# Patient Record
Sex: FEMALE | Race: WHITE | Employment: OTHER | URBAN - METROPOLITAN AREA
[De-identification: names, ages, dates, MRNs, and addresses within clinical notes are randomized per-mention and may not be internally consistent; named-entity substitution may affect disease eponyms.]

---

## 2017-01-09 ENCOUNTER — OFFICE VISIT (OUTPATIENT)
Dept: INTERNAL MEDICINE CLINIC | Facility: CLINIC | Age: 82
End: 2017-01-09

## 2017-01-09 VITALS
RESPIRATION RATE: 16 BRPM | TEMPERATURE: 99 F | WEIGHT: 150 LBS | DIASTOLIC BLOOD PRESSURE: 64 MMHG | SYSTOLIC BLOOD PRESSURE: 100 MMHG | HEART RATE: 64 BPM | BODY MASS INDEX: 27.6 KG/M2 | HEIGHT: 62 IN

## 2017-01-09 DIAGNOSIS — R42 VERTIGO: Primary | ICD-10-CM

## 2017-01-09 DIAGNOSIS — F41.9 ANXIETY: ICD-10-CM

## 2017-01-09 DIAGNOSIS — R26.89 BALANCE PROBLEM: ICD-10-CM

## 2017-01-09 PROCEDURE — 99213 OFFICE O/P EST LOW 20 MIN: CPT | Performed by: INTERNAL MEDICINE

## 2017-01-09 RX ORDER — ESCITALOPRAM OXALATE 5 MG/1
5 TABLET ORAL DAILY
Qty: 90 TABLET | Refills: 0 | Status: SHIPPED | OUTPATIENT
Start: 2017-01-09 | End: 2017-05-25

## 2017-01-09 RX ORDER — MECLIZINE HYDROCHLORIDE CHEWABLE TABLETS 25 MG/1
TABLET, CHEWABLE ORAL
Qty: 30 TABLET | Refills: 0 | Status: SHIPPED | OUTPATIENT
Start: 2017-01-09 | End: 2017-02-09

## 2017-01-09 NOTE — PROGRESS NOTES
Vannesa Sal is a 80year old female. Patient presents with:  Dizziness: pt thinks it is the lexapro causing this, along with nausea       HPI:     C/o dizziness with any head turns for the last week. +nausea with it.  No uri symptoms or vomiting, no f/c/CP 9636,9194        • H/O mammogram 2011     left breast   • Arthritis      knees and shoulders   • Cancer [facility])      breast ca   • Breast CA ([facility]    • Rheumatic fever    • History of endoscopy 2013     Dr. Aman Brown   • H/O mammogr to left, walking slow b/c balance feels slightly off, no focal deficits    ASSESSMENT AND PLAN:   Vertigo  (primary encounter diagnosis)- meclizine prn, PT, see me in 2 weeks if not better  Anxiety- continue lexapro.  Discussed I do not think dizziness is r

## 2017-01-12 ENCOUNTER — HOSPITAL ENCOUNTER (OUTPATIENT)
Dept: PHYSICAL THERAPY | Facility: HOSPITAL | Age: 82
Setting detail: THERAPIES SERIES
Discharge: HOME | End: 2017-01-12
Attending: INTERNAL MEDICINE
Payer: MEDICARE

## 2017-01-12 DIAGNOSIS — R26.89 BALANCE PROBLEM: ICD-10-CM

## 2017-01-12 DIAGNOSIS — R42 VERTIGO: Primary | ICD-10-CM

## 2017-01-12 PROCEDURE — 97162 PT EVAL MOD COMPLEX 30 MIN: CPT

## 2017-01-12 NOTE — PROGRESS NOTES
VESTIBULAR EVALUATION:   Referring Physician: Hellen Staley  Diagnosis: Vertigo/Balance disturbance     Date of Service: 1/12/2017     PATIENT Clayton Sal is a 80year old year old y/o female who presents to therapy today with complaints of dizziness mobility, and household tasks without limitations or dizziness prior to 1 week ago. Pt goals include finding out what is causing her symptoms and what is going on so it doesn't happen again. Past medical history was reviewed with Fe Sal.  Significant pursuit:  Normal  Saccades:  Normal   Gaze Evoked Nystagmus:  Normal   Vergence: Normal  VOR slow Right: Normal; Left Normal  VOR Head Thrust:  Normal   VOR-C: Normal    Neurological Exam:   Vertebro/Basilar Artery: Provocation testing in sitting was negat to [phone].  If you have any questions, please contact me at Dept: [phone]    Sincerely,  Electronically signed by therapist: Hailey Valladares DPT    [de-identified] certification required: Yes  I certify the need for these services furnished under this

## 2017-02-09 ENCOUNTER — OFFICE VISIT (OUTPATIENT)
Dept: INTERNAL MEDICINE CLINIC | Facility: CLINIC | Age: 82
End: 2017-02-09

## 2017-02-09 VITALS
DIASTOLIC BLOOD PRESSURE: 68 MMHG | SYSTOLIC BLOOD PRESSURE: 120 MMHG | HEIGHT: 62 IN | WEIGHT: 152.63 LBS | TEMPERATURE: 98 F | RESPIRATION RATE: 16 BRPM | BODY MASS INDEX: 28.09 KG/M2 | HEART RATE: 68 BPM

## 2017-02-09 DIAGNOSIS — Z00.00 ENCOUNTER FOR ANNUAL HEALTH EXAMINATION: Primary | ICD-10-CM

## 2017-02-09 DIAGNOSIS — N20.0 NEPHROLITHIASIS: ICD-10-CM

## 2017-02-09 DIAGNOSIS — Z12.31 ENCOUNTER FOR SCREENING MAMMOGRAM FOR MALIGNANT NEOPLASM OF BREAST: ICD-10-CM

## 2017-02-09 DIAGNOSIS — N28.1 BILATERAL RENAL CYSTS: ICD-10-CM

## 2017-02-09 DIAGNOSIS — E78.00 PURE HYPERCHOLESTEROLEMIA: ICD-10-CM

## 2017-02-09 DIAGNOSIS — F41.9 ANXIETY: ICD-10-CM

## 2017-02-09 DIAGNOSIS — Z12.39 SCREENING FOR MALIGNANT NEOPLASM OF BREAST: ICD-10-CM

## 2017-02-09 DIAGNOSIS — I10 ESSENTIAL HYPERTENSION: ICD-10-CM

## 2017-02-09 DIAGNOSIS — M19.019 SHOULDER ARTHRITIS: ICD-10-CM

## 2017-02-09 DIAGNOSIS — K76.0 FATTY INFILTRATION OF LIVER: ICD-10-CM

## 2017-02-09 DIAGNOSIS — Z90.11 H/O RIGHT MASTECTOMY: ICD-10-CM

## 2017-02-09 DIAGNOSIS — I77.811 ECTATIC ABDOMINAL AORTA (HCC): ICD-10-CM

## 2017-02-09 PROCEDURE — 96160 PT-FOCUSED HLTH RISK ASSMT: CPT | Performed by: INTERNAL MEDICINE

## 2017-02-09 PROCEDURE — G0439 PPPS, SUBSEQ VISIT: HCPCS | Performed by: INTERNAL MEDICINE

## 2017-02-09 PROCEDURE — 99397 PER PM REEVAL EST PAT 65+ YR: CPT | Performed by: INTERNAL MEDICINE

## 2017-02-09 NOTE — PATIENT INSTRUCTIONS
Fe Sal's SCREENING SCHEDULE   Tests on this list are recommended by your physician but may not be covered, or covered at this frequency, by your insurer. Please check with your insurance carrier before scheduling to verify coverage.    PREVENTATIVE S with a family history    Colorectal Cancer Screening  Covered up to Age 76     Colonoscopy Screen   Covered every 10 years- more often if abnormal Colonoscopy,5 Years due on 08/21/2018 Update Health Maintenance if applicable    Flex Sigmoidoscopy Screen  C visit on 10/28/15  -FLU VACC PRSV FREE INC ANTIG   Orders placed or performed in visit on 10/21/14  -INFLUENZA VIRUS VACCINE, >=1YEARS OF AGE   Orders placed or performed in visit on 10/09/12  -INFLUENZA VIRUS VACCINE, PRESERV FREE, >=1YEARS OF AGE    Pl (the forms are also available in [place]  [email or web address] org  This link also has information from the [facility] regarding Advance Directives.

## 2017-02-09 NOTE — PROGRESS NOTES
HPI:   Ibeth Hall is a 80year old female who presents for a MA (Medicare Advantage) Supervisit (Once per calendar year). Anxiety - doing great on lexapro 5mg daily, sleeping well.  Good energy  Renal cysts/kidney stones- needs to see Dr. Jesus Banks this Benjamin Martinez MD:  escitalopram 5 MG Oral Tab Take 1 tablet (5 mg total) by mouth daily. AmLODIPine Besylate 5 MG Oral Tab Take 1 tablet (5 mg total) by mouth once daily.    Multiple Vitamins-Minerals (ICAPS AREDS 2 OR) Take 1 capsule by mouth 2 (two) times daily any unusual skin lesions  EYES: denies blurred vision or double vision  HEENT: denies nasal congestion, sinus pain or ST  LUNGS: denies shortness of breath  CARDIOVASCULAR: denies chest pain on exertion  GI: denies abdominal pain, denies heartburn  : den supraclavicular, and axillary nodes normal   Neurologic: Normal       SUGGESTED VACCINATIONS - Influenza, Pneumococcal, Zoster, Tetanus     Immunization History   Administered Date(s) Administered   • >=3 YRS TRI  MULTIDOSE VIAL (69416) FLU CLINIC 10/21/20 Ms. Jeanine Blackwell does not currently take aspirin. Patient is already taking aspirin      Diet assessment: good     Advanced Directive:  Living Will on file in [facility]? Fe Sal does not have a Living Will on file in [facility]  Discussed with patient and provided you have difficulty seeing?: 0-No    Do you have any difficulty walking or getting up?: 0-No    Do you have any tripping hazards?: 0-No    Are you on multiple medications?: 1-Yes    Does pain affect your day to day activities?: 0-No     Have you had any me 10 years No results found for this or any previous visit. No flowsheet data found. Fecal Occult Blood Annually No results found for: FOB No flowsheet data found.     Glaucoma Screening      Ophthalmology Visit Annually: Diabetics, FHx Glaucoma, AA>50, H 10/03/2016 4.2   12/05/2014 4.6    No flowsheet data found. Creatinine  Annually CREATININE (mg/dL)   Date Value   10/03/2016 0.96   12/05/2014 1.04*    No flowsheet data found.     BUN  Annually BUN (mg/dL)   Date Value   10/03/2016 17     UREA NITROG

## 2017-02-20 ENCOUNTER — LAB ENCOUNTER (OUTPATIENT)
Dept: LAB | Age: 82
End: 2017-02-20
Attending: INTERNAL MEDICINE
Payer: MEDICARE

## 2017-02-20 DIAGNOSIS — E78.00 PURE HYPERCHOLESTEROLEMIA: ICD-10-CM

## 2017-02-20 DIAGNOSIS — F41.9 ANXIETY: ICD-10-CM

## 2017-02-20 DIAGNOSIS — I10 ESSENTIAL HYPERTENSION: ICD-10-CM

## 2017-02-20 DIAGNOSIS — N20.0 NEPHROLITHIASIS: ICD-10-CM

## 2017-02-20 LAB
ALBUMIN SERPL-MCNC: 3.8 G/DL (ref 3.5–4.8)
ALP LIVER SERPL-CCNC: 67 U/L (ref 55–142)
ALT SERPL-CCNC: 31 U/L (ref 14–54)
AST SERPL-CCNC: 21 U/L (ref 15–41)
BASOPHILS # BLD AUTO: 0.06 X10(3) UL (ref 0–0.1)
BASOPHILS NFR BLD AUTO: 0.9 %
BILIRUB SERPL-MCNC: 0.9 MG/DL (ref 0.1–2)
BUN BLD-MCNC: 13 MG/DL (ref 8–20)
CALCIUM BLD-MCNC: 10.1 MG/DL (ref 8.3–10.3)
CHLORIDE: 105 MMOL/L (ref 101–111)
CHOLEST SMN-MCNC: 162 MG/DL (ref ?–200)
CO2: 29 MMOL/L (ref 22–32)
CREAT BLD-MCNC: 1.06 MG/DL (ref 0.55–1.02)
EOSINOPHIL # BLD AUTO: 0.14 X10(3) UL (ref 0–0.3)
EOSINOPHIL NFR BLD AUTO: 2.1 %
ERYTHROCYTE [DISTWIDTH] IN BLOOD BY AUTOMATED COUNT: 13.2 % (ref 11.5–16)
GLUCOSE BLD-MCNC: 96 MG/DL (ref 70–99)
HCT VFR BLD AUTO: 44 % (ref 34–50)
HDLC SERPL-MCNC: 42 MG/DL (ref 45–?)
HDLC SERPL: 3.86 {RATIO} (ref ?–4.44)
HGB BLD-MCNC: 14.2 G/DL (ref 12–16)
IMMATURE GRANULOCYTE COUNT: 0.02 X10(3) UL (ref 0–1)
IMMATURE GRANULOCYTE RATIO %: 0.3 %
LDLC SERPL CALC-MCNC: 92 MG/DL (ref ?–130)
LYMPHOCYTES # BLD AUTO: 1.69 X10(3) UL (ref 0.9–4)
LYMPHOCYTES NFR BLD AUTO: 25.9 %
M PROTEIN MFR SERPL ELPH: 7.7 G/DL (ref 6.1–8.3)
MCH RBC QN AUTO: 29 PG (ref 27–33.2)
MCHC RBC AUTO-ENTMCNC: 32.3 G/DL (ref 31–37)
MCV RBC AUTO: 90 FL (ref 81–100)
MONOCYTES # BLD AUTO: 0.69 X10(3) UL (ref 0.1–0.6)
MONOCYTES NFR BLD AUTO: 10.6 %
NEUTROPHIL ABS PRELIM: 3.93 X10 (3) UL (ref 1.3–6.7)
NEUTROPHILS # BLD AUTO: 3.93 X10(3) UL (ref 1.3–6.7)
NEUTROPHILS NFR BLD AUTO: 60.2 %
NONHDLC SERPL-MCNC: 120 MG/DL (ref ?–130)
PLATELET # BLD AUTO: 314 10(3)UL (ref 150–450)
POTASSIUM SERPL-SCNC: 4.1 MMOL/L (ref 3.6–5.1)
RBC # BLD AUTO: 4.89 X10(6)UL (ref 3.8–5.1)
RED CELL DISTRIBUTION WIDTH-SD: 43.5 FL (ref 35.1–46.3)
SODIUM SERPL-SCNC: 138 MMOL/L (ref 136–144)
TRIGLYCERIDES: 141 MG/DL (ref ?–150)
TSI SER-ACNC: 2.39 MIU/ML (ref 0.35–5.5)
VLDL: 28 MG/DL (ref 5–40)
WBC # BLD AUTO: 6.5 X10(3) UL (ref 4–13)

## 2017-02-20 PROCEDURE — 80053 COMPREHEN METABOLIC PANEL: CPT

## 2017-02-20 PROCEDURE — 84443 ASSAY THYROID STIM HORMONE: CPT

## 2017-02-20 PROCEDURE — 80061 LIPID PANEL: CPT

## 2017-02-20 PROCEDURE — 85025 COMPLETE CBC W/AUTO DIFF WBC: CPT

## 2017-03-23 ENCOUNTER — HOSPITAL ENCOUNTER (OUTPATIENT)
Dept: MAMMOGRAPHY | Age: 82
Discharge: HOME | End: 2017-03-23
Attending: INTERNAL MEDICINE
Payer: MEDICARE

## 2017-03-23 DIAGNOSIS — Z90.11 H/O RIGHT MASTECTOMY: ICD-10-CM

## 2017-03-23 DIAGNOSIS — Z12.31 ENCOUNTER FOR SCREENING MAMMOGRAM FOR MALIGNANT NEOPLASM OF BREAST: ICD-10-CM

## 2017-03-23 PROCEDURE — 77067 SCR MAMMO BI INCL CAD: CPT

## 2017-05-25 ENCOUNTER — OFFICE VISIT (OUTPATIENT)
Dept: INTERNAL MEDICINE CLINIC | Facility: CLINIC | Age: 82
End: 2017-05-25

## 2017-05-25 VITALS
WEIGHT: 151 LBS | SYSTOLIC BLOOD PRESSURE: 118 MMHG | TEMPERATURE: 99 F | DIASTOLIC BLOOD PRESSURE: 66 MMHG | HEART RATE: 64 BPM | BODY MASS INDEX: 27.79 KG/M2 | HEIGHT: 62 IN

## 2017-05-25 DIAGNOSIS — R53.82 CHRONIC FATIGUE: Primary | ICD-10-CM

## 2017-05-25 DIAGNOSIS — F41.9 ANXIETY: ICD-10-CM

## 2017-05-25 DIAGNOSIS — M54.50 ACUTE BILATERAL LOW BACK PAIN WITHOUT SCIATICA: ICD-10-CM

## 2017-05-25 PROBLEM — D37.8 NEOPLASM OF UNCERTAIN BEHAVIOR OF STOMACH, INTESTINES, AND RECTUM: Status: ACTIVE | Noted: 2017-05-25

## 2017-05-25 PROBLEM — D37.5 NEOPLASM OF UNCERTAIN BEHAVIOR OF STOMACH, INTESTINES, AND RECTUM: Status: ACTIVE | Noted: 2017-05-25

## 2017-05-25 PROBLEM — D37.1 NEOPLASM OF UNCERTAIN BEHAVIOR OF STOMACH, INTESTINES, AND RECTUM: Status: ACTIVE | Noted: 2017-05-25

## 2017-05-25 PROBLEM — Z80.0 FAMILY HISTORY OF MALIGNANT NEOPLASM OF GASTROINTESTINAL TRACT: Status: ACTIVE | Noted: 2017-05-25

## 2017-05-25 PROCEDURE — 99214 OFFICE O/P EST MOD 30 MIN: CPT | Performed by: INTERNAL MEDICINE

## 2017-05-25 RX ORDER — ESCITALOPRAM OXALATE 5 MG/1
5 TABLET ORAL DAILY
Qty: 90 TABLET | Refills: 1 | Status: SHIPPED | OUTPATIENT
Start: 2017-05-25 | End: 2017-11-30

## 2017-05-25 NOTE — PROGRESS NOTES
Nicolas Sal is a 80year old female. Patient presents with: Follow - Up: 3 month  Fatigue  Pain: Pt. c/o back pain      HPI:     Patient here for f/u  Anxiety- has some down days but most are good, not excessively worrying like before.  No crying or lack (MULTIVITAMIN OR) Take  by mouth daily.  Disp:  Rfl:       Past Medical History   Diagnosis Date   • Other and unspecified personal history of malignant neoplasm 2001     dr. Pauline Lehman, ill  breast cancer right   • Other and unspecified hyperlipidem adenopathy  LUNGS: normal rate without respiratory distress, lungs clear to auscultation  CARDIO: RRR nl S1 S2  GI: normal bowel sounds, soft, NT/ND  Spine: paraspinal muscle spasm in the lumbar region, neg slr  EXTREMITIES: no cyanosis, clubbing or edema,

## 2017-06-12 RX ORDER — AMLODIPINE BESYLATE 5 MG/1
TABLET ORAL
Qty: 90 TABLET | Refills: 0 | Status: SHIPPED | OUTPATIENT
Start: 2017-06-12 | End: 2017-11-30

## 2017-08-11 ENCOUNTER — OFFICE VISIT (OUTPATIENT)
Dept: INTERNAL MEDICINE CLINIC | Facility: CLINIC | Age: 82
End: 2017-08-11

## 2017-08-11 VITALS
HEART RATE: 67 BPM | TEMPERATURE: 99 F | SYSTOLIC BLOOD PRESSURE: 122 MMHG | BODY MASS INDEX: 27.67 KG/M2 | WEIGHT: 150.38 LBS | HEIGHT: 62 IN | RESPIRATION RATE: 16 BRPM | DIASTOLIC BLOOD PRESSURE: 60 MMHG

## 2017-08-11 DIAGNOSIS — F41.9 ANXIETY AND DEPRESSION: ICD-10-CM

## 2017-08-11 DIAGNOSIS — R35.0 FREQUENCY OF URINATION: Primary | ICD-10-CM

## 2017-08-11 DIAGNOSIS — G89.29 CHRONIC RIGHT SHOULDER PAIN: ICD-10-CM

## 2017-08-11 DIAGNOSIS — F32.A ANXIETY AND DEPRESSION: ICD-10-CM

## 2017-08-11 DIAGNOSIS — M25.511 CHRONIC RIGHT SHOULDER PAIN: ICD-10-CM

## 2017-08-11 LAB
APPEARANCE: CLEAR
BILIRUBIN: NEGATIVE
GLUCOSE (URINE DIPSTICK): NEGATIVE MG/DL
KETONES (URINE DIPSTICK): NEGATIVE MG/DL
MULTISTIX LOT#: ABNORMAL NUMERIC
NITRITE, URINE: NEGATIVE
PH, URINE: 6.5 (ref 4.5–8)
SPECIFIC GRAVITY: 1.01 (ref 1–1.03)
URINE-COLOR: YELLOW
UROBILINOGEN,SEMI-QN: 0.2 MG/DL (ref 0–1.9)

## 2017-08-11 PROCEDURE — 87086 URINE CULTURE/COLONY COUNT: CPT | Performed by: INTERNAL MEDICINE

## 2017-08-11 PROCEDURE — 81003 URINALYSIS AUTO W/O SCOPE: CPT | Performed by: INTERNAL MEDICINE

## 2017-08-11 PROCEDURE — 99214 OFFICE O/P EST MOD 30 MIN: CPT | Performed by: INTERNAL MEDICINE

## 2017-08-11 RX ORDER — CIPROFLOXACIN 250 MG/1
250 TABLET, FILM COATED ORAL 2 TIMES DAILY
Qty: 14 TABLET | Refills: 0 | Status: SHIPPED | OUTPATIENT
Start: 2017-08-11 | End: 2017-08-18

## 2017-08-11 NOTE — PROGRESS NOTES
Natividad Sal is a [de-identified]year old female. Patient presents with:   Follow - Up: ROOM 3/ urine frequency/ nausea/ bilateral shoulder pain, right worse/ extreme fatigue/ all sx x2 weeks      HPI:     C/o urinary frequency, low grade fevers, and fatigue for 2+ wee CRANBERRY EXTRACT OR Take 4,200 mg by mouth 3 (three) times daily. Disp:  Rfl:    Omega-3 Fatty Acids (FISH OIL) 1200 MG Oral Cap Take 1,200 mg by mouth daily. Disp:  Rfl:    Calcium 600 MG Oral Tab Take 600 mg by mouth daily.  Disp:  Rfl:    Multiple Vit adenopathy      EXAM:   /60 (BP Location: Left arm, Patient Position: Sitting, Cuff Size: adult)   Pulse 67   Temp 98.6 °F (37 °C) (Oral)   Resp 16   Ht 62\"   Wt 150 lb 6.4 oz   BMI 27.51 kg/m²   GENERAL: well developed, well nourished,in no apparen

## 2017-09-11 DIAGNOSIS — I10 ESSENTIAL HYPERTENSION: ICD-10-CM

## 2017-09-11 RX ORDER — AMLODIPINE BESYLATE 5 MG/1
TABLET ORAL
Qty: 90 TABLET | Refills: 0 | Status: SHIPPED | OUTPATIENT
Start: 2017-09-11 | End: 2019-06-14

## 2017-09-22 ENCOUNTER — OFFICE VISIT (OUTPATIENT)
Dept: INTERNAL MEDICINE CLINIC | Facility: CLINIC | Age: 82
End: 2017-09-22

## 2017-09-22 ENCOUNTER — LAB ENCOUNTER (OUTPATIENT)
Dept: LAB | Age: 82
End: 2017-09-22
Attending: INTERNAL MEDICINE
Payer: MEDICARE

## 2017-09-22 VITALS
SYSTOLIC BLOOD PRESSURE: 120 MMHG | TEMPERATURE: 99 F | RESPIRATION RATE: 16 BRPM | HEART RATE: 68 BPM | DIASTOLIC BLOOD PRESSURE: 70 MMHG | HEIGHT: 62 IN | BODY MASS INDEX: 27.42 KG/M2 | WEIGHT: 149 LBS

## 2017-09-22 DIAGNOSIS — R53.81 MALAISE AND FATIGUE: Primary | ICD-10-CM

## 2017-09-22 DIAGNOSIS — F41.9 ANXIETY AND DEPRESSION: ICD-10-CM

## 2017-09-22 DIAGNOSIS — R11.0 NAUSEA: ICD-10-CM

## 2017-09-22 DIAGNOSIS — R53.83 MALAISE AND FATIGUE: ICD-10-CM

## 2017-09-22 DIAGNOSIS — Z23 NEED FOR INFLUENZA VACCINATION: ICD-10-CM

## 2017-09-22 DIAGNOSIS — R53.83 MALAISE AND FATIGUE: Primary | ICD-10-CM

## 2017-09-22 DIAGNOSIS — F32.A ANXIETY AND DEPRESSION: ICD-10-CM

## 2017-09-22 DIAGNOSIS — R53.81 MALAISE AND FATIGUE: ICD-10-CM

## 2017-09-22 LAB
ALBUMIN SERPL-MCNC: 3.6 G/DL (ref 3.5–4.8)
ALP LIVER SERPL-CCNC: 78 U/L (ref 55–142)
ALT SERPL-CCNC: 35 U/L (ref 14–54)
AST SERPL-CCNC: 22 U/L (ref 15–41)
BASOPHILS # BLD AUTO: 0.06 X10(3) UL (ref 0–0.1)
BASOPHILS NFR BLD AUTO: 0.7 %
BILIRUB SERPL-MCNC: 0.6 MG/DL (ref 0.1–2)
BUN BLD-MCNC: 21 MG/DL (ref 8–20)
CALCIUM BLD-MCNC: 9.8 MG/DL (ref 8.3–10.3)
CHLORIDE: 102 MMOL/L (ref 101–111)
CO2: 30 MMOL/L (ref 22–32)
CREAT BLD-MCNC: 1.09 MG/DL (ref 0.55–1.02)
EOSINOPHIL # BLD AUTO: 0.16 X10(3) UL (ref 0–0.3)
EOSINOPHIL NFR BLD AUTO: 1.8 %
ERYTHROCYTE [DISTWIDTH] IN BLOOD BY AUTOMATED COUNT: 13.4 % (ref 11.5–16)
FREE T4: 1 NG/DL (ref 0.9–1.8)
GLUCOSE BLD-MCNC: 110 MG/DL (ref 70–99)
HCT VFR BLD AUTO: 42.2 % (ref 34–50)
HGB BLD-MCNC: 13.5 G/DL (ref 12–16)
IMMATURE GRANULOCYTE COUNT: 0.02 X10(3) UL (ref 0–1)
IMMATURE GRANULOCYTE RATIO %: 0.2 %
LYMPHOCYTES # BLD AUTO: 2.39 X10(3) UL (ref 0.9–4)
LYMPHOCYTES NFR BLD AUTO: 26.4 %
M PROTEIN MFR SERPL ELPH: 7.7 G/DL (ref 6.1–8.3)
MCH RBC QN AUTO: 28.2 PG (ref 27–33.2)
MCHC RBC AUTO-ENTMCNC: 32 G/DL (ref 31–37)
MCV RBC AUTO: 88.3 FL (ref 81–100)
MONOCYTES # BLD AUTO: 1.15 X10(3) UL (ref 0.1–0.6)
MONOCYTES NFR BLD AUTO: 12.7 %
NEUTROPHIL ABS PRELIM: 5.27 X10 (3) UL (ref 1.3–6.7)
NEUTROPHILS # BLD AUTO: 5.27 X10(3) UL (ref 1.3–6.7)
NEUTROPHILS NFR BLD AUTO: 58.2 %
PLATELET # BLD AUTO: 307 10(3)UL (ref 150–450)
POTASSIUM SERPL-SCNC: 3.7 MMOL/L (ref 3.6–5.1)
RBC # BLD AUTO: 4.78 X10(6)UL (ref 3.8–5.1)
RED CELL DISTRIBUTION WIDTH-SD: 43.6 FL (ref 35.1–46.3)
SODIUM SERPL-SCNC: 137 MMOL/L (ref 136–144)
TSI SER-ACNC: 2.47 MIU/ML (ref 0.35–5.5)
WBC # BLD AUTO: 9.1 X10(3) UL (ref 4–13)

## 2017-09-22 PROCEDURE — 93000 ELECTROCARDIOGRAM COMPLETE: CPT | Performed by: INTERNAL MEDICINE

## 2017-09-22 PROCEDURE — G0008 ADMIN INFLUENZA VIRUS VAC: HCPCS | Performed by: INTERNAL MEDICINE

## 2017-09-22 PROCEDURE — 80050 GENERAL HEALTH PANEL: CPT | Performed by: INTERNAL MEDICINE

## 2017-09-22 PROCEDURE — 99214 OFFICE O/P EST MOD 30 MIN: CPT | Performed by: INTERNAL MEDICINE

## 2017-09-22 PROCEDURE — 90653 IIV ADJUVANT VACCINE IM: CPT | Performed by: INTERNAL MEDICINE

## 2017-09-22 PROCEDURE — 84439 ASSAY OF FREE THYROXINE: CPT | Performed by: INTERNAL MEDICINE

## 2017-09-22 NOTE — PROGRESS NOTES
Mariama Sal is a [de-identified]year old female. Patient presents with: Follow - Up: 6 week , wants to do labs  Fatigue  Nausea      HPI:     Patient here for f/u  C/o nausea intermittently for 6-8 weeks, no vomiting or abdominal pain with it. No f/c/BM changes.  No Disp:  Rfl:    PRAVASTATIN SODIUM 10 MG Oral Tab TAKE 1 TABLET BY MOUTH EVERY NIGHT AT BEDTIME Disp: 90 tablet Rfl: 3   CRANBERRY EXTRACT OR Take 4,200 mg by mouth 3 (three) times daily.  Disp:  Rfl:    Omega-3 Fatty Acids (FISH OIL) 1200 MG Oral Cap Take 1 pain on exertion, no palpatations  GI: denies abdominal pain and denies heartburn, +nausea  : no dysuria, hematuria  NEURO: denies headaches  PSYCH: as above  RHEUM: No reported joint swelling  HEME: No adenopathy      EXAM:   /70 (BP Location: Rig

## 2017-10-05 ENCOUNTER — HOSPITAL ENCOUNTER (OUTPATIENT)
Dept: ULTRASOUND IMAGING | Age: 82
Discharge: HOME | End: 2017-10-05
Attending: INTERNAL MEDICINE
Payer: MEDICARE

## 2017-10-05 DIAGNOSIS — R53.83 FATIGUE, UNSPECIFIED TYPE: ICD-10-CM

## 2017-10-05 DIAGNOSIS — R11.0 NAUSEA: ICD-10-CM

## 2017-10-05 PROCEDURE — 76700 US EXAM ABDOM COMPLETE: CPT | Performed by: INTERNAL MEDICINE

## 2017-11-14 RX ORDER — PRAVASTATIN SODIUM 10 MG
TABLET ORAL
Qty: 90 TABLET | Refills: 0 | Status: SHIPPED | OUTPATIENT
Start: 2017-11-14 | End: 2018-02-14

## 2017-11-30 ENCOUNTER — OFFICE VISIT (OUTPATIENT)
Dept: INTERNAL MEDICINE CLINIC | Facility: CLINIC | Age: 82
End: 2017-11-30

## 2017-11-30 VITALS
BODY MASS INDEX: 27.23 KG/M2 | WEIGHT: 148 LBS | HEART RATE: 66 BPM | SYSTOLIC BLOOD PRESSURE: 116 MMHG | HEIGHT: 62 IN | DIASTOLIC BLOOD PRESSURE: 68 MMHG | RESPIRATION RATE: 17 BRPM

## 2017-11-30 DIAGNOSIS — R53.81 MALAISE AND FATIGUE: Primary | ICD-10-CM

## 2017-11-30 DIAGNOSIS — F41.9 ANXIETY: ICD-10-CM

## 2017-11-30 DIAGNOSIS — I10 ESSENTIAL HYPERTENSION: ICD-10-CM

## 2017-11-30 DIAGNOSIS — N28.9 RENAL INSUFFICIENCY: ICD-10-CM

## 2017-11-30 DIAGNOSIS — E55.9 VITAMIN D DEFICIENCY: ICD-10-CM

## 2017-11-30 DIAGNOSIS — R53.83 MALAISE AND FATIGUE: Primary | ICD-10-CM

## 2017-11-30 PROCEDURE — 99214 OFFICE O/P EST MOD 30 MIN: CPT | Performed by: INTERNAL MEDICINE

## 2017-11-30 RX ORDER — ESCITALOPRAM OXALATE 5 MG/1
5 TABLET ORAL DAILY
Qty: 90 TABLET | Refills: 1 | Status: SHIPPED | OUTPATIENT
Start: 2017-11-30 | End: 2018-06-26

## 2017-11-30 NOTE — PROGRESS NOTES
Pari Sal is a [de-identified]year old female. Patient presents with: Follow - Up: 6 month f/u , lab results and US results  Fatigue: wants labs checked  HTN      HPI:     Patient here for f/u  Nausea - cleared on its own, labs and US were ok.  Appetite is good, no (MULTIVITAMIN OR) Take  by mouth daily. Disp:  Rfl:       Past Medical History:   Diagnosis Date   • Arthritis     knees and shoulders   • Breast CA ([facility] [address] 2001    right breast cancer with mastectomy.    • Cancer [facility]) 2001    right breast ca   • Cystitis, i clear  NECK: supple,no adenopathy  LUNGS: normal rate without respiratory distress, lungs clear to auscultation  CARDIO: RRR nl S1 S2  GI: normal bowel sounds, no masses, HSM or tenderness  EXTREMITIES: no cyanosis, clubbing or edema, normal strength and t

## 2017-12-01 ENCOUNTER — LAB ENCOUNTER (OUTPATIENT)
Dept: LAB | Age: 82
End: 2017-12-01
Attending: INTERNAL MEDICINE
Payer: MEDICARE

## 2017-12-01 DIAGNOSIS — R53.83 MALAISE AND FATIGUE: ICD-10-CM

## 2017-12-01 DIAGNOSIS — R53.81 MALAISE AND FATIGUE: ICD-10-CM

## 2017-12-01 DIAGNOSIS — I10 ESSENTIAL HYPERTENSION: ICD-10-CM

## 2017-12-01 DIAGNOSIS — E55.9 VITAMIN D DEFICIENCY: ICD-10-CM

## 2017-12-01 DIAGNOSIS — N28.9 RENAL INSUFFICIENCY: ICD-10-CM

## 2017-12-01 PROCEDURE — 82607 VITAMIN B-12: CPT | Performed by: INTERNAL MEDICINE

## 2017-12-01 PROCEDURE — 80048 BASIC METABOLIC PNL TOTAL CA: CPT | Performed by: INTERNAL MEDICINE

## 2017-12-01 PROCEDURE — 82306 VITAMIN D 25 HYDROXY: CPT | Performed by: INTERNAL MEDICINE

## 2017-12-08 DIAGNOSIS — I10 ESSENTIAL HYPERTENSION: ICD-10-CM

## 2017-12-08 RX ORDER — AMLODIPINE BESYLATE 5 MG/1
TABLET ORAL
Qty: 90 TABLET | Refills: 0 | Status: SHIPPED | OUTPATIENT
Start: 2017-12-08 | End: 2018-04-09

## 2017-12-29 DIAGNOSIS — F41.9 ANXIETY: ICD-10-CM

## 2017-12-29 RX ORDER — ESCITALOPRAM OXALATE 5 MG/1
TABLET ORAL
Qty: 90 TABLET | Refills: 0 | OUTPATIENT
Start: 2017-12-29

## 2018-02-14 RX ORDER — PRAVASTATIN SODIUM 10 MG
TABLET ORAL
Qty: 90 TABLET | Refills: 1 | Status: SHIPPED | OUTPATIENT
Start: 2018-02-14 | End: 2018-08-24

## 2018-03-06 ENCOUNTER — TELEPHONE (OUTPATIENT)
Dept: INTERNAL MEDICINE CLINIC | Facility: CLINIC | Age: 83
End: 2018-03-06

## 2018-03-06 DIAGNOSIS — Z12.39 SCREENING FOR MALIGNANT NEOPLASM OF BREAST: ICD-10-CM

## 2018-03-06 DIAGNOSIS — Z90.11 H/O RIGHT MASTECTOMY: Primary | ICD-10-CM

## 2018-03-07 DIAGNOSIS — I10 ESSENTIAL HYPERTENSION: ICD-10-CM

## 2018-03-07 RX ORDER — AMLODIPINE BESYLATE 5 MG/1
TABLET ORAL
Qty: 90 TABLET | Refills: 0 | Status: SHIPPED | OUTPATIENT
Start: 2018-03-07 | End: 2018-04-09

## 2018-03-26 ENCOUNTER — HOSPITAL ENCOUNTER (OUTPATIENT)
Dept: MAMMOGRAPHY | Age: 83
Discharge: HOME | End: 2018-03-26
Attending: INTERNAL MEDICINE
Payer: MEDICARE

## 2018-03-26 DIAGNOSIS — Z12.39 SCREENING FOR MALIGNANT NEOPLASM OF BREAST: ICD-10-CM

## 2018-03-26 DIAGNOSIS — Z90.11 H/O RIGHT MASTECTOMY: ICD-10-CM

## 2018-03-26 PROCEDURE — 77067 SCR MAMMO BI INCL CAD: CPT | Performed by: INTERNAL MEDICINE

## 2018-04-09 ENCOUNTER — TELEPHONE (OUTPATIENT)
Dept: INTERNAL MEDICINE CLINIC | Facility: CLINIC | Age: 83
End: 2018-04-09

## 2018-04-09 ENCOUNTER — OFFICE VISIT (OUTPATIENT)
Dept: INTERNAL MEDICINE CLINIC | Facility: CLINIC | Age: 83
End: 2018-04-09

## 2018-04-09 VITALS
SYSTOLIC BLOOD PRESSURE: 120 MMHG | WEIGHT: 144 LBS | TEMPERATURE: 99 F | BODY MASS INDEX: 26.5 KG/M2 | HEIGHT: 62 IN | DIASTOLIC BLOOD PRESSURE: 60 MMHG | HEART RATE: 68 BPM

## 2018-04-09 DIAGNOSIS — R42 LIGHTHEADED: Primary | ICD-10-CM

## 2018-04-09 DIAGNOSIS — I10 ESSENTIAL HYPERTENSION: ICD-10-CM

## 2018-04-09 DIAGNOSIS — F41.9 ANXIETY: ICD-10-CM

## 2018-04-09 DIAGNOSIS — R11.0 NAUSEA: ICD-10-CM

## 2018-04-09 DIAGNOSIS — E78.00 PURE HYPERCHOLESTEROLEMIA: ICD-10-CM

## 2018-04-09 PROCEDURE — 93000 ELECTROCARDIOGRAM COMPLETE: CPT | Performed by: INTERNAL MEDICINE

## 2018-04-09 PROCEDURE — 99214 OFFICE O/P EST MOD 30 MIN: CPT | Performed by: INTERNAL MEDICINE

## 2018-04-09 RX ORDER — ONDANSETRON 4 MG/1
4 TABLET, FILM COATED ORAL EVERY 8 HOURS PRN
Qty: 30 TABLET | Refills: 0 | Status: SHIPPED | OUTPATIENT
Start: 2018-04-09 | End: 2018-04-26

## 2018-04-09 NOTE — TELEPHONE ENCOUNTER
Patient states she has had dizziness, lightheadedness and nausea for about one month, no appetite, no energy, no new medications except for Lexapro 11/2017, ok as long as she is lying down, feels better after lying down gets up and starts all over, drinkin

## 2018-04-09 NOTE — TELEPHONE ENCOUNTER
Pt has been feeling dizzy and lightheaded and nauseated for about the past 2 weeks. Not sure if it is your medication. It goes on throughout the day.

## 2018-04-09 NOTE — PROGRESS NOTES
Hossein Sal is a [de-identified]year old female. Patient presents with:  Dizziness: LG. Room 4. Dizziness, nausea, fatigue and lightheadedness for about 2 weeks.  Doesn't notice anything triggering the dizziness  Nausea  Anxiety      HPI:     Patient here with several (two) times daily. Disp:  Rfl:    CRANBERRY EXTRACT OR Take 4,200 mg by mouth 3 (three) times daily. Disp:  Rfl:    Omega-3 Fatty Acids (FISH OIL) 1200 MG Oral Cap Take 1,200 mg by mouth daily.  Disp:  Rfl:    Calcium 600 MG Oral Tab Take 600 mg by mouth da joint swelling  HEME: No easy bruising, bleeding or adenopathy      EXAM:   /60 (BP Location: Left arm, Patient Position: Sitting, Cuff Size: adult)   Pulse 68   Temp 98.7 °F (37.1 °C) (Oral)   Ht 62\"   Wt 144 lb   BMI 26.34 kg/m²   GENERAL: well de

## 2018-04-10 ENCOUNTER — LAB ENCOUNTER (OUTPATIENT)
Dept: LAB | Age: 83
End: 2018-04-10
Attending: INTERNAL MEDICINE
Payer: MEDICARE

## 2018-04-10 DIAGNOSIS — R42 LIGHTHEADED: ICD-10-CM

## 2018-04-10 DIAGNOSIS — R11.0 NAUSEA: ICD-10-CM

## 2018-04-10 DIAGNOSIS — F41.9 ANXIETY: ICD-10-CM

## 2018-04-10 PROCEDURE — 80053 COMPREHEN METABOLIC PANEL: CPT | Performed by: INTERNAL MEDICINE

## 2018-04-10 PROCEDURE — 84443 ASSAY THYROID STIM HORMONE: CPT | Performed by: INTERNAL MEDICINE

## 2018-04-10 PROCEDURE — 85025 COMPLETE CBC W/AUTO DIFF WBC: CPT | Performed by: INTERNAL MEDICINE

## 2018-04-26 ENCOUNTER — OFFICE VISIT (OUTPATIENT)
Dept: INTERNAL MEDICINE CLINIC | Facility: CLINIC | Age: 83
End: 2018-04-26

## 2018-04-26 VITALS
TEMPERATURE: 97 F | BODY MASS INDEX: 26.13 KG/M2 | DIASTOLIC BLOOD PRESSURE: 70 MMHG | WEIGHT: 142 LBS | RESPIRATION RATE: 16 BRPM | SYSTOLIC BLOOD PRESSURE: 112 MMHG | HEART RATE: 72 BPM | HEIGHT: 62 IN

## 2018-04-26 DIAGNOSIS — F32.A MILD DEPRESSION: ICD-10-CM

## 2018-04-26 DIAGNOSIS — N20.0 NEPHROLITHIASIS: ICD-10-CM

## 2018-04-26 DIAGNOSIS — M25.511 CHRONIC RIGHT SHOULDER PAIN: ICD-10-CM

## 2018-04-26 DIAGNOSIS — F41.9 ANXIETY: ICD-10-CM

## 2018-04-26 DIAGNOSIS — I10 ESSENTIAL HYPERTENSION: ICD-10-CM

## 2018-04-26 DIAGNOSIS — R53.82 CHRONIC FATIGUE: ICD-10-CM

## 2018-04-26 DIAGNOSIS — I77.811 ECTATIC ABDOMINAL AORTA (HCC): ICD-10-CM

## 2018-04-26 DIAGNOSIS — Z00.00 ENCOUNTER FOR ANNUAL HEALTH EXAMINATION: Primary | ICD-10-CM

## 2018-04-26 DIAGNOSIS — M19.019 SHOULDER ARTHRITIS: ICD-10-CM

## 2018-04-26 DIAGNOSIS — G89.29 CHRONIC RIGHT SHOULDER PAIN: ICD-10-CM

## 2018-04-26 DIAGNOSIS — Z90.11 H/O RIGHT MASTECTOMY: ICD-10-CM

## 2018-04-26 PROBLEM — M54.50 ACUTE BILATERAL LOW BACK PAIN WITHOUT SCIATICA: Status: RESOLVED | Noted: 2017-05-25 | Resolved: 2018-04-26

## 2018-04-26 PROCEDURE — G0439 PPPS, SUBSEQ VISIT: HCPCS | Performed by: INTERNAL MEDICINE

## 2018-04-26 PROCEDURE — 96160 PT-FOCUSED HLTH RISK ASSMT: CPT | Performed by: INTERNAL MEDICINE

## 2018-04-26 PROCEDURE — 99397 PER PM REEVAL EST PAT 65+ YR: CPT | Performed by: INTERNAL MEDICINE

## 2018-04-26 NOTE — PROGRESS NOTES
HPI:   Sammi Morfin is a [de-identified]year old female who presents for a MA (Medicare Advantage) Supervisit (Once per calendar year).     LH and dizzy feeling resolved with pushing fluids (seen 4/9/18), labs were wnl  Depression and Anxiety - doing well on lexapro 5 0 so is at low risk.     Patient Care Team: Patient Care Team:  Yury Gates MD as PCP - Laura Espinal MD ([facility] 8248)  Driss Reece PA-C (Physician Assistant)  David Tobar MD ([facility])  CHRISTY Zhu (Int Multiple Vitamins-Minerals (ICAPS AREDS 2 OR) Take 1 capsule by mouth 2 (two) times daily. CRANBERRY EXTRACT OR Take 4,200 mg by mouth 3 (three) times daily. Omega-3 Fatty Acids (FISH OIL) 1200 MG Oral Cap Take 1,200 mg by mouth daily.    Calcium 600 Weight as of this encounter: 142 lb.     Medicare Hearing Assessment  (Required for AWV/SWV)    Finger Rub  WNL     Visual Acuity WNL                           General Appearance:  Alert, cooperative, no distress, appears stated age   Head:  Normocephalic, visit:    Encounter for annual health examination- utd on cscope and mammogram. Declined shingrix vaccine due to side effect profile. Essential hypertension- controlled, cpm  -     LIPID PANEL;  Future    Ectatic abdominal aorta ([address]- mild ectasia and Cardiovascular Disease Screening     LDL Annually LDL-CHOLESTEROL (mg/dL (calc))   Date Value   12/05/2014 115     LDL Cholesterol (mg/dL)   Date Value   02/20/2017 92        EKG - w/ Initial Preventative Physical Exam only, or if medically necessary [place] for the mentally retarded   Persons who live in the same house as a HepB virus carrier   Homosexual men   Illicit injectable drug abusers     Tetanus Toxoid  Only covered with a cut with metal- TD and TDaP Not covered by Medicare Part B No vaccine history

## 2018-04-26 NOTE — PATIENT INSTRUCTIONS
Fe Sal's SCREENING SCHEDULE   Tests on this list are recommended by your physician but may not be covered, or covered at this frequency, by your insurer. Please check with your insurance carrier before scheduling to verify coverage.    PREVENTATIVE S following criteria:   • Men who are 73-68 years old and have smoked more than 100 cigarettes in their lifetime   • Anyone with a family history    Colorectal Cancer Screening  Covered up to Age 76     Colonoscopy Screen   Covered every 10 years- more often Immunizations      Influenza  Covered Annually   Orders placed or performed in visit on 11/10/16  -FLU VACC [address] ANTIG   Orders placed or performed in visit on 10/28/15  -FLU VACC PRSV FREE INC ANTIG   Orders placed or performed in visit on 10/2 different types of Advance Directives. It also has the State forms available on it's website for anyone to review and print using their home computer and printer. (the forms are also available in [address])  [email or web address]  This link also has informa

## 2018-05-01 ENCOUNTER — APPOINTMENT (OUTPATIENT)
Dept: LAB | Age: 83
End: 2018-05-01
Attending: INTERNAL MEDICINE
Payer: MEDICARE

## 2018-05-01 DIAGNOSIS — I10 ESSENTIAL HYPERTENSION: ICD-10-CM

## 2018-05-01 DIAGNOSIS — I77.811 ECTATIC ABDOMINAL AORTA (HCC): ICD-10-CM

## 2018-05-01 PROCEDURE — 80061 LIPID PANEL: CPT

## 2018-05-29 NOTE — PROGRESS NOTES
Edouard Sal is a [de-identified]year old female. Patient presents with: Follow - Up: to discuss medications for anxiety.  LB-room 4      HPI:     Patient here for f/u  Anxiety is much worse in the last 2 weeks, several nights she wakes up at 3am with panic attacks an 600 MG Oral Tab Take 600 mg by mouth daily. Disp:  Rfl:    Multiple Vitamins-Minerals (MULTIVITAMIN OR) Take  by mouth daily.  Disp:  Rfl:       Past Medical History:   Diagnosis Date   • Arthritis     knees and shoulders   • [facility]    right b atraumatic, normocephalic,ears and throat are clear  NECK: supple,no adenopathy  LUNGS: normal rate without respiratory distress, lungs clear to auscultation  CARDIO: RRR nl S1 S2   NEURO: Alert and oriented    ASSESSMENT AND PLAN:   Panic attack- resource

## 2018-06-05 DIAGNOSIS — I10 ESSENTIAL HYPERTENSION: ICD-10-CM

## 2018-06-05 RX ORDER — AMLODIPINE BESYLATE 5 MG/1
TABLET ORAL
Qty: 90 TABLET | Refills: 0 | Status: SHIPPED | OUTPATIENT
Start: 2018-06-05 | End: 2018-06-28

## 2018-06-06 ENCOUNTER — MOBILE ENCOUNTER (OUTPATIENT)
Dept: INTERNAL MEDICINE CLINIC | Facility: CLINIC | Age: 83
End: 2018-06-06

## 2018-06-06 RX ORDER — CIPROFLOXACIN 250 MG/1
250 TABLET, FILM COATED ORAL 2 TIMES DAILY
Qty: 14 TABLET | Refills: 0 | Status: SHIPPED | OUTPATIENT
Start: 2018-06-06 | End: 2018-06-13

## 2018-06-07 ENCOUNTER — TELEPHONE (OUTPATIENT)
Dept: INTERNAL MEDICINE CLINIC | Facility: CLINIC | Age: 83
End: 2018-06-07

## 2018-06-07 DIAGNOSIS — N30.00 ACUTE CYSTITIS WITHOUT HEMATURIA: Primary | ICD-10-CM

## 2018-06-07 NOTE — TELEPHONE ENCOUNTER
Patient states she came down with a bladder infection last night. She spoke with Dr Ana Maria Davis, because he was on call, and he advised she come in for a urine sample. Does she just need to come in for urine sample? Or does she need to be seen? Please advise.

## 2018-06-07 NOTE — TELEPHONE ENCOUNTER
Patient has taken 2 doses of Cipro which is helping, notified to drink more water, TB indicates to call if not resolving s/s but if s/s resolved repeat U/A CS 1 week after completing abx. Orders in EPIC. Pt verbalizes understanding.

## 2018-06-21 ENCOUNTER — APPOINTMENT (OUTPATIENT)
Dept: LAB | Age: 83
End: 2018-06-21
Attending: INTERNAL MEDICINE
Payer: MEDICARE

## 2018-06-21 DIAGNOSIS — N30.00 ACUTE CYSTITIS WITHOUT HEMATURIA: ICD-10-CM

## 2018-06-21 PROCEDURE — 81001 URINALYSIS AUTO W/SCOPE: CPT

## 2018-06-21 PROCEDURE — 87086 URINE CULTURE/COLONY COUNT: CPT

## 2018-06-26 DIAGNOSIS — F41.9 ANXIETY: ICD-10-CM

## 2018-06-26 RX ORDER — ESCITALOPRAM OXALATE 5 MG/1
TABLET ORAL
Qty: 90 TABLET | Refills: 0 | Status: SHIPPED | OUTPATIENT
Start: 2018-06-26 | End: 2018-09-28

## 2018-06-26 NOTE — TELEPHONE ENCOUNTER
LOV: 05/29/2018  Future Visit: 06/28/2018  Last Labs: 05/01/2018 Lipid panel  Last Rx: 11/30/2017     Per protocol sent to provider

## 2018-06-28 NOTE — PROGRESS NOTES
Zoran Sal is a [de-identified]year old female. Patient presents with: Follow - Up: cn room 3 : follow up on anxiety      HPI:     Patient here for f/u -  Anxiety- doing much better. Just having xanax as backup plan helps. Never used it though.  Going to see her bro Dispersible Take 4 mg by mouth every 8 (eight) hours as needed for Nausea. Disp:  Rfl:       Past Medical History:   Diagnosis Date   • Arthritis     knees and shoulders   • Breast CA ([facility] 2001    right breast cancer with mastectomy.    • Cancer [facility]) 2001 NEURO: Alert and oriented, no deficits    ASSESSMENT AND PLAN:   Anxiety  (primary encounter diagnosis)   -doing much better.  Continue lexapro 5mg daily and can use xanax 0.25mg prn panic attack      Meds & Refills for this Visit:  No prescriptions reque

## 2018-08-24 RX ORDER — PRAVASTATIN SODIUM 10 MG
TABLET ORAL
Qty: 90 TABLET | Refills: 0 | Status: SHIPPED | OUTPATIENT
Start: 2018-08-24 | End: 2018-11-21

## 2018-09-04 DIAGNOSIS — I10 ESSENTIAL HYPERTENSION: ICD-10-CM

## 2018-09-04 RX ORDER — AMLODIPINE BESYLATE 5 MG/1
TABLET ORAL
Qty: 90 TABLET | Refills: 0 | Status: SHIPPED | OUTPATIENT
Start: 2018-09-04 | End: 2018-12-06

## 2018-09-28 DIAGNOSIS — F41.9 ANXIETY: ICD-10-CM

## 2018-09-28 RX ORDER — ESCITALOPRAM OXALATE 5 MG/1
TABLET ORAL
Qty: 90 TABLET | Refills: 0 | Status: SHIPPED | OUTPATIENT
Start: 2018-09-28 | End: 2018-11-30

## 2018-10-04 ENCOUNTER — NURSE ONLY (OUTPATIENT)
Dept: INTERNAL MEDICINE CLINIC | Facility: CLINIC | Age: 83
End: 2018-10-04
Payer: COMMERCIAL

## 2018-10-04 DIAGNOSIS — Z23 NEED FOR INFLUENZA VACCINATION: Primary | ICD-10-CM

## 2018-10-04 PROCEDURE — G0008 ADMIN INFLUENZA VIRUS VAC: HCPCS | Performed by: INTERNAL MEDICINE

## 2018-10-04 PROCEDURE — 90653 IIV ADJUVANT VACCINE IM: CPT | Performed by: INTERNAL MEDICINE

## 2018-11-19 ENCOUNTER — TELEPHONE (OUTPATIENT)
Dept: INTERNAL MEDICINE CLINIC | Facility: CLINIC | Age: 83
End: 2018-11-19

## 2018-11-19 NOTE — TELEPHONE ENCOUNTER
Medical records requested by [facility]. Request sent to [employer] for processing. Copy sent to scanning.

## 2018-11-21 RX ORDER — PRAVASTATIN SODIUM 10 MG
TABLET ORAL
Qty: 90 TABLET | Refills: 0 | Status: SHIPPED | OUTPATIENT
Start: 2018-11-21 | End: 2019-02-22

## 2018-11-30 DIAGNOSIS — F41.9 ANXIETY: ICD-10-CM

## 2018-11-30 RX ORDER — ESCITALOPRAM OXALATE 5 MG/1
TABLET ORAL
Qty: 90 TABLET | Refills: 0 | Status: SHIPPED | OUTPATIENT
Start: 2018-11-30 | End: 2019-01-03 | Stop reason: DRUGHIGH

## 2018-11-30 NOTE — TELEPHONE ENCOUNTER
Last Office Visit: 6-28-18 with TB for follow up  Last Rx Filled: 9-28-18 90 tabs with no refills   Last Labs: 9-28-18 90 tabs with no refills   Future Appointment: 12-6-18    Per protocol to provider

## 2018-12-02 DIAGNOSIS — I10 ESSENTIAL HYPERTENSION: ICD-10-CM

## 2018-12-03 RX ORDER — AMLODIPINE BESYLATE 5 MG/1
TABLET ORAL
Qty: 90 TABLET | Refills: 0 | Status: SHIPPED | OUTPATIENT
Start: 2018-12-03 | End: 2018-12-06

## 2018-12-06 ENCOUNTER — OFFICE VISIT (OUTPATIENT)
Dept: INTERNAL MEDICINE CLINIC | Facility: CLINIC | Age: 83
End: 2018-12-06
Payer: COMMERCIAL

## 2018-12-06 VITALS
BODY MASS INDEX: 25.73 KG/M2 | DIASTOLIC BLOOD PRESSURE: 88 MMHG | HEIGHT: 62 IN | WEIGHT: 139.81 LBS | SYSTOLIC BLOOD PRESSURE: 138 MMHG | TEMPERATURE: 98 F | HEART RATE: 72 BPM | RESPIRATION RATE: 14 BRPM

## 2018-12-06 DIAGNOSIS — R42 DIZZINESS: ICD-10-CM

## 2018-12-06 DIAGNOSIS — I10 ESSENTIAL HYPERTENSION: ICD-10-CM

## 2018-12-06 DIAGNOSIS — R53.81 MALAISE AND FATIGUE: ICD-10-CM

## 2018-12-06 DIAGNOSIS — R53.83 MALAISE AND FATIGUE: ICD-10-CM

## 2018-12-06 DIAGNOSIS — H65.02 ACUTE SEROUS OTITIS MEDIA OF LEFT EAR, RECURRENCE NOT SPECIFIED: Primary | ICD-10-CM

## 2018-12-06 PROCEDURE — 99214 OFFICE O/P EST MOD 30 MIN: CPT | Performed by: INTERNAL MEDICINE

## 2018-12-06 RX ORDER — AZITHROMYCIN 250 MG/1
TABLET, FILM COATED ORAL
Qty: 6 TABLET | Refills: 0 | Status: SHIPPED | OUTPATIENT
Start: 2018-12-06 | End: 2019-01-03

## 2018-12-06 NOTE — PROGRESS NOTES
Cortney Sal is a 82year old female. Patient presents with:   Follow - Up: cn room 3: follow up high blood pressure  , dizziness, cholesterol, ear pain , extreme fatige       HPI:     Patient not feeling well for 3-4 weeks  C/o left ear pain for several we (two) times daily. Disp:  Rfl:    CRANBERRY EXTRACT OR Take 4,200 mg by mouth 3 (three) times daily. Disp:  Rfl:    Omega-3 Fatty Acids (FISH OIL) 1200 MG Oral Cap Take 1,200 mg by mouth daily.  Disp:  Rfl:    Calcium 600 MG Oral Tab Take 600 mg by mouth da adenopathy      EXAM:   /88 (BP Location: Left arm, Patient Position: Sitting, Cuff Size: adult)   Pulse 72   Temp 97.6 °F (36.4 °C) (Oral)   Resp 14   Ht 62\"   Wt 139 lb 12.8 oz   BMI 25.57 kg/m²   GENERAL: well developed, well nourished,in no appa

## 2018-12-11 ENCOUNTER — LAB ENCOUNTER (OUTPATIENT)
Dept: LAB | Age: 83
End: 2018-12-11
Attending: INTERNAL MEDICINE
Payer: MEDICARE

## 2018-12-11 DIAGNOSIS — R42 DIZZINESS: ICD-10-CM

## 2018-12-11 DIAGNOSIS — R53.81 MALAISE AND FATIGUE: ICD-10-CM

## 2018-12-11 DIAGNOSIS — R53.83 MALAISE AND FATIGUE: ICD-10-CM

## 2018-12-11 PROCEDURE — 84439 ASSAY OF FREE THYROXINE: CPT

## 2018-12-11 PROCEDURE — 84443 ASSAY THYROID STIM HORMONE: CPT

## 2018-12-11 PROCEDURE — 85025 COMPLETE CBC W/AUTO DIFF WBC: CPT

## 2018-12-11 PROCEDURE — 80053 COMPREHEN METABOLIC PANEL: CPT

## 2019-01-03 PROBLEM — F33.1 MODERATE EPISODE OF RECURRENT MAJOR DEPRESSIVE DISORDER (HCC): Status: ACTIVE | Noted: 2019-01-03

## 2019-01-03 NOTE — PROGRESS NOTES
Miri Sal is a 82year old female. Patient presents with: Follow - Up: cn room 4: patient is here to follow up on high blood pressure .  depression is worse      HPI:     Patient here for f/u-  HTN- BP controlled, compliant with medication, no HA/DZ/CP History:   Diagnosis Date   • Arthritis     knees and shoulders   • Breast CA ([facility] [address] 2001    right breast cancer with mastectomy.    • Cancer [facility]) 2001    right breast ca   • Cystitis, interstitial     history of----   • Frequent UTI    • H/O mammogram 12/2 auscultation  CARDIO: RRR nl S1 S2   EXTREMITIES: no cyanosis, clubbing or edema  NEURO: Alert and oriented, no focal deficits    ASSESSMENT AND PLAN:      Moderate episode of recurrent major depressive disorder (hcc) - resources given for counseling, incr

## 2019-02-12 DIAGNOSIS — F41.9 ANXIETY: ICD-10-CM

## 2019-02-12 RX ORDER — ESCITALOPRAM OXALATE 5 MG/1
TABLET ORAL
Qty: 90 TABLET | Refills: 0 | OUTPATIENT
Start: 2019-02-12

## 2019-02-12 NOTE — TELEPHONE ENCOUNTER
Pt no longer on Escitalopram 5 mg pt is on 10 mg per LOV notes from 1/3/19. Per protocol denied. Moderate episode of recurrent major depressive disorder (hcc) - resources given for counseling, increase lexapro to 10mg daily, SE reviewed.  Re assess in 6

## 2019-02-22 RX ORDER — PRAVASTATIN SODIUM 10 MG
TABLET ORAL
Qty: 90 TABLET | Refills: 1 | Status: SHIPPED | OUTPATIENT
Start: 2019-02-22 | End: 2019-08-20

## 2019-03-03 DIAGNOSIS — I10 ESSENTIAL HYPERTENSION: ICD-10-CM

## 2019-03-04 RX ORDER — AMLODIPINE BESYLATE 5 MG/1
TABLET ORAL
Qty: 90 TABLET | Refills: 0 | Status: SHIPPED | OUTPATIENT
Start: 2019-03-04 | End: 2019-03-21

## 2019-03-21 PROBLEM — N18.30 CKD (CHRONIC KIDNEY DISEASE) STAGE 3, GFR 30-59 ML/MIN (HCC): Chronic | Status: ACTIVE | Noted: 2019-03-21

## 2019-03-21 NOTE — PROGRESS NOTES
Mariama Sal is a 82year old female. Patient presents with:   Follow - Up: would like mamm      HPI:     Patient here for f/u-  Depressed mood and anxiety doing a lot better- on lexapro 10mg daily now, says she does not overdo things and this helps with ch daily. Disp:  Rfl:    Calcium 600 MG Oral Tab Take 600 mg by mouth daily. Disp:  Rfl:    Multiple Vitamins-Minerals (MULTIVITAMIN OR) Take  by mouth daily.  Disp:  Rfl:       Past Medical History:   Diagnosis Date   • Arthritis     knees and shoulders   • B clear to auscultation  CARDIO: RRR nl S1 S2  NEURO: Alert and oriented, no focal deficits    ASSESSMENT AND PLAN:    Anxiety and depression  (primary encounter diagnosis)- improved, continue lexapro 10mg daily  Encounter for screening mammogram for maligna

## 2019-04-11 ENCOUNTER — HOSPITAL ENCOUNTER (OUTPATIENT)
Dept: MAMMOGRAPHY | Age: 84
Discharge: HOME | End: 2019-04-11
Attending: INTERNAL MEDICINE
Payer: MEDICARE

## 2019-04-11 DIAGNOSIS — Z12.31 ENCOUNTER FOR SCREENING MAMMOGRAM FOR MALIGNANT NEOPLASM OF BREAST: ICD-10-CM

## 2019-04-11 PROCEDURE — 77067 SCR MAMMO BI INCL CAD: CPT | Performed by: INTERNAL MEDICINE

## 2019-04-11 PROCEDURE — 77063 BREAST TOMOSYNTHESIS BI: CPT | Performed by: INTERNAL MEDICINE

## 2019-05-21 ENCOUNTER — OFFICE VISIT (OUTPATIENT)
Dept: FAMILY MEDICINE CLINIC | Facility: CLINIC | Age: 84
End: 2019-05-21
Payer: COMMERCIAL

## 2019-05-21 VITALS — DIASTOLIC BLOOD PRESSURE: 72 MMHG | SYSTOLIC BLOOD PRESSURE: 124 MMHG

## 2019-05-21 DIAGNOSIS — Z01.30 BLOOD PRESSURE CHECK: Primary | ICD-10-CM

## 2019-05-31 ENCOUNTER — TELEPHONE (OUTPATIENT)
Dept: INTERNAL MEDICINE CLINIC | Facility: CLINIC | Age: 84
End: 2019-05-31

## 2019-05-31 DIAGNOSIS — I10 ESSENTIAL HYPERTENSION: ICD-10-CM

## 2019-05-31 DIAGNOSIS — I10 ESSENTIAL HYPERTENSION: Primary | ICD-10-CM

## 2019-05-31 DIAGNOSIS — R53.82 CHRONIC FATIGUE: ICD-10-CM

## 2019-05-31 RX ORDER — AMLODIPINE BESYLATE 5 MG/1
TABLET ORAL
Qty: 90 TABLET | Refills: 0 | Status: SHIPPED | OUTPATIENT
Start: 2019-05-31 | End: 2019-11-26

## 2019-05-31 NOTE — TELEPHONE ENCOUNTER
Supervisit   Future Appointments   Date Time Provider Sherin Queen   6/14/2019  1:00 PM Marquis Tatum MD [facility]     Orders to  Linda   aware must fast no call back required

## 2019-06-07 ENCOUNTER — LAB ENCOUNTER (OUTPATIENT)
Dept: LAB | Age: 84
End: 2019-06-07
Attending: INTERNAL MEDICINE
Payer: MEDICARE

## 2019-06-07 DIAGNOSIS — E78.00 PURE HYPERCHOLESTEROLEMIA: ICD-10-CM

## 2019-06-07 DIAGNOSIS — I10 ESSENTIAL HYPERTENSION: ICD-10-CM

## 2019-06-07 DIAGNOSIS — R53.82 CHRONIC FATIGUE: ICD-10-CM

## 2019-06-07 PROCEDURE — 84443 ASSAY THYROID STIM HORMONE: CPT

## 2019-06-07 PROCEDURE — 80061 LIPID PANEL: CPT

## 2019-06-07 PROCEDURE — 85025 COMPLETE CBC W/AUTO DIFF WBC: CPT

## 2019-06-07 PROCEDURE — 80053 COMPREHEN METABOLIC PANEL: CPT

## 2019-06-10 DIAGNOSIS — F33.1 MODERATE EPISODE OF RECURRENT MAJOR DEPRESSIVE DISORDER (HCC): ICD-10-CM

## 2019-06-10 RX ORDER — ESCITALOPRAM OXALATE 10 MG/1
TABLET ORAL
Qty: 90 TABLET | Refills: 1 | Status: SHIPPED | OUTPATIENT
Start: 2019-06-10 | End: 2019-12-05

## 2019-06-10 NOTE — TELEPHONE ENCOUNTER
LOV: 3/21/19 TB  FOV:4/14/19   LAST RX:1/3/19 10 mg take 1 tab daily 30 tabs 3 refills   LAST LABS:6/7/19 cbc,tsh,cmp,lipids   PER PROTOCOL: to provider

## 2019-06-14 ENCOUNTER — OFFICE VISIT (OUTPATIENT)
Dept: INTERNAL MEDICINE CLINIC | Facility: CLINIC | Age: 84
End: 2019-06-14
Payer: COMMERCIAL

## 2019-06-14 VITALS
SYSTOLIC BLOOD PRESSURE: 112 MMHG | HEART RATE: 56 BPM | DIASTOLIC BLOOD PRESSURE: 60 MMHG | WEIGHT: 134 LBS | HEIGHT: 62 IN | TEMPERATURE: 99 F | RESPIRATION RATE: 14 BRPM | BODY MASS INDEX: 24.66 KG/M2

## 2019-06-14 DIAGNOSIS — F32.A MILD DEPRESSION: ICD-10-CM

## 2019-06-14 DIAGNOSIS — N18.30 CKD (CHRONIC KIDNEY DISEASE) STAGE 3, GFR 30-59 ML/MIN (HCC): ICD-10-CM

## 2019-06-14 DIAGNOSIS — Z00.00 ENCOUNTER FOR ANNUAL HEALTH EXAMINATION: Primary | ICD-10-CM

## 2019-06-14 DIAGNOSIS — I77.811 ECTATIC ABDOMINAL AORTA (HCC): ICD-10-CM

## 2019-06-14 DIAGNOSIS — I10 ESSENTIAL HYPERTENSION: ICD-10-CM

## 2019-06-14 DIAGNOSIS — Z90.11 H/O RIGHT MASTECTOMY: ICD-10-CM

## 2019-06-14 DIAGNOSIS — E78.00 PURE HYPERCHOLESTEROLEMIA: ICD-10-CM

## 2019-06-14 DIAGNOSIS — Z78.0 POST-MENOPAUSAL: ICD-10-CM

## 2019-06-14 DIAGNOSIS — E83.52 HYPERCALCEMIA: ICD-10-CM

## 2019-06-14 DIAGNOSIS — Z80.0 FAMILY HISTORY OF MALIGNANT NEOPLASM OF GASTROINTESTINAL TRACT: ICD-10-CM

## 2019-06-14 DIAGNOSIS — R63.0 LACK OF APPETITE: ICD-10-CM

## 2019-06-14 DIAGNOSIS — F41.9 ANXIETY: ICD-10-CM

## 2019-06-14 PROCEDURE — G0439 PPPS, SUBSEQ VISIT: HCPCS | Performed by: INTERNAL MEDICINE

## 2019-06-14 PROCEDURE — 93000 ELECTROCARDIOGRAM COMPLETE: CPT | Performed by: INTERNAL MEDICINE

## 2019-06-14 PROCEDURE — 96160 PT-FOCUSED HLTH RISK ASSMT: CPT | Performed by: INTERNAL MEDICINE

## 2019-06-14 PROCEDURE — 99397 PER PM REEVAL EST PAT 65+ YR: CPT | Performed by: INTERNAL MEDICINE

## 2019-06-14 NOTE — PATIENT INSTRUCTIONS
Fe Sal's SCREENING SCHEDULE   Tests on this list are recommended by your physician but may not be covered, or covered at this frequency, by your insurer. Please check with your insurance carrier before scheduling to verify coverage.    PREVENTATIVE S Men who are 73-68 years old and have smoked more than 100 cigarettes in their lifetime   • Anyone with a family history    Colorectal Cancer Screening  Covered up to Age 76     Colonoscopy Screen   Covered every 10 years- more often if abnormal Colonoscopy Annually Orders placed or performed in visit on 11/10/16   • FLU VACC [address] ANTIG   Orders placed or performed in visit on 10/28/15   • FLU VACC [address] ANTIG   Orders placed or performed in visit on 10/21/14   • INFLUENZA VIRUS VACCINE, >=3 It also has the State forms available on it's website for anyone to review and print using their home computer and printer. (the forms are also available in [address])  [email or web address]  This link also has information from the [facility] negative...

## 2019-06-14 NOTE — PROGRESS NOTES
HPI:   Perry Carranza is a 82year old female who presents for a MA (Medicare Advantage) Supervisit (Once per calendar year). Patient c/o shoulder pain, has known OA, sees Dr. Sterling Sorenson. Does not like to take pills, may consider CSI.  Does not want surgery as listed below:  She has Dressing and/or Bathing issues based on screening of functional status.    Difficulty dressing or bathing?: Yes  Bathing or Showering: Able without help  Dressing: Able without help                                          Depressi 06/07/2019    HDL 40 06/07/2019    LDL 85 06/07/2019    TRIG 119 06/07/2019          Last Chemistry Labs:   Lab Results   Component Value Date    AST 15 06/07/2019    ALT 22 06/07/2019    CA 10.3 (H) 06/07/2019    ALB 3.7 06/07/2019    TSH 2.360 06/07/2019 special service or report (2001); colonoscopy (2/02 4/08); unspecified diagnostic proce (2005); colonoscopy (08/21/2013); mastectomy right (Right, 2001); needle biopsy right (Right, 2001); and hysterectomy (1985).     Her family history includes Breast Canc quadrants,  no masses, no organomegaly   Pelvic: Deferred   Extremities: No c/c/e, restricted ROM of both shoulders   Pulses: 2+ and symmetric   Skin: Skin color, texture, turgor normal, no rashes or lesions   Lymph nodes: Cervical, supraclavicular, and ax WNL    Post-menopausal- check dexa  -     XR DEXA BONE DENSITOMETRY (CPT=77080);  Future    Family history of malignant neoplasm of gastrointestinal tract- f/u Dr. Cedric Emanuel, pt with lack of appetite and family h/o malignancy  -     EVALUATE & TREAT, GASTRO (I Screen every 10 years No results found for this or any previous visit. No flowsheet data found. Fecal Occult Blood Annually No results found for: FOB No flowsheet data found.     Glaucoma Screening      Ophthalmology Visit Annually: Diabetics, FHx Enzo Litten your pharmacy  prescription benefits                    Template: PATRIC ANN MEDICARE ANNUAL ASSESSMENT FEMALE [19823]

## 2019-07-25 ENCOUNTER — APPOINTMENT (OUTPATIENT)
Dept: LAB | Age: 84
End: 2019-07-25
Attending: INTERNAL MEDICINE
Payer: MEDICARE

## 2019-07-25 DIAGNOSIS — E83.52 HYPERCALCEMIA: ICD-10-CM

## 2019-07-25 LAB
ANION GAP SERPL CALC-SCNC: 5 MMOL/L (ref 0–18)
BUN BLD-MCNC: 23 MG/DL (ref 7–18)
BUN/CREAT SERPL: 24.5 (ref 10–20)
CALCIUM BLD-MCNC: 9.9 MG/DL (ref 8.5–10.1)
CHLORIDE SERPL-SCNC: 105 MMOL/L (ref 98–112)
CO2 SERPL-SCNC: 30 MMOL/L (ref 21–32)
CREAT BLD-MCNC: 0.94 MG/DL (ref 0.55–1.02)
GLUCOSE BLD-MCNC: 109 MG/DL (ref 70–99)
OSMOLALITY SERPL CALC.SUM OF ELEC: 294 MOSM/KG (ref 275–295)
POTASSIUM SERPL-SCNC: 3.7 MMOL/L (ref 3.5–5.1)
PTH-INTACT SERPL-MCNC: 98.2 PG/ML (ref 18.5–88)
SODIUM SERPL-SCNC: 140 MMOL/L (ref 136–145)

## 2019-07-25 PROCEDURE — 83970 ASSAY OF PARATHORMONE: CPT

## 2019-07-25 PROCEDURE — 80048 BASIC METABOLIC PNL TOTAL CA: CPT

## 2019-07-26 ENCOUNTER — TELEPHONE (OUTPATIENT)
Dept: INTERNAL MEDICINE CLINIC | Facility: CLINIC | Age: 84
End: 2019-07-26

## 2019-07-26 DIAGNOSIS — E21.3 HYPERPARATHYROIDISM (HCC): Primary | ICD-10-CM

## 2019-07-30 ENCOUNTER — HOSPITAL ENCOUNTER (OUTPATIENT)
Dept: BONE DENSITY | Age: 84
Discharge: HOME | End: 2019-07-30
Attending: INTERNAL MEDICINE
Payer: MEDICARE

## 2019-07-30 DIAGNOSIS — Z78.0 POST-MENOPAUSAL: ICD-10-CM

## 2019-07-30 PROCEDURE — 77080 DXA BONE DENSITY AXIAL: CPT | Performed by: INTERNAL MEDICINE

## 2019-08-20 RX ORDER — PRAVASTATIN SODIUM 10 MG
TABLET ORAL
Qty: 90 TABLET | Refills: 1 | Status: SHIPPED | OUTPATIENT
Start: 2019-08-20 | End: 2020-02-24

## 2019-08-29 DIAGNOSIS — I10 ESSENTIAL HYPERTENSION: ICD-10-CM

## 2019-08-29 RX ORDER — AMLODIPINE BESYLATE 5 MG/1
TABLET ORAL
Qty: 90 TABLET | Refills: 1 | Status: SHIPPED | OUTPATIENT
Start: 2019-08-29 | End: 2020-02-26

## 2019-10-01 ENCOUNTER — MED REC SCAN ONLY (OUTPATIENT)
Dept: INTERNAL MEDICINE CLINIC | Facility: CLINIC | Age: 84
End: 2019-10-01

## 2019-10-01 ENCOUNTER — IMMUNIZATION (OUTPATIENT)
Dept: INTERNAL MEDICINE CLINIC | Facility: CLINIC | Age: 84
End: 2019-10-01
Payer: COMMERCIAL

## 2019-10-01 DIAGNOSIS — Z23 NEED FOR VACCINATION: ICD-10-CM

## 2019-10-01 PROCEDURE — G0008 ADMIN INFLUENZA VIRUS VAC: HCPCS | Performed by: INTERNAL MEDICINE

## 2019-10-01 PROCEDURE — 90662 IIV NO PRSV INCREASED AG IM: CPT | Performed by: INTERNAL MEDICINE

## 2019-10-08 ENCOUNTER — TELEPHONE (OUTPATIENT)
Dept: INTERNAL MEDICINE CLINIC | Facility: CLINIC | Age: 84
End: 2019-10-08

## 2019-10-08 NOTE — TELEPHONE ENCOUNTER
Specialty: Endocrinologist     Full Name of Specialist: Dr Norris Martinez    Date of Appointment: 10/22/19    Reason for the Appointment (be specific): TB wanted to her to see Endo pt doesn't want to go to Dr Geoffrey Goldmann the patient seen a provider in our [facility]

## 2019-10-22 PROBLEM — E21.3 HYPERPARATHYROIDISM (HCC): Status: ACTIVE | Noted: 2019-10-22

## 2019-10-24 ENCOUNTER — LAB ENCOUNTER (OUTPATIENT)
Dept: LAB | Age: 84
End: 2019-10-24
Attending: INTERNAL MEDICINE
Payer: MEDICARE

## 2019-10-24 DIAGNOSIS — E21.3 HYPERPARATHYROIDISM (HCC): ICD-10-CM

## 2019-10-24 PROCEDURE — 80053 COMPREHEN METABOLIC PANEL: CPT

## 2019-10-24 PROCEDURE — 83970 ASSAY OF PARATHORMONE: CPT

## 2019-10-24 PROCEDURE — 82652 VIT D 1 25-DIHYDROXY: CPT

## 2019-10-24 PROCEDURE — 82306 VITAMIN D 25 HYDROXY: CPT

## 2019-10-28 NOTE — PROGRESS NOTES
Telephone Information:  Home Phone      [phone]    Patient informed of Dr. Eston Closs result note. Patient verbalized understanding and agrees with plan.

## 2019-10-28 NOTE — PROGRESS NOTES
[phone] (home)   No relevant phone numbers on file. Patient informed of Dr. Jagdish Mo result note. Patient verbalized understanding and agrees with plan.

## 2019-10-30 ENCOUNTER — PATIENT OUTREACH (OUTPATIENT)
Dept: CASE MANAGEMENT | Age: 84
End: 2019-10-30

## 2019-11-26 ENCOUNTER — OFFICE VISIT (OUTPATIENT)
Dept: INTERNAL MEDICINE CLINIC | Facility: CLINIC | Age: 84
End: 2019-11-26
Payer: COMMERCIAL

## 2019-11-26 VITALS
DIASTOLIC BLOOD PRESSURE: 72 MMHG | HEIGHT: 62 IN | BODY MASS INDEX: 24.33 KG/M2 | SYSTOLIC BLOOD PRESSURE: 120 MMHG | HEART RATE: 64 BPM | WEIGHT: 132.19 LBS | TEMPERATURE: 98 F | RESPIRATION RATE: 16 BRPM

## 2019-11-26 DIAGNOSIS — F41.9 ANXIETY AND DEPRESSION: ICD-10-CM

## 2019-11-26 DIAGNOSIS — R63.0 DECREASED APPETITE: Primary | ICD-10-CM

## 2019-11-26 DIAGNOSIS — N18.30 CKD (CHRONIC KIDNEY DISEASE) STAGE 3, GFR 30-59 ML/MIN (HCC): Chronic | ICD-10-CM

## 2019-11-26 DIAGNOSIS — E78.00 PURE HYPERCHOLESTEROLEMIA: ICD-10-CM

## 2019-11-26 DIAGNOSIS — F32.A ANXIETY AND DEPRESSION: ICD-10-CM

## 2019-11-26 DIAGNOSIS — E21.3 HYPERPARATHYROIDISM (HCC): ICD-10-CM

## 2019-11-26 DIAGNOSIS — Z80.0 FAMILY HISTORY OF CANCER OF GI TRACT: ICD-10-CM

## 2019-11-26 DIAGNOSIS — I10 ESSENTIAL HYPERTENSION: ICD-10-CM

## 2019-11-26 PROCEDURE — 99214 OFFICE O/P EST MOD 30 MIN: CPT | Performed by: NURSE PRACTITIONER

## 2019-11-26 NOTE — PROGRESS NOTES
Nuvia Sal is a 82year old female. Patient presents with:  Blood Pressure: DD Rm 10 Blood Pressure Check  Lab Results: Vit D, PTH, CMP      HPI:   Here for multiple issues to be addressed. HTN  bp stable  Amlodipine 5mg  toprol XL 25mg.     Dyslipi 2 (two) times daily. • CRANBERRY EXTRACT OR Take 4,200 mg by mouth 3 (three) times daily. • Omega-3 Fatty Acids (FISH OIL) 1200 MG Oral Cap Take 1,200 mg by mouth daily. • Multiple Vitamins-Minerals (MULTIVITAMIN OR) Take  by mouth daily.      • Patient Position: Sitting, Cuff Size: adult)   Pulse 64   Temp 98.2 °F (36.8 °C) (Oral)   Resp 16   Ht 62\"   Wt 132 lb 3.2 oz (60 kg)   BMI 24.18 kg/m²   Repeat temp 98.3  GENERAL: well developed, well nourished,in no apparent distress    HEENT: atraumati

## 2019-12-05 DIAGNOSIS — F33.1 MODERATE EPISODE OF RECURRENT MAJOR DEPRESSIVE DISORDER (HCC): ICD-10-CM

## 2019-12-06 NOTE — TELEPHONE ENCOUNTER
Last Ov: 11/26/19, SD, acute  Upcoming appt: no upcoming appt  Last labs: Vit D, PTH, Vit D, CMP 10/24/19  Last Rx: escitalopram 10mg, #90, ,1R 6/10/19    Per Protocol, not on protocol. Rx pending.

## 2019-12-07 RX ORDER — ESCITALOPRAM OXALATE 10 MG/1
TABLET ORAL
Qty: 90 TABLET | Refills: 0 | Status: SHIPPED | OUTPATIENT
Start: 2019-12-07 | End: 2020-03-04

## 2020-01-30 ENCOUNTER — PATIENT OUTREACH (OUTPATIENT)
Dept: CASE MANAGEMENT | Age: 85
End: 2020-01-30

## 2020-01-30 NOTE — PROGRESS NOTES
Spoke with patient to introduce CCM services. Pt relates she is having arthritic pain on her shoulders which is getting worse. Pt states she gets nauseous and does not feel well due to severe pain. Pt has had xrays in the past and was told to take Tylenol.

## 2020-01-31 ENCOUNTER — PATIENT OUTREACH (OUTPATIENT)
Dept: CASE MANAGEMENT | Age: 85
End: 2020-01-31

## 2020-01-31 NOTE — PROGRESS NOTES
Patient called stated she would like to d/c CCM services. Patient identified with a potential need for Chronic Care Management services. Called patient to introduce self and availability of Chronic Care Management services.   Patient informed about the fo

## 2020-02-11 ENCOUNTER — OFFICE VISIT (OUTPATIENT)
Dept: INTERNAL MEDICINE CLINIC | Facility: CLINIC | Age: 85
End: 2020-02-11
Payer: COMMERCIAL

## 2020-02-11 VITALS
TEMPERATURE: 98 F | DIASTOLIC BLOOD PRESSURE: 66 MMHG | BODY MASS INDEX: 24 KG/M2 | WEIGHT: 131.38 LBS | HEART RATE: 60 BPM | SYSTOLIC BLOOD PRESSURE: 110 MMHG

## 2020-02-11 DIAGNOSIS — M19.011 ARTHRITIS OF RIGHT SHOULDER REGION: Primary | ICD-10-CM

## 2020-02-11 DIAGNOSIS — I10 ESSENTIAL HYPERTENSION: ICD-10-CM

## 2020-02-11 DIAGNOSIS — F33.1 MODERATE EPISODE OF RECURRENT MAJOR DEPRESSIVE DISORDER (HCC): ICD-10-CM

## 2020-02-11 PROCEDURE — 99214 OFFICE O/P EST MOD 30 MIN: CPT | Performed by: INTERNAL MEDICINE

## 2020-02-11 NOTE — PROGRESS NOTES
Janeth Sal is a 82year old female. Patient presents with:  Arthritis: lm rm 3. pain for years in the arms  Health Maintenance: reminded.       HPI:     Patient here for f/u-  C/o chronic R shoulder pain, known OA but no longer wants CSI, plans to see rhe Cap Take 1,200 mg by mouth daily. • Calcium 600 MG Oral Tab Take 600 mg by mouth daily. • Multiple Vitamins-Minerals (MULTIVITAMIN OR) Take  by mouth daily.         Past Medical History:   Diagnosis Date   • Arthritis     knees and shoulders   • Kirsten developed, well nourished,in no apparent distress  SKIN: no rashes,no suspicious lesions  HEENT: atraumatic, normocephalic  NECK: supple,no adenopathy  LUNGS: normal rate without respiratory distress, lungs clear to auscultation  CARDIO: RRR nl S1 S2  GI:

## 2020-02-19 ENCOUNTER — HOSPITAL ENCOUNTER (EMERGENCY)
Facility: HOSPITAL | Age: 85
Discharge: HOME | End: 2020-02-19
Attending: EMERGENCY MEDICINE
Payer: MEDICARE

## 2020-02-19 VITALS
HEIGHT: 62 IN | DIASTOLIC BLOOD PRESSURE: 84 MMHG | HEART RATE: 75 BPM | WEIGHT: 130 LBS | TEMPERATURE: 98 F | SYSTOLIC BLOOD PRESSURE: 137 MMHG | RESPIRATION RATE: 20 BRPM | BODY MASS INDEX: 23.92 KG/M2 | OXYGEN SATURATION: 96 %

## 2020-02-19 DIAGNOSIS — R04.0 EPISTAXIS: Primary | ICD-10-CM

## 2020-02-19 PROCEDURE — 99282 EMERGENCY DEPT VISIT SF MDM: CPT

## 2020-02-20 NOTE — ED PROVIDER NOTES
Patient Seen in: [facility] Emergency Department      History   Patient presents with:  Nose Bleed    Stated Complaint: nose bleed x 1 hour.   not on blood thinners, denies trauma, or dizziness     HPI    CHIEF COMPLAINT: Nosebleed     HISTORY OF PRE CHOLESTEROL    • History of endoscopy 08/21/2013    Dr. Angela Wagner   • Other and unspecified hyperlipidemia    • Other and unspecified personal history of malignant neoplasm 2001    dr. Cherelle Montelongo, ill  breast cancer right   • Pap smear for cervical can CTAB  Cardiovascular: RRR, S1/S2, no m/c/r  Musculoskeletal: Extremities are symmetrical, full range of motion  Skin:  warm and dry, no rashes. HEENT: Bilateral naris clear. No evidence of active bleeding. No sores or lesions noted.   Posterior pharynx

## 2020-02-24 RX ORDER — PRAVASTATIN SODIUM 10 MG
TABLET ORAL
Qty: 90 TABLET | Refills: 1 | Status: SHIPPED | OUTPATIENT
Start: 2020-02-24 | End: 2020-08-31

## 2020-02-26 DIAGNOSIS — I10 ESSENTIAL HYPERTENSION: ICD-10-CM

## 2020-02-26 RX ORDER — AMLODIPINE BESYLATE 5 MG/1
TABLET ORAL
Qty: 90 TABLET | Refills: 0 | Status: SHIPPED | OUTPATIENT
Start: 2020-02-26 | End: 2020-05-26

## 2020-03-04 DIAGNOSIS — F33.1 MODERATE EPISODE OF RECURRENT MAJOR DEPRESSIVE DISORDER (HCC): ICD-10-CM

## 2020-03-04 RX ORDER — ESCITALOPRAM OXALATE 10 MG/1
TABLET ORAL
Qty: 90 TABLET | Refills: 1 | Status: SHIPPED | OUTPATIENT
Start: 2020-03-04 | End: 2020-09-05

## 2020-03-04 NOTE — TELEPHONE ENCOUNTER
Requesting Escitalopram   LOV: 2/11/20  RTC: 4 months   Last Relevant Labs: n/a   Filled: 12/7/19 #90 with 0 refills    Future Appointments   Date Time Provider Sherin Murdockisti   4/28/2020 10:45 AM Cassie Clement MD [address] [place] [address]   5/26/2020  9:20 AM Lalitha

## 2020-05-20 ENCOUNTER — TELEPHONE (OUTPATIENT)
Dept: INTERNAL MEDICINE CLINIC | Facility: CLINIC | Age: 85
End: 2020-05-20

## 2020-05-20 NOTE — TELEPHONE ENCOUNTER
If she is ok with tele visit, I can call her at home next week for her shoulder pain and her mood issues. Tele visits are covered by her insurance. Please set one up if she is agreeable.   Supervisit for her can be scheduled for end of August

## 2020-05-20 NOTE — TELEPHONE ENCOUNTER
Patient called and Cancelled her supervisit. Tried to r/s but wants to wait until the [place] is over.   Please advise

## 2020-05-26 DIAGNOSIS — I10 ESSENTIAL HYPERTENSION: ICD-10-CM

## 2020-05-26 RX ORDER — AMLODIPINE BESYLATE 5 MG/1
TABLET ORAL
Qty: 90 TABLET | Refills: 0 | Status: SHIPPED | OUTPATIENT
Start: 2020-05-26 | End: 2020-08-24

## 2020-06-09 ENCOUNTER — MA CHART PREP (OUTPATIENT)
Dept: FAMILY MEDICINE CLINIC | Facility: CLINIC | Age: 85
End: 2020-06-09

## 2020-07-01 ENCOUNTER — TELEPHONE (OUTPATIENT)
Dept: INTERNAL MEDICINE CLINIC | Facility: CLINIC | Age: 85
End: 2020-07-01

## 2020-07-01 DIAGNOSIS — R53.81 MALAISE AND FATIGUE: ICD-10-CM

## 2020-07-01 DIAGNOSIS — I10 BENIGN ESSENTIAL HYPERTENSION: ICD-10-CM

## 2020-07-01 DIAGNOSIS — Z00.00 ENCOUNTER FOR ANNUAL HEALTH EXAMINATION: Primary | ICD-10-CM

## 2020-07-01 DIAGNOSIS — R53.83 MALAISE AND FATIGUE: ICD-10-CM

## 2020-07-01 DIAGNOSIS — I10 ESSENTIAL HYPERTENSION: ICD-10-CM

## 2020-07-01 DIAGNOSIS — E21.3 HYPERPARATHYROIDISM (HCC): ICD-10-CM

## 2020-07-01 NOTE — TELEPHONE ENCOUNTER
Supervisit  Future Appointments   Date Time Provider Sherin Queen   8/26/2020 10:40 AM Yury Gates MD [facility] [address] [facility]     Orders to THE [facility]  aware must fast no call back required

## 2020-07-30 NOTE — TELEPHONE ENCOUNTER
Last VISIT 02/11/20    Last REFILL 05/01/20 qty 180 w/0 refills    Last LABS 10/24/19 CMP, Vit D done    Future Appointments   Date Time Provider Sherin Queen                 9/8/2020 11:20 AM Tomas Felder MD [facility] [address] [facility]         Per Yohana Gates

## 2020-08-05 ENCOUNTER — OFFICE VISIT (OUTPATIENT)
Dept: FAMILY MEDICINE CLINIC | Facility: CLINIC | Age: 85
End: 2020-08-05
Payer: COMMERCIAL

## 2020-08-05 VITALS
BODY MASS INDEX: 27.6 KG/M2 | WEIGHT: 150 LBS | RESPIRATION RATE: 16 BRPM | TEMPERATURE: 98 F | HEART RATE: 107 BPM | SYSTOLIC BLOOD PRESSURE: 133 MMHG | DIASTOLIC BLOOD PRESSURE: 68 MMHG | HEIGHT: 62 IN | OXYGEN SATURATION: 97 %

## 2020-08-05 DIAGNOSIS — N30.01 ACUTE CYSTITIS WITH HEMATURIA: Primary | ICD-10-CM

## 2020-08-05 DIAGNOSIS — R30.0 DYSURIA: ICD-10-CM

## 2020-08-05 LAB
MULTISTIX LOT#: ABNORMAL NUMERIC
PH, URINE: 6.5 (ref 4.5–8)
PROTEIN (URINE DIPSTICK): 300 MG/DL
SPECIFIC GRAVITY: 1.03 (ref 1–1.03)
UROBILINOGEN,SEMI-QN: 0.2 MG/DL (ref 0–1.9)

## 2020-08-05 PROCEDURE — 99213 OFFICE O/P EST LOW 20 MIN: CPT | Performed by: NURSE PRACTITIONER

## 2020-08-05 PROCEDURE — 87086 URINE CULTURE/COLONY COUNT: CPT | Performed by: NURSE PRACTITIONER

## 2020-08-05 PROCEDURE — 3075F SYST BP GE 130 - 139MM HG: CPT | Performed by: NURSE PRACTITIONER

## 2020-08-05 PROCEDURE — 81003 URINALYSIS AUTO W/O SCOPE: CPT | Performed by: NURSE PRACTITIONER

## 2020-08-05 PROCEDURE — 3008F BODY MASS INDEX DOCD: CPT | Performed by: NURSE PRACTITIONER

## 2020-08-05 PROCEDURE — 3078F DIAST BP <80 MM HG: CPT | Performed by: NURSE PRACTITIONER

## 2020-08-05 RX ORDER — CIPROFLOXACIN 250 MG/1
250 TABLET, FILM COATED ORAL 2 TIMES DAILY
Qty: 14 TABLET | Refills: 0 | Status: SHIPPED | OUTPATIENT
Start: 2020-08-05 | End: 2020-08-12

## 2020-08-05 NOTE — PROGRESS NOTES
Miri Sal is a 82year old female. HPI:   Patient presents with symptoms of UTI for 1 days. Complaining of urinary frequency, urgency, dysuria,      Denies back pain, fever, hematuria.   Pt has strong history of UTI in past.    Ciprofloxacin HCl 250 MG Refer to Dr. Shaffer/urology.    • Rheumatic fever    • Unspecified essential hypertension       Social History:  Social History    Tobacco Use      Smoking status: Never Smoker      Smokeless tobacco: Never Used    Alcohol use: No    Drug use: No        R Patient Instructions       Understanding Urinary Tract Infections (UTIs)   Most UTIs are caused by bacteria, but they may also be caused by viruses or fungi.  Bacteria from the bowel are the most common source of infection. The infection may start because o

## 2020-08-19 ENCOUNTER — LAB ENCOUNTER (OUTPATIENT)
Dept: LAB | Age: 85
End: 2020-08-19
Attending: INTERNAL MEDICINE
Payer: MEDICARE

## 2020-08-19 DIAGNOSIS — R53.81 MALAISE AND FATIGUE: ICD-10-CM

## 2020-08-19 DIAGNOSIS — I10 BENIGN ESSENTIAL HYPERTENSION: ICD-10-CM

## 2020-08-19 DIAGNOSIS — I10 ESSENTIAL HYPERTENSION: ICD-10-CM

## 2020-08-19 DIAGNOSIS — Z00.00 ENCOUNTER FOR ANNUAL HEALTH EXAMINATION: ICD-10-CM

## 2020-08-19 DIAGNOSIS — R53.83 MALAISE AND FATIGUE: ICD-10-CM

## 2020-08-19 DIAGNOSIS — E21.3 HYPERPARATHYROIDISM (HCC): ICD-10-CM

## 2020-08-19 LAB
ALBUMIN SERPL-MCNC: 3.3 G/DL (ref 3.4–5)
ALBUMIN/GLOB SERPL: 0.9 {RATIO} (ref 1–2)
ALP LIVER SERPL-CCNC: 64 U/L (ref 55–142)
ALT SERPL-CCNC: 19 U/L (ref 13–56)
ANION GAP SERPL CALC-SCNC: 3 MMOL/L (ref 0–18)
AST SERPL-CCNC: 15 U/L (ref 15–37)
BASOPHILS # BLD AUTO: 0.05 X10(3) UL (ref 0–0.2)
BASOPHILS NFR BLD AUTO: 0.7 %
BILIRUB SERPL-MCNC: 0.8 MG/DL (ref 0.1–2)
BUN BLD-MCNC: 21 MG/DL (ref 7–18)
BUN/CREAT SERPL: 19.8 (ref 10–20)
CALCIUM BLD-MCNC: 9.8 MG/DL (ref 8.5–10.1)
CHLORIDE SERPL-SCNC: 105 MMOL/L (ref 98–112)
CHOLEST SMN-MCNC: 167 MG/DL (ref ?–200)
CO2 SERPL-SCNC: 30 MMOL/L (ref 21–32)
CREAT BLD-MCNC: 1.06 MG/DL (ref 0.55–1.02)
DEPRECATED RDW RBC AUTO: 44.9 FL (ref 35.1–46.3)
EOSINOPHIL # BLD AUTO: 0.14 X10(3) UL (ref 0–0.7)
EOSINOPHIL NFR BLD AUTO: 1.9 %
ERYTHROCYTE [DISTWIDTH] IN BLOOD BY AUTOMATED COUNT: 13.7 % (ref 11–15)
GLOBULIN PLAS-MCNC: 3.6 G/DL (ref 2.8–4.4)
GLUCOSE BLD-MCNC: 94 MG/DL (ref 70–99)
HCT VFR BLD AUTO: 39.8 % (ref 35–48)
HDLC SERPL-MCNC: 44 MG/DL (ref 40–59)
HGB BLD-MCNC: 12.5 G/DL (ref 12–16)
IMM GRANULOCYTES # BLD AUTO: 0.03 X10(3) UL (ref 0–1)
IMM GRANULOCYTES NFR BLD: 0.4 %
LDLC SERPL CALC-MCNC: 95 MG/DL (ref ?–100)
LYMPHOCYTES # BLD AUTO: 1.64 X10(3) UL (ref 1–4)
LYMPHOCYTES NFR BLD AUTO: 22.3 %
M PROTEIN MFR SERPL ELPH: 6.9 G/DL (ref 6.4–8.2)
MCH RBC QN AUTO: 28.2 PG (ref 26–34)
MCHC RBC AUTO-ENTMCNC: 31.4 G/DL (ref 31–37)
MCV RBC AUTO: 89.6 FL (ref 80–100)
MONOCYTES # BLD AUTO: 0.83 X10(3) UL (ref 0.1–1)
MONOCYTES NFR BLD AUTO: 11.3 %
NEUTROPHILS # BLD AUTO: 4.66 X10 (3) UL (ref 1.5–7.7)
NEUTROPHILS # BLD AUTO: 4.66 X10(3) UL (ref 1.5–7.7)
NEUTROPHILS NFR BLD AUTO: 63.4 %
NONHDLC SERPL-MCNC: 123 MG/DL (ref ?–130)
OSMOLALITY SERPL CALC.SUM OF ELEC: 289 MOSM/KG (ref 275–295)
PATIENT FASTING Y/N/NP: YES
PATIENT FASTING Y/N/NP: YES
PLATELET # BLD AUTO: 284 10(3)UL (ref 150–450)
POTASSIUM SERPL-SCNC: 4 MMOL/L (ref 3.5–5.1)
RBC # BLD AUTO: 4.44 X10(6)UL (ref 3.8–5.3)
SODIUM SERPL-SCNC: 138 MMOL/L (ref 136–145)
TRIGL SERPL-MCNC: 139 MG/DL (ref 30–149)
TSI SER-ACNC: 2.39 MIU/ML (ref 0.36–3.74)
VLDLC SERPL CALC-MCNC: 28 MG/DL (ref 0–30)
WBC # BLD AUTO: 7.4 X10(3) UL (ref 4–11)

## 2020-08-19 PROCEDURE — 80053 COMPREHEN METABOLIC PANEL: CPT

## 2020-08-19 PROCEDURE — 84443 ASSAY THYROID STIM HORMONE: CPT

## 2020-08-19 PROCEDURE — 36415 COLL VENOUS BLD VENIPUNCTURE: CPT

## 2020-08-19 PROCEDURE — 85025 COMPLETE CBC W/AUTO DIFF WBC: CPT

## 2020-08-19 PROCEDURE — 80061 LIPID PANEL: CPT

## 2020-08-23 DIAGNOSIS — I10 ESSENTIAL HYPERTENSION: ICD-10-CM

## 2020-08-24 RX ORDER — AMLODIPINE BESYLATE 5 MG/1
TABLET ORAL
Qty: 90 TABLET | Refills: 0 | Status: SHIPPED | OUTPATIENT
Start: 2020-08-24 | End: 2020-11-20

## 2020-08-24 NOTE — TELEPHONE ENCOUNTER
PASSED per protocol, refill sent.   Last PE 6.14.19-PE scheduled for 9.8.20   Future Appointments   Date Time Provider [facility] Bernice   8/26/2020 12:45 PM Rafal Stanton MD [address]   9/8/2020 11:20 AM Marianna Woods MD [facility] [address] [facility]

## 2020-08-31 RX ORDER — PRAVASTATIN SODIUM 10 MG
TABLET ORAL
Qty: 90 TABLET | Refills: 1 | Status: SHIPPED | OUTPATIENT
Start: 2020-08-31 | End: 2021-03-09

## 2020-08-31 NOTE — TELEPHONE ENCOUNTER
Last VISIT 02/11/20    Last REFILL 02/24/20 qty 90 w/1 refill    Last LABS 08/19/20 TSH, Lipid, CMP, CBC done    Future Appointments   Date Time Provider Sherin Queen   9/8/2020 11:20 AM Idalia Erickson MD [facility] [address] [facility]         Per PROTOCOL?  Pa

## 2020-09-04 DIAGNOSIS — F33.1 MODERATE EPISODE OF RECURRENT MAJOR DEPRESSIVE DISORDER (HCC): ICD-10-CM

## 2020-09-05 RX ORDER — ESCITALOPRAM OXALATE 10 MG/1
TABLET ORAL
Qty: 90 TABLET | Refills: 1 | Status: SHIPPED | OUTPATIENT
Start: 2020-09-05 | End: 2021-03-01

## 2020-09-05 NOTE — TELEPHONE ENCOUNTER
Last OV 2.11.20 w/ TB (acute)   Last PE 6.14.19-Overdue   Last REFILL 3.4.20 Escitalopram 10mg #90 1R  Last LABS 8.19.20 CBC, CMP, Lipid, TSH w/ reflex    Future Appointments   Date Time Provider Sherin Queen   9/8/2020 11:20 AM Inder Castaneda MD EMG

## 2020-09-08 ENCOUNTER — OFFICE VISIT (OUTPATIENT)
Dept: INTERNAL MEDICINE CLINIC | Facility: CLINIC | Age: 85
End: 2020-09-08
Payer: COMMERCIAL

## 2020-09-08 VITALS
BODY MASS INDEX: 24.11 KG/M2 | HEART RATE: 64 BPM | DIASTOLIC BLOOD PRESSURE: 64 MMHG | WEIGHT: 131 LBS | RESPIRATION RATE: 16 BRPM | TEMPERATURE: 98 F | HEIGHT: 61.81 IN | SYSTOLIC BLOOD PRESSURE: 122 MMHG

## 2020-09-08 DIAGNOSIS — E21.3 HYPERPARATHYROIDISM (HCC): ICD-10-CM

## 2020-09-08 DIAGNOSIS — M19.012 LOCALIZED OSTEOARTHRITIS OF BOTH SHOULDER REGIONS: ICD-10-CM

## 2020-09-08 DIAGNOSIS — Z23 NEED FOR VACCINATION: ICD-10-CM

## 2020-09-08 DIAGNOSIS — R35.0 URINARY FREQUENCY: ICD-10-CM

## 2020-09-08 DIAGNOSIS — N18.30 CKD (CHRONIC KIDNEY DISEASE) STAGE 3, GFR 30-59 ML/MIN (HCC): ICD-10-CM

## 2020-09-08 DIAGNOSIS — Z90.11 H/O RIGHT MASTECTOMY: ICD-10-CM

## 2020-09-08 DIAGNOSIS — I10 ESSENTIAL HYPERTENSION: ICD-10-CM

## 2020-09-08 DIAGNOSIS — M19.011 LOCALIZED OSTEOARTHRITIS OF BOTH SHOULDER REGIONS: ICD-10-CM

## 2020-09-08 DIAGNOSIS — E78.00 PURE HYPERCHOLESTEROLEMIA: ICD-10-CM

## 2020-09-08 DIAGNOSIS — Z12.31 ENCOUNTER FOR SCREENING MAMMOGRAM FOR MALIGNANT NEOPLASM OF BREAST: ICD-10-CM

## 2020-09-08 DIAGNOSIS — F33.1 MODERATE EPISODE OF RECURRENT MAJOR DEPRESSIVE DISORDER (HCC): ICD-10-CM

## 2020-09-08 DIAGNOSIS — F41.9 ANXIETY: ICD-10-CM

## 2020-09-08 DIAGNOSIS — Z00.00 ENCOUNTER FOR ANNUAL HEALTH EXAMINATION: Primary | ICD-10-CM

## 2020-09-08 LAB
APPEARANCE: CLEAR
BILIRUBIN: NEGATIVE
GLUCOSE (URINE DIPSTICK): NEGATIVE MG/DL
MULTISTIX LOT#: NORMAL NUMERIC
NITRITE, URINE: NEGATIVE
OCCULT BLOOD: NEGATIVE
PH, URINE: 6 (ref 4.5–8)
PROTEIN (URINE DIPSTICK): 30 MG/DL
SPECIFIC GRAVITY: 1.02 (ref 1–1.03)
URINE-COLOR: YELLOW
UROBILINOGEN,SEMI-QN: 0.2 MG/DL (ref 0–1.9)

## 2020-09-08 PROCEDURE — 3074F SYST BP LT 130 MM HG: CPT | Performed by: INTERNAL MEDICINE

## 2020-09-08 PROCEDURE — 96160 PT-FOCUSED HLTH RISK ASSMT: CPT | Performed by: INTERNAL MEDICINE

## 2020-09-08 PROCEDURE — 81003 URINALYSIS AUTO W/O SCOPE: CPT | Performed by: INTERNAL MEDICINE

## 2020-09-08 PROCEDURE — 87086 URINE CULTURE/COLONY COUNT: CPT | Performed by: INTERNAL MEDICINE

## 2020-09-08 PROCEDURE — G0008 ADMIN INFLUENZA VIRUS VAC: HCPCS | Performed by: INTERNAL MEDICINE

## 2020-09-08 PROCEDURE — 87077 CULTURE AEROBIC IDENTIFY: CPT | Performed by: INTERNAL MEDICINE

## 2020-09-08 PROCEDURE — 3078F DIAST BP <80 MM HG: CPT | Performed by: INTERNAL MEDICINE

## 2020-09-08 PROCEDURE — G0439 PPPS, SUBSEQ VISIT: HCPCS | Performed by: INTERNAL MEDICINE

## 2020-09-08 PROCEDURE — 99397 PER PM REEVAL EST PAT 65+ YR: CPT | Performed by: INTERNAL MEDICINE

## 2020-09-08 PROCEDURE — 3008F BODY MASS INDEX DOCD: CPT | Performed by: INTERNAL MEDICINE

## 2020-09-08 PROCEDURE — 90662 IIV NO PRSV INCREASED AG IM: CPT | Performed by: INTERNAL MEDICINE

## 2020-09-08 RX ORDER — CEPHALEXIN 500 MG/1
500 CAPSULE ORAL 2 TIMES DAILY
Qty: 14 CAPSULE | Refills: 0 | Status: SHIPPED | OUTPATIENT
Start: 2020-09-08 | End: 2020-09-15

## 2020-09-08 NOTE — PATIENT INSTRUCTIONS
Fe Sal's SCREENING SCHEDULE   Tests on this list are recommended by your physician but may not be covered, or covered at this frequency, by your insurer. Please check with your insurance carrier before scheduling to verify coverage.    PREVENTATIVE S Men who are 73-68 years old and have smoked more than 100 cigarettes in their lifetime   • Anyone with a family history    Colorectal Cancer Screening  Covered up to Age 76     Colonoscopy Screen   Covered every 10 years- more often if abnormal Colonoscopy Annually Orders placed or performed in visit on 10/01/19   • FLU VACC HIGH DOSE PRSV FREE   Orders placed or performed in visit on 11/10/16   • FLU VACC [address] ANTIG   Orders placed or performed in visit on 10/28/15   • FLU VACC PRSV FREE INC ANTIG good information including definitions of the different types of Advance Directives. It also has the State forms available on it's website for anyone to review and print using their home computer and printer. (the forms are also available in [address])  www.

## 2020-09-08 NOTE — PROGRESS NOTES
HPI:   Temi Adams is a 82year old female who presents for a MA (Medicare Advantage) Supervisit (Once per calendar year). Encounter for annual health examination- referred for mammogram, she no longer needs screening cscope due to her age.  Flu vacci Sal was screened for Alcohol abuse and had a score of 0 so is at low risk.     Patient Care Team: Patient Care Team:  Shiva Parrish MD as PCP - Zainab Kramer MD ([facility] 6802)  Keron Nixon PA-C (Physician Assistant)  Alpa Coffman Oral Tab, TAKE 1 TABLET BY MOUTH EVERY NIGHT AT BEDTIME  Betamethasone Dipropionate 0.05 % External Lotion, Apply to AA of scalp once daily x2 weeks then hold 1 week then repeat prn  AMLODIPINE BESYLATE 5 MG Oral Tab, TAKE 1 TABLET(5 MG) BY MOUTH EVERY DAY hematuria    EXAM:   /64 (BP Location: Left arm, Patient Position: Sitting, Cuff Size: adult)   Pulse 64   Temp 98 °F (36.7 °C) (Temporal)   Resp 16   Ht 61.81\"   Wt 131 lb (59.4 kg)   LMP  (LMP Unknown)   Breastfeeding No   BMI 24.11 kg/m²  Estimat High Dose 65 YRS & Older PRSV Free (71062) 10/28/2015, 10/01/2019, 09/08/2020   • FLUAD High Dose 72 yr and older (74701) 09/22/2017, 10/04/2018   • Fluzone Vaccine Medicare () 10/28/2015, 11/10/2016   • HIGH DOSE FLU 65 YRS AND OLDER PRSV FREE SINGLE appetite been poor?: Yes  How does the patient maintain a good energy level?: Other  How would you describe your daily physical activity?: None  How would you describe your current health state?: Poor      This section provided for quick review of chart, s to display for this patient.  Update Health Maintenance if applicable     Immunizations (Update Immunization Activity if applicable)     Influenza  Covered Annually 9/8/2020 Please get every year    Pneumococcal 13 (Prevnar)  Covered Once after 65 10/28/201

## 2020-10-28 ENCOUNTER — HOSPITAL ENCOUNTER (OUTPATIENT)
Dept: MAMMOGRAPHY | Age: 85
Discharge: HOME | End: 2020-10-28
Attending: INTERNAL MEDICINE
Payer: MEDICARE

## 2020-10-28 DIAGNOSIS — Z12.31 ENCOUNTER FOR SCREENING MAMMOGRAM FOR MALIGNANT NEOPLASM OF BREAST: ICD-10-CM

## 2020-10-28 DIAGNOSIS — Z90.11 H/O RIGHT MASTECTOMY: ICD-10-CM

## 2020-10-28 PROCEDURE — 77067 SCR MAMMO BI INCL CAD: CPT | Performed by: INTERNAL MEDICINE

## 2020-10-28 PROCEDURE — 77063 BREAST TOMOSYNTHESIS BI: CPT | Performed by: INTERNAL MEDICINE

## 2020-11-20 DIAGNOSIS — I10 ESSENTIAL HYPERTENSION: ICD-10-CM

## 2020-11-20 RX ORDER — AMLODIPINE BESYLATE 5 MG/1
TABLET ORAL
Qty: 90 TABLET | Refills: 0 | Status: SHIPPED | OUTPATIENT
Start: 2020-11-20 | End: 2021-02-17

## 2020-11-20 NOTE — TELEPHONE ENCOUNTER
LOV:9/8/20, CPE, TB  Last CPE:9/8/20, CPE, TB  Last refill:AMLODIPINE BESYLATE 5 MG Oral Tab, 8/24/20  Quantity: 90 tablet, 0 refills  Last labs that are related: cbc, lipid, tsh 8/19/20  Future appointment:No future appointments.   Protocol: passed protoco

## 2020-12-07 ENCOUNTER — OFFICE VISIT (OUTPATIENT)
Dept: INTERNAL MEDICINE CLINIC | Facility: CLINIC | Age: 85
End: 2020-12-07
Payer: COMMERCIAL

## 2020-12-07 VITALS
TEMPERATURE: 98 F | RESPIRATION RATE: 18 BRPM | BODY MASS INDEX: 23.04 KG/M2 | WEIGHT: 125.19 LBS | HEART RATE: 64 BPM | SYSTOLIC BLOOD PRESSURE: 116 MMHG | DIASTOLIC BLOOD PRESSURE: 68 MMHG | HEIGHT: 61.81 IN

## 2020-12-07 DIAGNOSIS — R35.0 URINARY FREQUENCY: Primary | ICD-10-CM

## 2020-12-07 PROCEDURE — 99214 OFFICE O/P EST MOD 30 MIN: CPT | Performed by: FAMILY MEDICINE

## 2020-12-07 PROCEDURE — 87086 URINE CULTURE/COLONY COUNT: CPT | Performed by: FAMILY MEDICINE

## 2020-12-07 PROCEDURE — 81003 URINALYSIS AUTO W/O SCOPE: CPT | Performed by: FAMILY MEDICINE

## 2020-12-07 PROCEDURE — 3074F SYST BP LT 130 MM HG: CPT | Performed by: FAMILY MEDICINE

## 2020-12-07 PROCEDURE — 3078F DIAST BP <80 MM HG: CPT | Performed by: FAMILY MEDICINE

## 2020-12-07 PROCEDURE — 3008F BODY MASS INDEX DOCD: CPT | Performed by: FAMILY MEDICINE

## 2020-12-07 RX ORDER — CEPHALEXIN 500 MG/1
500 CAPSULE ORAL 2 TIMES DAILY
Qty: 14 CAPSULE | Refills: 0 | Status: SHIPPED | OUTPATIENT
Start: 2020-12-07 | End: 2020-12-14

## 2020-12-07 NOTE — PROGRESS NOTES
Fe Sal  8/5/1935    Patient presents with:  UTI: MR rm 9 possible UTI, pt states just frequency in urinating      HPI:   Preeti Fallon is a 82year old female who presents with urinary frequency that has been present for about 1 week.  No significant dys unspecified hyperlipidemia    • Other and unspecified personal history of malignant neoplasm 2001    dr. Destinee Key, ill  breast cancer right   • Pap smear for cervical cancer screening 9/18/2003    wnl   • Renal cyst, right 7/15/2015    Refer to  rashes  EYES:denies blurred vision or double vision  HEENT: congested  LUNGS: denies shortness of breath with exertion  CARDIOVASCULAR: denies chest pain on exertion  GI: no nausea or abdominal pain  NEURO: denies headaches    EXAM:   /68 (BP Locatio

## 2020-12-11 ENCOUNTER — TELEPHONE (OUTPATIENT)
Dept: INTERNAL MEDICINE CLINIC | Facility: CLINIC | Age: 85
End: 2020-12-11

## 2020-12-11 DIAGNOSIS — R35.0 URINARY FREQUENCY: Primary | ICD-10-CM

## 2020-12-11 NOTE — TELEPHONE ENCOUNTER
Discussed results over the phone. Urine Cx with no growth. Still having persistent frequency. Has had chronic urinary symptoms worsening over the last 4 months. Will refer to urology for further evaluation. Referral placed.

## 2021-02-17 DIAGNOSIS — I10 ESSENTIAL HYPERTENSION: ICD-10-CM

## 2021-02-17 RX ORDER — AMLODIPINE BESYLATE 5 MG/1
TABLET ORAL
Qty: 90 TABLET | Refills: 0 | Status: SHIPPED | OUTPATIENT
Start: 2021-02-17 | End: 2021-04-22 | Stop reason: ALTCHOICE

## 2021-03-01 DIAGNOSIS — F33.1 MODERATE EPISODE OF RECURRENT MAJOR DEPRESSIVE DISORDER (HCC): ICD-10-CM

## 2021-03-01 RX ORDER — ESCITALOPRAM OXALATE 10 MG/1
TABLET ORAL
Qty: 90 TABLET | Refills: 1 | Status: SHIPPED | OUTPATIENT
Start: 2021-03-01 | End: 2021-09-02

## 2021-03-01 NOTE — TELEPHONE ENCOUNTER
Last visit-  09/08/2020 cpe    Last refill-  09/05/2020 escitalopram 10mg QTY90 1R    Last labs-  08/19/2020 cbc, cmp, lipid, tsh    No future appointments.     Per Protocol- Passed

## 2021-03-09 RX ORDER — PRAVASTATIN SODIUM 10 MG
10 TABLET ORAL NIGHTLY
Qty: 90 TABLET | Refills: 1 | Status: SHIPPED | OUTPATIENT
Start: 2021-03-09 | End: 2021-06-01

## 2021-03-09 NOTE — TELEPHONE ENCOUNTER
PRAVASTATIN SODIUM 10 MG Oral Tab 90 tablet 1 8/31/2020    Sig:   TAKE 1 TABLET BY MOUTH EVERY NIGHT AT BEDTIME       [id number] - [address] [facility], [address] [place] [address] AT [address] [facility], [phone], [phone]

## 2021-03-15 ENCOUNTER — TELEPHONE (OUTPATIENT)
Dept: INTERNAL MEDICINE CLINIC | Facility: CLINIC | Age: 86
End: 2021-03-15

## 2021-03-15 DIAGNOSIS — E21.3 HYPERPARATHYROIDISM (HCC): ICD-10-CM

## 2021-03-15 DIAGNOSIS — I10 ESSENTIAL HYPERTENSION: ICD-10-CM

## 2021-03-15 DIAGNOSIS — Z00.00 ROUTINE GENERAL MEDICAL EXAMINATION AT A HEALTH CARE FACILITY: Primary | ICD-10-CM

## 2021-03-15 DIAGNOSIS — E78.00 PURE HYPERCHOLESTEROLEMIA: ICD-10-CM

## 2021-03-15 NOTE — TELEPHONE ENCOUNTER
CPE   Future Appointments   Date Time Provider Sherin Bernice   4/22/2021  1:00 PM Piyush Plummer MD [facility] [address] [facility] [facility]      Orders to  THE [facility]   aware must fast no call back required

## 2021-03-17 ENCOUNTER — TELEPHONE (OUTPATIENT)
Dept: INTERNAL MEDICINE CLINIC | Facility: CLINIC | Age: 86
End: 2021-03-17

## 2021-03-17 NOTE — TELEPHONE ENCOUNTER
Pt denies ever being told she cannot take tylenol. Last CMP 8/19/20, LFTs WNL. Advised okay to take tonight and will confirm with TB okay for future use.

## 2021-03-17 NOTE — TELEPHONE ENCOUNTER
Patient calling got her 2nd covid immunization today and would like to take Tylenol for her arthritis    Please advise

## 2021-04-15 ENCOUNTER — LAB ENCOUNTER (OUTPATIENT)
Dept: LAB | Age: 86
End: 2021-04-15
Attending: INTERNAL MEDICINE
Payer: MEDICARE

## 2021-04-15 DIAGNOSIS — E78.00 PURE HYPERCHOLESTEROLEMIA: ICD-10-CM

## 2021-04-15 DIAGNOSIS — Z00.00 ROUTINE GENERAL MEDICAL EXAMINATION AT A HEALTH CARE FACILITY: ICD-10-CM

## 2021-04-15 DIAGNOSIS — I10 ESSENTIAL HYPERTENSION: ICD-10-CM

## 2021-04-15 DIAGNOSIS — E21.3 HYPERPARATHYROIDISM (HCC): ICD-10-CM

## 2021-04-15 PROCEDURE — 36415 COLL VENOUS BLD VENIPUNCTURE: CPT

## 2021-04-15 PROCEDURE — 85025 COMPLETE CBC W/AUTO DIFF WBC: CPT

## 2021-04-15 PROCEDURE — 80053 COMPREHEN METABOLIC PANEL: CPT

## 2021-04-15 PROCEDURE — 80061 LIPID PANEL: CPT

## 2021-04-15 PROCEDURE — 84443 ASSAY THYROID STIM HORMONE: CPT

## 2021-04-22 ENCOUNTER — OFFICE VISIT (OUTPATIENT)
Dept: INTERNAL MEDICINE CLINIC | Facility: CLINIC | Age: 86
End: 2021-04-22
Payer: COMMERCIAL

## 2021-04-22 ENCOUNTER — TELEPHONE (OUTPATIENT)
Dept: INTERNAL MEDICINE CLINIC | Facility: CLINIC | Age: 86
End: 2021-04-22

## 2021-04-22 VITALS
HEART RATE: 68 BPM | HEIGHT: 61.81 IN | TEMPERATURE: 98 F | RESPIRATION RATE: 16 BRPM | SYSTOLIC BLOOD PRESSURE: 90 MMHG | DIASTOLIC BLOOD PRESSURE: 60 MMHG | BODY MASS INDEX: 23.55 KG/M2 | WEIGHT: 128 LBS

## 2021-04-22 DIAGNOSIS — F41.9 ANXIETY AND DEPRESSION: ICD-10-CM

## 2021-04-22 DIAGNOSIS — N18.31 STAGE 3A CHRONIC KIDNEY DISEASE (HCC): ICD-10-CM

## 2021-04-22 DIAGNOSIS — M19.019 SHOULDER ARTHRITIS: ICD-10-CM

## 2021-04-22 DIAGNOSIS — I10 ESSENTIAL HYPERTENSION: Primary | ICD-10-CM

## 2021-04-22 DIAGNOSIS — F32.A ANXIETY AND DEPRESSION: ICD-10-CM

## 2021-04-22 DIAGNOSIS — E78.00 PURE HYPERCHOLESTEROLEMIA: ICD-10-CM

## 2021-04-22 PROCEDURE — 3008F BODY MASS INDEX DOCD: CPT | Performed by: INTERNAL MEDICINE

## 2021-04-22 PROCEDURE — 3074F SYST BP LT 130 MM HG: CPT | Performed by: INTERNAL MEDICINE

## 2021-04-22 PROCEDURE — 3078F DIAST BP <80 MM HG: CPT | Performed by: INTERNAL MEDICINE

## 2021-04-22 PROCEDURE — 99214 OFFICE O/P EST MOD 30 MIN: CPT | Performed by: INTERNAL MEDICINE

## 2021-04-22 RX ORDER — NAPROXEN 500 MG/1
500 TABLET ORAL DAILY PRN
Qty: 30 TABLET | Refills: 2 | Status: SHIPPED | OUTPATIENT
Start: 2021-04-22

## 2021-04-22 NOTE — TELEPHONE ENCOUNTER
Future Appointments   Date Time Provider Sherin Bernice   6/1/2021  1:20 PM Irving Brandt MD [facility] [address] [facility]

## 2021-04-22 NOTE — TELEPHONE ENCOUNTER
Pt came in for [address] today, but came too early and waited in the lobby. Pt came back up and was not checked in. Pt had a follow up instead of [address] Te created to re-schedule pt for MWV.

## 2021-04-29 ENCOUNTER — TELEPHONE (OUTPATIENT)
Dept: INTERNAL MEDICINE CLINIC | Facility: CLINIC | Age: 86
End: 2021-04-29

## 2021-04-29 NOTE — TELEPHONE ENCOUNTER
Pt saw TB on 4/22/21 and was told to discontinue Amlodipine. Her bp has been high 170/102 175/90 174/100 192/100. Pt is not having any symptoms but she is concerned. Her question is should she start the Amlodipine again?  Please advise

## 2021-04-29 NOTE — TELEPHONE ENCOUNTER
LOV 4/22/21    \"Essential hypertension  (primary encounter diagnosis)- DC amlodipine due to low BP, continue metoprolol. Monitor BP, parameters reviewed with her. \"

## 2021-05-03 ENCOUNTER — TELEPHONE (OUTPATIENT)
Dept: INTERNAL MEDICINE CLINIC | Facility: CLINIC | Age: 86
End: 2021-05-03

## 2021-05-03 DIAGNOSIS — M25.60 JOINT STIFFNESS: Primary | ICD-10-CM

## 2021-05-03 NOTE — TELEPHONE ENCOUNTER
LOV with TB 4/22/2021; please advise. HPI:      Patient here for f/u-  HTN- BP running on the low side, double checked today and 90/60. She does feel tired but no dizziness or LH feeling.  No CP/SOB  HL- well controlled on statin therapy  Anxiety- doing w

## 2021-05-04 NOTE — TELEPHONE ENCOUNTER
Patient states she has done some research, she would like to be tested for RA for the following reasons.   Pt states she has had a low grade fever ie 99.2, 99.3 for a couple of months, no energy, fatigue so tired all the time that she has to lie down after

## 2021-05-05 ENCOUNTER — LABORATORY ENCOUNTER (OUTPATIENT)
Dept: LAB | Age: 86
End: 2021-05-05
Attending: INTERNAL MEDICINE
Payer: MEDICARE

## 2021-05-05 DIAGNOSIS — M25.60 JOINT STIFFNESS: ICD-10-CM

## 2021-05-05 PROCEDURE — 86200 CCP ANTIBODY: CPT

## 2021-05-05 PROCEDURE — 36415 COLL VENOUS BLD VENIPUNCTURE: CPT

## 2021-05-05 PROCEDURE — 86431 RHEUMATOID FACTOR QUANT: CPT

## 2021-05-05 PROCEDURE — 85652 RBC SED RATE AUTOMATED: CPT

## 2021-05-11 ENCOUNTER — TELEPHONE (OUTPATIENT)
Dept: INTERNAL MEDICINE CLINIC | Facility: CLINIC | Age: 86
End: 2021-05-11

## 2021-05-11 NOTE — TELEPHONE ENCOUNTER
Patient notified of lab results  5/5/2021and TB comment of within normal ranges no changes in management.

## 2021-05-26 DIAGNOSIS — I10 ESSENTIAL HYPERTENSION: ICD-10-CM

## 2021-05-26 RX ORDER — AMLODIPINE BESYLATE 5 MG/1
TABLET ORAL
Qty: 90 TABLET | Refills: 0 | OUTPATIENT
Start: 2021-05-26

## 2021-06-01 ENCOUNTER — OFFICE VISIT (OUTPATIENT)
Dept: INTERNAL MEDICINE CLINIC | Facility: CLINIC | Age: 86
End: 2021-06-01
Payer: COMMERCIAL

## 2021-06-01 VITALS
HEART RATE: 70 BPM | OXYGEN SATURATION: 97 % | RESPIRATION RATE: 16 BRPM | BODY MASS INDEX: 23 KG/M2 | DIASTOLIC BLOOD PRESSURE: 80 MMHG | WEIGHT: 125 LBS | HEIGHT: 61.73 IN | SYSTOLIC BLOOD PRESSURE: 122 MMHG | TEMPERATURE: 99 F

## 2021-06-01 DIAGNOSIS — Z00.00 ENCOUNTER FOR ANNUAL HEALTH EXAMINATION: Primary | ICD-10-CM

## 2021-06-01 DIAGNOSIS — R11.0 NAUSEA: ICD-10-CM

## 2021-06-01 DIAGNOSIS — F33.1 MODERATE EPISODE OF RECURRENT MAJOR DEPRESSIVE DISORDER (HCC): ICD-10-CM

## 2021-06-01 DIAGNOSIS — E78.00 PURE HYPERCHOLESTEROLEMIA: ICD-10-CM

## 2021-06-01 DIAGNOSIS — N18.31 STAGE 3A CHRONIC KIDNEY DISEASE (HCC): ICD-10-CM

## 2021-06-01 DIAGNOSIS — Z90.11 H/O RIGHT MASTECTOMY: ICD-10-CM

## 2021-06-01 DIAGNOSIS — I10 ESSENTIAL HYPERTENSION: ICD-10-CM

## 2021-06-01 DIAGNOSIS — R10.11 RIGHT UPPER QUADRANT PAIN: ICD-10-CM

## 2021-06-01 DIAGNOSIS — M19.019 SHOULDER ARTHRITIS: ICD-10-CM

## 2021-06-01 DIAGNOSIS — E21.3 HYPERPARATHYROIDISM (HCC): ICD-10-CM

## 2021-06-01 PROBLEM — D37.5 NEOPLASM OF UNCERTAIN BEHAVIOR OF STOMACH, INTESTINES, AND RECTUM: Status: RESOLVED | Noted: 2017-05-25 | Resolved: 2021-06-01

## 2021-06-01 PROBLEM — N28.1 BILATERAL RENAL CYSTS: Status: RESOLVED | Noted: 2017-02-09 | Resolved: 2021-06-01

## 2021-06-01 PROBLEM — D37.1 NEOPLASM OF UNCERTAIN BEHAVIOR OF STOMACH, INTESTINES, AND RECTUM: Status: RESOLVED | Noted: 2017-05-25 | Resolved: 2021-06-01

## 2021-06-01 PROBLEM — D37.8 NEOPLASM OF UNCERTAIN BEHAVIOR OF STOMACH, INTESTINES, AND RECTUM: Status: RESOLVED | Noted: 2017-05-25 | Resolved: 2021-06-01

## 2021-06-01 PROCEDURE — 3008F BODY MASS INDEX DOCD: CPT | Performed by: INTERNAL MEDICINE

## 2021-06-01 PROCEDURE — 99397 PER PM REEVAL EST PAT 65+ YR: CPT | Performed by: INTERNAL MEDICINE

## 2021-06-01 PROCEDURE — 3074F SYST BP LT 130 MM HG: CPT | Performed by: INTERNAL MEDICINE

## 2021-06-01 PROCEDURE — G0439 PPPS, SUBSEQ VISIT: HCPCS | Performed by: INTERNAL MEDICINE

## 2021-06-01 PROCEDURE — 96160 PT-FOCUSED HLTH RISK ASSMT: CPT | Performed by: INTERNAL MEDICINE

## 2021-06-01 PROCEDURE — 3079F DIAST BP 80-89 MM HG: CPT | Performed by: INTERNAL MEDICINE

## 2021-06-01 RX ORDER — PRAVASTATIN SODIUM 10 MG
10 TABLET ORAL NIGHTLY
Qty: 90 TABLET | Refills: 3 | Status: SHIPPED | OUTPATIENT
Start: 2021-06-01

## 2021-06-01 RX ORDER — AMLODIPINE BESYLATE 5 MG/1
5 TABLET ORAL DAILY
Qty: 90 TABLET | Refills: 3 | Status: SHIPPED | OUTPATIENT
Start: 2021-06-01

## 2021-06-01 NOTE — PROGRESS NOTES
HPI:   Wen Toledo is a 82year old female who presents for a MA (Medicare Advantage) Supervisit (Once per calendar year).     Encounter for annual health examination- she is utd on mammogram and she completed covid 19 vaccine series, she no longer needs depressed, or hopeless: Not at all  3. Trouble falling or staying asleep, or sleeping too much: Nearly every day  4. Feeling tired or having little energy: Nearly every day  5. Poor appetite or overeating: Not at all  6.  Feeling bad about yourself - or jaida ([facility] [address]     CKD (chronic kidney disease) stage 3, GFR 30-59 ml/min ([facility])     Hyperparathyroidism ([address]    Wt Readings from Last 3 Encounters:  06/01/21 : 125 lb (56.7 kg)  04/22/21 : 128 lb (58.1 kg)  12/07/20 : 125 lb 3.2 oz (56.8 kg)     Last Cholesterol L (9/18/2003), Renal cyst, right (7/15/2015), Rheumatic fever, and Unspecified essential hypertension.     She  has a past surgical history that includes special service or report (2001); colonoscopy (2/02 4/08); unspecified diagnostic proce (2005); colonosco gums normal   Neck: Supple, symmetrical, trachea midline, no adenopathy;  thyroid: not enlarged, symmetric, no tenderness/mass/nodules; no carotid bruit or JVD   Back:   Symmetric, no curvature, ROM normal, no CVA tenderness   Lungs:   Clear to auscultatio controlled on statin therapy, CPM  H/O right mastectomy for breast cancer- she is utd on left mammogram  Moderate episode of recurrent major depressive disorder ([facility] [address]- doing well on lexapro, CPM  Hyperparathyroidism ([facility] [address]- check PTH and dexa later this yea Panel  Cholesterol  Lipoprotein (HDL)  Triglycerides Covered every 5 years for all Medicare beneficiaries without apparent signs or symptoms of cardiovascular disease Lab Results   Component Value Date    CHOLEST 164 04/15/2021    HDL 46 04/15/2021    LDL 13: 10/28/2015    [id number]: 10/21/2014     No recommendations at this time    Hepatitis B One screening covered for patients with certain risk factors   -  No recommendations at this time    Tetanus Toxoid Not covered by Medicare Part B unless medically

## 2021-06-01 NOTE — PATIENT INSTRUCTIONS
Fe Sal's SCREENING SCHEDULE   Tests on this list are recommended by your physician but may not be covered, or covered at this frequency, by your insurer. Please check with your insurance carrier before scheduling to verify coverage.    PREVENTATIVE 07/30/2019      No recommendations at this time   Pap and Pelvic    Pap   Covered every 2 years for women at normal risk;  Annually if at high risk -  No recommendations at this time    Chlamydia Annually if high risk -  No recommendations at this time   Sc Advance Directives.

## 2021-07-01 ENCOUNTER — HOSPITAL ENCOUNTER (OUTPATIENT)
Dept: ULTRASOUND IMAGING | Age: 86
Discharge: HOME | End: 2021-07-01
Attending: INTERNAL MEDICINE
Payer: MEDICARE

## 2021-07-01 ENCOUNTER — TELEPHONE (OUTPATIENT)
Dept: INTERNAL MEDICINE CLINIC | Facility: CLINIC | Age: 86
End: 2021-07-01

## 2021-07-01 DIAGNOSIS — R11.0 NAUSEA: Primary | ICD-10-CM

## 2021-07-01 DIAGNOSIS — R11.0 NAUSEA: ICD-10-CM

## 2021-07-01 DIAGNOSIS — R10.9 ABDOMINAL PAIN, UNSPECIFIED ABDOMINAL LOCATION: ICD-10-CM

## 2021-07-01 DIAGNOSIS — K76.89 HEPATIC CYST: ICD-10-CM

## 2021-07-01 DIAGNOSIS — R10.11 RIGHT UPPER QUADRANT PAIN: ICD-10-CM

## 2021-07-01 PROCEDURE — 76700 US EXAM ABDOM COMPLETE: CPT | Performed by: INTERNAL MEDICINE

## 2021-08-31 ENCOUNTER — LAB ENCOUNTER (OUTPATIENT)
Dept: LAB | Age: 86
End: 2021-08-31
Attending: INTERNAL MEDICINE
Payer: MEDICARE

## 2021-08-31 DIAGNOSIS — E21.3 HYPERPARATHYROIDISM (HCC): ICD-10-CM

## 2021-08-31 LAB
ANION GAP SERPL CALC-SCNC: 4 MMOL/L (ref 0–18)
BUN BLD-MCNC: 16 MG/DL (ref 7–18)
CALCIUM BLD-MCNC: 10.1 MG/DL (ref 8.5–10.1)
CHLORIDE SERPL-SCNC: 106 MMOL/L (ref 98–112)
CO2 SERPL-SCNC: 29 MMOL/L (ref 21–32)
CREAT BLD-MCNC: 1.07 MG/DL
GLUCOSE BLD-MCNC: 96 MG/DL (ref 70–99)
OSMOLALITY SERPL CALC.SUM OF ELEC: 289 MOSM/KG (ref 275–295)
PATIENT FASTING Y/N/NP: YES
POTASSIUM SERPL-SCNC: 3.8 MMOL/L (ref 3.5–5.1)
PTH-INTACT SERPL-MCNC: 105.3 PG/ML (ref 18.5–88)
SODIUM SERPL-SCNC: 139 MMOL/L (ref 136–145)

## 2021-08-31 PROCEDURE — 83970 ASSAY OF PARATHORMONE: CPT

## 2021-08-31 PROCEDURE — 36415 COLL VENOUS BLD VENIPUNCTURE: CPT

## 2021-08-31 PROCEDURE — 80048 BASIC METABOLIC PNL TOTAL CA: CPT

## 2021-09-01 DIAGNOSIS — F33.1 MODERATE EPISODE OF RECURRENT MAJOR DEPRESSIVE DISORDER (HCC): ICD-10-CM

## 2021-09-02 ENCOUNTER — OFFICE VISIT (OUTPATIENT)
Dept: INTERNAL MEDICINE CLINIC | Facility: CLINIC | Age: 86
End: 2021-09-02
Payer: COMMERCIAL

## 2021-09-02 VITALS
OXYGEN SATURATION: 98 % | RESPIRATION RATE: 16 BRPM | HEART RATE: 55 BPM | WEIGHT: 127 LBS | SYSTOLIC BLOOD PRESSURE: 136 MMHG | TEMPERATURE: 99 F | BODY MASS INDEX: 23.37 KG/M2 | DIASTOLIC BLOOD PRESSURE: 82 MMHG | HEIGHT: 62 IN

## 2021-09-02 DIAGNOSIS — E78.00 PURE HYPERCHOLESTEROLEMIA: ICD-10-CM

## 2021-09-02 DIAGNOSIS — E21.0 PRIMARY HYPERPARATHYROIDISM (HCC): ICD-10-CM

## 2021-09-02 DIAGNOSIS — R53.83 MALAISE AND FATIGUE: Primary | ICD-10-CM

## 2021-09-02 DIAGNOSIS — I10 ESSENTIAL HYPERTENSION: ICD-10-CM

## 2021-09-02 DIAGNOSIS — R53.81 MALAISE AND FATIGUE: Primary | ICD-10-CM

## 2021-09-02 PROCEDURE — 3079F DIAST BP 80-89 MM HG: CPT | Performed by: INTERNAL MEDICINE

## 2021-09-02 PROCEDURE — 3008F BODY MASS INDEX DOCD: CPT | Performed by: INTERNAL MEDICINE

## 2021-09-02 PROCEDURE — 99214 OFFICE O/P EST MOD 30 MIN: CPT | Performed by: INTERNAL MEDICINE

## 2021-09-02 PROCEDURE — 3075F SYST BP GE 130 - 139MM HG: CPT | Performed by: INTERNAL MEDICINE

## 2021-09-02 RX ORDER — ESCITALOPRAM OXALATE 10 MG/1
TABLET ORAL
Qty: 90 TABLET | Refills: 1 | Status: SHIPPED | OUTPATIENT
Start: 2021-09-02

## 2021-09-02 NOTE — PROGRESS NOTES
Jacoby Sal is a 82year old female. Patient presents with: Follow - Up: RM 4 JY med review, chronic conditions      HPI:     Patient here for follow up-  C/o fatigue for over a year, feels like it may be worsening. Needs to take a lot of naps.  No CP/pal on 6/1/2021 ) 30 tablet 2      Past Medical History:   Diagnosis Date   • Anxiety    • Arthritis     knees and shoulders   • Breast CA ([facility] [address] 2001    right breast cancer with mastectomy.    • Cancer [facility]) 2001    right breast ca   • Cystitis, interstitial SpO2 98%   Breastfeeding No   BMI 23.23 kg/m²   GENERAL: well developed, NAD  NECK: supple,no adenopathy  LUNGS: normal rate without respiratory distress, lungs clear to auscultation  CARDIO: RRR nl S1 S2  GI: normal bowel sounds, soft, NT/ND  EXTREMITIES

## 2021-09-02 NOTE — TELEPHONE ENCOUNTER
Last VISIT 6/1/21 TB CPE    Last CPE 6/1/2 TB CPE    Last REFILL 3/1/21 Escitalopram 90T 1R    Last LABS 8/31/21 BMP, PTH. 5/5/21 RA Factr, CCP, Sed Rate    Future Appointments   Date Time Provider Sherin Queen   9/2/2021  1:00 PM Yury Gates MD E

## 2021-09-08 ENCOUNTER — TELEPHONE (OUTPATIENT)
Dept: INTERNAL MEDICINE CLINIC | Facility: CLINIC | Age: 86
End: 2021-09-08

## 2021-09-08 NOTE — TELEPHONE ENCOUNTER
[facility] [place] calling to state that the nuclear stress test ordered by TB will need authorization.  The number to call is [phone]

## 2021-09-09 NOTE — TELEPHONE ENCOUNTER
Testing has been authorized- referrals conducted the JUMANA Carroll on patient VM for patient to call and schedule testing [phone]

## 2021-10-08 ENCOUNTER — TELEPHONE (OUTPATIENT)
Dept: INTERNAL MEDICINE CLINIC | Facility: CLINIC | Age: 86
End: 2021-10-08

## 2021-10-08 NOTE — TELEPHONE ENCOUNTER
Patient is wondering if she should go to the Pharmacy or come into the office to get her Flu shot.   Please advise

## 2021-10-11 ENCOUNTER — IMMUNIZATION (OUTPATIENT)
Dept: FAMILY MEDICINE CLINIC | Facility: CLINIC | Age: 86
End: 2021-10-11
Payer: COMMERCIAL

## 2021-10-11 PROCEDURE — 90662 IIV NO PRSV INCREASED AG IM: CPT | Performed by: NURSE PRACTITIONER

## 2021-10-11 PROCEDURE — G0008 ADMIN INFLUENZA VIRUS VAC: HCPCS | Performed by: NURSE PRACTITIONER

## 2021-10-30 ENCOUNTER — OFFICE VISIT (OUTPATIENT)
Dept: FAMILY MEDICINE CLINIC | Facility: CLINIC | Age: 86
End: 2021-10-30
Payer: COMMERCIAL

## 2021-10-30 VITALS
TEMPERATURE: 98 F | HEART RATE: 72 BPM | SYSTOLIC BLOOD PRESSURE: 110 MMHG | WEIGHT: 125.38 LBS | DIASTOLIC BLOOD PRESSURE: 60 MMHG | BODY MASS INDEX: 23 KG/M2 | OXYGEN SATURATION: 98 %

## 2021-10-30 DIAGNOSIS — N30.01 ACUTE CYSTITIS WITH HEMATURIA: Primary | ICD-10-CM

## 2021-10-30 DIAGNOSIS — R39.9 UTI SYMPTOMS: ICD-10-CM

## 2021-10-30 PROCEDURE — 81003 URINALYSIS AUTO W/O SCOPE: CPT | Performed by: FAMILY MEDICINE

## 2021-10-30 PROCEDURE — 87086 URINE CULTURE/COLONY COUNT: CPT | Performed by: FAMILY MEDICINE

## 2021-10-30 PROCEDURE — 99213 OFFICE O/P EST LOW 20 MIN: CPT | Performed by: FAMILY MEDICINE

## 2021-10-30 PROCEDURE — 3078F DIAST BP <80 MM HG: CPT | Performed by: FAMILY MEDICINE

## 2021-10-30 PROCEDURE — 3074F SYST BP LT 130 MM HG: CPT | Performed by: FAMILY MEDICINE

## 2021-10-30 RX ORDER — CEPHALEXIN 500 MG/1
500 CAPSULE ORAL 2 TIMES DAILY
Qty: 14 CAPSULE | Refills: 0 | Status: SHIPPED | OUTPATIENT
Start: 2021-10-30 | End: 2021-11-06

## 2021-10-30 NOTE — PROGRESS NOTES
Hank Sal is a 82year old female. S:  Patient presents today with the following concerns:  · Patient complains of urinary frequency, urgency, pain with urination for 3 days. Some loss of bladder control.   Denies abdominal or back pain or fever or ch • Colon Cancer Mother    • Breast Cancer Self 63   • Hypertension Brother        REVIEW OF SYSTEMS:  GENERAL: feels well otherwise  SKIN: denies any unusual skin lesions  EYES:denies vision change  LUNGS: denies shortness of breath with exertion  CARDIOV

## 2021-11-02 ENCOUNTER — TELEPHONE (OUTPATIENT)
Dept: INTERNAL MEDICINE CLINIC | Facility: CLINIC | Age: 86
End: 2021-11-02

## 2021-11-02 NOTE — TELEPHONE ENCOUNTER
Patient states she went to the [facility] on 10/30/21 with burning with urination, frequency, some incontinence, gave samples which showed bacteria, given abx, took 3 days, was called by the [facility] who indicates there was no uti and could stop the abx medication. Pt indicates she no longer has any s/s, last uti was 1 year ago. Pt notified to call back if s/s recur after stopping the abx. Pt verbalizes understanding. Any other recommendations?

## 2021-11-02 NOTE — TELEPHONE ENCOUNTER
Spoke with pt. Pt said she went to [facility] on Saturday after having problems urinating. UA was done and no growth was seen. Pt was told by [facility] PA to discontinue antibiotics. Pt said she is not having any current symptoms. Pt said this has been an ongoing problem for years. Routing to provider for recommendation. Would you like to see her or maybe urology?

## 2022-02-15 ENCOUNTER — TELEPHONE (OUTPATIENT)
Dept: INTERNAL MEDICINE CLINIC | Facility: CLINIC | Age: 87
End: 2022-02-15

## 2022-03-04 ENCOUNTER — TELEPHONE (OUTPATIENT)
Dept: INTERNAL MEDICINE CLINIC | Facility: CLINIC | Age: 87
End: 2022-03-04

## 2022-03-04 NOTE — TELEPHONE ENCOUNTER
Supervisit   Future Appointments   Date Time Provider Sherin Queen   5/3/2022  4:20 PM Thai Solis MD [facility] [address] [facility]      Orders to THE [facility] aware must fast no call back required

## 2022-03-07 RX ORDER — ESCITALOPRAM OXALATE 10 MG/1
TABLET ORAL
Qty: 90 TABLET | Refills: 1 | Status: SHIPPED | OUTPATIENT
Start: 2022-03-07

## 2022-03-07 NOTE — TELEPHONE ENCOUNTER
Last VISIT - 9/2/21 f/u chronic conditions    Last CPE - 6/1/21    Last REFILL -     ESCITALOPRAM 10 MG Oral Tab 90 tablet 1 9/2/2021     Last LABS - 8/31/21 bmp 4/15/21 tsh, lipid, cmp, cbc    Future Appointments   Date Time Provider Sherin Queen   5/3/2022  4:20 PM Josh Helton MD [facility] [address] [facility]       Per PROTOCOL? None    Please Approve or Deny.

## 2022-03-17 ENCOUNTER — HOSPITAL ENCOUNTER (OUTPATIENT)
Dept: MAMMOGRAPHY | Age: 87
Discharge: HOME | End: 2022-03-17
Attending: INTERNAL MEDICINE
Payer: MEDICARE

## 2022-03-17 DIAGNOSIS — Z12.31 ENCOUNTER FOR SCREENING MAMMOGRAM FOR MALIGNANT NEOPLASM OF BREAST: ICD-10-CM

## 2022-03-17 PROCEDURE — 77063 BREAST TOMOSYNTHESIS BI: CPT | Performed by: INTERNAL MEDICINE

## 2022-03-17 PROCEDURE — 77067 SCR MAMMO BI INCL CAD: CPT | Performed by: INTERNAL MEDICINE

## 2022-04-01 ENCOUNTER — HOSPITAL ENCOUNTER (OUTPATIENT)
Dept: MAMMOGRAPHY | Facility: HOSPITAL | Age: 87
Discharge: HOME | End: 2022-04-01
Attending: INTERNAL MEDICINE
Payer: MEDICARE

## 2022-04-01 DIAGNOSIS — R92.2 INCONCLUSIVE MAMMOGRAM: ICD-10-CM

## 2022-04-01 PROCEDURE — 77065 DX MAMMO INCL CAD UNI: CPT | Performed by: INTERNAL MEDICINE

## 2022-04-01 PROCEDURE — 77061 BREAST TOMOSYNTHESIS UNI: CPT | Performed by: INTERNAL MEDICINE

## 2022-04-11 ENCOUNTER — TELEPHONE (OUTPATIENT)
Dept: INTERNAL MEDICINE CLINIC | Facility: CLINIC | Age: 87
End: 2022-04-11

## 2022-04-11 NOTE — TELEPHONE ENCOUNTER
Spoke to pt. Informed her of additional views results from 4/1 (benign, rpt in 1 year). Pt said she has gotten multiple letters from the [facility] telling her to reach out to her PCP for results. Informed pt we are unsure why this is. Pt said she will call the mammo dept to ask them about it.

## 2022-04-11 NOTE — TELEPHONE ENCOUNTER
Pt calling about her recent mammogram. She got a letter stating to reach out to her PCP for results.  Please call back

## 2022-04-20 NOTE — TELEPHONE ENCOUNTER
Last VISIT - 9//2/21 f/u on meds and chronic conditions. Last CPE - 6/1/21    Last REFILL -     METOPROLOL TARTRATE 25 MG Oral Tab 180 tablet 1 10/25/2021     Last LABS - 4/15/21 tsh, lipid, cmp, cbc    Future Appointments   Date Time Provider Sherin Queen   5/3/2022  4:20 PM Vannesa Maldonado MD [facility] [address] [facility]     Per PROTOCOL? PASSED    Please Approve or Deny.

## 2022-04-21 ENCOUNTER — LAB ENCOUNTER (OUTPATIENT)
Dept: LAB | Age: 87
End: 2022-04-21
Attending: INTERNAL MEDICINE
Payer: MEDICARE

## 2022-04-21 DIAGNOSIS — I10 HYPERTENSION, UNSPECIFIED TYPE: ICD-10-CM

## 2022-04-21 DIAGNOSIS — Z00.00 ROUTINE GENERAL MEDICAL EXAMINATION AT A HEALTH CARE FACILITY: ICD-10-CM

## 2022-04-21 LAB
ALBUMIN SERPL-MCNC: 3.6 G/DL (ref 3.4–5)
ALBUMIN/GLOB SERPL: 1 {RATIO} (ref 1–2)
ALP LIVER SERPL-CCNC: 53 U/L
ALT SERPL-CCNC: 21 U/L
ANION GAP SERPL CALC-SCNC: 6 MMOL/L (ref 0–18)
AST SERPL-CCNC: 14 U/L (ref 15–37)
BASOPHILS # BLD AUTO: 0.06 X10(3) UL (ref 0–0.2)
BASOPHILS NFR BLD AUTO: 0.7 %
BILIRUB SERPL-MCNC: 0.9 MG/DL (ref 0.1–2)
BUN BLD-MCNC: 22 MG/DL (ref 7–18)
CALCIUM BLD-MCNC: 10 MG/DL (ref 8.5–10.1)
CHLORIDE SERPL-SCNC: 107 MMOL/L (ref 98–112)
CHOLEST SERPL-MCNC: 159 MG/DL (ref ?–200)
CO2 SERPL-SCNC: 29 MMOL/L (ref 21–32)
CREAT BLD-MCNC: 1.02 MG/DL
EOSINOPHIL # BLD AUTO: 0.08 X10(3) UL (ref 0–0.7)
EOSINOPHIL NFR BLD AUTO: 1 %
ERYTHROCYTE [DISTWIDTH] IN BLOOD BY AUTOMATED COUNT: 13.5 %
FASTING PATIENT LIPID ANSWER: YES
FASTING STATUS PATIENT QL REPORTED: YES
GLOBULIN PLAS-MCNC: 3.5 G/DL (ref 2.8–4.4)
GLUCOSE BLD-MCNC: 96 MG/DL (ref 70–99)
HCT VFR BLD AUTO: 39.1 %
HDLC SERPL-MCNC: 47 MG/DL (ref 40–59)
HGB BLD-MCNC: 12.5 G/DL
IMM GRANULOCYTES # BLD AUTO: 0.02 X10(3) UL (ref 0–1)
IMM GRANULOCYTES NFR BLD: 0.2 %
LDLC SERPL CALC-MCNC: 90 MG/DL (ref ?–100)
LYMPHOCYTES # BLD AUTO: 1.83 X10(3) UL (ref 1–4)
LYMPHOCYTES NFR BLD AUTO: 22.7 %
MCH RBC QN AUTO: 28.8 PG (ref 26–34)
MCHC RBC AUTO-ENTMCNC: 32 G/DL (ref 31–37)
MCV RBC AUTO: 90.1 FL
MONOCYTES # BLD AUTO: 0.76 X10(3) UL (ref 0.1–1)
MONOCYTES NFR BLD AUTO: 9.4 %
NEUTROPHILS # BLD AUTO: 5.31 X10 (3) UL (ref 1.5–7.7)
NEUTROPHILS # BLD AUTO: 5.31 X10(3) UL (ref 1.5–7.7)
NEUTROPHILS NFR BLD AUTO: 66 %
NONHDLC SERPL-MCNC: 112 MG/DL (ref ?–130)
OSMOLALITY SERPL CALC.SUM OF ELEC: 297 MOSM/KG (ref 275–295)
PLATELET # BLD AUTO: 288 10(3)UL (ref 150–450)
POTASSIUM SERPL-SCNC: 3.9 MMOL/L (ref 3.5–5.1)
PROT SERPL-MCNC: 7.1 G/DL (ref 6.4–8.2)
RBC # BLD AUTO: 4.34 X10(6)UL
SODIUM SERPL-SCNC: 142 MMOL/L (ref 136–145)
TRIGL SERPL-MCNC: 124 MG/DL (ref 30–149)
TSI SER-ACNC: 1.91 MIU/ML (ref 0.36–3.74)
VLDLC SERPL CALC-MCNC: 20 MG/DL (ref 0–30)
WBC # BLD AUTO: 8.1 X10(3) UL (ref 4–11)

## 2022-04-21 PROCEDURE — 84443 ASSAY THYROID STIM HORMONE: CPT

## 2022-04-21 PROCEDURE — 85025 COMPLETE CBC W/AUTO DIFF WBC: CPT

## 2022-04-21 PROCEDURE — 80061 LIPID PANEL: CPT

## 2022-04-21 PROCEDURE — 36415 COLL VENOUS BLD VENIPUNCTURE: CPT

## 2022-04-21 PROCEDURE — 80053 COMPREHEN METABOLIC PANEL: CPT

## 2022-05-03 ENCOUNTER — OFFICE VISIT (OUTPATIENT)
Dept: INTERNAL MEDICINE CLINIC | Facility: CLINIC | Age: 87
End: 2022-05-03
Payer: COMMERCIAL

## 2022-05-03 VITALS
HEART RATE: 66 BPM | WEIGHT: 123.38 LBS | DIASTOLIC BLOOD PRESSURE: 70 MMHG | BODY MASS INDEX: 22.7 KG/M2 | SYSTOLIC BLOOD PRESSURE: 112 MMHG | HEIGHT: 62 IN | RESPIRATION RATE: 16 BRPM | TEMPERATURE: 99 F

## 2022-05-03 DIAGNOSIS — R53.82 CHRONIC FATIGUE: ICD-10-CM

## 2022-05-03 DIAGNOSIS — M19.019 SHOULDER ARTHRITIS: ICD-10-CM

## 2022-05-03 DIAGNOSIS — I10 ESSENTIAL (PRIMARY) HYPERTENSION: ICD-10-CM

## 2022-05-03 DIAGNOSIS — N18.31 STAGE 3A CHRONIC KIDNEY DISEASE (HCC): ICD-10-CM

## 2022-05-03 DIAGNOSIS — Z78.0 POST-MENOPAUSAL: ICD-10-CM

## 2022-05-03 DIAGNOSIS — F41.9 ANXIETY: ICD-10-CM

## 2022-05-03 DIAGNOSIS — R53.81 MALAISE AND FATIGUE: ICD-10-CM

## 2022-05-03 DIAGNOSIS — E21.0 PRIMARY HYPERPARATHYROIDISM (HCC): ICD-10-CM

## 2022-05-03 DIAGNOSIS — R41.3 MEMORY CHANGES: ICD-10-CM

## 2022-05-03 DIAGNOSIS — F33.1 MODERATE EPISODE OF RECURRENT MAJOR DEPRESSIVE DISORDER (HCC): ICD-10-CM

## 2022-05-03 DIAGNOSIS — Z90.11 H/O RIGHT MASTECTOMY: ICD-10-CM

## 2022-05-03 DIAGNOSIS — R53.83 MALAISE AND FATIGUE: ICD-10-CM

## 2022-05-03 DIAGNOSIS — Z00.00 ENCOUNTER FOR ANNUAL HEALTH EXAMINATION: Primary | ICD-10-CM

## 2022-05-03 DIAGNOSIS — R01.1 HEART MURMUR: ICD-10-CM

## 2022-05-03 DIAGNOSIS — E78.00 PURE HYPERCHOLESTEROLEMIA: ICD-10-CM

## 2022-05-03 PROCEDURE — 99397 PER PM REEVAL EST PAT 65+ YR: CPT | Performed by: INTERNAL MEDICINE

## 2022-05-03 PROCEDURE — 96127 BRIEF EMOTIONAL/BEHAV ASSMT: CPT | Performed by: INTERNAL MEDICINE

## 2022-05-03 PROCEDURE — 3008F BODY MASS INDEX DOCD: CPT | Performed by: INTERNAL MEDICINE

## 2022-05-03 PROCEDURE — 96160 PT-FOCUSED HLTH RISK ASSMT: CPT | Performed by: INTERNAL MEDICINE

## 2022-05-03 PROCEDURE — 3074F SYST BP LT 130 MM HG: CPT | Performed by: INTERNAL MEDICINE

## 2022-05-03 PROCEDURE — 3078F DIAST BP <80 MM HG: CPT | Performed by: INTERNAL MEDICINE

## 2022-05-03 PROCEDURE — G0439 PPPS, SUBSEQ VISIT: HCPCS | Performed by: INTERNAL MEDICINE

## 2022-05-03 RX ORDER — LOSARTAN POTASSIUM 25 MG/1
25 TABLET ORAL DAILY
Qty: 30 TABLET | Refills: 1 | Status: SHIPPED | OUTPATIENT
Start: 2022-05-03

## 2022-05-04 PROBLEM — R41.3 MEMORY CHANGES: Status: ACTIVE | Noted: 2022-05-04

## 2022-05-04 PROBLEM — R10.11 RIGHT UPPER QUADRANT PAIN: Status: RESOLVED | Noted: 2021-06-01 | Resolved: 2022-05-04

## 2022-05-04 PROBLEM — Z80.0 FAMILY HISTORY OF MALIGNANT NEOPLASM OF GASTROINTESTINAL TRACT: Status: RESOLVED | Noted: 2017-05-25 | Resolved: 2022-05-04

## 2022-05-04 PROBLEM — E21.0 PRIMARY HYPERPARATHYROIDISM (HCC): Status: ACTIVE | Noted: 2019-10-22

## 2022-05-04 PROBLEM — R11.0 NAUSEA: Status: RESOLVED | Noted: 2021-06-01 | Resolved: 2022-05-04

## 2022-05-04 RX ORDER — LOSARTAN POTASSIUM 25 MG/1
TABLET ORAL
Qty: 90 TABLET | Refills: 0 | OUTPATIENT
Start: 2022-05-04

## 2022-05-04 NOTE — TELEPHONE ENCOUNTER
Last REFILL   Medication Quantity Refills Start End   losartan 25 MG Oral Tab 30 tablet 1 5/3/2022      Medication already filled   Denying request

## 2022-05-05 ENCOUNTER — OFFICE VISIT (OUTPATIENT)
Dept: INTERNAL MEDICINE CLINIC | Facility: CLINIC | Age: 87
End: 2022-05-05
Payer: COMMERCIAL

## 2022-05-05 VITALS
SYSTOLIC BLOOD PRESSURE: 128 MMHG | HEIGHT: 62 IN | BODY MASS INDEX: 23 KG/M2 | DIASTOLIC BLOOD PRESSURE: 86 MMHG | OXYGEN SATURATION: 98 % | HEART RATE: 84 BPM | TEMPERATURE: 98 F

## 2022-05-05 DIAGNOSIS — R30.0 DYSURIA: Primary | ICD-10-CM

## 2022-05-05 DIAGNOSIS — R39.15 URINARY URGENCY: ICD-10-CM

## 2022-05-05 LAB
BILIRUBIN: NEGATIVE
GLUCOSE (URINE DIPSTICK): NEGATIVE MG/DL
KETONES (URINE DIPSTICK): NEGATIVE MG/DL
LEUKOCYTES: NEGATIVE
MULTISTIX LOT#: ABNORMAL NUMERIC
NITRITE, URINE: NEGATIVE
OCCULT BLOOD: NEGATIVE
PH, URINE: 7.5 (ref 4.5–8)
PROTEIN (URINE DIPSTICK): NEGATIVE MG/DL
SPECIFIC GRAVITY: 1.01 (ref 1–1.03)
UROBILINOGEN,SEMI-QN: 0.2 MG/DL (ref 0–1.9)

## 2022-05-05 PROCEDURE — 87088 URINE BACTERIA CULTURE: CPT | Performed by: PHYSICIAN ASSISTANT

## 2022-05-05 PROCEDURE — 3079F DIAST BP 80-89 MM HG: CPT | Performed by: PHYSICIAN ASSISTANT

## 2022-05-05 PROCEDURE — 81003 URINALYSIS AUTO W/O SCOPE: CPT | Performed by: PHYSICIAN ASSISTANT

## 2022-05-05 PROCEDURE — 3074F SYST BP LT 130 MM HG: CPT | Performed by: PHYSICIAN ASSISTANT

## 2022-05-05 PROCEDURE — 87186 SC STD MICRODIL/AGAR DIL: CPT | Performed by: PHYSICIAN ASSISTANT

## 2022-05-05 PROCEDURE — 87086 URINE CULTURE/COLONY COUNT: CPT | Performed by: PHYSICIAN ASSISTANT

## 2022-05-05 PROCEDURE — 99213 OFFICE O/P EST LOW 20 MIN: CPT | Performed by: PHYSICIAN ASSISTANT

## 2022-05-05 RX ORDER — CEPHALEXIN 500 MG/1
500 CAPSULE ORAL 2 TIMES DAILY
Qty: 14 CAPSULE | Refills: 0 | Status: SHIPPED | OUTPATIENT
Start: 2022-05-05 | End: 2022-05-12

## 2022-05-08 NOTE — PROGRESS NOTES
Attempted to reach pt to notify her of positive results. LM on VM regarding results. Please call pt to ensure that she received message.

## 2022-05-10 ENCOUNTER — HOSPITAL ENCOUNTER (OUTPATIENT)
Dept: BONE DENSITY | Age: 87
Discharge: HOME | End: 2022-05-10
Attending: INTERNAL MEDICINE
Payer: MEDICARE

## 2022-05-10 DIAGNOSIS — Z78.0 POST-MENOPAUSAL: ICD-10-CM

## 2022-05-10 PROCEDURE — 77080 DXA BONE DENSITY AXIAL: CPT | Performed by: INTERNAL MEDICINE

## 2022-05-12 ENCOUNTER — HOSPITAL ENCOUNTER (OUTPATIENT)
Dept: CV DIAGNOSTICS | Facility: HOSPITAL | Age: 87
Discharge: HOME | End: 2022-05-12
Attending: INTERNAL MEDICINE
Payer: MEDICARE

## 2022-05-12 DIAGNOSIS — R53.81 MALAISE AND FATIGUE: ICD-10-CM

## 2022-05-12 DIAGNOSIS — R01.1 HEART MURMUR: ICD-10-CM

## 2022-05-12 DIAGNOSIS — R53.83 MALAISE AND FATIGUE: ICD-10-CM

## 2022-05-12 PROCEDURE — 93306 TTE W/DOPPLER COMPLETE: CPT | Performed by: INTERNAL MEDICINE

## 2022-05-15 NOTE — PROGRESS NOTES
S/w pt to introduce CCM program pt stated she would like additional information to read over. I have mailed letter and will follow up w/ patient in the next few weeks. Moderna dose 1, 2, and 3

## 2022-05-21 DIAGNOSIS — I10 ESSENTIAL HYPERTENSION: ICD-10-CM

## 2022-05-23 RX ORDER — PRAVASTATIN SODIUM 10 MG
TABLET ORAL
Qty: 90 TABLET | Refills: 0 | Status: SHIPPED | OUTPATIENT
Start: 2022-05-23

## 2022-05-23 RX ORDER — AMLODIPINE BESYLATE 5 MG/1
TABLET ORAL
Qty: 90 TABLET | Refills: 0 | Status: SHIPPED | OUTPATIENT
Start: 2022-05-23

## 2022-05-23 NOTE — TELEPHONE ENCOUNTER
PASSED per protocol, refill sent.   Last PE 5.3.22   Future Appointments   Date Time Provider [facility] Bernice   6/30/2022  1:55 PM Marissa Brittle, DO ENINAPER [facility] [place]

## 2022-07-29 ENCOUNTER — LAB ENCOUNTER (OUTPATIENT)
Dept: LAB | Age: 87
End: 2022-07-29
Attending: Other
Payer: MEDICARE

## 2022-07-29 DIAGNOSIS — F02.80 ALZHEIMER DISEASE (HCC): ICD-10-CM

## 2022-07-29 DIAGNOSIS — G30.9 ALZHEIMER DISEASE (HCC): ICD-10-CM

## 2022-07-29 LAB — VIT B12 SERPL-MCNC: 619 PG/ML (ref 193–986)

## 2022-07-29 PROCEDURE — 82607 VITAMIN B-12: CPT

## 2022-07-29 PROCEDURE — 36415 COLL VENOUS BLD VENIPUNCTURE: CPT

## 2022-08-22 DIAGNOSIS — I10 ESSENTIAL HYPERTENSION: ICD-10-CM

## 2022-08-22 RX ORDER — AMLODIPINE BESYLATE 5 MG/1
TABLET ORAL
Qty: 90 TABLET | Refills: 0 | Status: SHIPPED | OUTPATIENT
Start: 2022-08-22

## 2022-08-22 RX ORDER — PRAVASTATIN SODIUM 10 MG
TABLET ORAL
Qty: 90 TABLET | Refills: 0 | Status: SHIPPED | OUTPATIENT
Start: 2022-08-22

## 2022-09-06 ENCOUNTER — TELEPHONE (OUTPATIENT)
Dept: INTERNAL MEDICINE CLINIC | Facility: CLINIC | Age: 87
End: 2022-09-06

## 2022-09-06 NOTE — TELEPHONE ENCOUNTER
Spoke to LILLIANA. Candie said that pt saw Dr. Jayce Mcclain, and they were told to f/u with TB after seeing Dr. Jayce Mcclain. Candie also wanting to discuss dexa. Was told to take Ca but pt has hyperparathyroid so she cannot. Candie ok with waiting until Nov, but will come in sooner if TB thinks pt should be seen sooner. TB, ok to wait until Nov or would you like to see her sooner for f/u? Last seen by TB 5/3/22 for cpe.

## 2022-09-06 NOTE — TELEPHONE ENCOUNTER
Pt 's daughter wanted to make the below appt with TB. I offered a partner and daughter declined as her mom is only familiar with TB.  FYI, please advise if she should be soon sooner, if so please advise what sda to take.       follow up on neurologic testing/dexa scan options of treatment of osteoporosis    Future Appointments   Date Time Provider Sherin Queen   11/8/2022  3:40 PM Bertin Lyons MD [facility] [address] [facility]

## 2022-09-19 DIAGNOSIS — F33.1 MODERATE EPISODE OF RECURRENT MAJOR DEPRESSIVE DISORDER (HCC): ICD-10-CM

## 2022-09-19 RX ORDER — ESCITALOPRAM OXALATE 10 MG/1
10 TABLET ORAL DAILY
Qty: 90 TABLET | Refills: 1 | Status: SHIPPED | OUTPATIENT
Start: 2022-09-19

## 2022-10-19 ENCOUNTER — TELEPHONE (OUTPATIENT)
Dept: INTERNAL MEDICINE CLINIC | Facility: CLINIC | Age: 87
End: 2022-10-19

## 2022-10-19 DIAGNOSIS — I10 ESSENTIAL (PRIMARY) HYPERTENSION: ICD-10-CM

## 2022-10-24 RX ORDER — LOSARTAN POTASSIUM 25 MG/1
25 TABLET ORAL DAILY
Qty: 30 TABLET | Refills: 1 | Status: SHIPPED | OUTPATIENT
Start: 2022-10-24

## 2022-10-24 NOTE — TELEPHONE ENCOUNTER
Informed pt per TB on 10/22 pt is no longer taking this medication and is taking the Losartan instead. Confirmed pick-up of losartan on 5/4/22 at the [facility] at [phone]. Sent refill for Losartan.

## 2022-10-24 NOTE — TELEPHONE ENCOUNTER
Pt called asking why this med was denied? [facility] looked as well and not sure why denied-please look into and refill if needed.

## 2022-11-08 ENCOUNTER — TELEPHONE (OUTPATIENT)
Dept: INTERNAL MEDICINE CLINIC | Facility: CLINIC | Age: 87
End: 2022-11-08

## 2022-11-08 ENCOUNTER — OFFICE VISIT (OUTPATIENT)
Dept: INTERNAL MEDICINE CLINIC | Facility: CLINIC | Age: 87
End: 2022-11-08
Payer: COMMERCIAL

## 2022-11-08 VITALS
BODY MASS INDEX: 22.75 KG/M2 | SYSTOLIC BLOOD PRESSURE: 136 MMHG | WEIGHT: 123.63 LBS | HEIGHT: 62 IN | DIASTOLIC BLOOD PRESSURE: 70 MMHG | HEART RATE: 70 BPM | RESPIRATION RATE: 18 BRPM

## 2022-11-08 DIAGNOSIS — R41.3 MEMORY CHANGES: ICD-10-CM

## 2022-11-08 DIAGNOSIS — R53.83 MALAISE AND FATIGUE: Primary | ICD-10-CM

## 2022-11-08 DIAGNOSIS — I10 ESSENTIAL (PRIMARY) HYPERTENSION: ICD-10-CM

## 2022-11-08 DIAGNOSIS — R53.81 MALAISE AND FATIGUE: Primary | ICD-10-CM

## 2022-11-08 PROCEDURE — 99214 OFFICE O/P EST MOD 30 MIN: CPT | Performed by: INTERNAL MEDICINE

## 2022-11-08 PROCEDURE — 3075F SYST BP GE 130 - 139MM HG: CPT | Performed by: INTERNAL MEDICINE

## 2022-11-08 PROCEDURE — 3078F DIAST BP <80 MM HG: CPT | Performed by: INTERNAL MEDICINE

## 2022-11-08 PROCEDURE — 3008F BODY MASS INDEX DOCD: CPT | Performed by: INTERNAL MEDICINE

## 2022-11-19 ENCOUNTER — LAB ENCOUNTER (OUTPATIENT)
Dept: LAB | Age: 87
End: 2022-11-19
Attending: INTERNAL MEDICINE
Payer: MEDICARE

## 2022-11-19 DIAGNOSIS — R53.81 MALAISE AND FATIGUE: ICD-10-CM

## 2022-11-19 DIAGNOSIS — R53.83 MALAISE AND FATIGUE: ICD-10-CM

## 2022-11-19 LAB
ALBUMIN SERPL-MCNC: 3.6 G/DL (ref 3.4–5)
ALBUMIN/GLOB SERPL: 1 {RATIO} (ref 1–2)
ALP LIVER SERPL-CCNC: 62 U/L
ALT SERPL-CCNC: 19 U/L
ANION GAP SERPL CALC-SCNC: 6 MMOL/L (ref 0–18)
AST SERPL-CCNC: 14 U/L (ref 15–37)
BASOPHILS # BLD AUTO: 0.04 X10(3) UL (ref 0–0.2)
BASOPHILS NFR BLD AUTO: 0.5 %
BILIRUB SERPL-MCNC: 0.8 MG/DL (ref 0.1–2)
BUN BLD-MCNC: 19 MG/DL (ref 7–18)
CALCIUM BLD-MCNC: 10.1 MG/DL (ref 8.5–10.1)
CHLORIDE SERPL-SCNC: 107 MMOL/L (ref 98–112)
CO2 SERPL-SCNC: 27 MMOL/L (ref 21–32)
CREAT BLD-MCNC: 0.97 MG/DL
EOSINOPHIL # BLD AUTO: 0.08 X10(3) UL (ref 0–0.7)
EOSINOPHIL NFR BLD AUTO: 1 %
ERYTHROCYTE [DISTWIDTH] IN BLOOD BY AUTOMATED COUNT: 13.5 %
FASTING STATUS PATIENT QL REPORTED: YES
GFR SERPLBLD BASED ON 1.73 SQ M-ARVRAT: 57 ML/MIN/1.73M2 (ref 60–?)
GLOBULIN PLAS-MCNC: 3.6 G/DL (ref 2.8–4.4)
GLUCOSE BLD-MCNC: 98 MG/DL (ref 70–99)
HCT VFR BLD AUTO: 40.2 %
HGB BLD-MCNC: 12.8 G/DL
IMM GRANULOCYTES # BLD AUTO: 0.03 X10(3) UL (ref 0–1)
IMM GRANULOCYTES NFR BLD: 0.4 %
LYMPHOCYTES # BLD AUTO: 1.55 X10(3) UL (ref 1–4)
LYMPHOCYTES NFR BLD AUTO: 20.2 %
MCH RBC QN AUTO: 28.6 PG (ref 26–34)
MCHC RBC AUTO-ENTMCNC: 31.8 G/DL (ref 31–37)
MCV RBC AUTO: 89.7 FL
MONOCYTES # BLD AUTO: 0.68 X10(3) UL (ref 0.1–1)
MONOCYTES NFR BLD AUTO: 8.9 %
NEUTROPHILS # BLD AUTO: 5.29 X10 (3) UL (ref 1.5–7.7)
NEUTROPHILS # BLD AUTO: 5.29 X10(3) UL (ref 1.5–7.7)
NEUTROPHILS NFR BLD AUTO: 69 %
OSMOLALITY SERPL CALC.SUM OF ELEC: 292 MOSM/KG (ref 275–295)
PLATELET # BLD AUTO: 328 10(3)UL (ref 150–450)
POTASSIUM SERPL-SCNC: 3.9 MMOL/L (ref 3.5–5.1)
PROT SERPL-MCNC: 7.2 G/DL (ref 6.4–8.2)
RBC # BLD AUTO: 4.48 X10(6)UL
SODIUM SERPL-SCNC: 140 MMOL/L (ref 136–145)
TSI SER-ACNC: 2.23 MIU/ML (ref 0.36–3.74)
VIT D+METAB SERPL-MCNC: 37.5 NG/ML (ref 30–100)
WBC # BLD AUTO: 7.7 X10(3) UL (ref 4–11)

## 2022-11-19 PROCEDURE — 36415 COLL VENOUS BLD VENIPUNCTURE: CPT

## 2022-11-19 PROCEDURE — 84443 ASSAY THYROID STIM HORMONE: CPT

## 2022-11-19 PROCEDURE — 85025 COMPLETE CBC W/AUTO DIFF WBC: CPT

## 2022-11-19 PROCEDURE — 80053 COMPREHEN METABOLIC PANEL: CPT

## 2022-11-19 PROCEDURE — 82306 VITAMIN D 25 HYDROXY: CPT

## 2022-11-27 DIAGNOSIS — I10 ESSENTIAL HYPERTENSION: ICD-10-CM

## 2022-11-28 RX ORDER — PRAVASTATIN SODIUM 10 MG
TABLET ORAL
Qty: 90 TABLET | Refills: 0 | Status: SHIPPED | OUTPATIENT
Start: 2022-11-28

## 2022-11-28 RX ORDER — AMLODIPINE BESYLATE 5 MG/1
TABLET ORAL
Qty: 90 TABLET | Refills: 0 | OUTPATIENT
Start: 2022-11-28

## 2023-01-01 ENCOUNTER — PATIENT MESSAGE (OUTPATIENT)
Dept: INTERNAL MEDICINE CLINIC | Facility: CLINIC | Age: 88
End: 2023-01-01

## 2023-01-04 NOTE — TELEPHONE ENCOUNTER
Pt asking questions about hospital d/c meds. Advised already to continue meds as directed upon d/c and can discuss at hosp f/u. Recommended scheduling this week with avail provider. They want to wait for TB. Do you have anything to advise before visit? Pt asking if should restart losartan. Hospital started her on irbesartan 150 mg instead because they don't stock losartan. Also now requesting a home sleep study.

## 2023-01-04 NOTE — TELEPHONE ENCOUNTER
From: Fe Sal  To: Juliette Ayala MD  Sent: 1/1/2023 10:56 AM CST  Subject: Hospital Follow Up    Good morning. My mother, Christi Sumner, was discharged on 12/31/22 from [facility] after a 17 day stay post CVA and fall. We need to schedule a follow up with Dr Duglas Fields. They recommended fu 1 week post discharge, but you have no visits available until the end of January. Are we able to get in sooner? My concerns are:  -the hospital didn't stock Losartin, so they started her on Irbesartan (150 mg). Which should we continue with?  -Are you able to access her test/scan's from [address]?  -I think she has sleep apnea. She's been getting up 8-10x/night and wants to run to the bathroom. They checked for UTI and it was negative. After staying with her, I've noticed she wakes after pausing breathing then coughing/snorting. Could we get a home sleep apnea test ordered?  (I really don't think she could manage a sleep study at a location)  Thank you for your advice,  Gabriel Ram (Fe's Daughter)

## 2023-01-04 NOTE — TELEPHONE ENCOUNTER
Agree with your advise to continue hospital meds, sleep study needs to be discussed in office  Can see available provider this week if they like, I am happy to see as well Stable.

## 2023-01-04 NOTE — TELEPHONE ENCOUNTER
Called and talked to Kar Romo, she is happy to wait for Dr Christin Riley and made an appointment    Future Appointments   Date Time Provider Sherin Melvini   1/12/2023  4:40 PM Isaias Monreal MD [facility]   5/11/2023  2:00 PM Isaias Monreal MD [facility]     Daughter would like to know if they should continue with the medication she was given in the hospital or go back to what Dr Christin Riley had her on.     Daughter would also like to know if Dr Christin Riley would order a at home sleep apnea test

## 2023-01-12 ENCOUNTER — OFFICE VISIT (OUTPATIENT)
Dept: INTERNAL MEDICINE CLINIC | Facility: CLINIC | Age: 88
End: 2023-01-12
Payer: MEDICARE

## 2023-01-12 ENCOUNTER — TELEPHONE (OUTPATIENT)
Dept: INTERNAL MEDICINE CLINIC | Facility: CLINIC | Age: 88
End: 2023-01-12

## 2023-01-12 VITALS
WEIGHT: 122 LBS | HEIGHT: 62 IN | BODY MASS INDEX: 22.45 KG/M2 | OXYGEN SATURATION: 95 % | SYSTOLIC BLOOD PRESSURE: 158 MMHG | RESPIRATION RATE: 16 BRPM | DIASTOLIC BLOOD PRESSURE: 90 MMHG

## 2023-01-12 DIAGNOSIS — R07.89 RIGHT-SIDED CHEST WALL PAIN: ICD-10-CM

## 2023-01-12 DIAGNOSIS — F41.9 ANXIETY: ICD-10-CM

## 2023-01-12 DIAGNOSIS — D64.9 NORMOCYTIC ANEMIA: ICD-10-CM

## 2023-01-12 DIAGNOSIS — I10 ESSENTIAL HYPERTENSION: ICD-10-CM

## 2023-01-12 DIAGNOSIS — E78.00 HIGH CHOLESTEROL: ICD-10-CM

## 2023-01-12 DIAGNOSIS — R91.1 RIGHT LOWER LOBE PULMONARY NODULE: ICD-10-CM

## 2023-01-12 DIAGNOSIS — I69.30 H/O: STROKE WITH RESIDUAL EFFECTS: Primary | ICD-10-CM

## 2023-01-12 PROCEDURE — 3008F BODY MASS INDEX DOCD: CPT | Performed by: INTERNAL MEDICINE

## 2023-01-12 PROCEDURE — 1111F DSCHRG MED/CURRENT MED MERGE: CPT | Performed by: INTERNAL MEDICINE

## 2023-01-12 PROCEDURE — 99214 OFFICE O/P EST MOD 30 MIN: CPT | Performed by: INTERNAL MEDICINE

## 2023-01-12 PROCEDURE — 1126F AMNT PAIN NOTED NONE PRSNT: CPT | Performed by: INTERNAL MEDICINE

## 2023-01-12 PROCEDURE — 3080F DIAST BP >= 90 MM HG: CPT | Performed by: INTERNAL MEDICINE

## 2023-01-12 PROCEDURE — 3077F SYST BP >= 140 MM HG: CPT | Performed by: INTERNAL MEDICINE

## 2023-01-12 RX ORDER — MELATONIN
1 NIGHTLY PRN
COMMUNITY
Start: 2022-12-31

## 2023-01-12 RX ORDER — ACETAMINOPHEN 325 MG/1
2 TABLET ORAL EVERY 6 HOURS PRN
COMMUNITY
Start: 2022-12-31

## 2023-01-12 RX ORDER — IRBESARTAN 150 MG/1
150 TABLET ORAL DAILY
COMMUNITY
Start: 2022-12-31

## 2023-01-12 RX ORDER — ESCITALOPRAM OXALATE 20 MG/1
20 TABLET ORAL DAILY
Qty: 90 TABLET | Refills: 1 | Status: SHIPPED | OUTPATIENT
Start: 2023-01-12 | End: 2024-01-07

## 2023-01-12 RX ORDER — ASPIRIN 325 MG
325 TABLET ORAL DAILY
COMMUNITY
Start: 2022-12-31

## 2023-01-12 NOTE — TELEPHONE ENCOUNTER
Alexandra from [facility] called stating that patient's BP has been running high;  165/85 when therapist was there around 2:00 today then Alexandra took it and it was 150/95  Patient is up urinating at least 7x during the night. Denies fever--When patient had a UTI in the hospital she had no symptoms. Patient also has a pain on the R side going toward the back. Patient is scheduled for today for TB.

## 2023-01-14 PROBLEM — R91.1 RIGHT LOWER LOBE PULMONARY NODULE: Status: ACTIVE | Noted: 2023-01-14

## 2023-01-14 PROBLEM — F33.1 MODERATE EPISODE OF RECURRENT MAJOR DEPRESSIVE DISORDER (HCC): Status: RESOLVED | Noted: 2019-01-03 | Resolved: 2023-01-14

## 2023-01-14 PROBLEM — D64.9 NORMOCYTIC ANEMIA: Status: ACTIVE | Noted: 2023-01-14

## 2023-01-14 PROBLEM — R07.89 RIGHT-SIDED CHEST WALL PAIN: Status: ACTIVE | Noted: 2023-01-14

## 2023-01-14 PROBLEM — I69.30 H/O: STROKE WITH RESIDUAL EFFECTS: Status: ACTIVE | Noted: 2023-01-14

## 2023-01-14 RX ORDER — PRAVASTATIN SODIUM 40 MG
40 TABLET ORAL NIGHTLY
Qty: 30 TABLET | Refills: 2 | COMMUNITY
Start: 2023-01-14

## 2023-01-17 ENCOUNTER — TELEPHONE (OUTPATIENT)
Dept: INTERNAL MEDICINE CLINIC | Facility: CLINIC | Age: 88
End: 2023-01-17

## 2023-01-17 NOTE — TELEPHONE ENCOUNTER
Paperwork was received [facility]#[id number].  It was placed on TB desk for review and signature

## 2023-01-19 ENCOUNTER — TELEPHONE (OUTPATIENT)
Dept: INTERNAL MEDICINE CLINIC | Facility: CLINIC | Age: 88
End: 2023-01-19

## 2023-01-19 NOTE — TELEPHONE ENCOUNTER
Paperwork was received from [facility]. It was placed on TB desk for review and signature.

## 2023-01-19 NOTE — TELEPHONE ENCOUNTER
Paperwork was refaxed to [facility]. Confirmation of receipt was received and paperwork was sent to [facility].

## 2023-01-19 NOTE — TELEPHONE ENCOUNTER
Signed paperwork was faxed to [facility]. Confirmation of receipt was received and paperwork was sent to [facility].

## 2023-01-20 ENCOUNTER — HOSPITAL ENCOUNTER (OUTPATIENT)
Dept: NUCLEAR MEDICINE | Facility: HOSPITAL | Age: 88
Discharge: HOME | End: 2023-01-20
Attending: INTERNAL MEDICINE
Payer: MEDICARE

## 2023-01-20 ENCOUNTER — LAB ENCOUNTER (OUTPATIENT)
Dept: LAB | Age: 88
End: 2023-01-20
Attending: INTERNAL MEDICINE
Payer: MEDICARE

## 2023-01-20 ENCOUNTER — TELEPHONE (OUTPATIENT)
Dept: INTERNAL MEDICINE CLINIC | Facility: CLINIC | Age: 88
End: 2023-01-20

## 2023-01-20 DIAGNOSIS — R35.0 URINARY FREQUENCY: ICD-10-CM

## 2023-01-20 DIAGNOSIS — R07.89 RIGHT-SIDED CHEST WALL PAIN: ICD-10-CM

## 2023-01-20 DIAGNOSIS — R91.1 RIGHT LOWER LOBE PULMONARY NODULE: ICD-10-CM

## 2023-01-20 DIAGNOSIS — R35.0 URINARY FREQUENCY: Primary | ICD-10-CM

## 2023-01-20 LAB
BILIRUB UR QL STRIP.AUTO: NEGATIVE
COLOR UR AUTO: YELLOW
GLUCOSE BLD-MCNC: 88 MG/DL (ref 70–99)
GLUCOSE UR STRIP.AUTO-MCNC: NEGATIVE MG/DL
KETONES UR STRIP.AUTO-MCNC: NEGATIVE MG/DL
NITRITE UR QL STRIP.AUTO: POSITIVE
PH UR STRIP.AUTO: 6.5 [PH] (ref 5–8)
RBC #/AREA URNS AUTO: >10 /HPF
RBC #/AREA URNS AUTO: >10 /HPF
SP GR UR STRIP.AUTO: 1.02 (ref 1–1.03)
UROBILINOGEN UR STRIP.AUTO-MCNC: 0.2 MG/DL
WBC #/AREA URNS AUTO: >50 /HPF
WBC #/AREA URNS AUTO: >50 /HPF
WBC CLUMPS UR QL AUTO: PRESENT /HPF
WBC CLUMPS UR QL AUTO: PRESENT /HPF

## 2023-01-20 PROCEDURE — 87086 URINE CULTURE/COLONY COUNT: CPT

## 2023-01-20 PROCEDURE — 87077 CULTURE AEROBIC IDENTIFY: CPT

## 2023-01-20 PROCEDURE — 87186 SC STD MICRODIL/AGAR DIL: CPT

## 2023-01-20 PROCEDURE — 78815 PET IMAGE W/CT SKULL-THIGH: CPT | Performed by: INTERNAL MEDICINE

## 2023-01-20 PROCEDURE — 81001 URINALYSIS AUTO W/SCOPE: CPT

## 2023-01-20 PROCEDURE — 82962 GLUCOSE BLOOD TEST: CPT

## 2023-01-20 PROCEDURE — 81015 MICROSCOPIC EXAM OF URINE: CPT

## 2023-01-20 RX ORDER — CIPROFLOXACIN 500 MG/1
500 TABLET, FILM COATED ORAL 2 TIMES DAILY
Qty: 14 TABLET | Refills: 0 | Status: SHIPPED | OUTPATIENT
Start: 2023-01-20 | End: 2023-01-27

## 2023-01-20 NOTE — TELEPHONE ENCOUNTER
Patient's daughter, Vincent Mckeon calling; thinks her Mom has another UTI and would like an order put in. They are taking her out today for a test at 1:30 and would also like to do the Urine test at that time since it's hard for her to get around.     Please call daughter and let her know [phone]

## 2023-01-20 NOTE — TELEPHONE ENCOUNTER
Order placed. Daughter reports urinary frequency which is patients usual symptoms for UTI in the past. Daughter aware of order placed.

## 2023-01-24 ENCOUNTER — TELEPHONE (OUTPATIENT)
Dept: INTERNAL MEDICINE CLINIC | Facility: CLINIC | Age: 88
End: 2023-01-24

## 2023-01-24 NOTE — TELEPHONE ENCOUNTER
Paperwork was received from [address] [place] [address]#[id number]. It was placed on TB desk fir review and signature.

## 2023-01-24 NOTE — TELEPHONE ENCOUNTER
Signed paperwork was faxed to [address] [place], [place] [address],[address]. Confirmation of receipt was received and paperwork was sent to scan.

## 2023-01-27 ENCOUNTER — TELEPHONE (OUTPATIENT)
Dept: INTERNAL MEDICINE CLINIC | Facility: CLINIC | Age: 88
End: 2023-01-27

## 2023-01-27 NOTE — TELEPHONE ENCOUNTER
Alexandra, stated she saw pt yesterday and heard a crackle on left lower lobe. Pt doesn't have fever and isn't coughing, no edema and oxygen saturation 95-98% at room air. No SOB    Alexandra stated she doesn't know where the crackle is coming from.

## 2023-01-27 NOTE — TELEPHONE ENCOUNTER
LM for Eder Camacho, pt's dtr at [phone] vm to call back to reschedule appt with TB.  2/6/23 appt on hold for 5:40pm so she could come or we could schedule for 2/3/23 with TB for 1:40pm and caregiver would have to bring her.

## 2023-01-30 RX ORDER — ASPIRIN 325 MG
325 TABLET ORAL DAILY
Qty: 90 TABLET | Refills: 0 | Status: SHIPPED | OUTPATIENT
Start: 2023-01-30

## 2023-01-30 RX ORDER — PRAVASTATIN SODIUM 40 MG
40 TABLET ORAL NIGHTLY
Qty: 90 TABLET | Refills: 2 | Status: SHIPPED | OUTPATIENT
Start: 2023-01-30

## 2023-01-30 RX ORDER — IRBESARTAN 150 MG/1
150 TABLET ORAL DAILY
Qty: 90 TABLET | Refills: 0 | Status: SHIPPED | OUTPATIENT
Start: 2023-01-30

## 2023-01-30 NOTE — TELEPHONE ENCOUNTER
Pt daughter calling to ask for 90 day refills of 3 medications below. Pt is out of the irbesartan completely. Please send to [employer] on file.

## 2023-01-30 NOTE — TELEPHONE ENCOUNTER
Last VISIT - 1/12/23 HFU for 12/20 [facility]    Last CPE - 5/3/22     Last REFILL -     pravastatin 40 MG Oral Tab 30 tablet 2 1/14/2023   Not due til mid April    aspirin 325 MG Oral Tab   12/31/2022   Externally reported    Irbesartan 150 MG Oral Tab   12/31/2022   Externally reported    Last LABS - 1/12/23 Active labs 12/29/22 bmp    Future Appointments   Date Time Provider Sherin Queen   5/11/2023  2:00 PM Page Long MD [facility]       Please Approve or Deny.

## 2023-01-30 NOTE — TELEPHONE ENCOUNTER
Future Appointments   Date Time Provider Sherin Queen   1/31/2023  4:40 PM Moncho Bragg MD [facility]

## 2023-01-31 ENCOUNTER — OFFICE VISIT (OUTPATIENT)
Dept: INTERNAL MEDICINE CLINIC | Facility: CLINIC | Age: 88
End: 2023-01-31
Payer: MEDICARE

## 2023-01-31 VITALS
TEMPERATURE: 99 F | OXYGEN SATURATION: 94 % | BODY MASS INDEX: 22.82 KG/M2 | SYSTOLIC BLOOD PRESSURE: 158 MMHG | HEIGHT: 62 IN | DIASTOLIC BLOOD PRESSURE: 68 MMHG | WEIGHT: 124 LBS | HEART RATE: 50 BPM

## 2023-01-31 DIAGNOSIS — I10 ESSENTIAL HYPERTENSION: Primary | ICD-10-CM

## 2023-01-31 PROCEDURE — 3077F SYST BP >= 140 MM HG: CPT | Performed by: INTERNAL MEDICINE

## 2023-01-31 PROCEDURE — 3008F BODY MASS INDEX DOCD: CPT | Performed by: INTERNAL MEDICINE

## 2023-01-31 PROCEDURE — 3078F DIAST BP <80 MM HG: CPT | Performed by: INTERNAL MEDICINE

## 2023-01-31 PROCEDURE — 99213 OFFICE O/P EST LOW 20 MIN: CPT | Performed by: INTERNAL MEDICINE

## 2023-01-31 RX ORDER — IRBESARTAN 150 MG/1
300 TABLET ORAL DAILY
Qty: 90 TABLET | Refills: 0 | COMMUNITY
Start: 2023-01-31

## 2023-02-03 ENCOUNTER — TELEPHONE (OUTPATIENT)
Dept: INTERNAL MEDICINE CLINIC | Facility: CLINIC | Age: 88
End: 2023-02-03

## 2023-02-03 NOTE — TELEPHONE ENCOUNTER
Received discharge summary from [facility].  - [phone]. Put in TB bin for review and signature.

## 2023-02-08 ENCOUNTER — TELEPHONE (OUTPATIENT)
Dept: INTERNAL MEDICINE CLINIC | Facility: CLINIC | Age: 88
End: 2023-02-08

## 2023-02-08 NOTE — TELEPHONE ENCOUNTER
Faxed discharge summary from [facility]. To [phone]  Ph - [phone]. Received confirmation. Sent to scanning.

## 2023-02-18 ENCOUNTER — HOSPITAL ENCOUNTER (OUTPATIENT)
Age: 88
Discharge: HOME | End: 2023-02-18
Attending: EMERGENCY MEDICINE
Payer: MEDICARE

## 2023-02-18 VITALS
BODY MASS INDEX: 23 KG/M2 | HEIGHT: 62 IN | HEART RATE: 89 BPM | OXYGEN SATURATION: 97 % | RESPIRATION RATE: 20 BRPM | WEIGHT: 125 LBS | DIASTOLIC BLOOD PRESSURE: 96 MMHG | SYSTOLIC BLOOD PRESSURE: 159 MMHG | TEMPERATURE: 98 F

## 2023-02-18 DIAGNOSIS — I10 POORLY-CONTROLLED HYPERTENSION: Primary | ICD-10-CM

## 2023-02-18 LAB
#MXD IC: 0.8 X10ˆ3/UL (ref 0.1–1)
ATRIAL RATE: 68 BPM
BUN BLD-MCNC: 26 MG/DL (ref 7–18)
CHLORIDE BLD-SCNC: 103 MMOL/L (ref 98–112)
CO2 BLD-SCNC: 28 MMOL/L (ref 21–32)
CREAT BLD-MCNC: 1.2 MG/DL
GFR SERPLBLD BASED ON 1.73 SQ M-ARVRAT: 44 ML/MIN/1.73M2 (ref 60–?)
GLUCOSE BLD-MCNC: 125 MG/DL (ref 70–99)
HCT VFR BLD AUTO: 39 %
HCT VFR BLD CALC: 38 %
HGB BLD-MCNC: 12 G/DL
ISTAT IONIZED CALCIUM FOR CHEM 8: 1.36 MMOL/L (ref 1.12–1.32)
LYMPHOCYTES # BLD AUTO: 1.5 X10ˆ3/UL (ref 1–4)
LYMPHOCYTES NFR BLD AUTO: 24 %
MCH RBC QN AUTO: 27.8 PG (ref 26–34)
MCHC RBC AUTO-ENTMCNC: 30.8 G/DL (ref 31–37)
MCV RBC AUTO: 90.5 FL (ref 80–100)
MIXED CELL %: 12 %
NEUTROPHILS # BLD AUTO: 4.1 X10ˆ3/UL (ref 1.5–7.7)
NEUTROPHILS NFR BLD AUTO: 64 %
P AXIS: 79 DEGREES
P-R INTERVAL: 204 MS
PLATELET # BLD AUTO: 273 X10ˆ3/UL (ref 150–450)
POTASSIUM BLD-SCNC: 4.1 MMOL/L (ref 3.6–5.1)
Q-T INTERVAL: 408 MS
QRS DURATION: 94 MS
QTC CALCULATION (BEZET): 433 MS
R AXIS: -18 DEGREES
RBC # BLD AUTO: 4.31 X10ˆ6/UL
SODIUM BLD-SCNC: 141 MMOL/L (ref 136–145)
T AXIS: 34 DEGREES
TROPONIN I BLD-MCNC: 0.02 NG/ML
VENTRICULAR RATE: 68 BPM
WBC # BLD AUTO: 6.4 X10ˆ3/UL (ref 4–11)

## 2023-02-18 PROCEDURE — 99214 OFFICE O/P EST MOD 30 MIN: CPT

## 2023-02-18 PROCEDURE — 84484 ASSAY OF TROPONIN QUANT: CPT

## 2023-02-18 PROCEDURE — 85025 COMPLETE CBC W/AUTO DIFF WBC: CPT | Performed by: EMERGENCY MEDICINE

## 2023-02-18 PROCEDURE — 36415 COLL VENOUS BLD VENIPUNCTURE: CPT

## 2023-02-18 PROCEDURE — 93005 ELECTROCARDIOGRAM TRACING: CPT

## 2023-02-18 PROCEDURE — 80047 BASIC METABLC PNL IONIZED CA: CPT

## 2023-02-18 PROCEDURE — 93010 ELECTROCARDIOGRAM REPORT: CPT

## 2023-02-18 NOTE — ED INITIAL ASSESSMENT (HPI)
C/o hypertension starting this morning. Pt daughter bedside and reports BP meds recently switched and pt may not be responding to meds. Pt home BP cuff presents high readings but daughter's bp cuff present normal readings. Pt reports taking meds as prescribed. Pt reports continuing low salt diet. Pt denies any symptoms. Pt daughter reports recent pmh of stroke, no residuals.

## 2023-02-27 ENCOUNTER — TELEPHONE (OUTPATIENT)
Dept: ORTHOPEDICS CLINIC | Facility: CLINIC | Age: 88
End: 2023-02-27

## 2023-02-27 DIAGNOSIS — G89.29 CHRONIC PAIN OF BOTH SHOULDERS: Primary | ICD-10-CM

## 2023-02-27 DIAGNOSIS — M25.512 CHRONIC PAIN OF BOTH SHOULDERS: Primary | ICD-10-CM

## 2023-02-27 DIAGNOSIS — M25.511 CHRONIC PAIN OF BOTH SHOULDERS: Primary | ICD-10-CM

## 2023-02-27 NOTE — TELEPHONE ENCOUNTER
NP Bilateral shoulder pain. Please advise if imaging is needed.   Future Appointments   Date Time Provider Sherin Queen   2/28/2023  1:20 PM Ifeanyi Drake MD [facility] [id number]   3/2/2023  4:30 PM Anibal Prescott PA-C [facility] EC Nap 4   3/16/2023  5:00 PM Marcel Morfin MD [facility]   5/11/2023  2:00 PM Marcel Morfin MD [facility]

## 2023-02-28 ENCOUNTER — OFFICE VISIT (OUTPATIENT)
Dept: ORTHOPEDICS CLINIC | Facility: CLINIC | Age: 88
End: 2023-02-28
Payer: MEDICARE

## 2023-02-28 ENCOUNTER — HOSPITAL ENCOUNTER (OUTPATIENT)
Dept: GENERAL RADIOLOGY | Age: 88
Discharge: HOME | End: 2023-02-28
Attending: ORTHOPAEDIC SURGERY
Payer: MEDICARE

## 2023-02-28 VITALS — WEIGHT: 125 LBS | BODY MASS INDEX: 23 KG/M2 | HEIGHT: 62 IN

## 2023-02-28 DIAGNOSIS — M19.012 PRIMARY OSTEOARTHRITIS OF BOTH SHOULDERS: Primary | ICD-10-CM

## 2023-02-28 DIAGNOSIS — M25.512 CHRONIC PAIN OF BOTH SHOULDERS: ICD-10-CM

## 2023-02-28 DIAGNOSIS — M25.511 CHRONIC PAIN OF BOTH SHOULDERS: ICD-10-CM

## 2023-02-28 DIAGNOSIS — M19.011 PRIMARY OSTEOARTHRITIS OF BOTH SHOULDERS: Primary | ICD-10-CM

## 2023-02-28 DIAGNOSIS — G89.29 CHRONIC PAIN OF BOTH SHOULDERS: ICD-10-CM

## 2023-02-28 PROCEDURE — 3008F BODY MASS INDEX DOCD: CPT | Performed by: ORTHOPAEDIC SURGERY

## 2023-02-28 PROCEDURE — 99204 OFFICE O/P NEW MOD 45 MIN: CPT | Performed by: ORTHOPAEDIC SURGERY

## 2023-02-28 PROCEDURE — 73030 X-RAY EXAM OF SHOULDER: CPT | Performed by: ORTHOPAEDIC SURGERY

## 2023-02-28 PROCEDURE — 20610 DRAIN/INJ JOINT/BURSA W/O US: CPT | Performed by: ORTHOPAEDIC SURGERY

## 2023-02-28 PROCEDURE — 1125F AMNT PAIN NOTED PAIN PRSNT: CPT | Performed by: ORTHOPAEDIC SURGERY

## 2023-02-28 RX ORDER — TRIAMCINOLONE ACETONIDE 40 MG/ML
40 INJECTION, SUSPENSION INTRA-ARTICULAR; INTRAMUSCULAR ONCE
Status: COMPLETED | OUTPATIENT
Start: 2023-02-28 | End: 2023-02-28

## 2023-02-28 RX ADMIN — TRIAMCINOLONE ACETONIDE 40 MG: 40 INJECTION, SUSPENSION INTRA-ARTICULAR; INTRAMUSCULAR at 14:07:00

## 2023-02-28 NOTE — PROGRESS NOTES
Patient is an 82-year-old white female with bilateral shoulder pain. Patient's daughter is with her today. Patient states that she has pain on a constant basis and has difficulty sleeping at night because of the pain. Patient daughter also gives the same history for her mom. This problems been going on for years but getting progressively worse. Patient's exam shows that have moderate crepitation with passive range of motion of both shoulders. Positive impingement sign. Forward elevation to about 90 to 100 degrees and abduction about 70 degrees. Mildly positive impingement sign. X-rays of her shoulder shows her to have severe degenerative changes of both shoulders with complete joint space narrowing and substantial osteophyte formation. Subacromial space is fairly well-maintained. Impression is that of degenerative arthritis of the shoulders bilaterally. Recommendation is to go ahead with cortisone injections which were done through a posterior medial approach is into the glenohumeral joint with 2 cc of Xylocaine and Marcaine and 40 mg of Kenalog. Patient tolerated the injections well. No complications. Patient was advised to follow-up with me in a couple months if having any issues. If she is doing well she can come in when she has recurrent pain.

## 2023-03-01 ENCOUNTER — HOSPITAL ENCOUNTER (EMERGENCY)
Facility: HOSPITAL | Age: 88
Discharge: HOME | End: 2023-03-01
Attending: EMERGENCY MEDICINE
Payer: MEDICARE

## 2023-03-01 ENCOUNTER — APPOINTMENT (OUTPATIENT)
Dept: CT IMAGING | Facility: HOSPITAL | Age: 88
End: 2023-03-01
Attending: EMERGENCY MEDICINE
Payer: MEDICARE

## 2023-03-01 VITALS
BODY MASS INDEX: 23 KG/M2 | OXYGEN SATURATION: 94 % | HEART RATE: 87 BPM | TEMPERATURE: 99 F | DIASTOLIC BLOOD PRESSURE: 95 MMHG | RESPIRATION RATE: 26 BRPM | SYSTOLIC BLOOD PRESSURE: 163 MMHG | WEIGHT: 125 LBS | HEIGHT: 62 IN

## 2023-03-01 DIAGNOSIS — S00.83XA CONTUSION OF FACE, INITIAL ENCOUNTER: Primary | ICD-10-CM

## 2023-03-01 LAB
ALBUMIN SERPL-MCNC: 3.6 G/DL (ref 3.4–5)
ALBUMIN/GLOB SERPL: 0.9 {RATIO} (ref 1–2)
ALP LIVER SERPL-CCNC: 57 U/L
ALT SERPL-CCNC: 16 U/L
ANION GAP SERPL CALC-SCNC: 7 MMOL/L (ref 0–18)
AST SERPL-CCNC: 9 U/L (ref 15–37)
ATRIAL RATE: 80 BPM
BASOPHILS # BLD AUTO: 0.01 X10(3) UL (ref 0–0.2)
BASOPHILS NFR BLD AUTO: 0.1 %
BILIRUB SERPL-MCNC: 0.8 MG/DL (ref 0.1–2)
BUN BLD-MCNC: 27 MG/DL (ref 7–18)
CALCIUM BLD-MCNC: 10.2 MG/DL (ref 8.5–10.1)
CHLORIDE SERPL-SCNC: 108 MMOL/L (ref 98–112)
CO2 SERPL-SCNC: 23 MMOL/L (ref 21–32)
CREAT BLD-MCNC: 1.1 MG/DL
EOSINOPHIL # BLD AUTO: 0 X10(3) UL (ref 0–0.7)
EOSINOPHIL NFR BLD AUTO: 0 %
ERYTHROCYTE [DISTWIDTH] IN BLOOD BY AUTOMATED COUNT: 13.1 %
GFR SERPLBLD BASED ON 1.73 SQ M-ARVRAT: 49 ML/MIN/1.73M2 (ref 60–?)
GLOBULIN PLAS-MCNC: 3.8 G/DL (ref 2.8–4.4)
GLUCOSE BLD-MCNC: 143 MG/DL (ref 70–99)
HCT VFR BLD AUTO: 38.3 %
HGB BLD-MCNC: 12.6 G/DL
IMM GRANULOCYTES # BLD AUTO: 0.04 X10(3) UL (ref 0–1)
IMM GRANULOCYTES NFR BLD: 0.5 %
LYMPHOCYTES # BLD AUTO: 0.94 X10(3) UL (ref 1–4)
LYMPHOCYTES NFR BLD AUTO: 10.8 %
MCH RBC QN AUTO: 28.3 PG (ref 26–34)
MCHC RBC AUTO-ENTMCNC: 32.9 G/DL (ref 31–37)
MCV RBC AUTO: 86.1 FL
MONOCYTES # BLD AUTO: 0.33 X10(3) UL (ref 0.1–1)
MONOCYTES NFR BLD AUTO: 3.8 %
NEUTROPHILS # BLD AUTO: 7.42 X10 (3) UL (ref 1.5–7.7)
NEUTROPHILS # BLD AUTO: 7.42 X10(3) UL (ref 1.5–7.7)
NEUTROPHILS NFR BLD AUTO: 84.8 %
OSMOLALITY SERPL CALC.SUM OF ELEC: 294 MOSM/KG (ref 275–295)
P AXIS: 75 DEGREES
P-R INTERVAL: 224 MS
PLATELET # BLD AUTO: 262 10(3)UL (ref 150–450)
POTASSIUM SERPL-SCNC: 4.4 MMOL/L (ref 3.5–5.1)
PROT SERPL-MCNC: 7.4 G/DL (ref 6.4–8.2)
Q-T INTERVAL: 386 MS
QRS DURATION: 90 MS
QTC CALCULATION (BEZET): 445 MS
R AXIS: -3 DEGREES
RBC # BLD AUTO: 4.45 X10(6)UL
SODIUM SERPL-SCNC: 138 MMOL/L (ref 136–145)
T AXIS: 66 DEGREES
VENTRICULAR RATE: 80 BPM
WBC # BLD AUTO: 8.7 X10(3) UL (ref 4–11)

## 2023-03-01 PROCEDURE — 93005 ELECTROCARDIOGRAM TRACING: CPT

## 2023-03-01 PROCEDURE — 99284 EMERGENCY DEPT VISIT MOD MDM: CPT

## 2023-03-01 PROCEDURE — 72125 CT NECK SPINE W/O DYE: CPT | Performed by: EMERGENCY MEDICINE

## 2023-03-01 PROCEDURE — 96360 HYDRATION IV INFUSION INIT: CPT

## 2023-03-01 PROCEDURE — 93010 ELECTROCARDIOGRAM REPORT: CPT

## 2023-03-01 PROCEDURE — 99285 EMERGENCY DEPT VISIT HI MDM: CPT

## 2023-03-01 PROCEDURE — 80053 COMPREHEN METABOLIC PANEL: CPT | Performed by: EMERGENCY MEDICINE

## 2023-03-01 PROCEDURE — 70450 CT HEAD/BRAIN W/O DYE: CPT | Performed by: EMERGENCY MEDICINE

## 2023-03-01 PROCEDURE — 85025 COMPLETE CBC W/AUTO DIFF WBC: CPT | Performed by: EMERGENCY MEDICINE

## 2023-03-01 RX ORDER — SODIUM CHLORIDE 9 MG/ML
INJECTION, SOLUTION INTRAVENOUS CONTINUOUS
Status: DISCONTINUED | OUTPATIENT
Start: 2023-03-01 | End: 2023-03-01

## 2023-03-01 NOTE — DISCHARGE INSTRUCTIONS
Apply ice 20 minutes 4 times a day Tylenol or Advil as needed for pain drink plenty fluids follow-up your doctor over next 2 days return if worse peer

## 2023-03-01 NOTE — ED INITIAL ASSESSMENT (HPI)
PT PRESENTS TO ED AFTER HER DAUGHTER STATES SHE WAS IN THE SHOWER AND HEARD PT FALL, PT WAS FOUND AT BOTTOM OF STAIRS FACE FIRST, DOES NOT REMEMBER THE FALL, NORMALLY CONFUSED PER DAUGHTER SHE IS NOW MORE CONFUSED. UNKNOWN HOW MANY STAIRS SHE FELL DOWN, STAIRCASE HAS 8 STAIRS, DAUGHTER DOES NOT BELIEVE SHE FELL DOWN ALL 8 STAIRS.   PT IS ON ASPIRIN 325MG, PT DENIES PAIN, HAS HEMATOMA TO LEFT EYE AND LACERATION, BLEEDING CONTROLED UPON ARRIVAL

## 2023-03-02 ENCOUNTER — OFFICE VISIT (OUTPATIENT)
Dept: SURGERY | Facility: CLINIC | Age: 88
End: 2023-03-02

## 2023-03-02 DIAGNOSIS — R35.1 NOCTURIA: ICD-10-CM

## 2023-03-02 DIAGNOSIS — N39.41 URGE INCONTINENCE OF URINE: ICD-10-CM

## 2023-03-02 DIAGNOSIS — R41.3 MEMORY CHANGES: ICD-10-CM

## 2023-03-02 DIAGNOSIS — F41.9 ANXIETY: ICD-10-CM

## 2023-03-02 DIAGNOSIS — R82.90 URINE FINDING: Primary | ICD-10-CM

## 2023-03-02 LAB
APPEARANCE: CLEAR
BILIRUBIN: NEGATIVE
GLUCOSE (URINE DIPSTICK): NEGATIVE MG/DL
KETONES (URINE DIPSTICK): NEGATIVE MG/DL
MULTISTIX LOT#: ABNORMAL NUMERIC
NITRITE, URINE: NEGATIVE
OCCULT BLOOD: NEGATIVE
PH, URINE: 5.5 (ref 4.5–8)
PROTEIN (URINE DIPSTICK): 30 MG/DL
SPECIFIC GRAVITY: 1.03 (ref 1–1.03)
UROBILINOGEN,SEMI-QN: 0.2 MG/DL (ref 0–1.9)

## 2023-03-11 DIAGNOSIS — I10 ESSENTIAL HYPERTENSION: ICD-10-CM

## 2023-03-13 RX ORDER — IRBESARTAN 150 MG/1
TABLET ORAL
Qty: 90 TABLET | Refills: 0 | Status: SHIPPED | OUTPATIENT
Start: 2023-03-13

## 2023-03-16 ENCOUNTER — OFFICE VISIT (OUTPATIENT)
Dept: INTERNAL MEDICINE CLINIC | Facility: CLINIC | Age: 88
End: 2023-03-16
Payer: MEDICARE

## 2023-03-16 VITALS
TEMPERATURE: 97 F | WEIGHT: 121.19 LBS | RESPIRATION RATE: 16 BRPM | BODY MASS INDEX: 23.79 KG/M2 | HEIGHT: 60 IN | DIASTOLIC BLOOD PRESSURE: 85 MMHG | SYSTOLIC BLOOD PRESSURE: 155 MMHG

## 2023-03-16 DIAGNOSIS — I10 ESSENTIAL HYPERTENSION: Primary | ICD-10-CM

## 2023-03-16 DIAGNOSIS — S00.83XA CONTUSION OF FACE, INITIAL ENCOUNTER: ICD-10-CM

## 2023-03-16 PROCEDURE — 3077F SYST BP >= 140 MM HG: CPT | Performed by: INTERNAL MEDICINE

## 2023-03-16 PROCEDURE — 3079F DIAST BP 80-89 MM HG: CPT | Performed by: INTERNAL MEDICINE

## 2023-03-16 PROCEDURE — 3008F BODY MASS INDEX DOCD: CPT | Performed by: INTERNAL MEDICINE

## 2023-03-16 PROCEDURE — 99213 OFFICE O/P EST LOW 20 MIN: CPT | Performed by: INTERNAL MEDICINE

## 2023-03-16 PROCEDURE — 1126F AMNT PAIN NOTED NONE PRSNT: CPT | Performed by: INTERNAL MEDICINE

## 2023-03-16 RX ORDER — HYDRALAZINE HYDROCHLORIDE 10 MG/1
10 TABLET, FILM COATED ORAL 2 TIMES DAILY
Qty: 60 TABLET | Refills: 2 | Status: SHIPPED | OUTPATIENT
Start: 2023-03-16

## 2023-03-20 ENCOUNTER — TELEPHONE (OUTPATIENT)
Dept: INTERNAL MEDICINE CLINIC | Facility: CLINIC | Age: 88
End: 2023-03-20

## 2023-03-20 RX ORDER — TRAZODONE HYDROCHLORIDE 50 MG/1
50 TABLET ORAL NIGHTLY PRN
Qty: 30 TABLET | Refills: 0 | Status: SHIPPED | OUTPATIENT
Start: 2023-03-20

## 2023-03-20 NOTE — TELEPHONE ENCOUNTER
Patient recently seen, I would advise for trazodone 50mg which is specifically for insomnia and can help with anxiety too.    Xanax at her age not recommended (high chance of confusion on it)  If she wants to try the trazodone, I can send to pharmacy for 30 days

## 2023-03-20 NOTE — TELEPHONE ENCOUNTER
Pt daughter calling regarding mcm sent today. Good morning,  My mom's night time anxiety is getting pretty bad. She's barely sleeping, having panic attacks. We are desperate for something to help her relax and sleep. Could we try some Xanax to see if this will help her?   Stephanie Phillips, Fe's daughter and JEANETTE

## 2023-03-20 NOTE — TELEPHONE ENCOUNTER
Called and spoke to David. Advised against xanax as at pt age, there is high chance of confusion. Informed her TB recommended trazodone 50 mg which is specifically for insomnia and can also help with anxiety. Candie agreeable and said they are willing to try anything as pt is not sleeping. Would like sent to [facility] in [place] (listed in chart).

## 2023-04-04 ENCOUNTER — PATIENT MESSAGE (OUTPATIENT)
Dept: INTERNAL MEDICINE CLINIC | Facility: CLINIC | Age: 88
End: 2023-04-04

## 2023-04-05 NOTE — TELEPHONE ENCOUNTER
From: Fe Sal  To: Isabel Smith MD  Sent: 4/4/2023 11:36 AM CDT  Subject: Durwin Glance    Good Morning. My mom's insurance is saying her Durwin Glance is subject to a quantity limit of 30 tab every 30 days. (She takes 2 tabs/day). I have put in a request for an exception, and Cristóbal Sanchez is going to contact your office for justification of this. Please keep an eye out for the insurance forms that need to be filled out. Thank you.  Millie Ordoñez (Fe's daughter)

## 2023-04-05 NOTE — TELEPHONE ENCOUNTER
Kerry Burkitt,  Just sending you an FYI to be on the look out for a PA for this patient.   Shade Torres

## 2023-04-11 RX ORDER — TRAZODONE HYDROCHLORIDE 50 MG/1
TABLET ORAL
Qty: 30 TABLET | Refills: 0 | Status: SHIPPED | OUTPATIENT
Start: 2023-04-11

## 2023-04-11 NOTE — TELEPHONE ENCOUNTER
Daughter on hipaa calling to request that this be refilled right away this is the only thing that helps her calm down/sleep at night.

## 2023-04-30 ENCOUNTER — HOSPITAL ENCOUNTER (OUTPATIENT)
Age: 88
Discharge: HOME | End: 2023-04-30
Payer: MEDICARE

## 2023-04-30 VITALS
TEMPERATURE: 98 F | OXYGEN SATURATION: 97 % | HEART RATE: 67 BPM | RESPIRATION RATE: 18 BRPM | DIASTOLIC BLOOD PRESSURE: 75 MMHG | SYSTOLIC BLOOD PRESSURE: 123 MMHG

## 2023-04-30 DIAGNOSIS — N30.90 CYSTITIS: Primary | ICD-10-CM

## 2023-04-30 LAB
POCT BILIRUBIN URINE: NEGATIVE
POCT GLUCOSE URINE: NEGATIVE MG/DL
POCT KETONE URINE: NEGATIVE MG/DL
POCT NITRITE URINE: NEGATIVE
POCT PH URINE: 7 (ref 5–8)
POCT PROTEIN URINE: >=300 MG/DL
POCT SPECIFIC GRAVITY URINE: 1.02
POCT URINE COLOR: YELLOW
POCT UROBILINOGEN URINE: 0.2 MG/DL

## 2023-04-30 PROCEDURE — 87077 CULTURE AEROBIC IDENTIFY: CPT | Performed by: PHYSICIAN ASSISTANT

## 2023-04-30 PROCEDURE — 87086 URINE CULTURE/COLONY COUNT: CPT | Performed by: PHYSICIAN ASSISTANT

## 2023-04-30 PROCEDURE — 87186 SC STD MICRODIL/AGAR DIL: CPT | Performed by: PHYSICIAN ASSISTANT

## 2023-04-30 PROCEDURE — 99214 OFFICE O/P EST MOD 30 MIN: CPT

## 2023-04-30 PROCEDURE — 81002 URINALYSIS NONAUTO W/O SCOPE: CPT | Performed by: PHYSICIAN ASSISTANT

## 2023-04-30 RX ORDER — CEPHALEXIN 500 MG/1
500 CAPSULE ORAL 3 TIMES DAILY
Qty: 21 CAPSULE | Refills: 0 | Status: SHIPPED | OUTPATIENT
Start: 2023-04-30 | End: 2023-05-07

## 2023-04-30 NOTE — DISCHARGE INSTRUCTIONS
Antibiotic as written. Push clear fluids. For any deterioration including back pain/flank pain, systemic malaise, fatigue or fever, please immediately seek reevaluation.   If urine culture dictates a change in therapy, you will be contacted

## 2023-05-11 ENCOUNTER — OFFICE VISIT (OUTPATIENT)
Dept: INTERNAL MEDICINE CLINIC | Facility: CLINIC | Age: 88
End: 2023-05-11
Payer: MEDICARE

## 2023-05-11 VITALS
OXYGEN SATURATION: 96 % | RESPIRATION RATE: 16 BRPM | SYSTOLIC BLOOD PRESSURE: 152 MMHG | BODY MASS INDEX: 23.22 KG/M2 | HEIGHT: 61 IN | HEART RATE: 66 BPM | TEMPERATURE: 98 F | DIASTOLIC BLOOD PRESSURE: 74 MMHG | WEIGHT: 123 LBS

## 2023-05-11 DIAGNOSIS — R53.81 PHYSICAL DECONDITIONING: ICD-10-CM

## 2023-05-11 DIAGNOSIS — R26.89 BALANCE PROBLEM: ICD-10-CM

## 2023-05-11 DIAGNOSIS — R41.3 MEMORY CHANGES: ICD-10-CM

## 2023-05-11 DIAGNOSIS — M19.019 SHOULDER ARTHRITIS: ICD-10-CM

## 2023-05-11 DIAGNOSIS — Z12.31 ENCOUNTER FOR SCREENING MAMMOGRAM FOR MALIGNANT NEOPLASM OF BREAST: ICD-10-CM

## 2023-05-11 DIAGNOSIS — E78.00 HIGH CHOLESTEROL: ICD-10-CM

## 2023-05-11 DIAGNOSIS — F41.9 ANXIETY AND DEPRESSION: ICD-10-CM

## 2023-05-11 DIAGNOSIS — I10 ESSENTIAL HYPERTENSION: ICD-10-CM

## 2023-05-11 DIAGNOSIS — N20.0 NEPHROLITHIASIS: ICD-10-CM

## 2023-05-11 DIAGNOSIS — G47.09 OTHER INSOMNIA: ICD-10-CM

## 2023-05-11 DIAGNOSIS — Z90.11 H/O RIGHT MASTECTOMY: ICD-10-CM

## 2023-05-11 DIAGNOSIS — F32.A ANXIETY AND DEPRESSION: ICD-10-CM

## 2023-05-11 DIAGNOSIS — E21.0 PRIMARY HYPERPARATHYROIDISM (HCC): ICD-10-CM

## 2023-05-11 DIAGNOSIS — I69.30 H/O: STROKE WITH RESIDUAL EFFECTS: ICD-10-CM

## 2023-05-11 DIAGNOSIS — N18.31 STAGE 3A CHRONIC KIDNEY DISEASE (HCC): ICD-10-CM

## 2023-05-11 DIAGNOSIS — Z00.00 ENCOUNTER FOR ANNUAL HEALTH EXAMINATION: Primary | ICD-10-CM

## 2023-05-11 RX ORDER — TRAZODONE HYDROCHLORIDE 50 MG/1
50 TABLET ORAL NIGHTLY PRN
Qty: 90 TABLET | Refills: 3 | Status: SHIPPED | OUTPATIENT
Start: 2023-05-11

## 2023-05-11 RX ORDER — HYDRALAZINE HYDROCHLORIDE 10 MG/1
10 TABLET, FILM COATED ORAL 3 TIMES DAILY
Qty: 90 TABLET | Refills: 3 | Status: SHIPPED | OUTPATIENT
Start: 2023-05-11

## 2023-05-13 ENCOUNTER — HOSPITAL ENCOUNTER (OUTPATIENT)
Age: 88
Discharge: HOME | End: 2023-05-13
Payer: MEDICARE

## 2023-05-13 VITALS
HEIGHT: 62 IN | TEMPERATURE: 99 F | RESPIRATION RATE: 18 BRPM | DIASTOLIC BLOOD PRESSURE: 96 MMHG | WEIGHT: 120 LBS | BODY MASS INDEX: 22.08 KG/M2 | SYSTOLIC BLOOD PRESSURE: 168 MMHG | HEART RATE: 83 BPM | OXYGEN SATURATION: 95 %

## 2023-05-13 DIAGNOSIS — R30.0 DYSURIA: Primary | ICD-10-CM

## 2023-05-13 PROBLEM — R91.1 RIGHT LOWER LOBE PULMONARY NODULE: Status: RESOLVED | Noted: 2023-01-14 | Resolved: 2023-05-13

## 2023-05-13 PROBLEM — D64.9 NORMOCYTIC ANEMIA: Status: RESOLVED | Noted: 2023-01-14 | Resolved: 2023-05-13

## 2023-05-13 PROBLEM — R26.89 BALANCE PROBLEM: Status: ACTIVE | Noted: 2023-05-13

## 2023-05-13 PROBLEM — G47.09 OTHER INSOMNIA: Status: ACTIVE | Noted: 2023-05-13

## 2023-05-13 PROBLEM — R07.89 RIGHT-SIDED CHEST WALL PAIN: Status: RESOLVED | Noted: 2023-01-14 | Resolved: 2023-05-13

## 2023-05-13 PROBLEM — R53.81 PHYSICAL DECONDITIONING: Status: ACTIVE | Noted: 2023-05-13

## 2023-05-13 LAB
POCT BILIRUBIN URINE: NEGATIVE
POCT GLUCOSE URINE: NEGATIVE MG/DL
POCT KETONE URINE: NEGATIVE MG/DL
POCT NITRITE URINE: POSITIVE
POCT PH URINE: 5.5 (ref 5–8)
POCT PROTEIN URINE: >=300 MG/DL
POCT SPECIFIC GRAVITY URINE: 1.03
POCT URINE COLOR: YELLOW
POCT UROBILINOGEN URINE: 0.2 MG/DL

## 2023-05-13 PROCEDURE — 87186 SC STD MICRODIL/AGAR DIL: CPT | Performed by: NURSE PRACTITIONER

## 2023-05-13 PROCEDURE — 87086 URINE CULTURE/COLONY COUNT: CPT | Performed by: NURSE PRACTITIONER

## 2023-05-13 PROCEDURE — 81002 URINALYSIS NONAUTO W/O SCOPE: CPT | Performed by: NURSE PRACTITIONER

## 2023-05-13 PROCEDURE — 99214 OFFICE O/P EST MOD 30 MIN: CPT

## 2023-05-13 PROCEDURE — 87088 URINE BACTERIA CULTURE: CPT | Performed by: NURSE PRACTITIONER

## 2023-05-13 RX ORDER — CEFUROXIME AXETIL 500 MG/1
500 TABLET ORAL 2 TIMES DAILY
Qty: 14 TABLET | Refills: 0 | Status: SHIPPED | OUTPATIENT
Start: 2023-05-13 | End: 2023-05-20

## 2023-05-13 NOTE — DISCHARGE INSTRUCTIONS
Take antibiotic as directed. Drink enough water and fluids to keep your urine clear or pale yellow. Avoid caffeine, tea, and carbonated beverages. They tend to irritate your bladder. Empty your bladder often. Avoid holding urine for long periods of time. Empty your bladder before and after sexual intercourse. Women should cleanse from front to back. Use each tissue only once.      SEEK MEDICAL CARE IF:   You have back pain or abdominal pain   You develop a fever/ chills, nausea, vomiting   Your symptoms do not begin to resolve within a few days   We have sent a culture of your urine and will call you with the results

## 2023-05-15 ENCOUNTER — HOSPITAL ENCOUNTER (OUTPATIENT)
Dept: MAMMOGRAPHY | Age: 88
Discharge: HOME | End: 2023-05-15
Attending: INTERNAL MEDICINE
Payer: MEDICARE

## 2023-05-15 DIAGNOSIS — Z12.31 ENCOUNTER FOR SCREENING MAMMOGRAM FOR MALIGNANT NEOPLASM OF BREAST: ICD-10-CM

## 2023-05-15 PROCEDURE — 77067 SCR MAMMO BI INCL CAD: CPT | Performed by: INTERNAL MEDICINE

## 2023-05-15 PROCEDURE — 77063 BREAST TOMOSYNTHESIS BI: CPT | Performed by: INTERNAL MEDICINE

## 2023-05-16 ENCOUNTER — LAB ENCOUNTER (OUTPATIENT)
Dept: LAB | Age: 88
End: 2023-05-16
Attending: INTERNAL MEDICINE
Payer: MEDICARE

## 2023-05-16 DIAGNOSIS — D64.9 NORMOCYTIC ANEMIA: ICD-10-CM

## 2023-05-16 DIAGNOSIS — E78.00 HIGH CHOLESTEROL: ICD-10-CM

## 2023-05-16 DIAGNOSIS — I10 ESSENTIAL HYPERTENSION: ICD-10-CM

## 2023-05-16 LAB
ALBUMIN SERPL-MCNC: 3.5 G/DL (ref 3.4–5)
ALBUMIN/GLOB SERPL: 1 {RATIO} (ref 1–2)
ALP LIVER SERPL-CCNC: 49 U/L
ALT SERPL-CCNC: 17 U/L
ANION GAP SERPL CALC-SCNC: 3 MMOL/L (ref 0–18)
AST SERPL-CCNC: 19 U/L (ref 15–37)
BASOPHILS # BLD AUTO: 0.05 X10(3) UL (ref 0–0.2)
BASOPHILS NFR BLD AUTO: 0.6 %
BILIRUB SERPL-MCNC: 1 MG/DL (ref 0.1–2)
BUN BLD-MCNC: 16 MG/DL (ref 7–18)
CALCIUM BLD-MCNC: 9.8 MG/DL (ref 8.5–10.1)
CHLORIDE SERPL-SCNC: 103 MMOL/L (ref 98–112)
CHOLEST SERPL-MCNC: 106 MG/DL (ref ?–200)
CO2 SERPL-SCNC: 27 MMOL/L (ref 21–32)
CREAT BLD-MCNC: 0.82 MG/DL
DEPRECATED HBV CORE AB SER IA-ACNC: 36.6 NG/ML
EOSINOPHIL # BLD AUTO: 0.11 X10(3) UL (ref 0–0.7)
EOSINOPHIL NFR BLD AUTO: 1.4 %
ERYTHROCYTE [DISTWIDTH] IN BLOOD BY AUTOMATED COUNT: 14.1 %
FASTING PATIENT LIPID ANSWER: YES
FASTING STATUS PATIENT QL REPORTED: YES
GFR SERPLBLD BASED ON 1.73 SQ M-ARVRAT: 70 ML/MIN/1.73M2 (ref 60–?)
GLOBULIN PLAS-MCNC: 3.4 G/DL (ref 2.8–4.4)
GLUCOSE BLD-MCNC: 83 MG/DL (ref 70–99)
HCT VFR BLD AUTO: 37.3 %
HDLC SERPL-MCNC: 46 MG/DL (ref 40–59)
HGB BLD-MCNC: 12 G/DL
IMM GRANULOCYTES # BLD AUTO: 0.02 X10(3) UL (ref 0–1)
IMM GRANULOCYTES NFR BLD: 0.2 %
IRON SATN MFR SERPL: 18 %
IRON SERPL-MCNC: 63 UG/DL
LDLC SERPL CALC-MCNC: 42 MG/DL (ref ?–100)
LYMPHOCYTES # BLD AUTO: 1.65 X10(3) UL (ref 1–4)
LYMPHOCYTES NFR BLD AUTO: 20.5 %
MCH RBC QN AUTO: 28.2 PG (ref 26–34)
MCHC RBC AUTO-ENTMCNC: 32.2 G/DL (ref 31–37)
MCV RBC AUTO: 87.8 FL
MONOCYTES # BLD AUTO: 0.83 X10(3) UL (ref 0.1–1)
MONOCYTES NFR BLD AUTO: 10.3 %
NEUTROPHILS # BLD AUTO: 5.37 X10 (3) UL (ref 1.5–7.7)
NEUTROPHILS # BLD AUTO: 5.37 X10(3) UL (ref 1.5–7.7)
NEUTROPHILS NFR BLD AUTO: 67 %
NONHDLC SERPL-MCNC: 60 MG/DL (ref ?–130)
OSMOLALITY SERPL CALC.SUM OF ELEC: 276 MOSM/KG (ref 275–295)
PLATELET # BLD AUTO: 259 10(3)UL (ref 150–450)
POTASSIUM SERPL-SCNC: 3.8 MMOL/L (ref 3.5–5.1)
PROT SERPL-MCNC: 6.9 G/DL (ref 6.4–8.2)
RBC # BLD AUTO: 4.25 X10(6)UL
SODIUM SERPL-SCNC: 133 MMOL/L (ref 136–145)
TIBC SERPL-MCNC: 343 UG/DL (ref 240–450)
TRANSFERRIN SERPL-MCNC: 230 MG/DL (ref 200–360)
TRIGL SERPL-MCNC: 94 MG/DL (ref 30–149)
TSI SER-ACNC: 2.47 MIU/ML (ref 0.36–3.74)
VIT B12 SERPL-MCNC: 1009 PG/ML (ref 193–986)
VLDLC SERPL CALC-MCNC: 13 MG/DL (ref 0–30)
WBC # BLD AUTO: 8 X10(3) UL (ref 4–11)

## 2023-05-16 PROCEDURE — 80061 LIPID PANEL: CPT

## 2023-05-16 PROCEDURE — 36415 COLL VENOUS BLD VENIPUNCTURE: CPT

## 2023-05-16 PROCEDURE — 82607 VITAMIN B-12: CPT

## 2023-05-16 PROCEDURE — 84443 ASSAY THYROID STIM HORMONE: CPT

## 2023-05-16 PROCEDURE — 80053 COMPREHEN METABOLIC PANEL: CPT

## 2023-05-16 PROCEDURE — 85025 COMPLETE CBC W/AUTO DIFF WBC: CPT

## 2023-05-16 PROCEDURE — 83540 ASSAY OF IRON: CPT

## 2023-05-16 PROCEDURE — 82728 ASSAY OF FERRITIN: CPT

## 2023-05-16 PROCEDURE — 83550 IRON BINDING TEST: CPT

## 2023-05-24 ENCOUNTER — TELEPHONE (OUTPATIENT)
Dept: PHYSICAL THERAPY | Facility: HOSPITAL | Age: 88
End: 2023-05-24

## 2023-05-31 ENCOUNTER — TELEPHONE (OUTPATIENT)
Dept: INTERNAL MEDICINE CLINIC | Facility: CLINIC | Age: 88
End: 2023-05-31

## 2023-05-31 NOTE — TELEPHONE ENCOUNTER
Received order from [address] [facility] for wheelchair. After TB reviewed and faxed to [phone].  - [phone]. Received confirmation. Sent to scanning.

## 2023-06-08 ENCOUNTER — TELEPHONE (OUTPATIENT)
Dept: PHYSICAL THERAPY | Facility: HOSPITAL | Age: 88
End: 2023-06-08

## 2023-06-11 DIAGNOSIS — I10 ESSENTIAL HYPERTENSION: ICD-10-CM

## 2023-06-12 ENCOUNTER — OFFICE VISIT (OUTPATIENT)
Dept: PHYSICAL THERAPY | Facility: HOSPITAL | Age: 88
End: 2023-06-12
Attending: INTERNAL MEDICINE
Payer: MEDICARE

## 2023-06-12 DIAGNOSIS — R26.89 BALANCE PROBLEM: ICD-10-CM

## 2023-06-12 DIAGNOSIS — I69.30 H/O: STROKE WITH RESIDUAL EFFECTS: ICD-10-CM

## 2023-06-12 DIAGNOSIS — R53.81 PHYSICAL DECONDITIONING: ICD-10-CM

## 2023-06-12 PROCEDURE — 97163 PT EVAL HIGH COMPLEX 45 MIN: CPT

## 2023-06-12 PROCEDURE — 97110 THERAPEUTIC EXERCISES: CPT

## 2023-06-12 RX ORDER — IRBESARTAN 150 MG/1
TABLET ORAL
Qty: 180 TABLET | Refills: 1 | Status: SHIPPED | OUTPATIENT
Start: 2023-06-12

## 2023-06-13 DIAGNOSIS — I10 ESSENTIAL HYPERTENSION: ICD-10-CM

## 2023-06-13 RX ORDER — HYDRALAZINE HYDROCHLORIDE 10 MG/1
TABLET, FILM COATED ORAL
Qty: 60 TABLET | Refills: 0 | Status: SHIPPED | OUTPATIENT
Start: 2023-06-13

## 2023-06-19 ENCOUNTER — OFFICE VISIT (OUTPATIENT)
Dept: PHYSICAL THERAPY | Facility: HOSPITAL | Age: 88
End: 2023-06-19
Attending: INTERNAL MEDICINE
Payer: MEDICARE

## 2023-06-19 PROCEDURE — 97112 NEUROMUSCULAR REEDUCATION: CPT

## 2023-06-19 PROCEDURE — 97110 THERAPEUTIC EXERCISES: CPT

## 2023-06-21 ENCOUNTER — OFFICE VISIT (OUTPATIENT)
Dept: PHYSICAL THERAPY | Facility: HOSPITAL | Age: 88
End: 2023-06-21
Attending: INTERNAL MEDICINE
Payer: MEDICARE

## 2023-06-21 PROCEDURE — 97110 THERAPEUTIC EXERCISES: CPT

## 2023-06-21 PROCEDURE — 97112 NEUROMUSCULAR REEDUCATION: CPT

## 2023-06-26 ENCOUNTER — OFFICE VISIT (OUTPATIENT)
Dept: PHYSICAL THERAPY | Facility: HOSPITAL | Age: 88
End: 2023-06-26
Attending: INTERNAL MEDICINE
Payer: MEDICARE

## 2023-06-26 PROCEDURE — 97112 NEUROMUSCULAR REEDUCATION: CPT

## 2023-06-26 PROCEDURE — 97110 THERAPEUTIC EXERCISES: CPT

## 2023-06-26 NOTE — PROGRESS NOTES
Diagnosis:   Physical deconditioning (R53.81)  Balance problem (R26.89)  H/O: stroke with residual effects (I69.30)    Referring Provider: Bobby Gunter  Date of Evaluation:    6/12/2023    Precautions:  Fall Risk Next MD visit:   none scheduled   Insurance Primary/Secondary: Tesfaye Chapman / N/A     # Auth Visits: 10 POC            Subjective: The patient reports that after the last session she was tired for a little while. Today she is doing ok, she has tennis balls on her walker. This is usually her nap time, she is a little sleepy. Pain: pt here for balance      Objective:       6 minute walk test with RW: 290 ft. Normative value for community dwelling elderly: 80 M (1286 ft)      Assessment: The patient had improved utilization of balance strategies today with the static balance in a narrow RONNIE, as she required less frequency in her upper extremity support. Fe does have some swaying when standing without UE support, however she maintains her balance. Goals:   (to be met in 10 visits)  Pt will demonstrate improved balance feet together to >20 seconds to improve static balance  Pt will perform TUG in <55 seconds with least restrictive AD, demonstrating improved gait speed for improved participation in ADL such as community ambulation  Pt will demonstrate the ability to ambulate for 1 min without requiring a seated rest break  Pt will be able to perform 4x STS in 20 seconds or less  Pt will improve functional hip strength to demonstrate ability to ascend/descend 1 flight of stairs reciprocally with use of handrail  Pt will be independent and compliant with comprehensive HEP to maintain progress achieved in PT    Plan: continue sand dune stpe ups  Date: 6/19/2023  TX#: 2/10 Date: 6/21/2023           TX#: 3/10 Date: 6/26/2023                TX#: 4/10 Date:                 TX#: 5/ Date:    Tx#: 6/   Therapeutic exercise  6 minute walk test  Calf stretch 3x30 sec  Step up 4\" box 2x6 ea Therapeutic exercise  Lateral stepping in // bars 3x down/back  Calf stretch 3x30 sec  Step up 4\" box 2x8 ea  Seated HS curl green TB 3x8 ea  LAQ 2.5# 2x8 ea Therapeutic exercise  Lateral stepping in // bars 2x, ankle weights 2nd /3rd lap  Calf stretch 3x30 sec       Neuro re-ed  Standing feet apart 2x30 sec   Standing feet together 10 sec multiple bouts  Standing on airex feet apart 2x30 sec  Foot on 2\" box, balance 3x10 sec ea Neuro re-ed  Standing feet apart 2x30 sec   Standing feet together 10 sec multiple bouts  Standing on airex pad 1 min with CGA  Tilt board weight shifting ant-pos 10x with UE support Neuro re-ed  Step up on sand dune 10x ea COURTNEY UE support  Standing feet together 2x30 sec  Toe tapping on 4\" box 10x ea, single UE support  Semi tandem 2x ea, SBA in // bars  Standing on airex pad 1 min with CGA  Ambulation 1 short lap in PT gym with RW, SBA     X X X     X X X     HEP:   Access Code: [email or web address]  URL: [email or web address]  Date: 06/12/2023  Prepared by: Meg Archuleta    Exercises  - Seated March  - 1 x daily - 7 x weekly - 2 sets - 10 reps  - Seated Long Arc Quad  - 1 x daily - 7 x weekly - 2 sets - 10 reps  - Seated Heel Raise  - 1 x daily - 7 x weekly - 2 sets - 10 reps    Charges: therapeutic exercise x1, neuro re-ed x2      Total Timed Treatment: 40 min  Total Treatment Time: 40 min

## 2023-06-28 ENCOUNTER — OFFICE VISIT (OUTPATIENT)
Dept: PHYSICAL THERAPY | Facility: HOSPITAL | Age: 88
End: 2023-06-28
Attending: INTERNAL MEDICINE
Payer: MEDICARE

## 2023-06-28 PROCEDURE — 97110 THERAPEUTIC EXERCISES: CPT

## 2023-06-28 PROCEDURE — 97112 NEUROMUSCULAR REEDUCATION: CPT

## 2023-07-03 ENCOUNTER — TELEPHONE (OUTPATIENT)
Dept: PHYSICAL THERAPY | Facility: HOSPITAL | Age: 88
End: 2023-07-03

## 2023-07-05 ENCOUNTER — APPOINTMENT (OUTPATIENT)
Dept: PHYSICAL THERAPY | Facility: HOSPITAL | Age: 88
End: 2023-07-05
Attending: INTERNAL MEDICINE
Payer: MEDICARE

## 2023-07-06 DIAGNOSIS — I10 ESSENTIAL HYPERTENSION: ICD-10-CM

## 2023-07-06 DIAGNOSIS — F41.9 ANXIETY: ICD-10-CM

## 2023-07-06 RX ORDER — ESCITALOPRAM OXALATE 20 MG/1
TABLET ORAL
Qty: 90 TABLET | Refills: 1 | Status: SHIPPED | OUTPATIENT
Start: 2023-07-06

## 2023-07-07 ENCOUNTER — APPOINTMENT (OUTPATIENT)
Dept: PHYSICAL THERAPY | Facility: HOSPITAL | Age: 88
End: 2023-07-07
Attending: INTERNAL MEDICINE
Payer: MEDICARE

## 2023-07-10 ENCOUNTER — OFFICE VISIT (OUTPATIENT)
Dept: PHYSICAL THERAPY | Facility: HOSPITAL | Age: 88
End: 2023-07-10
Attending: INTERNAL MEDICINE
Payer: MEDICARE

## 2023-07-10 PROCEDURE — 97110 THERAPEUTIC EXERCISES: CPT

## 2023-07-10 PROCEDURE — 97112 NEUROMUSCULAR REEDUCATION: CPT

## 2023-07-10 NOTE — PROGRESS NOTES
Diagnosis:   Physical deconditioning (R53.81)  Balance problem (R26.89)  H/O: stroke with residual effects (I69.30)    Referring Provider: Yamileth Chapman  Date of Evaluation:    6/12/2023    Precautions:  Fall Risk Next MD visit:   none scheduled   Insurance Primary/Secondary: Scott Wyatt / N/A     # Auth Visits: 10 POC            Subjective: The patient reports that she feels like her strength and balance is the same. , she hasn't noticed that anything has gotten better. Pt has not been going for walks due to the hot weather. Pain: pt here for balance      Objective:       6 minute walk test with RW: 317 feet. Initial test: 290 ft. Normative value for community dwelling elderly: 80 M (1286 ft)    Observation: pt is able to step over a theraband on the floor, no use of upper extremities. Assessment: Reassessed the patient's 6 minute walk test today, she improved in her distance by 27 feet. She did require 1 brief seated rest break during this test. Fe was steady with stepping over the theraband on the floor, and was able to complete this without upper extremity support. Will continue to work to improve strength and balance.       Goals:   (to be met in 10 visits)  Pt will demonstrate improved balance feet together to >20 seconds to improve static balance  Pt will perform TUG in <55 seconds with least restrictive AD, demonstrating improved gait speed for improved participation in ADL such as community ambulation  Pt will demonstrate the ability to ambulate for 1 min without requiring a seated rest break  Pt will be able to perform 4x STS in 20 seconds or less  Pt will improve functional hip strength to demonstrate ability to ascend/descend 1 flight of stairs reciprocally with use of handrail  Pt will be independent and compliant with comprehensive HEP to maintain progress achieved in PT    Plan: continue sand dune step ups  Date: 6/19/2023  TX#: 2/10 Date: 6/21/2023           TX#: 3/10 Date: 6/26/2023 TX#: 4/10 Date: 6/28/2023         TX#: 5/10 Date: 7/10/2023  Tx#: 6/10   Therapeutic exercise  6 minute walk test  Calf stretch 3x30 sec  Step up 4\" box 2x6 ea Therapeutic exercise  Lateral stepping in // bars 3x down/back  Calf stretch 3x30 sec  Step up 4\" box 2x8 ea  Seated HS curl green TB 3x8 ea  LAQ 2.5# 2x8 ea Therapeutic exercise  Lateral stepping in // bars 2x, ankle weights 2nd /3rd lap  Calf stretch 3x30 sec   Therapeutic exercise  Forward step up 6\" box 2x8 ea  Standing hip abduction yellow TB 3x5 ea  Sit<>stand chair with airex pad 3x5 no UE support Therapeutic exercise  6MWT with rolling walker  Sit<>stand 2x5   Neuro re-ed  Standing feet apart 2x30 sec   Standing feet together 10 sec multiple bouts  Standing on airex feet apart 2x30 sec  Foot on 2\" box, balance 3x10 sec ea Neuro re-ed  Standing feet apart 2x30 sec   Standing feet together 10 sec multiple bouts  Standing on airex pad 1 min with CGA  Tilt board weight shifting ant-pos 10x with UE support Neuro re-ed  Step up on sand dune 10x ea COURTNEY UE support  Standing feet together 2x30 sec  Toe tapping on 4\" box 10x ea, single UE support  Semi tandem 2x ea, SBA in // bars  Standing on airex pad 1 min with CGA  Ambulation 1 short lap in PT gym with RW, SBA Neuro re-ed  Stepping over therband on floor 2x5 ea  Forward stepping in // bars no UE support, backwards stepping with UE support 3x  Standing on airex pad 3x30 sec  Weight shifting R/L foot 5x ea Neuro re-ed  Step up on sand dune 8x ea  Forward step over theraband on floor 10x ea forward, lateral  Standing feet apart balance 2x30 sec, then feet together 2x30 sec     X X X X X   X X X X X   HEP:   Access Code: [email or web address]  URL: [email or web address]  Date: 06/12/2023  Prepared by: Kush Dys    Exercises  - Seated March  - 1 x daily - 7 x weekly - 2 sets - 10 reps  - Seated Long Arc Quad  - 1 x daily - 7 x weekly - 2 sets - 10 reps  - Seated Heel Raise  - 1 x daily - 7 x weekly - 2 sets - 10 reps    Charges: therapeutic exercise x1, neuro re-ed x2     Total Timed Treatment: 40 min  Total Treatment Time: 40 min

## 2023-07-12 ENCOUNTER — APPOINTMENT (OUTPATIENT)
Dept: PHYSICAL THERAPY | Facility: HOSPITAL | Age: 88
End: 2023-07-12
Attending: INTERNAL MEDICINE
Payer: MEDICARE

## 2023-07-12 ENCOUNTER — TELEPHONE (OUTPATIENT)
Dept: PHYSICAL THERAPY | Facility: HOSPITAL | Age: 88
End: 2023-07-12

## 2023-07-12 ENCOUNTER — LAB ENCOUNTER (OUTPATIENT)
Dept: LAB | Age: 88
End: 2023-07-12
Attending: INTERNAL MEDICINE
Payer: MEDICARE

## 2023-07-12 DIAGNOSIS — I10 ESSENTIAL HYPERTENSION, MALIGNANT: Primary | ICD-10-CM

## 2023-07-12 LAB
ANION GAP SERPL CALC-SCNC: <0 MMOL/L (ref 0–18)
BUN BLD-MCNC: 38 MG/DL (ref 7–18)
CALCIUM BLD-MCNC: 10.2 MG/DL (ref 8.5–10.1)
CHLORIDE SERPL-SCNC: 107 MMOL/L (ref 98–112)
CO2 SERPL-SCNC: 31 MMOL/L (ref 21–32)
CREAT BLD-MCNC: 1.16 MG/DL
FASTING STATUS PATIENT QL REPORTED: NO
GFR SERPLBLD BASED ON 1.73 SQ M-ARVRAT: 46 ML/MIN/1.73M2 (ref 60–?)
GLUCOSE BLD-MCNC: 85 MG/DL (ref 70–99)
OSMOLALITY SERPL CALC.SUM OF ELEC: 290 MOSM/KG (ref 275–295)
POTASSIUM SERPL-SCNC: 3.8 MMOL/L (ref 3.5–5.1)
SODIUM SERPL-SCNC: 136 MMOL/L (ref 136–145)

## 2023-07-12 PROCEDURE — 36415 COLL VENOUS BLD VENIPUNCTURE: CPT

## 2023-07-12 PROCEDURE — 80048 BASIC METABOLIC PNL TOTAL CA: CPT

## 2023-07-17 ENCOUNTER — OFFICE VISIT (OUTPATIENT)
Dept: PHYSICAL THERAPY | Facility: HOSPITAL | Age: 88
End: 2023-07-17
Attending: INTERNAL MEDICINE
Payer: MEDICARE

## 2023-07-17 PROCEDURE — 97112 NEUROMUSCULAR REEDUCATION: CPT

## 2023-07-17 PROCEDURE — 97110 THERAPEUTIC EXERCISES: CPT

## 2023-07-17 NOTE — PROGRESS NOTES
Diagnosis:   Physical deconditioning (R53.81)  Balance problem (R26.89)  H/O: stroke with residual effects (I69.30)    Referring Provider: Yamileth Chapman  Date of Evaluation:    6/12/2023    Precautions:  Fall Risk Next MD visit:   none scheduled   Insurance Primary/Secondary: Scott Wyatt / N/A     # Auth Visits: 10 POC            Subjective: The patient states that she hasn't been able to go for walks lately because it has been hot and rainy. Pain: pt here for balance      Objective:       6 minute walk test with RW: 317 feet. Initial test: 290 ft. Normative value for community dwelling elderly: 80 M (1286 ft)    Observation: pt is able to step over a theraband on the floor, no use of upper extremities. Assessment: The patient had some difficulty performing a forward step over on the half foam roll, she required single upper extremity support to maintain balance with this. Working on increasing step length and height to improve her ability to step over objects safely. Pt required cueing to lift feet up and reduce shuffling with ambulation around the PT gym.       Goals:   (to be met in 10 visits)  Pt will demonstrate improved balance feet together to >20 seconds to improve static balance  Pt will perform TUG in <55 seconds with least restrictive AD, demonstrating improved gait speed for improved participation in ADL such as community ambulation  Pt will demonstrate the ability to ambulate for 1 min without requiring a seated rest break  Pt will be able to perform 4x STS in 20 seconds or less  Pt will improve functional hip strength to demonstrate ability to ascend/descend 1 flight of stairs reciprocally with use of handrail  Pt will be independent and compliant with comprehensive HEP to maintain progress achieved in PT    Plan: continue sand dune step ups  Date: 6/26/2023                TX#: 4/10 Date: 6/28/2023         TX#: 5/10 Date: 7/10/2023  Tx#: 6/10 Date: 7/17/2023  Tx#: 7/10   Therapeutic exercise  Lateral stepping in // bars 2x, ankle weights 2nd /3rd lap  Calf stretch 3x30 sec   Therapeutic exercise  Forward step up 6\" box 2x8 ea  Standing hip abduction yellow TB 3x5 ea  Sit<>stand chair with airex pad 3x5 no UE support Therapeutic exercise  6MWT with rolling walker  Sit<>stand 2x5 Therapeutic exercise  Step up on 6\" box 2x8 ea  Sit<>stand from standard chair 2x5  Seated HS curl green TB 2x8 ea   Neuro re-ed  Step up on sand dune 10x ea COURTNEY UE support  Standing feet together 2x30 sec  Toe tapping on 4\" box 10x ea, single UE support  Semi tandem 2x ea, SBA in // bars  Standing on airex pad 1 min with CGA  Ambulation 1 short lap in PT gym with RW, SBA Neuro re-ed  Stepping over therband on floor 2x5 ea  Forward stepping in // bars no UE support, backwards stepping with UE support 3x  Standing on airex pad 3x30 sec  Weight shifting R/L foot 5x ea Neuro re-ed  Step up on sand dune 8x ea  Forward step over theraband on floor 10x ea forward, lateral  Standing feet apart balance 2x30 sec, then feet together 2x30 sec   Neuro re-ed  Forward step over half foam roll on floor 10x ea forward, lateral  Forward step up onto airex with balance 5 sec, 2x5  Weight shifting ant-post 10x no UE support  Ambulation in PT gym 2 short laps, cues to pick feet up   X X X X   X X X X   HEP:   Access Code: [email or web address]  URL: [email or web address]  Date: 06/12/2023  Prepared by: Romario Mcdonald    Exercises  - Seated March  - 1 x daily - 7 x weekly - 2 sets - 10 reps  - Seated Long Arc Quad  - 1 x daily - 7 x weekly - 2 sets - 10 reps  - Seated Heel Raise  - 1 x daily - 7 x weekly - 2 sets - 10 reps    Charges: therapeutic exercise x1, neuro re-ed x2     Total Timed Treatment: 41 min  Total Treatment Time: 41 min

## 2023-08-02 ENCOUNTER — OFFICE VISIT (OUTPATIENT)
Dept: PHYSICAL THERAPY | Facility: HOSPITAL | Age: 88
End: 2023-08-02
Attending: INTERNAL MEDICINE
Payer: MEDICARE

## 2023-08-02 PROCEDURE — 97112 NEUROMUSCULAR REEDUCATION: CPT

## 2023-08-02 PROCEDURE — 97110 THERAPEUTIC EXERCISES: CPT

## 2023-08-02 NOTE — PROGRESS NOTES
Diagnosis:   Physical deconditioning (R53.81)  Balance problem (R26.89)  H/O: stroke with residual effects (I69.30)    Referring Provider: Jose Maria Hernandez  Date of Evaluation:    6/12/2023    Precautions:  Fall Risk Next MD visit:   none scheduled   Insurance Primary/Secondary: [place] Faster / N/A     # Auth Visits: 10 POC            Subjective: The patient reports that she is tired today. She notes that she usually takes a nap at this time. Pain: pt here for balance      Objective:       6 minute walk test with RW: 317 feet. Initial test: 290 ft. Normative value for community dwelling elderly: 80 M (1286 ft)    Observation: pt is able to step over a theraband on the floor, no use of upper extremities. Assessment: The patient was fatigued today, therefore took extra seated rest breaks and performed some seated strengthening. She fatigue with the sit to stand exercise, requiring some upper extremity support for this towards the end of the sets. Fe required a seated rest break during the walk around the PT gym at the end of the session.        Goals:   (to be met in 10 visits)  Pt will demonstrate improved balance feet together to >20 seconds to improve static balance  Pt will perform TUG in <55 seconds with least restrictive AD, demonstrating improved gait speed for improved participation in ADL such as community ambulation  Pt will demonstrate the ability to ambulate for 1 min without requiring a seated rest break  Pt will be able to perform 4x STS in 20 seconds or less  Pt will improve functional hip strength to demonstrate ability to ascend/descend 1 flight of stairs reciprocally with use of handrail  Pt will be independent and compliant with comprehensive HEP to maintain progress achieved in PT    Plan: DC  Date: 6/26/2023                TX#: 4/10 Date: 6/28/2023         TX#: 5/10 Date: 7/10/2023  Tx#: 6/10 Date: 7/17/2023  Tx#: 7/10 Date: 8/2/2023  Tx#: 8/10   Therapeutic exercise  Lateral stepping in // bars 2x, ankle weights 2nd /3rd lap  Calf stretch 3x30 sec   Therapeutic exercise  Forward step up 6\" box 2x8 ea  Standing hip abduction yellow TB 3x5 ea  Sit<>stand chair with airex pad 3x5 no UE support Therapeutic exercise  6MWT with rolling walker  Sit<>stand 2x5 Therapeutic exercise  Step up on 6\" box 2x8 ea  Sit<>stand from standard chair 2x5  Seated HS curl green TB 2x8 ea Therapeutic exercise  Seated LAQ 2# 3x8 ea  Sit<>stand from standard chair 2x5 single UE support  Lateral stepping in // bars 2x down/back   Neuro re-ed  Step up on sand dune 10x ea COURTNEY UE support  Standing feet together 2x30 sec  Toe tapping on 4\" box 10x ea, single UE support  Semi tandem 2x ea, SBA in // bars  Standing on airex pad 1 min with CGA  Ambulation 1 short lap in PT gym with RW, SBA Neuro re-ed  Stepping over therband on floor 2x5 ea  Forward stepping in // bars no UE support, backwards stepping with UE support 3x  Standing on airex pad 3x30 sec  Weight shifting R/L foot 5x ea Neuro re-ed  Step up on sand dune 8x ea  Forward step over theraband on floor 10x ea forward, lateral  Standing feet apart balance 2x30 sec, then feet together 2x30 sec   Neuro re-ed  Forward step over half foam roll on floor 10x ea forward, lateral  Forward step up onto airex with balance 5 sec, 2x5  Weight shifting ant-post 10x no UE support  Ambulation in PT gym 2 short laps, cues to pick feet up Neuro re-ed  Step up on airex pad 2x10 ea  Balance on airex pad 2x30 sec ea  Stepping over hurdles in // bars 2x with UE support  Sand dune step ups 10x ea  Ambulation in PT gym long lap, pt requried seated rest break 2/3 of way   X X X X X   X X X X X   HEP:   Access Code: [email or web address]  URL: [email or web address]  Date: 06/12/2023  Prepared by: Hannah Current    Exercises  - Seated March  - 1 x daily - 7 x weekly - 2 sets - 10 reps  - Seated Long Arc Quad  - 1 x daily - 7 x weekly - 2 sets - 10 reps  - Seated Heel Raise  - 1 x daily - 7 x weekly - 2 sets - 10 reps    Charges: therapeutic exercise x1, neuro re-ed x2     Total Timed Treatment: 42 min  Total Treatment Time: 42 min

## 2023-08-08 ENCOUNTER — OFFICE VISIT (OUTPATIENT)
Dept: ORTHOPEDICS CLINIC | Facility: CLINIC | Age: 88
End: 2023-08-08
Payer: MEDICARE

## 2023-08-08 VITALS — BODY MASS INDEX: 22.08 KG/M2 | WEIGHT: 120 LBS | HEIGHT: 62 IN

## 2023-08-08 DIAGNOSIS — M19.011 PRIMARY OSTEOARTHRITIS OF BOTH SHOULDERS: Primary | ICD-10-CM

## 2023-08-08 DIAGNOSIS — M19.012 PRIMARY OSTEOARTHRITIS OF BOTH SHOULDERS: Primary | ICD-10-CM

## 2023-08-08 PROCEDURE — 1126F AMNT PAIN NOTED NONE PRSNT: CPT | Performed by: ORTHOPAEDIC SURGERY

## 2023-08-08 PROCEDURE — 1159F MED LIST DOCD IN RCRD: CPT | Performed by: ORTHOPAEDIC SURGERY

## 2023-08-08 PROCEDURE — 3008F BODY MASS INDEX DOCD: CPT | Performed by: ORTHOPAEDIC SURGERY

## 2023-08-08 PROCEDURE — 1160F RVW MEDS BY RX/DR IN RCRD: CPT | Performed by: ORTHOPAEDIC SURGERY

## 2023-08-08 PROCEDURE — 99212 OFFICE O/P EST SF 10 MIN: CPT | Performed by: ORTHOPAEDIC SURGERY

## 2023-08-08 PROCEDURE — 20610 DRAIN/INJ JOINT/BURSA W/O US: CPT | Performed by: ORTHOPAEDIC SURGERY

## 2023-08-08 RX ORDER — TRIAMCINOLONE ACETONIDE 40 MG/ML
40 INJECTION, SUSPENSION INTRA-ARTICULAR; INTRAMUSCULAR ONCE
Status: COMPLETED | OUTPATIENT
Start: 2023-08-08 | End: 2023-08-08

## 2023-08-08 RX ADMIN — TRIAMCINOLONE ACETONIDE 40 MG: 40 INJECTION, SUSPENSION INTRA-ARTICULAR; INTRAMUSCULAR at 16:16:00

## 2023-08-08 NOTE — PROGRESS NOTES
Patient is a 53-year-old white female here for follow-up on her shoulders. Patient had a cortisone injection of both shoulders intra-articularly at her last visit. These were helpful for her for 3 months time. She is now about 6 months since her last injection and having some recurrence of her pain. Patient's daughter is with her. Patient's exam shows her to have moderate crepitation with passive range of motion of both shoulders. Impingement sign is positive mildly. Elevation is about 100 degrees bilaterally. Impression is that of degenerative arthritis of the shoulders bilaterally. Recommendations to go ahead with repeat injections which were done under sterile technique through a posterior medial approach into each glenohumeral joint. These were done without difficulties with 2 cc of Xylocaine and Marcaine and 40 mg of Kenalog. No complications. Patient will follow-up as needed.

## 2023-08-14 ENCOUNTER — OFFICE VISIT (OUTPATIENT)
Dept: PHYSICAL THERAPY | Facility: HOSPITAL | Age: 88
End: 2023-08-14
Attending: INTERNAL MEDICINE
Payer: MEDICARE

## 2023-08-14 PROCEDURE — 97110 THERAPEUTIC EXERCISES: CPT

## 2023-08-14 NOTE — PROGRESS NOTES
Discharge Summary  Pt has attended 9 visits in Physical Therapy. Diagnosis:   Physical deconditioning (R53.81)  Balance problem (R26.89)  H/O: stroke with residual effects (I69.30)    Referring Provider: Christin Riley  Date of Evaluation:    6/12/2023    Precautions:  Fall Risk Next MD visit:   none scheduled   Insurance Primary/Secondary: Addy Silva / N/A     # Auth Visits: 10 POC            Subjective: The patient reports that she is doing ok, she is tired today. She notes that her balance and walking are the same. Pain: pt here for balance      Objective:       6 minute walk test with RW: 298 ft, last test: 317 feet. Initial test: 290 ft. Normative value for community dwelling elderly: 80 M (1286 ft)    4-Stage Balance Test:   -feet apart: 30 sec   - Feet together: 8 sec (unable at eval)   - Modified Tandem:  5 (unable at eval)   - Tandem Stance: unable sec Fall Risk: yes   - SLS: R unable sec, L unble sec Fall Risk: yes  [Full tandem stance <10 sec indicates increased risk of falling]  Age appropriate norms for SLS: 61-75 y/o mean = 27.0 sec      70-78 y/o mean = 17.2 sec      80-98 y/o mean = 8.5 sec     TUG (AD, time): 36.34 (was 1:01)    Fall Risk: yes  [Performance exceeding the upper limit of confidence intervals are considered increased risk for falls; Dayan, 2006. .. 80-81 y/o mean 11.3s (10.0-12.7s)]    Functional Mobility:  5x sit<>stand: 31.96 (normative age value is 14.8 seconds)   Fall Risk: yes          Assessment: The patient improved in her TUG and 4 stage balance test, however 6MWT and 5x sit to stand were decreased from initial evaluation. This may be related to time of day with testing as these are more endurance type tests, and the patient had a later appointment and noted that she was fatigued today. Overall she has improved in her ability to balance without upper extremity support, and she can walk a farther distance before requiring a seated rest break.  She did not take a seated rest break until 4 minutes into the walk testing today. Leeroy Sharma has met or partially met most of her PT goals and will be discharged from this episode of care for physical therapy. She will continue with her HEP with her caregiver. Goals:   (to be met in 10 visits)  Pt will demonstrate improved balance feet together to >20 seconds to improve static balance  PARTIALLY MET  Pt will perform TUG in <55 seconds with least restrictive AD, demonstrating improved gait speed for improved participation in ADL such as community ambulation   MET  Pt will demonstrate the ability to ambulate for 1 min without requiring a seated rest break  MET  Pt will be able to perform 4x STS in 20 seconds or less  NOT MET  Pt will improve functional hip strength to demonstrate ability to ascend/descend 1 flight of stairs reciprocally with use of handrail MET  Pt will be independent and compliant with comprehensive HEP to maintain progress achieved in PT  MET      Plan: The patient will be discharged from this episode of care for physical therapy at this time. Patient/Family/Caregiver was advised of these findings, precautions, and treatment options and has agreed to actively participate in planning and for this course of care. Thank you for your referral. If you have any questions, please contact me at Dept: [phone].     Sincerely,  Electronically signed by therapist: Yessenia Choe PT     Certification From: 7/54/0456  To:11/12/2023       Plan: DC  Date: 6/28/2023         TX#: 5/10 Date: 7/10/2023  Tx#: 6/10 Date: 7/17/2023  Tx#: 7/10 Date: 8/2/2023  Tx#: 8/10 Date: 8/14/2023  Tx#: 9/10   Therapeutic exercise  Forward step up 6\" box 2x8 ea  Standing hip abduction yellow TB 3x5 ea  Sit<>stand chair with airex pad 3x5 no UE support Therapeutic exercise  6MWT with rolling walker  Sit<>stand 2x5 Therapeutic exercise  Step up on 6\" box 2x8 ea  Sit<>stand from standard chair 2x5  Seated HS curl green TB 2x8 ea Therapeutic exercise  Seated LAQ 2# 3x8 ea  Sit<>stand from standard chair 2x5 single UE support  Lateral stepping in // bars 2x down/back Therapeutic exercise  Reassessment of objective findings  6 minute walk test  DL heel raise 2x10  Standing hip abduction 2x10 ea at // bars  Standing march 2x10 ea   Neuro re-ed  Stepping over therband on floor 2x5 ea  Forward stepping in // bars no UE support, backwards stepping with UE support 3x  Standing on airex pad 3x30 sec  Weight shifting R/L foot 5x ea Neuro re-ed  Step up on sand dune 8x ea  Forward step over theraband on floor 10x ea forward, lateral  Standing feet apart balance 2x30 sec, then feet together 2x30 sec   Neuro re-ed  Forward step over half foam roll on floor 10x ea forward, lateral  Forward step up onto airex with balance 5 sec, 2x5  Weight shifting ant-post 10x no UE support  Ambulation in PT gym 2 short laps, cues to pick feet up Neuro re-ed  Step up on airex pad 2x10 ea  Balance on airex pad 2x30 sec ea  Stepping over hurdles in // bars 2x with UE support  Sand dune step ups 10x ea  Ambulation in PT gym long lap, pt requried seated rest break 2/3 of way X     X X X X X   X X X X X   HEP:   Access Code: [email or web address]  URL: [email or web address]  Date: 08/14/2023  Prepared by: Ruth Laureano    Exercises  - Seated Long Arc Quad  - 1 x daily - 7 x weekly - 2 sets - 10 reps  - Heel Raises with Counter Support  - 1 x daily - 7 x weekly - 2 sets - 10 reps  - Standing Hip Abduction with Counter Support  - 1 x daily - 7 x weekly - 2 sets - 10 reps  - Standing March with Counter Support  - 1 x daily - 7 x weekly - 2 sets - 10 reps    Charges: therapeutic exercise x3    Total Timed Treatment: 39 min  Total Treatment Time: 39 min

## 2023-08-18 ENCOUNTER — APPOINTMENT (OUTPATIENT)
Dept: PHYSICAL THERAPY | Facility: HOSPITAL | Age: 88
End: 2023-08-18
Attending: INTERNAL MEDICINE
Payer: MEDICARE

## 2023-09-06 RX ORDER — DESMOPRESSIN ACETATE 0.1 MG/1
0.1 TABLET ORAL NIGHTLY
Qty: 90 TABLET | Refills: 1 | Status: SHIPPED | OUTPATIENT
Start: 2023-09-06

## 2023-09-18 ENCOUNTER — LAB ENCOUNTER (OUTPATIENT)
Dept: LAB | Age: 88
End: 2023-09-18
Attending: NURSE PRACTITIONER
Payer: MEDICARE

## 2023-09-18 DIAGNOSIS — I10 ESSENTIAL HYPERTENSION, MALIGNANT: Primary | ICD-10-CM

## 2023-09-18 LAB
ANION GAP SERPL CALC-SCNC: 5 MMOL/L (ref 0–18)
BUN BLD-MCNC: 25 MG/DL (ref 7–18)
CALCIUM BLD-MCNC: 9.9 MG/DL (ref 8.5–10.1)
CHLORIDE SERPL-SCNC: 100 MMOL/L (ref 98–112)
CO2 SERPL-SCNC: 31 MMOL/L (ref 21–32)
CREAT BLD-MCNC: 0.92 MG/DL
EGFRCR SERPLBLD CKD-EPI 2021: 61 ML/MIN/1.73M2 (ref 60–?)
FASTING STATUS PATIENT QL REPORTED: NO
GLUCOSE BLD-MCNC: 104 MG/DL (ref 70–99)
OSMOLALITY SERPL CALC.SUM OF ELEC: 287 MOSM/KG (ref 275–295)
POTASSIUM SERPL-SCNC: 4 MMOL/L (ref 3.5–5.1)
SODIUM SERPL-SCNC: 136 MMOL/L (ref 136–145)

## 2023-09-18 PROCEDURE — 80048 BASIC METABOLIC PNL TOTAL CA: CPT

## 2023-09-18 PROCEDURE — 36415 COLL VENOUS BLD VENIPUNCTURE: CPT

## 2023-09-25 ENCOUNTER — APPOINTMENT (OUTPATIENT)
Dept: GENERAL RADIOLOGY | Facility: HOSPITAL | Age: 88
DRG: 481 | End: 2023-09-25
Payer: MEDICARE

## 2023-09-25 ENCOUNTER — ANESTHESIA (OUTPATIENT)
Dept: EMERGENCY DEPT | Facility: HOSPITAL | Age: 88
DRG: 481 | End: 2023-09-25
Payer: MEDICARE

## 2023-09-25 ENCOUNTER — HOSPITAL ENCOUNTER (INPATIENT)
Facility: HOSPITAL | Age: 88
LOS: 5 days | Discharge: NURSING, REHAB OR LONG-TERM CARE | DRG: 481 | End: 2023-10-01
Attending: EMERGENCY MEDICINE | Admitting: HOSPITALIST
Payer: MEDICARE

## 2023-09-25 ENCOUNTER — APPOINTMENT (OUTPATIENT)
Dept: GENERAL RADIOLOGY | Facility: HOSPITAL | Age: 88
DRG: 481 | End: 2023-09-25
Attending: EMERGENCY MEDICINE
Payer: MEDICARE

## 2023-09-25 ENCOUNTER — ANESTHESIA EVENT (OUTPATIENT)
Dept: EMERGENCY DEPT | Facility: HOSPITAL | Age: 88
DRG: 481 | End: 2023-09-25
Payer: MEDICARE

## 2023-09-25 DIAGNOSIS — S72.002A CLOSED FRACTURE OF LEFT HIP, INITIAL ENCOUNTER (HCC): Primary | ICD-10-CM

## 2023-09-25 DIAGNOSIS — I10 ESSENTIAL HYPERTENSION: ICD-10-CM

## 2023-09-25 LAB
ANION GAP SERPL CALC-SCNC: 2 MMOL/L (ref 0–18)
APTT PPP: 32.7 SECONDS (ref 23.3–35.6)
BASOPHILS # BLD AUTO: 0.06 X10(3) UL (ref 0–0.2)
BASOPHILS NFR BLD AUTO: 0.8 %
BUN BLD-MCNC: 34 MG/DL (ref 7–18)
CALCIUM BLD-MCNC: 9.6 MG/DL (ref 8.5–10.1)
CHLORIDE SERPL-SCNC: 106 MMOL/L (ref 98–112)
CO2 SERPL-SCNC: 30 MMOL/L (ref 21–32)
CREAT BLD-MCNC: 1.16 MG/DL
EGFRCR SERPLBLD CKD-EPI 2021: 46 ML/MIN/1.73M2 (ref 60–?)
EOSINOPHIL # BLD AUTO: 0.19 X10(3) UL (ref 0–0.7)
EOSINOPHIL NFR BLD AUTO: 2.4 %
ERYTHROCYTE [DISTWIDTH] IN BLOOD BY AUTOMATED COUNT: 14.3 %
GLUCOSE BLD-MCNC: 120 MG/DL (ref 70–99)
HCT VFR BLD AUTO: 33.6 %
HGB BLD-MCNC: 10.9 G/DL
IMM GRANULOCYTES # BLD AUTO: 0.03 X10(3) UL (ref 0–1)
IMM GRANULOCYTES NFR BLD: 0.4 %
INR BLD: 1.58 (ref 0.85–1.16)
LYMPHOCYTES # BLD AUTO: 1.55 X10(3) UL (ref 1–4)
LYMPHOCYTES NFR BLD AUTO: 19.7 %
MCH RBC QN AUTO: 29.2 PG (ref 26–34)
MCHC RBC AUTO-ENTMCNC: 32.4 G/DL (ref 31–37)
MCV RBC AUTO: 90.1 FL
MONOCYTES # BLD AUTO: 1.17 X10(3) UL (ref 0.1–1)
MONOCYTES NFR BLD AUTO: 14.8 %
NEUTROPHILS # BLD AUTO: 4.88 X10 (3) UL (ref 1.5–7.7)
NEUTROPHILS # BLD AUTO: 4.88 X10(3) UL (ref 1.5–7.7)
NEUTROPHILS NFR BLD AUTO: 61.9 %
OSMOLALITY SERPL CALC.SUM OF ELEC: 295 MOSM/KG (ref 275–295)
PLATELET # BLD AUTO: 248 10(3)UL (ref 150–450)
POTASSIUM SERPL-SCNC: 4 MMOL/L (ref 3.5–5.1)
PROTHROMBIN TIME: 19 SECONDS (ref 11.6–14.8)
RBC # BLD AUTO: 3.73 X10(6)UL
SODIUM SERPL-SCNC: 138 MMOL/L (ref 136–145)
WBC # BLD AUTO: 7.9 X10(3) UL (ref 4–11)

## 2023-09-25 PROCEDURE — 73552 X-RAY EXAM OF FEMUR 2/>: CPT | Performed by: EMERGENCY MEDICINE

## 2023-09-25 PROCEDURE — 99223 1ST HOSP IP/OBS HIGH 75: CPT | Performed by: HOSPITALIST

## 2023-09-25 PROCEDURE — 73502 X-RAY EXAM HIP UNI 2-3 VIEWS: CPT

## 2023-09-25 PROCEDURE — 71045 X-RAY EXAM CHEST 1 VIEW: CPT | Performed by: EMERGENCY MEDICINE

## 2023-09-25 RX ORDER — SODIUM CHLORIDE 9 MG/ML
INJECTION, SOLUTION INTRAVENOUS CONTINUOUS
Status: DISCONTINUED | OUTPATIENT
Start: 2023-09-25 | End: 2023-09-26

## 2023-09-25 RX ORDER — HYDROMORPHONE HYDROCHLORIDE 1 MG/ML
0.5 INJECTION, SOLUTION INTRAMUSCULAR; INTRAVENOUS; SUBCUTANEOUS EVERY 30 MIN PRN
Status: DISCONTINUED | OUTPATIENT
Start: 2023-09-25 | End: 2023-10-01

## 2023-09-25 RX ORDER — LIDOCAINE HYDROCHLORIDE 10 MG/ML
2 INJECTION, SOLUTION EPIDURAL; INFILTRATION; INTRACAUDAL; PERINEURAL AS NEEDED
Status: DISCONTINUED | OUTPATIENT
Start: 2023-09-25 | End: 2023-10-01

## 2023-09-25 RX ORDER — MORPHINE SULFATE 4 MG/ML
4 INJECTION, SOLUTION INTRAMUSCULAR; INTRAVENOUS ONCE
Status: COMPLETED | OUTPATIENT
Start: 2023-09-25 | End: 2023-09-25

## 2023-09-25 RX ORDER — ROPIVACAINE HYDROCHLORIDE 5 MG/ML
30 INJECTION, SOLUTION EPIDURAL; INFILTRATION; PERINEURAL AS NEEDED
Status: DISCONTINUED | OUTPATIENT
Start: 2023-09-25 | End: 2023-10-01

## 2023-09-25 RX ORDER — SODIUM CHLORIDE 9 MG/ML
10 INJECTION INTRAVENOUS AS NEEDED
Status: DISCONTINUED | OUTPATIENT
Start: 2023-09-25 | End: 2023-10-01

## 2023-09-25 RX ORDER — MORPHINE SULFATE 4 MG/ML
INJECTION, SOLUTION INTRAMUSCULAR; INTRAVENOUS
Status: COMPLETED
Start: 2023-09-25 | End: 2023-09-25

## 2023-09-25 RX ORDER — HYDROCHLOROTHIAZIDE 12.5 MG/1
12.5 CAPSULE, GELATIN COATED ORAL DAILY
Status: ON HOLD | COMMUNITY

## 2023-09-25 RX ORDER — CARVEDILOL 12.5 MG/1
12.5 TABLET ORAL 2 TIMES DAILY WITH MEALS
Status: ON HOLD | COMMUNITY

## 2023-09-26 ENCOUNTER — ANESTHESIA (OUTPATIENT)
Dept: SURGERY | Facility: HOSPITAL | Age: 88
DRG: 481 | End: 2023-09-26
Payer: MEDICARE

## 2023-09-26 ENCOUNTER — ANESTHESIA EVENT (OUTPATIENT)
Dept: SURGERY | Facility: HOSPITAL | Age: 88
DRG: 481 | End: 2023-09-26
Payer: MEDICARE

## 2023-09-26 ENCOUNTER — APPOINTMENT (OUTPATIENT)
Dept: GENERAL RADIOLOGY | Facility: HOSPITAL | Age: 88
DRG: 481 | End: 2023-09-26
Attending: ORTHOPAEDIC SURGERY
Payer: MEDICARE

## 2023-09-26 LAB
ANION GAP SERPL CALC-SCNC: 5 MMOL/L (ref 0–18)
ATRIAL RATE: 84 BPM
BILIRUB UR QL STRIP.AUTO: NEGATIVE
BUN BLD-MCNC: 29 MG/DL (ref 7–18)
CALCIUM BLD-MCNC: 9.3 MG/DL (ref 8.5–10.1)
CHLORIDE SERPL-SCNC: 106 MMOL/L (ref 98–112)
CLARITY UR REFRACT.AUTO: CLEAR
CO2 SERPL-SCNC: 26 MMOL/L (ref 21–32)
CREAT BLD-MCNC: 0.9 MG/DL
EGFRCR SERPLBLD CKD-EPI 2021: 62 ML/MIN/1.73M2 (ref 60–?)
GLUCOSE BLD-MCNC: 113 MG/DL (ref 70–99)
GLUCOSE UR STRIP.AUTO-MCNC: NORMAL MG/DL
INR BLD: 1.17 (ref 0.85–1.16)
KETONES UR STRIP.AUTO-MCNC: NEGATIVE MG/DL
LEUKOCYTE ESTERASE UR QL STRIP.AUTO: NEGATIVE
MRSA NASAL: NEGATIVE
NITRITE UR QL STRIP.AUTO: NEGATIVE
OSMOLALITY SERPL CALC.SUM OF ELEC: 291 MOSM/KG (ref 275–295)
P AXIS: 84 DEGREES
P-R INTERVAL: 234 MS
PH UR STRIP.AUTO: 7 [PH] (ref 5–8)
POTASSIUM SERPL-SCNC: 4 MMOL/L (ref 3.5–5.1)
PROT UR STRIP.AUTO-MCNC: NEGATIVE MG/DL
PROTHROMBIN TIME: 15 SECONDS (ref 11.6–14.8)
Q-T INTERVAL: 388 MS
QRS DURATION: 88 MS
QTC CALCULATION (BEZET): 458 MS
R AXIS: -13 DEGREES
RBC UR QL AUTO: NEGATIVE
SODIUM SERPL-SCNC: 137 MMOL/L (ref 136–145)
SP GR UR STRIP.AUTO: 1.02 (ref 1–1.03)
STAPH A BY PCR: NEGATIVE
T AXIS: 71 DEGREES
UROBILINOGEN UR STRIP.AUTO-MCNC: NORMAL MG/DL
VENTRICULAR RATE: 84 BPM

## 2023-09-26 PROCEDURE — 3E0T3BZ INTRODUCTION OF ANESTHETIC AGENT INTO PERIPHERAL NERVES AND PLEXI, PERCUTANEOUS APPROACH: ICD-10-PCS | Performed by: STUDENT IN AN ORGANIZED HEALTH CARE EDUCATION/TRAINING PROGRAM

## 2023-09-26 PROCEDURE — 0QS736Z REPOSITION LEFT UPPER FEMUR WITH INTRAMEDULLARY INTERNAL FIXATION DEVICE, PERCUTANEOUS APPROACH: ICD-10-PCS | Performed by: ORTHOPAEDIC SURGERY

## 2023-09-26 PROCEDURE — 76942 ECHO GUIDE FOR BIOPSY: CPT | Performed by: STUDENT IN AN ORGANIZED HEALTH CARE EDUCATION/TRAINING PROGRAM

## 2023-09-26 PROCEDURE — 3E0T3BZ INTRODUCTION OF ANESTHETIC AGENT INTO PERIPHERAL NERVES AND PLEXI, PERCUTANEOUS APPROACH: ICD-10-PCS | Performed by: ANESTHESIOLOGY

## 2023-09-26 PROCEDURE — 1159F MED LIST DOCD IN RCRD: CPT | Performed by: INTERNAL MEDICINE

## 2023-09-26 PROCEDURE — 2W6RXZZ TRACTION OF LEFT LOWER LEG: ICD-10-PCS | Performed by: ORTHOPAEDIC SURGERY

## 2023-09-26 PROCEDURE — 99232 SBSQ HOSP IP/OBS MODERATE 35: CPT | Performed by: INTERNAL MEDICINE

## 2023-09-26 PROCEDURE — 76000 FLUOROSCOPY <1 HR PHYS/QHP: CPT | Performed by: ORTHOPAEDIC SURGERY

## 2023-09-26 PROCEDURE — 3078F DIAST BP <80 MM HG: CPT | Performed by: INTERNAL MEDICINE

## 2023-09-26 PROCEDURE — 76942 ECHO GUIDE FOR BIOPSY: CPT | Performed by: ANESTHESIOLOGY

## 2023-09-26 PROCEDURE — 3074F SYST BP LT 130 MM HG: CPT | Performed by: INTERNAL MEDICINE

## 2023-09-26 DEVICE — IMPLANTABLE DEVICE: Type: IMPLANTABLE DEVICE | Site: FEMUR | Status: FUNCTIONAL

## 2023-09-26 RX ORDER — OXYCODONE HYDROCHLORIDE 5 MG/1
5 TABLET ORAL EVERY 4 HOURS PRN
Status: DISCONTINUED | OUTPATIENT
Start: 2023-09-26 | End: 2023-10-01

## 2023-09-26 RX ORDER — MEPERIDINE HYDROCHLORIDE 25 MG/ML
12.5 INJECTION INTRAMUSCULAR; INTRAVENOUS; SUBCUTANEOUS AS NEEDED
Status: DISCONTINUED | OUTPATIENT
Start: 2023-09-26 | End: 2023-09-26 | Stop reason: HOSPADM

## 2023-09-26 RX ORDER — MORPHINE SULFATE 2 MG/ML
2 INJECTION, SOLUTION INTRAMUSCULAR; INTRAVENOUS EVERY 2 HOUR PRN
Status: DISCONTINUED | OUTPATIENT
Start: 2023-09-26 | End: 2023-10-01

## 2023-09-26 RX ORDER — DOXEPIN HYDROCHLORIDE 50 MG/1
1 CAPSULE ORAL DAILY
Status: DISCONTINUED | OUTPATIENT
Start: 2023-09-27 | End: 2023-10-01

## 2023-09-26 RX ORDER — ACETAMINOPHEN 500 MG
500 TABLET ORAL EVERY 4 HOURS PRN
Status: DISCONTINUED | OUTPATIENT
Start: 2023-09-26 | End: 2023-10-01

## 2023-09-26 RX ORDER — ACETAMINOPHEN 500 MG
1000 TABLET ORAL EVERY 8 HOURS SCHEDULED
Status: DISCONTINUED | OUTPATIENT
Start: 2023-09-26 | End: 2023-10-01

## 2023-09-26 RX ORDER — MORPHINE SULFATE 4 MG/ML
4 INJECTION, SOLUTION INTRAMUSCULAR; INTRAVENOUS EVERY 2 HOUR PRN
Status: DISCONTINUED | OUTPATIENT
Start: 2023-09-26 | End: 2023-09-26 | Stop reason: DRUGHIGH

## 2023-09-26 RX ORDER — HYDROMORPHONE HYDROCHLORIDE 1 MG/ML
0.4 INJECTION, SOLUTION INTRAMUSCULAR; INTRAVENOUS; SUBCUTANEOUS EVERY 5 MIN PRN
Status: DISCONTINUED | OUTPATIENT
Start: 2023-09-26 | End: 2023-09-26 | Stop reason: HOSPADM

## 2023-09-26 RX ORDER — HYDROMORPHONE HYDROCHLORIDE 1 MG/ML
0.4 INJECTION, SOLUTION INTRAMUSCULAR; INTRAVENOUS; SUBCUTANEOUS EVERY 2 HOUR PRN
Status: DISCONTINUED | OUTPATIENT
Start: 2023-09-26 | End: 2023-10-01

## 2023-09-26 RX ORDER — BISACODYL 10 MG
10 SUPPOSITORY, RECTAL RECTAL
Status: DISCONTINUED | OUTPATIENT
Start: 2023-09-26 | End: 2023-09-26

## 2023-09-26 RX ORDER — MORPHINE SULFATE 2 MG/ML
1 INJECTION, SOLUTION INTRAMUSCULAR; INTRAVENOUS EVERY 2 HOUR PRN
Status: DISCONTINUED | OUTPATIENT
Start: 2023-09-26 | End: 2023-09-26 | Stop reason: DRUGHIGH

## 2023-09-26 RX ORDER — MORPHINE SULFATE 4 MG/ML
4 INJECTION, SOLUTION INTRAMUSCULAR; INTRAVENOUS EVERY 2 HOUR PRN
Status: DISCONTINUED | OUTPATIENT
Start: 2023-09-26 | End: 2023-10-01

## 2023-09-26 RX ORDER — ACETAMINOPHEN 500 MG
1000 TABLET ORAL EVERY 8 HOURS PRN
Status: DISCONTINUED | OUTPATIENT
Start: 2023-09-26 | End: 2023-10-01

## 2023-09-26 RX ORDER — MORPHINE SULFATE 2 MG/ML
2 INJECTION, SOLUTION INTRAMUSCULAR; INTRAVENOUS EVERY 2 HOUR PRN
Status: DISCONTINUED | OUTPATIENT
Start: 2023-09-26 | End: 2023-09-26 | Stop reason: DRUGHIGH

## 2023-09-26 RX ORDER — LOSARTAN POTASSIUM 50 MG/1
50 TABLET ORAL DAILY
Status: DISCONTINUED | OUTPATIENT
Start: 2023-09-26 | End: 2023-09-27

## 2023-09-26 RX ORDER — METOCLOPRAMIDE HYDROCHLORIDE 5 MG/ML
5 INJECTION INTRAMUSCULAR; INTRAVENOUS EVERY 8 HOURS PRN
Status: DISCONTINUED | OUTPATIENT
Start: 2023-09-26 | End: 2023-10-01

## 2023-09-26 RX ORDER — ONDANSETRON 2 MG/ML
4 INJECTION INTRAMUSCULAR; INTRAVENOUS EVERY 6 HOURS PRN
Status: DISCONTINUED | OUTPATIENT
Start: 2023-09-26 | End: 2023-10-01

## 2023-09-26 RX ORDER — ROCURONIUM BROMIDE 10 MG/ML
INJECTION, SOLUTION INTRAVENOUS AS NEEDED
Status: DISCONTINUED | OUTPATIENT
Start: 2023-09-26 | End: 2023-09-26 | Stop reason: SURG

## 2023-09-26 RX ORDER — HYDROCODONE BITARTRATE AND ACETAMINOPHEN 5; 325 MG/1; MG/1
1 TABLET ORAL ONCE AS NEEDED
Status: DISCONTINUED | OUTPATIENT
Start: 2023-09-26 | End: 2023-09-26 | Stop reason: HOSPADM

## 2023-09-26 RX ORDER — ESCITALOPRAM OXALATE 20 MG/1
20 TABLET ORAL DAILY
Status: DISCONTINUED | OUTPATIENT
Start: 2023-09-26 | End: 2023-10-01

## 2023-09-26 RX ORDER — HYDROCODONE BITARTRATE AND ACETAMINOPHEN 5; 325 MG/1; MG/1
2 TABLET ORAL ONCE AS NEEDED
Status: DISCONTINUED | OUTPATIENT
Start: 2023-09-26 | End: 2023-09-26 | Stop reason: HOSPADM

## 2023-09-26 RX ORDER — LIDOCAINE HYDROCHLORIDE 10 MG/ML
INJECTION, SOLUTION EPIDURAL; INFILTRATION; INTRACAUDAL; PERINEURAL AS NEEDED
Status: DISCONTINUED | OUTPATIENT
Start: 2023-09-26 | End: 2023-09-26 | Stop reason: SURG

## 2023-09-26 RX ORDER — EPHEDRINE SULFATE 50 MG/ML
INJECTION INTRAVENOUS AS NEEDED
Status: DISCONTINUED | OUTPATIENT
Start: 2023-09-26 | End: 2023-09-26 | Stop reason: SURG

## 2023-09-26 RX ORDER — SODIUM CHLORIDE 9 MG/ML
INJECTION, SOLUTION INTRAVENOUS CONTINUOUS
Status: DISCONTINUED | OUTPATIENT
Start: 2023-09-26 | End: 2023-09-26

## 2023-09-26 RX ORDER — POLYETHYLENE GLYCOL 3350 17 G/17G
17 POWDER, FOR SOLUTION ORAL DAILY PRN
Status: DISCONTINUED | OUTPATIENT
Start: 2023-09-26 | End: 2023-10-01

## 2023-09-26 RX ORDER — BISACODYL 10 MG
10 SUPPOSITORY, RECTAL RECTAL
Status: DISCONTINUED | OUTPATIENT
Start: 2023-09-26 | End: 2023-10-01

## 2023-09-26 RX ORDER — NALOXONE HYDROCHLORIDE 0.4 MG/ML
80 INJECTION, SOLUTION INTRAMUSCULAR; INTRAVENOUS; SUBCUTANEOUS AS NEEDED
Status: DISCONTINUED | OUTPATIENT
Start: 2023-09-26 | End: 2023-09-26 | Stop reason: HOSPADM

## 2023-09-26 RX ORDER — ACETAMINOPHEN 500 MG
1000 TABLET ORAL ONCE AS NEEDED
Status: DISCONTINUED | OUTPATIENT
Start: 2023-09-26 | End: 2023-09-26 | Stop reason: HOSPADM

## 2023-09-26 RX ORDER — ONDANSETRON 2 MG/ML
4 INJECTION INTRAMUSCULAR; INTRAVENOUS EVERY 4 HOURS PRN
Status: ACTIVE | OUTPATIENT
Start: 2023-09-26 | End: 2023-09-26

## 2023-09-26 RX ORDER — CEFAZOLIN SODIUM/WATER 2 G/20 ML
2 SYRINGE (ML) INTRAVENOUS EVERY 8 HOURS
Qty: 40 ML | Refills: 0 | Status: COMPLETED | OUTPATIENT
Start: 2023-09-27 | End: 2023-09-27

## 2023-09-26 RX ORDER — CEFAZOLIN SODIUM 1 G/3ML
INJECTION, POWDER, FOR SOLUTION INTRAMUSCULAR; INTRAVENOUS AS NEEDED
Status: DISCONTINUED | OUTPATIENT
Start: 2023-09-26 | End: 2023-09-26 | Stop reason: SURG

## 2023-09-26 RX ORDER — DEXAMETHASONE SODIUM PHOSPHATE 4 MG/ML
VIAL (ML) INJECTION AS NEEDED
Status: DISCONTINUED | OUTPATIENT
Start: 2023-09-26 | End: 2023-09-26 | Stop reason: SURG

## 2023-09-26 RX ORDER — SENNOSIDES 8.6 MG
17.2 TABLET ORAL NIGHTLY PRN
Status: DISCONTINUED | OUTPATIENT
Start: 2023-09-26 | End: 2023-10-01

## 2023-09-26 RX ORDER — ATORVASTATIN CALCIUM 10 MG/1
10 TABLET, FILM COATED ORAL NIGHTLY
Status: DISCONTINUED | OUTPATIENT
Start: 2023-09-26 | End: 2023-10-01

## 2023-09-26 RX ORDER — MELATONIN
3 NIGHTLY PRN
Status: DISCONTINUED | OUTPATIENT
Start: 2023-09-26 | End: 2023-10-01

## 2023-09-26 RX ORDER — ACETAMINOPHEN 325 MG/1
650 TABLET ORAL EVERY 6 HOURS PRN
Status: DISCONTINUED | OUTPATIENT
Start: 2023-09-26 | End: 2023-10-01

## 2023-09-26 RX ORDER — CARVEDILOL 12.5 MG/1
12.5 TABLET ORAL 2 TIMES DAILY WITH MEALS
Status: DISCONTINUED | OUTPATIENT
Start: 2023-09-26 | End: 2023-09-27

## 2023-09-26 RX ORDER — TRAZODONE HYDROCHLORIDE 50 MG/1
50 TABLET ORAL NIGHTLY PRN
Status: DISCONTINUED | OUTPATIENT
Start: 2023-09-26 | End: 2023-10-01

## 2023-09-26 RX ORDER — DOCUSATE SODIUM 100 MG/1
100 CAPSULE, LIQUID FILLED ORAL 2 TIMES DAILY
Status: DISCONTINUED | OUTPATIENT
Start: 2023-09-26 | End: 2023-10-01

## 2023-09-26 RX ORDER — SENNOSIDES 8.6 MG
17.2 TABLET ORAL NIGHTLY
Status: DISCONTINUED | OUTPATIENT
Start: 2023-09-26 | End: 2023-10-01

## 2023-09-26 RX ORDER — ONDANSETRON 2 MG/ML
4 INJECTION INTRAMUSCULAR; INTRAVENOUS EVERY 6 HOURS PRN
Status: DISCONTINUED | OUTPATIENT
Start: 2023-09-26 | End: 2023-09-26

## 2023-09-26 RX ORDER — HYDROMORPHONE HYDROCHLORIDE 1 MG/ML
0.2 INJECTION, SOLUTION INTRAMUSCULAR; INTRAVENOUS; SUBCUTANEOUS EVERY 5 MIN PRN
Status: DISCONTINUED | OUTPATIENT
Start: 2023-09-26 | End: 2023-09-26 | Stop reason: HOSPADM

## 2023-09-26 RX ORDER — MIDAZOLAM HYDROCHLORIDE 1 MG/ML
1 INJECTION INTRAMUSCULAR; INTRAVENOUS EVERY 5 MIN PRN
Status: DISCONTINUED | OUTPATIENT
Start: 2023-09-26 | End: 2023-09-26 | Stop reason: HOSPADM

## 2023-09-26 RX ORDER — HYDROMORPHONE HYDROCHLORIDE 1 MG/ML
0.5 INJECTION, SOLUTION INTRAMUSCULAR; INTRAVENOUS; SUBCUTANEOUS EVERY 30 MIN PRN
Status: ACTIVE | OUTPATIENT
Start: 2023-09-26 | End: 2023-09-26

## 2023-09-26 RX ORDER — DESMOPRESSIN ACETATE 0.1 MG/1
0.1 TABLET ORAL NIGHTLY
Status: DISCONTINUED | OUTPATIENT
Start: 2023-09-26 | End: 2023-09-26

## 2023-09-26 RX ORDER — ROPIVACAINE HYDROCHLORIDE 5 MG/ML
INJECTION, SOLUTION EPIDURAL; INFILTRATION; PERINEURAL AS NEEDED
Status: DISCONTINUED | OUTPATIENT
Start: 2023-09-26 | End: 2023-09-26

## 2023-09-26 RX ORDER — GLYCOPYRROLATE 0.2 MG/ML
INJECTION, SOLUTION INTRAMUSCULAR; INTRAVENOUS AS NEEDED
Status: DISCONTINUED | OUTPATIENT
Start: 2023-09-26 | End: 2023-09-26 | Stop reason: SURG

## 2023-09-26 RX ORDER — METOCLOPRAMIDE HYDROCHLORIDE 5 MG/ML
5 INJECTION INTRAMUSCULAR; INTRAVENOUS EVERY 8 HOURS PRN
Status: DISCONTINUED | OUTPATIENT
Start: 2023-09-26 | End: 2023-09-26

## 2023-09-26 RX ORDER — HYDROMORPHONE HYDROCHLORIDE 1 MG/ML
0.2 INJECTION, SOLUTION INTRAMUSCULAR; INTRAVENOUS; SUBCUTANEOUS EVERY 2 HOUR PRN
Status: DISCONTINUED | OUTPATIENT
Start: 2023-09-26 | End: 2023-10-01

## 2023-09-26 RX ORDER — METOPROLOL TARTRATE 5 MG/5ML
2.5 INJECTION INTRAVENOUS ONCE
Status: DISCONTINUED | OUTPATIENT
Start: 2023-09-26 | End: 2023-09-26 | Stop reason: HOSPADM

## 2023-09-26 RX ORDER — SODIUM CHLORIDE, SODIUM LACTATE, POTASSIUM CHLORIDE, CALCIUM CHLORIDE 600; 310; 30; 20 MG/100ML; MG/100ML; MG/100ML; MG/100ML
INJECTION, SOLUTION INTRAVENOUS CONTINUOUS
Status: DISCONTINUED | OUTPATIENT
Start: 2023-09-26 | End: 2023-09-26 | Stop reason: HOSPADM

## 2023-09-26 RX ORDER — SENNOSIDES 8.6 MG
17.2 TABLET ORAL NIGHTLY PRN
Status: DISCONTINUED | OUTPATIENT
Start: 2023-09-26 | End: 2023-09-26

## 2023-09-26 RX ORDER — OXYCODONE HYDROCHLORIDE 5 MG/1
2.5 TABLET ORAL EVERY 4 HOURS PRN
Status: DISCONTINUED | OUTPATIENT
Start: 2023-09-26 | End: 2023-10-01

## 2023-09-26 RX ORDER — ONDANSETRON 2 MG/ML
INJECTION INTRAMUSCULAR; INTRAVENOUS AS NEEDED
Status: DISCONTINUED | OUTPATIENT
Start: 2023-09-26 | End: 2023-09-26 | Stop reason: SURG

## 2023-09-26 RX ORDER — HYDROMORPHONE HYDROCHLORIDE 1 MG/ML
0.6 INJECTION, SOLUTION INTRAMUSCULAR; INTRAVENOUS; SUBCUTANEOUS EVERY 5 MIN PRN
Status: DISCONTINUED | OUTPATIENT
Start: 2023-09-26 | End: 2023-09-26 | Stop reason: HOSPADM

## 2023-09-26 RX ORDER — ENOXAPARIN SODIUM 100 MG/ML
40 INJECTION SUBCUTANEOUS DAILY
Status: DISCONTINUED | OUTPATIENT
Start: 2023-09-27 | End: 2023-09-30

## 2023-09-26 RX ORDER — ENEMA 19; 7 G/133ML; G/133ML
1 ENEMA RECTAL ONCE AS NEEDED
Status: DISCONTINUED | OUTPATIENT
Start: 2023-09-26 | End: 2023-10-01

## 2023-09-26 RX ORDER — POLYETHYLENE GLYCOL 3350 17 G/17G
17 POWDER, FOR SOLUTION ORAL DAILY PRN
Status: DISCONTINUED | OUTPATIENT
Start: 2023-09-26 | End: 2023-09-26

## 2023-09-26 RX ORDER — SODIUM CHLORIDE 9 MG/ML
INJECTION, SOLUTION INTRAVENOUS CONTINUOUS PRN
Status: DISCONTINUED | OUTPATIENT
Start: 2023-09-26 | End: 2023-09-26 | Stop reason: SURG

## 2023-09-26 RX ORDER — SODIUM CHLORIDE 9 MG/ML
INJECTION, SOLUTION INTRAVENOUS CONTINUOUS
Status: DISCONTINUED | OUTPATIENT
Start: 2023-09-26 | End: 2023-09-26 | Stop reason: HOSPADM

## 2023-09-26 RX ORDER — NEOSTIGMINE METHYLSULFATE 1 MG/ML
INJECTION, SOLUTION INTRAVENOUS AS NEEDED
Status: DISCONTINUED | OUTPATIENT
Start: 2023-09-26 | End: 2023-09-26 | Stop reason: SURG

## 2023-09-26 RX ORDER — HYDRALAZINE HYDROCHLORIDE 10 MG/1
10 TABLET, FILM COATED ORAL 2 TIMES DAILY
Status: DISCONTINUED | OUTPATIENT
Start: 2023-09-26 | End: 2023-10-01

## 2023-09-26 RX ORDER — MORPHINE SULFATE 2 MG/ML
1 INJECTION, SOLUTION INTRAMUSCULAR; INTRAVENOUS EVERY 2 HOUR PRN
Status: DISCONTINUED | OUTPATIENT
Start: 2023-09-26 | End: 2023-10-01

## 2023-09-26 RX ORDER — DIPHENHYDRAMINE HYDROCHLORIDE 50 MG/ML
12.5 INJECTION INTRAMUSCULAR; INTRAVENOUS AS NEEDED
Status: DISCONTINUED | OUTPATIENT
Start: 2023-09-26 | End: 2023-09-26 | Stop reason: HOSPADM

## 2023-09-26 RX ADMIN — EPHEDRINE SULFATE 5 MG: 50 INJECTION INTRAVENOUS at 17:47:00

## 2023-09-26 RX ADMIN — ONDANSETRON 4 MG: 2 INJECTION INTRAMUSCULAR; INTRAVENOUS at 18:49:00

## 2023-09-26 RX ADMIN — GLYCOPYRROLATE 0.4 MG: 0.2 INJECTION, SOLUTION INTRAMUSCULAR; INTRAVENOUS at 18:49:00

## 2023-09-26 RX ADMIN — SODIUM CHLORIDE: 9 INJECTION, SOLUTION INTRAVENOUS at 17:36:00

## 2023-09-26 RX ADMIN — DEXAMETHASONE SODIUM PHOSPHATE 4 MG: 4 MG/ML VIAL (ML) INJECTION at 17:53:00

## 2023-09-26 RX ADMIN — CEFAZOLIN SODIUM 2 G: 1 INJECTION, POWDER, FOR SOLUTION INTRAMUSCULAR; INTRAVENOUS at 17:48:00

## 2023-09-26 RX ADMIN — NEOSTIGMINE METHYLSULFATE 3 MG: 1 INJECTION, SOLUTION INTRAVENOUS at 18:49:00

## 2023-09-26 RX ADMIN — ROCURONIUM BROMIDE 30 MG: 10 INJECTION, SOLUTION INTRAVENOUS at 17:41:00

## 2023-09-26 RX ADMIN — EPHEDRINE SULFATE 5 MG: 50 INJECTION INTRAVENOUS at 17:51:00

## 2023-09-26 RX ADMIN — ROPIVACAINE HYDROCHLORIDE 20 ML: 5 INJECTION, SOLUTION EPIDURAL; INFILTRATION; PERINEURAL at 00:20:00

## 2023-09-26 RX ADMIN — LIDOCAINE HYDROCHLORIDE 50 MG: 10 INJECTION, SOLUTION EPIDURAL; INFILTRATION; INTRACAUDAL; PERINEURAL at 17:41:00

## 2023-09-26 NOTE — PLAN OF CARE
NURSING ADMISSION NOTE      Patient admitted via Cart  Oriented to room. Safety precautions initiated. Bed in low position. Call light in reach. Received from ER awake, daughter at bedside. /85, afebrile, on O2 at 2l/nc. Pt denies any pain to lt hip when not moving, states nerve block is working. Per dtr, pt has hx of Dementia, has poor short term memory, can follow conversation and simple instructions. Dtr Jewel Severance is POA, will bring copies of POA for health care, living  tila and POLST form in AM, states pt is DNAR/Comfort. Noted lt leg shortened and externally rotated, PP palpable, skin warm to touch, able to move toes weakly, sensation diminished d/t fem block. NPO at present, IVF infusing. Plan for surgery today. Pt and dtr updated with plan of care, verbalized understanding.

## 2023-09-26 NOTE — ED INITIAL ASSESSMENT (HPI)
Patient was at home and had unwitnessed fall. Unknown if there was LOC. Patient has also been hypertensive with SBP in 200s. Recently prescribed carvedilol.  Patient also has deformity to L hip along with pain with movement

## 2023-09-26 NOTE — ED QUICK NOTES
Orders for admission, patient is aware of plan and ready to go upstairs. Any questions, please call ED RN Tamy Lebron at extension [phone].      Patient Covid vaccination status: Fully vaccinated     COVID Test Ordered in ED: None    COVID Suspicion at Admission: N/A    Running Infusions:    sodium chloride 125 mL/hr at 09/25/23 6806        Mental Status/LOC at time of transport: A&Ox2    Other pertinent information:   CIWA score: N/A   NIH score:  N/A

## 2023-09-26 NOTE — PLAN OF CARE
Patient is alert and oriented x4. Pain last rated a 6/10. Medication given with relief. No complaint of numbness or tingling. Pulses bilateral +2. Plantar and dorsi flexions weak to LLE. Vital signs in normal range. Patient currently on bed rest and NPO awaiting surgery. IS and ankle pumps encouraged. Belongings within reach. Encouraged to call for any needs.

## 2023-09-26 NOTE — ANESTHESIA PROCEDURE NOTES
Regional Block    Date/Time: 9/26/2023 5:44 PM    Performed by: Louisa Gonzalez MD  Authorized by: Louisa Gonzalez MD      General Information and Staff    Start Time:  9/26/2023 5:44 PM  End Time:  9/26/2023 5:46 PM  Anesthesiologist:  Louisa Gonzalez MD  Performed by: Anesthesiologist  Patient Location:  OR    Block Placement: Post Induction  Site Identification: real time ultrasound guided and image stored and retrievable    Block site/laterality marked before start: site marked  Reason for Block: at surgeon's request and post-op pain management    Preanesthetic Checklist: 2 patient identifers, IV checked, site marked, risks and benefits discussed, monitors and equipment checked, pre-op evaluation, timeout performed, anesthesia consent, sterile technique used, no prohibitive neurological deficits and no local skin infection at insertion site      Procedure Details    Patient Position:  Supine  Prep: ChloraPrep    Monitoring:  Cardiac monitor, continuous pulse ox and blood pressure cuff  Block Type:  Fascia iliaca  Laterality:  Left  Injection Technique:  Single-shot    Needle    Needle Type:  Short-bevel and echogenic  Needle Gauge:  21 G  Needle Length:  100 mm  Needle Localization:  Ultrasound guidance  Reason for Ultrasound Use: appropriate spread of the medication was noted in real time and no ultrasound evidence of intravascular and/or intraneural injection            Assessment    Injection Assessment:  Good spread noted, negative resistance, negative aspiration for heme, incremental injection and low pressure  Heart Rate Change: No    - Patient tolerated block procedure well without evidence of immediate block related complications.      Medications  9/26/2023 5:44 PM      Additional Comments    Medication:  Ropivacaine 0.375% 20mL

## 2023-09-26 NOTE — ANESTHESIA PROCEDURE NOTES
Airway  Date/Time: 9/26/2023 5:43 PM  Urgency: elective    Airway not difficult    General Information and Staff    Patient location during procedure: OR  Anesthesiologist: Liza Dean MD  Performed: anesthesiologist   Performed by: Liza Dean MD  Authorized by: Liza Dean MD      Indications and Patient Condition  Indications for airway management: anesthesia  Sedation level: deep  Preoxygenated: yes  Patient position: sniffing  Mask difficulty assessment: 1 - vent by mask    Final Airway Details  Final airway type: endotracheal airway      Successful airway: ETT  Cuffed: yes   Successful intubation technique: direct laryngoscopy  Endotracheal tube insertion site: oral  Blade: Talha  Blade size: #3  ETT size (mm): 7.0    Cormack-Lehane Classification: grade I - full view of glottis  Placement verified by: capnometry   Cuff volume (mL): 9  Measured from: lips  ETT to lips (cm): 21  Number of attempts at approach: 1

## 2023-09-26 NOTE — ANESTHESIA PROCEDURE NOTES
Regional Block    Performed by: Vira Moses DO  Authorized by: Vira Moses DO      General Information and Staff    Start Time:   Anesthesiologist:  Vira Moses DO  Performed by: Anesthesiologist  Patient Location:  OR      Site Identification: real time ultrasound guided and image stored and retrievable    Block site/laterality marked before start: site marked  Reason for Block: at surgeon's request and post-op pain management    Preanesthetic Checklist: 2 patient identifers, IV checked, risks and benefits discussed, monitors and equipment checked, pre-op evaluation, timeout performed, anesthesia consent, sterile technique used, no prohibitive neurological deficits and no local skin infection at insertion site      Procedure Details    Patient Position:   Prep: ChloraPrep    Monitoring:  Cardiac monitor, continuous pulse ox and blood pressure cuff  Block Type:  Femoral  Laterality:  Left  Injection Technique:  Single-shot    Needle    Needle Type:  Short-bevel and echogenic  Needle Localization:  Ultrasound guidance  Reason for Ultrasound Use: appropriate spread of the medication was noted in real time and no ultrasound evidence of intravascular and/or intraneural injection            Assessment    Injection Assessment:  Good spread noted, negative resistance, negative aspiration for heme, incremental injection and low pressure  Heart Rate Change: No    - Patient tolerated block procedure well without evidence of immediate block related complications.      Medications      Additional Comments    Medication:  Ropivacaine 0.5% 20mL

## 2023-09-26 NOTE — ED QUICK NOTES
RN was out of unit when anesthesia came to do femoral block. Another nurse assisted physician procedure; charting/time out was not completed at this time. This RN did return after procedure was finished and is doing post-procedure charting/neurovascular assessments. Patient's vitals are at baseline to ED admission vitals. Patient has strong pulses on left lower extremity, cap refill less than 2 seconds. Patient states that she is unable to feel left leg when touching.

## 2023-09-27 LAB
ANION GAP SERPL CALC-SCNC: 6 MMOL/L (ref 0–18)
BASOPHILS # BLD AUTO: 0.02 X10(3) UL (ref 0–0.2)
BASOPHILS NFR BLD AUTO: 0.2 %
BUN BLD-MCNC: 27 MG/DL (ref 7–18)
CALCIUM BLD-MCNC: 8.6 MG/DL (ref 8.5–10.1)
CHLORIDE SERPL-SCNC: 109 MMOL/L (ref 98–112)
CO2 SERPL-SCNC: 23 MMOL/L (ref 21–32)
CREAT BLD-MCNC: 0.9 MG/DL
EGFRCR SERPLBLD CKD-EPI 2021: 62 ML/MIN/1.73M2 (ref 60–?)
EOSINOPHIL # BLD AUTO: 0 X10(3) UL (ref 0–0.7)
EOSINOPHIL NFR BLD AUTO: 0 %
ERYTHROCYTE [DISTWIDTH] IN BLOOD BY AUTOMATED COUNT: 14.4 %
ERYTHROCYTE [DISTWIDTH] IN BLOOD BY AUTOMATED COUNT: 14.7 %
GLUCOSE BLD-MCNC: 122 MG/DL (ref 70–99)
HCT VFR BLD AUTO: 21.7 %
HCT VFR BLD AUTO: 26.1 %
HGB BLD-MCNC: 7 G/DL
HGB BLD-MCNC: 8.1 G/DL
IMM GRANULOCYTES # BLD AUTO: 0.05 X10(3) UL (ref 0–1)
IMM GRANULOCYTES NFR BLD: 0.5 %
LYMPHOCYTES # BLD AUTO: 0.71 X10(3) UL (ref 1–4)
LYMPHOCYTES NFR BLD AUTO: 6.4 %
MAGNESIUM SERPL-MCNC: 1.8 MG/DL (ref 1.6–2.6)
MCH RBC QN AUTO: 29.4 PG (ref 26–34)
MCH RBC QN AUTO: 29.5 PG (ref 26–34)
MCHC RBC AUTO-ENTMCNC: 31 G/DL (ref 31–37)
MCHC RBC AUTO-ENTMCNC: 32.3 G/DL (ref 31–37)
MCV RBC AUTO: 91.2 FL
MCV RBC AUTO: 94.9 FL
MONOCYTES # BLD AUTO: 1.52 X10(3) UL (ref 0.1–1)
MONOCYTES NFR BLD AUTO: 13.8 %
NEUTROPHILS # BLD AUTO: 8.74 X10 (3) UL (ref 1.5–7.7)
NEUTROPHILS # BLD AUTO: 8.74 X10(3) UL (ref 1.5–7.7)
NEUTROPHILS NFR BLD AUTO: 79.1 %
OSMOLALITY SERPL CALC.SUM OF ELEC: 292 MOSM/KG (ref 275–295)
PLATELET # BLD AUTO: 159 10(3)UL (ref 150–450)
PLATELET # BLD AUTO: 170 10(3)UL (ref 150–450)
POTASSIUM SERPL-SCNC: 4.2 MMOL/L (ref 3.5–5.1)
RBC # BLD AUTO: 2.38 X10(6)UL
RBC # BLD AUTO: 2.75 X10(6)UL
SODIUM SERPL-SCNC: 138 MMOL/L (ref 136–145)
WBC # BLD AUTO: 11 X10(3) UL (ref 4–11)
WBC # BLD AUTO: 8.8 X10(3) UL (ref 4–11)

## 2023-09-27 PROCEDURE — 99232 SBSQ HOSP IP/OBS MODERATE 35: CPT | Performed by: HOSPITALIST

## 2023-09-27 RX ORDER — SODIUM CHLORIDE 9 MG/ML
1000 INJECTION, SOLUTION INTRAVENOUS ONCE
Status: COMPLETED | OUTPATIENT
Start: 2023-09-27 | End: 2023-09-27

## 2023-09-27 RX ORDER — SODIUM CHLORIDE 9 MG/ML
INJECTION, SOLUTION INTRAVENOUS CONTINUOUS
Status: DISCONTINUED | OUTPATIENT
Start: 2023-09-27 | End: 2023-09-29

## 2023-09-27 RX ORDER — MAGNESIUM OXIDE 400 MG/1
400 TABLET ORAL ONCE
Status: COMPLETED | OUTPATIENT
Start: 2023-09-27 | End: 2023-09-27

## 2023-09-27 RX ORDER — CARVEDILOL 3.12 MG/1
3.12 TABLET ORAL 2 TIMES DAILY WITH MEALS
Status: DISCONTINUED | OUTPATIENT
Start: 2023-09-28 | End: 2023-09-29

## 2023-09-27 NOTE — CM/SW NOTE
09/27/23 1415   Choice of Post-Acute Provider   Informed patient of right to choose their preferred provider Yes   List of appropriate post-acute services provided to patient/family with quality data Yes   Patient/family choice [facility]   Information given to Patient;Daughter       Met with pt and pt's dtr at bedside. List of accepting [facility] facilities given. Pt/dtr agreeable with [facility] and [facility] was chosen. Discussed need for insurance auth. [facility] reserved in [address]. Message sent asking them to request insurance auth for [facility] once PT/OT evals available in chart. Updated RN. / to remain available for support and/or discharge planning.      [address]  R:[phone]  S:[phone]    Messi Corbett LCSW  Discharge Planner  [phone]

## 2023-09-27 NOTE — CM/SW NOTE
09/27/23 1200   CM/SW Referral Data   Referral Source Social Work (self-referral)   Reason for Referral Discharge planning   Informant EMR;Clinical Staff Member   Patient Info   Patient's Current Mental Status at Time of Assessment Alert;Memory Impairments   Patient's [address]   Number of Levels in Home 3   Patient lives with Daughter   Discharge Needs   Anticipated D/C needs Subacute rehab         Patient is an 83 y/o woman admitted with hip fracture now s/p surgical repair. PT recommending [facility]. Pt lives with her daughter in a split level home. Message sent to [facility] to request [address] referrals to [address]. PASRR to be completed. Insurance Jane Castro will be needed. / to remain available for support and/or discharge planning.      Jayson Jose LCSW  Discharge Planner  [phone]

## 2023-09-27 NOTE — PLAN OF CARE
Alert and oriented x2/3. VSS. On 3L NC. . IS encouraged. Telemetry monitoring. SCDs on BLE. Tolerating diet. Right arm precautions. Purewick in place. DTV 0230. Denies pain at this time. Aquacel dressing x2 to left hip C/D/I. Ice applied. PT/OT. Call light within reach.

## 2023-09-27 NOTE — CM/SW NOTE
Level 1 PASRR screen completed and uploaded to [facility] referral in [address] worker/ to remain available for support and/or discharge planning.      Shiela DE LA GARZAA MSN, RN CTL/  [id number]

## 2023-09-27 NOTE — PHYSICAL THERAPY NOTE
PHYSICAL THERAPY EVALUATION - INPATIENT     Room Number: 373/373-A  Evaluation Date: 9/27/2023  Type of Evaluation: Initial  Physician Order: PT Eval and Treat    Presenting Problem: closed fracture of L hip  Co-Morbidities : dementia, anxiety, cancern, arthritis, stroke, HTN, mastectomy  Reason for Therapy: Mobility Dysfunction and Discharge Planning    History related to current admission: Patient is a 82year old female admitted on 9/25/2023 from home for fall with closed fracture of L hip. Pt is s/p Cephalomedullary nailing of left intertrochanteric fracture on 9/26/23. ASSESSMENT   In this PT evaluation, the patient presents with the following impairments of decreased strength, balance, endurance, ROM, and functional activity tolerance. These impairments and comorbidities manifest themselves as functional limitations in independent bed mobility, transfers, and gait. The patient is below baseline and would benefit from skilled inpatient PT to address the above deficits to assist patient in returning to prior to level of function. Functional outcome measures completed include AMPAC. The AM-PAC '6-Clicks' Inpatient Basic Mobility Short Form was completed and this patient is demonstrating a Approx Degree of Impairment: 61.29%  degree of impairment in mobility. Research supports that patients with this level of impairment may benefit from LACHELLE. DISCHARGE RECOMMENDATIONS  PT Discharge Recommendations: Sub-acute rehabilitation    PLAN  PT Treatment Plan: Bed mobility; Body mechanics; Endurance; Energy conservation;Patient education; Family education;Gait training;Range of motion;Strengthening;Stoop training;Stair training;Transfer training;Balance training  Rehab Potential : Good  Frequency (Obs): 3-5x/week  Number of Visits to Meet Established Goals: 5      CURRENT GOALS    Goal #1 Patient is able to demonstrate supine - sit EOB @ level: minimum assistance     Goal #2 Patient is able to demonstrate transfers Sit to/from Stand at assistance level: supervision     Goal #3 Patient is able to ambulate 50 feet with assist device: walker - rolling at assistance level: minimum assistance     Goal #4 Consider stair goal if/when appropriate   Goal #5    Goal #6    Goal Comments: Goals established on 9/27/2023    HOME SITUATION  Type of Home: House   Home Layout: Multi-level  Stairs to Enter : 7 (1 ANGELO outside the home)  Railing: Yes          Lives With: Daughter  Drives: No  Patient Owned Equipment: Rolling walker       Prior Level of [place]: Pt typically ambulates with no AD. Pt has supervision for stair climbing. Pt gets assist with ADLs. Pt has a CG for 4-6 hours/day. SUBJECTIVE  Pt pleasant and cooperative during session. OBJECTIVE  Precautions: Limb alert - right;Bed/chair alarm  Fall Risk: High fall risk    WEIGHT BEARING RESTRICTION  Weight Bearing Restriction: L lower extremity           L Lower Extremity: Weight Bearing as Tolerated    PAIN ASSESSMENT  Rating: Unable to rate  Location: L hip  Management Techniques: Activity promotion; Body mechanics; Relaxation;Repositioning;Breathing techniques    COGNITION  Pt with hx of dementia, pt follow simple one step commands with increased time. Pt with poor short term memory. RANGE OF MOTION AND STRENGTH ASSESSMENT  Upper extremity ROM and strength are limited due to hx of shoulder problems. Lower extremity ROM is within functional limits, except L hip s/p fracture repair. Lower extremity strength is within functional limits, except L hip s/p fracture repair. BALANCE  Static Sitting: Good  Dynamic Sitting: Fair +  Static Standing: Poor +  Dynamic Standing: Poor +    ADDITIONAL TESTS                                    ACTIVITY TOLERANCE   Pt with orthostatic hypotension, pt symptomatic and complaining of dizziness. RN notified of values.     BP sitting EOB - 94/60   BP sitting EOB - 102/67   BP standing - 88/51   BP returned to supine - 114/57          O2 WALK       NEUROLOGICAL FINDINGS                        AM-PAC '6-Clicks' INPATIENT SHORT FORM - BASIC MOBILITY  How much difficulty does the patient currently have. .. Patient Difficulty: Turning over in bed (including adjusting bedclothes, sheets and blankets)?: A Lot   Patient Difficulty: Sitting down on and standing up from a chair with arms (e.g., wheelchair, bedside commode, etc.): A Little   Patient Difficulty: Moving from lying on back to sitting on the side of the bed?: A Lot   How much help from another person does the patient currently need. .. Help from Another: Moving to and from a bed to a chair (including a wheelchair)?: A Little   Help from Another: Need to walk in hospital room?: A Lot   Help from Another: Climbing 3-5 steps with a railing?: A Lot       AM-PAC Score:  Raw Score: 14   Approx Degree of Impairment: 61.29%   Standardized Score (AM-PAC Scale): 38.1   CMS Modifier (G-Code): CL    FUNCTIONAL ABILITY STATUS  Gait Assessment   Functional Mobility/Gait Assessment  Gait Assistance: Not tested    Skilled Therapy Provided: Per RN okay to work with pt. Pt received in chair and was agreeable to PT session. Bed Mobility:  Rolling: NT  Supine to sit: max A of 2    Sit to supine: max A of 2     Transfer Mobility:  Sit to stand: min A    Stand to sit: min A  Gait = Pt unable to take steps at this time due to symptomatic orthostatic hypotension. See activity tolerance section above. Therapist's Comments: Pt and dtr educated on role of therapy, goals for session, safety, fall prevention, WBAT LLE, no hip restrictions, activity recommendations, and discharge planning. Exercise/Education Provided:  Bed mobility  Body mechanics  Energy conservation  Functional activity tolerated  ROM  Strengthening  Transfer training    Patient End of Session: In bed;Needs met;Call light within reach;RN aware of session/findings; All patient questions and concerns addressed;SCDs in place; Alarm set; Family present; Discussed recommendations with /      Patient Evaluation Complexity Level:  History Moderate - 1 or 2 personal factors and/or co-morbidities   Examination of body systems Low - addressing 1-2 elements   Clinical Presentation Low - Stable   Clinical Decision Making Low - Stable       PT Session Time: 30 minutes  Therapeutic Activity: 15 minutes

## 2023-09-27 NOTE — PROGRESS NOTES
[facility] Progress Note    Subjective: Tolerable pain at rest and laying in bed but more painful/pressure in left hip with weight bearing    Objective:     Alert Oriented to place, person,    Labs:   Lab Results   Component Value Date    WBC 11.0 09/27/2023    HGB 8.1 09/27/2023    HCT 26.1 09/27/2023    .0 09/27/2023    CREATSERUM 0.90 09/27/2023    BUN 27 09/27/2023     09/27/2023    K 4.2 09/27/2023     09/27/2023    CO2 23.0 09/27/2023     09/27/2023    CA 8.6 09/27/2023    MG 1.8 09/27/2023           Assessment/Plan: 82year old female s/p CMN for L ITFx on 9/26/23.     Activity: WBAT  Anticoagulation: Lovenox for 3 weeks, ASA 81 BID for subsequent 3 weeks  Analgesia: continue current pain protocol  Antibiotics: periop abx  Disposition: pending medical clearance      Fawn Hewitt MD  [address] [facility]

## 2023-09-27 NOTE — CM/SW NOTE
Department  notified of request for igor LAROSE referrals started. Assigned CM/SW to follow up with pt/family on further discharge planning.       Kvng Armando  [facility]

## 2023-09-27 NOTE — PROGRESS NOTES
Patient has order for DNAR/comfort, no POLST form on file. POLST given to daughter at bedside to review. Daughter had questions regarding DNAR status. Encouraged daughter to discuss with provider in AM during rounds.

## 2023-09-27 NOTE — PLAN OF CARE
Patient is alert and oriented x 3 forgetful at times. BP low patient was dizzy x 2 attempts to get out of bed. Hospitalist notified and boluses given as ordered. On 2L oxygen via nasal cannula. Denies pain. Aquacel dressings on left hip clean, dry, and intact. Weak plantar and dorsiflexion on LLE. Denies numbness and tingling. DTV s/p straight cath last night monitoring bladder scans. Encouraging PO fluids and IV fluids infusing per order. Last BM 9/25/23. Tolerating regular diet. SCD's on bilaterally. Up moderate assist with walker. WBAT LLE. Safety precautions in place. Plan to discharge once medically cleared to [facility] [place] [address]'s. Plan of care discussed with patient.

## 2023-09-27 NOTE — OPERATIVE REPORT
Operative Note    Patient Name: Bryan Sal    Preoperative Diagnosis:     Left intertrochanteric hip fracture    Postoperative Diagnosis:     Left intertrochanteric hip fracture    Surgeon(s) and Role:     Beatriz Briones MD - Primary     Assistant: Surgical Assistant.: Krzysztof Gu    Procedures:     Cephalomedullary nailing of left intertrochanteric fracture (CPT 89616)    Antibiotics: Ancef 2 g    Surgical Findings: intertrochanteric fracture    Anesthesia: General    Complications: None    Implants:   Implant Name Type Inv. Item Serial No.  Lot No. LRB No. Used Action   10MM/130 DEG TI FREEMAN TFNA 170MM - STERILE   N/A [employer] [id number] Left 1 Implanted   TFNA Fenestrated Helical Blade 10NY - Sterile   N/A [employer] [id number] Left 1 Implanted   SCREW BN 5MM 4.3MM 32MM NLEX - SN/A  SCREW BN 5MM 4.3MM 32MM NLEX N/A [employer] [id number] Left 1 Implanted       Specimen: None    Condition: Stable    Estimated Blood Loss: 100mL    Indications:  82year old female with an intertrochanteric hip fracture. Surgical and non-surgical treatment options were discussed with the patient/patient's family. Using a shared decision making process, the patient/patient's family elected to proceed with surgery. The risks associated with the procedure, expected  outcome, the expected time to recovery, and the rehab required were reviewed. All of the patient's questions were answered. Procedure:  Patient was met in the preoperative holding area where consent was verified, laterality was marked with the surgeon's initials, and the H&P was updated. Patient was brought to the operating room on an inpatient bed. Patient was transferred from the transport cart onto the [place] table and placed in a supine position. The foot and ankle was paddded with abdominal pad and web roll prior to being placed in the boots. A padded perineal post placed between the legs. The ipsilateral arm was draped over the chest and padded.  The uninjured leg was adducted below the injured leg. Traction was applied to injured leg and appropriate internal or external rotation to achieve reduction of fracture. Fluoroscopy was used to confirm appropriate reduction. The leg was prepped and draped in the usual sterile fashion. A surgical timeout was performed. Antibiotics were fully infused. TXA was infused. Guidewire was used to facilitate the marking of the femur in AP and lateral planes as well as the tip of the greater trochanter. The neck shaft angle and intramedullary canal size was measured using the ruler and guidewire. A 10 blade was then used to make a small incision proximal and just to the tip of the greater trochanter. Incision was carried through the dermis into the tensor fascia radha. A 3.2 guidewire was then placed through the incision. Fluoroscopy was used to confirm the intramedullary nail starting point with the guidewire in AP and lateral planes. The guidewire was then inserted into the proximal femur to approximately a depth of 15 cm. Fluoroscopy was used to confirm the trajectory in the proximal femur in the AP and lateral planes. A 16 mm cannulated drill bit was then passed over the guidewire with a soft tissue protection sleeve. Once the opening was created the guidewire and drill bit were removed. A 10mm diameter short nail measuring 170 mm in length was inserted with the handle. Fluoroscopy was used to confirm the nail was inserted to an appropriate depth. An aiming guide was then attached to confirm that the helical blade will be placed in the correct position. Fluoroscopy was used to ensure the correct anteversion of the intramedullary nail. A 10 blade was then used to make incision through the dermis and tensor fascia radha down to bone. A guide sleeve was then inserted through the intramedullary nail handle to facilitate placement of the helical blade. The guide sleeve was then advanced down to bone. This was confirmed on fluoroscopy. A 3.2 mm guidewire was then passed through the bone into the femoral head stopping 10 mm short of the joint. Fluoroscopy was used to confirm appropriate trajectory of this guidewire and AP and lateral planes. The length of the helical blade was measured and the appropriate screw length was noted to be 90 mm. A cannulated drill bit was then used to open the lateral cortex of the proximal femur. The helical blade was then fully inserted. The screw was then subsequently placed to lock rotation but allow collapse. With the helical blade seated, the helical blade and sleeves were removed. A 10 blade was then used to make an incision through the dermis and tensor fascia radha down to bone for the distal interlocking screw. A sleeve was inserted down to bone. A 4.2 mm drill was used to drill bicortically. Depth gauge was used to measure the length of the screw. A 32 mm locking screw was then inserted. Fluoroscopy was used to confirm the appropriate length and trajectory. The intramedullary nail handle was disconnected from the nail. Fluoroscopy was used to confirm implant positioning in AP and lateral planes. The wounds were irrigated with sterile saline prior to closure. Joint cocktail was used to infiltrate the surgical site. Closure:  0 Vicryl was used to reapproximate the TFL split proximally and distally using a figure of 8 fashion. 2-0 Vicryl was used to reapproximate the skin incision. 3-0 Monocryl was used in a subcuticular fashion. Skin glue and Steri-Strips were applied. Dressing/Splint:  2 Aquacel dressing were applied. Post Operative:  Patient was woken up from anesthesia and taken to PACU for further recovery and discharge home. Isidoro Posada MD   [facility]  [facility] [address] [facility], [address] [place] [address] org  [email or web address]  t: [phone]  f: [phone]

## 2023-09-28 PROBLEM — D64.9 ANEMIA: Status: ACTIVE | Noted: 2023-01-14

## 2023-09-28 LAB
ANION GAP SERPL CALC-SCNC: 5 MMOL/L (ref 0–18)
ANTIBODY SCREEN: NEGATIVE
BASOPHILS # BLD AUTO: 0.02 X10(3) UL (ref 0–0.2)
BASOPHILS NFR BLD AUTO: 0.3 %
BUN BLD-MCNC: 30 MG/DL (ref 7–18)
CALCIUM BLD-MCNC: 8.2 MG/DL (ref 8.5–10.1)
CHLORIDE SERPL-SCNC: 111 MMOL/L (ref 98–112)
CO2 SERPL-SCNC: 23 MMOL/L (ref 21–32)
CREAT BLD-MCNC: 0.83 MG/DL
EGFRCR SERPLBLD CKD-EPI 2021: 69 ML/MIN/1.73M2 (ref 60–?)
EOSINOPHIL # BLD AUTO: 0.22 X10(3) UL (ref 0–0.7)
EOSINOPHIL NFR BLD AUTO: 3.1 %
ERYTHROCYTE [DISTWIDTH] IN BLOOD BY AUTOMATED COUNT: 14.4 %
GLUCOSE BLD-MCNC: 97 MG/DL (ref 70–99)
HCT VFR BLD AUTO: 19.3 %
HGB BLD-MCNC: 6.2 G/DL
HGB BLD-MCNC: 7.7 G/DL
IMM GRANULOCYTES # BLD AUTO: 0.01 X10(3) UL (ref 0–1)
IMM GRANULOCYTES NFR BLD: 0.1 %
LYMPHOCYTES # BLD AUTO: 1.19 X10(3) UL (ref 1–4)
LYMPHOCYTES NFR BLD AUTO: 16.7 %
MAGNESIUM SERPL-MCNC: 1.9 MG/DL (ref 1.6–2.6)
MCH RBC QN AUTO: 29.1 PG (ref 26–34)
MCHC RBC AUTO-ENTMCNC: 32.1 G/DL (ref 31–37)
MCV RBC AUTO: 90.6 FL
MONOCYTES # BLD AUTO: 1.11 X10(3) UL (ref 0.1–1)
MONOCYTES NFR BLD AUTO: 15.6 %
NEUTROPHILS # BLD AUTO: 4.56 X10 (3) UL (ref 1.5–7.7)
NEUTROPHILS # BLD AUTO: 4.56 X10(3) UL (ref 1.5–7.7)
NEUTROPHILS NFR BLD AUTO: 64.2 %
OSMOLALITY SERPL CALC.SUM OF ELEC: 294 MOSM/KG (ref 275–295)
PLATELET # BLD AUTO: 149 10(3)UL (ref 150–450)
PLATELET # BLD AUTO: 149 10(3)UL (ref 150–450)
POTASSIUM SERPL-SCNC: 4.1 MMOL/L (ref 3.5–5.1)
RBC # BLD AUTO: 2.13 X10(6)UL
RH BLOOD TYPE: POSITIVE
SODIUM SERPL-SCNC: 139 MMOL/L (ref 136–145)
WBC # BLD AUTO: 7.1 X10(3) UL (ref 4–11)

## 2023-09-28 PROCEDURE — 99232 SBSQ HOSP IP/OBS MODERATE 35: CPT | Performed by: HOSPITALIST

## 2023-09-28 PROCEDURE — 30233N1 TRANSFUSION OF NONAUTOLOGOUS RED BLOOD CELLS INTO PERIPHERAL VEIN, PERCUTANEOUS APPROACH: ICD-10-PCS | Performed by: HOSPITALIST

## 2023-09-28 RX ORDER — SODIUM CHLORIDE 9 MG/ML
INJECTION, SOLUTION INTRAVENOUS ONCE
Status: COMPLETED | OUTPATIENT
Start: 2023-09-28 | End: 2023-09-28

## 2023-09-28 NOTE — PLAN OF CARE
Patient A & O x3 forgetful at times. Denies pain at this time. Dressing is clean dry and intact. IVF infusing per order. Bladder scan done at 2030 =218ml, will continue to monitor throughout the night. WBAT. Safety measures in place. Instructed to use call light.

## 2023-09-28 NOTE — CM/SW NOTE
Plan for DC to [facility]'s for LACHELLE at discharge. Confirmed with [facility]'s that insurance Vivienne Guillory is pending. Await auth and medical clearance for DC. / to remain available for support and/or discharge planning.      [address]  [facility]  M:[phone]  B:[phone]    [place] [facility]  Discharge Planner  [phone]

## 2023-09-28 NOTE — PLAN OF CARE
Patient alert and oriented x3, forgetful at times. VSS on RA. Pain controlled with PO medication. Denies n/t. X2 Aquacel dressing to left hip, CDI. SCDs in place, ankle pumps encouraged, IS encouraged. Pt up max assist. Tolerating diet, no c/o n/v. Patient DTV again at 1300. Purewick in place. Plan: monitor blood pressures, discharge to [address] [facility] pending insurance auth     Update: patients hgb at 1351 was 6.2. 1 unit of RBCs ordered, consent signed, blood transfused. Post transfusion hgb redraw for 2000, endorsed to night shift RN. Bladder Scan at 1328 was 313ml. Bladder Scan at 1754 was 407ml. Patient sat on commode to attempt to void, unable to void. Straight cath performed at 1830 was 400ml.

## 2023-09-29 PROBLEM — R33.9 URINARY RETENTION: Status: ACTIVE | Noted: 2023-09-29

## 2023-09-29 LAB
ANION GAP SERPL CALC-SCNC: 4 MMOL/L (ref 0–18)
BLOOD TYPE BARCODE: 5100
BUN BLD-MCNC: 20 MG/DL (ref 7–18)
CALCIUM BLD-MCNC: 8.4 MG/DL (ref 8.5–10.1)
CHLORIDE SERPL-SCNC: 112 MMOL/L (ref 98–112)
CO2 SERPL-SCNC: 24 MMOL/L (ref 21–32)
CREAT BLD-MCNC: 0.67 MG/DL
EGFRCR SERPLBLD CKD-EPI 2021: 85 ML/MIN/1.73M2 (ref 60–?)
GLUCOSE BLD-MCNC: 110 MG/DL (ref 70–99)
HGB BLD-MCNC: 7.2 G/DL
OSMOLALITY SERPL CALC.SUM OF ELEC: 293 MOSM/KG (ref 275–295)
POTASSIUM SERPL-SCNC: 3.9 MMOL/L (ref 3.5–5.1)
SODIUM SERPL-SCNC: 140 MMOL/L (ref 136–145)
UNIT VOLUME: 350 ML

## 2023-09-29 PROCEDURE — 99232 SBSQ HOSP IP/OBS MODERATE 35: CPT | Performed by: HOSPITALIST

## 2023-09-29 PROCEDURE — 99221 1ST HOSP IP/OBS SF/LOW 40: CPT | Performed by: PHYSICIAN ASSISTANT

## 2023-09-29 RX ORDER — LOSARTAN POTASSIUM 100 MG/1
100 TABLET ORAL DAILY
Status: ON HOLD | COMMUNITY
Start: 2023-09-30

## 2023-09-29 RX ORDER — ENOXAPARIN SODIUM 100 MG/ML
40 INJECTION SUBCUTANEOUS DAILY
Qty: 8 ML | Refills: 0 | Status: SHIPPED | OUTPATIENT
Start: 2023-09-30 | End: 2023-10-01

## 2023-09-29 RX ORDER — ASPIRIN 81 MG/1
81 TABLET ORAL DAILY
Status: SHIPPED | COMMUNITY
Start: 2023-10-20 | End: 2023-10-01

## 2023-09-29 RX ORDER — CARVEDILOL 12.5 MG/1
12.5 TABLET ORAL 2 TIMES DAILY WITH MEALS
Status: DISCONTINUED | OUTPATIENT
Start: 2023-09-29 | End: 2023-10-01

## 2023-09-29 RX ORDER — LOSARTAN POTASSIUM 100 MG/1
100 TABLET ORAL DAILY
Status: DISCONTINUED | OUTPATIENT
Start: 2023-09-29 | End: 2023-10-01

## 2023-09-29 NOTE — CM/SW NOTE
Informed by [facility] that they have received insurance auth and can accept pt for admission today. Updated RN. Urology consult pending. Await medical clearance for DC. / to remain available for support and/or discharge planning.      [address]  W:[phone]  P:[phone]    Rebel Martinez [facility]  Discharge Planner  [phone]

## 2023-09-29 NOTE — PLAN OF CARE
Patient A & O x2-3 forgetful at times. Denies pain at this time. Dressing is clean dry and intact. IVF infusing per order. WBAT. Safety measures in place. Instructed to use call light. 0200: Straight cath performed, 850ml out.    0300: Patient had short burst of SVT, SBP running in 170s, IM MD notified.

## 2023-09-29 NOTE — PHYSICAL THERAPY NOTE
PHYSICAL THERAPY TREATMENT NOTE - INPATIENT    Room Number: 373/373-A     Session: 1     Number of Visits to Meet Established Goals: 5    Presenting Problem: closed fracture of L hip  Co-Morbidities : dementia, anxiety, cancern, arthritis, stroke, HTN, mastectomy  ASSESSMENT       RN reported positive for orthostatics - added ace wraps to BLE's, and cleared for re-attempt of EOB sitting/orthostatics/transfer to chair. Pt seen this PM for bed mobility, transfers, and BLE strengthening. Pt with improved sit<>stand mechanics this session, when compared to PT Eval.  Recommend use of sit<>stand lift or two person SPT to surface (R side)  At this time, Pt. presents with decreased balance, impaired strength, difficulty with gait/transfers resulting in downgrade of overall functional mobility. Due to above deficits, Pt will benefit from continued IP PT, so that patient may achieve highest functional independence/return to baseline. DISCHARGE RECOMMENDATIONS  PT Discharge Recommendations: Sub-acute rehabilitation     PLAN  PT Treatment Plan: Bed mobility; Body mechanics; Endurance; Energy conservation;Patient education; Family education;Gait training;Range of motion;Strengthening;Stoop training;Stair training;Transfer training;Balance training  Rehab Potential : Good  Frequency (Obs): 3-5x/week    CURRENT GOALS     Goal #1 Patient is able to demonstrate supine - sit EOB @ level: minimum assistance      Goal #2 Patient is able to demonstrate transfers Sit to/from Stand at assistance level: supervision      Goal #3 Patient is able to ambulate 50 feet with assist device: walker - rolling at assistance level: minimum assistance      Goal #4 Consider stair goal if/when appropriate   Goal #5     Goal #6     Goal Comments: Goals established on 9/27/2023 9/29/2023 all goals ongoing       SUBJECTIVE  \"Wow you must workout\"    OBJECTIVE  Precautions: Limb alert - right;Bed/chair alarm    WEIGHT BEARING RESTRICTION  Weight Bearing Restriction: L lower extremity           L Lower Extremity: Weight Bearing as Tolerated    PAIN ASSESSMENT   Rating: Unable to rate  Location: L hip  Management Techniques: Activity promotion; Body mechanics; Relaxation;Repositioning;Breathing techniques    BALANCE                                                                                                                       Static Sitting: Good  Dynamic Sitting: Fair +           Static Standing: Poor +  Dynamic Standing: Poor +    ACTIVITY TOLERANCE                         O2 WALK         AM-PAC '6-Clicks' INPATIENT SHORT FORM - BASIC MOBILITY  How much difficulty does the patient currently have. .. Patient Difficulty: Turning over in bed (including adjusting bedclothes, sheets and blankets)?: A Lot   Patient Difficulty: Sitting down on and standing up from a chair with arms (e.g., wheelchair, bedside commode, etc.): A Little   Patient Difficulty: Moving from lying on back to sitting on the side of the bed?: A Lot   How much help from another person does the patient currently need. .. Help from Another: Moving to and from a bed to a chair (including a wheelchair)?: A Little   Help from Another: Need to walk in hospital room?: Total   Help from Another: Climbing 3-5 steps with a railing?: Total       AM-PAC Score:  Raw Score: 12   Approx Degree of Impairment: 68.66%   Standardized Score (AM-PAC Scale): 35.33   CMS Modifier (G-Code): CL    FUNCTIONAL ABILITY STATUS  Gait Assessment   Functional Mobility/Gait Assessment  Gait Assistance: Not tested  Distance (ft): 0    Skilled Therapy Provided    Bed Mobility:  Rolling: Mod A   Supine<>Sit: Max A   Sit<>Supine: NT     Transfer Mobility:  Sit<>Stand: Max A   Stand<>Sit: Max A   Gait: NT    Therapist's Comments: Pt denied dizziness and reported 0/10 pain. Pt's son witnessed session.      Orthostatics:   Supine = 106/60  Seated = 93/61  Standing =105/66 (from recliner chair - holding onto bed railing)      THERAPEUTIC EXERCISES  Lower Extremity Ankle pumps  Knee extension     Upper Extremity none     Position Sitting     Repetitions   10   Sets   1     Patient End of Session: Up in chair;Call light within reach; Needs met;RN aware of session/findings; All patient questions and concerns addressed; Alarm set; Family present    PT Session Time: 23 minutes  Gait Trainin minutes  Therapeutic Activity: 15 minutes  Therapeutic Exercise: 0 minutes   Neuromuscular Re-education: 8 minutes

## 2023-09-29 NOTE — PROGRESS NOTES
[facility] Progress Note    Subjective: Patient has been unable to participate in therapy secondary to orthostatic hypotension. Objective:     Alert Oriented to place, person  Dressing clean dry and intact. Dorsiflexion/plantarflexion intact. Swelling of left thigh    Labs:   Lab Results   Component Value Date    HGB 7.2 09/29/2023    CREATSERUM 0.67 09/29/2023    BUN 20 09/29/2023     09/29/2023    K 3.9 09/29/2023     09/29/2023    CO2 24.0 09/29/2023     09/29/2023    CA 8.4 09/29/2023           Assessment/Plan: 82year old female s/p CMN for L ITFx on 9/26/23. Continue to monitor hemoglobin and transfuse as needed.     Activity: WBAT  Anticoagulation: Lovenox for 3 weeks, ASA 81 BID for subsequent 3 weeks  Analgesia: continue current pain protocol  Antibiotics: periop abx complete  Disposition: Possibly tomorrow depending on hemoglobin stability      Mario Garcia MD  [address] [facility]

## 2023-09-29 NOTE — CONSULTS
[facility]    Report of Consultation    Fe Sal Patient Status:  Inpatient    1935 MRN [id number]   [facility] 3SW-A Attending Eliana De La O MD   Hosp Day # 3 PCP Bibi Keith MD     Date of Admission:  2023  Date of Consult:  2023    Reason for Consultation:  Urinary retention    History of Present Illness:  Josy Colvin is a a(n) 82year old female with hx of dementia, breast cancer, HTN, HL, CVA, who presented to the ED following unwitnessed fall. Imaging left intertrochanteric fracture. She was admitted and underwent Cephalomedullary nailing of left intertrochanteric fracture 23. Post operatively required multiple straight catheterizations for volumes 400-850mL. Sal catheter was placed last night without difficulty. Urology consulted. Patient currently resting in bed. Son at bedside provides interval history. Previously seen by urology services for nocturia. No evidence of retention at that time with post void residuals being scant. She was started on desmopressin low dose with improvement of her nocturia from x 5+ to x1. Overall had been doing well from  perspective. UA at time of presentation was negative. History:  Past Medical History:   Diagnosis Date    Anxiety     Arthritis     knees and shoulders    Breast CA ([address]     right breast cancer with mastectomy.     Cancer [facility])     right breast ca    Cystitis, interstitial     history of----    Flatulence/gas pain/belching     Frequent UTI     H/O mammogram 2011    left breast    H/O mammogram 2015    benign    H/O pregnancy [id number]        HIGH BLOOD PRESSURE     HIGH CHOLESTEROL     History of endoscopy 2013    Dr. Nury Hernandez    Loss of appetite     Nausea     Other and unspecified hyperlipidemia     Other and unspecified personal history of malignant neoplasm     dr. Edd Briones, ill  breast cancer right    Pap smear for cervical cancer screening 2003 wnl    Renal cyst, right 07/15/2015    Refer to Dr. Shaffer/urology. Rheumatic fever     Stroke [facility])     Unspecified essential hypertension      Past Surgical History:   Procedure Laterality Date    COLONOSCOPY  2/02 4/08 2008 Dr. Niki Juan, repeat in five years, family history of colon cancer, Diverticulosis    COLONOSCOPY  08/21/2013    Dr. Amado Osborne    partial hystero. MASTECTOMY RIGHT Right 2001    right breast cancer with mastectomy. 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    SPECIAL SERVICE OR REPORT  2001    mastectomy right    UNSPECIFIED DIAGNOSTIC PROCE  2005    cystoscopy     Family History   Problem Relation Age of Onset    Heart Disorder Father         chf    Cancer Mother         colon    Stroke Mother     Colon Cancer Mother     Breast Cancer Self 63    Hypertension Brother       reports that she has never smoked. She has never been exposed to tobacco smoke. She has never used smokeless tobacco. She reports that she does not drink alcohol and does not use drugs.     Allergies:    Bactrim [Sulfametho*    NAUSEA ONLY  Nitrofurantoin          NAUSEA ONLY    Medications:    Current Facility-Administered Medications:     carvedilol (Coreg) tab 12.5 mg, 12.5 mg, Oral, BID with meals    losartan (Cozaar) tab 100 mg, 100 mg, Oral, Daily    acetaminophen (Tylenol) tab 650 mg, 650 mg, Oral, Q6H PRN    escitalopram (Lexapro) tab 20 mg, 20 mg, Oral, Daily    hydrALAZINE (Apresoline) tab 10 mg, 10 mg, Oral, BID    traZODone (Desyrel) tab 50 mg, 50 mg, Oral, Nightly PRN    acetaminophen (Tylenol Extra Strength) tab 500 mg, 500 mg, Oral, Q4H PRN    atorvastatin (Lipitor) tab 10 mg, 10 mg, Oral, Nightly    acetaminophen (Tylenol Extra Strength) tab 1,000 mg, 1,000 mg, Oral, Q8H PRN    melatonin tab 3 mg, 3 mg, Oral, Nightly PRN    morphINE PF 2 MG/ML injection 1 mg, 1 mg, Intravenous, Q2H PRN **OR** morphINE PF 2 MG/ML injection 2 mg, 2 mg, Intravenous, Q2H PRN **OR** morphINE PF 4 MG/ML injection 4 mg, 4 mg, Intravenous, Q2H PRN    polyethylene glycol (PEG 3350) (Miralax) 17 g oral packet 17 g, 17 g, Oral, Daily PRN    sennosides (Senokot) tab 17.2 mg, 17.2 mg, Oral, Nightly PRN    bisacodyl (Dulcolax) 10 MG rectal suppository 10 mg, 10 mg, Rectal, Daily PRN    fleet enema (Fleet) 7-19 GM/118ML rectal enema 133 mL, 1 enema, Rectal, Once PRN    ondansetron (Zofran) 4 MG/2ML injection 4 mg, 4 mg, Intravenous, Q6H PRN    metoclopramide (Reglan) 5 mg/mL injection 5 mg, 5 mg, Intravenous, Q8H PRN    sennosides (Senokot) tab 17.2 mg, 17.2 mg, Oral, Nightly    docusate sodium (Colace) cap 100 mg, 100 mg, Oral, BID    bisacodyl (Dulcolax) 10 MG rectal suppository 10 mg, 10 mg, Rectal, Daily PRN    ondansetron (Zofran) 4 MG/2ML injection 4 mg, 4 mg, Intravenous, Q6H PRN    multivitamin (Tab-A-Fidelia/Beta Carotene) tab 1 tablet, 1 tablet, Oral, Daily    enoxaparin (Lovenox) 40 MG/0.4ML SUBQ injection 40 mg, 40 mg, Subcutaneous, Daily    acetaminophen (Tylenol Extra Strength) tab 1,000 mg, 1,000 mg, Oral, Q8H MONIKA    oxyCODONE immediate release tab 2.5 mg, 2.5 mg, Oral, Q4H PRN **OR** oxyCODONE immediate release tab 5 mg, 5 mg, Oral, Q4H PRN    HYDROmorphone (Dilaudid) 1 MG/ML injection 0.2 mg, 0.2 mg, Intravenous, Q2H PRN **OR** HYDROmorphone (Dilaudid) 1 MG/ML injection 0.4 mg, 0.4 mg, Intravenous, Q2H PRN    HYDROmorphone (Dilaudid) 1 MG/ML injection 0.5 mg, 0.5 mg, Intravenous, Q30 Min PRN    lidocaine PF (Xylocaine-MPF) 1% injection, 2 mL, Injection, PRN    ropivacaine (Naropin) 0.5% injection, 30 mL, Injection, PRN    EPINEPHrine (Adrenalin) 1 MG/ML injection (Allergic Reaction Kit) 1 mg, 1 mg, Injection, PRN    sodium chloride 0.9% PF injection 10 mL, 10 mL, Injection, PRN    Review of Systems:  Review of systems not obtained due to patient factors.     Physical Exam:  Sleeping restfully  Non labored respirations  Sal in place draining clear yellow urine    Laboratory Data:  Lab Results   Component Value Date    HGB 7.2 09/29/2023    CREATSERUM 0.67 09/29/2023    BUN 20 09/29/2023     09/29/2023    K 3.9 09/29/2023     09/29/2023    CO2 24.0 09/29/2023     09/29/2023    CA 8.4 09/29/2023       Urinalysis Results (last three years):  Recent Labs     12/07/20  1154 10/30/21  1057 05/05/22  1723 01/20/23  1520 03/02/23  1607 04/30/23  1359 05/13/23  1456 09/26/23  0441   COLORUR  --   --   --  Yellow  --   --   --  Light-Yellow   CLARITY  --   --   --  Slightly Cloudy*  --   --   --  Clear   SPECGRAVITY 1.010 1.010 1.015 1.025 1.030 1.020 1.030 1.017   PHURINE 6.5 7.0 7.5 6.5 5.5  --   --  7.0   PROUR  --   --   --  30 mg/dL*  --   --   --  Negative   GLUUR  --   --   --  Negative  --  Negative Negative Normal   KETUR  --   --   --  Negative  --   --   --  Negative   BILUR  --   --   --  Negative  --   --   --  Negative   BLOODURINE  --   --   --  Small*  --   --   --  Negative   NITRITE neg Negative Negative Positive* Negative  --   --  Negative   UROBILINOGEN  --   --   --  0.2  --   --   --  Normal   LEUUR  --   --   --  Large*  --   --   --  Negative   WBCUR  --   --   --  >50*  >50*  --   --   --   --    RBCUR  --   --   --  >10*  >10*  --   --   --   --    BACUR  --   --   --  Rare*  Rare*  --   --   --   --        Urine Culture Results (last three years):  Lab Results   Component Value Date    URINECUL >100,000 CFU/ML Escherichia coli (A) 05/13/2023    URINECUL 10,000 - 50,000 CFU/ML Escherichia coli (A) 04/30/2023    URINECUL 50,000-99,000 CFU/ML Escherichia coli (A) 01/20/2023    URINECUL >100,000 CFU/ML Escherichia coli (A) 05/05/2022    URINECUL No Growth 2 Days 10/30/2021    URINECUL No Growth at 18-24 hrs. 12/07/2020    URINECUL 10,000 - 50,000 CFU/ML Non hemolytic Streptococcus (A) 09/08/2020    URINECUL No Growth at 18-24 hrs.  08/05/2020    URINECUL No Growth at 18-24 hrs. 06/21/2018    URINECUL No Growth at 18-24 hrs. 08/11/2017    URINECUL 25,000 CFU/ML Mixed gram positive ambar 12/27/2016    URINECUL 40,000 CFU/ML Escherichia coli (A) 12/22/2016    URINECUL 80,000 CFU/ML Escherichia coli (A) 10/23/2016    URINECUL <10,000 CFU/ML Mixed Ambar 09/30/2015    URINECUL No Growth 1 Day 09/14/2015       Imaging  XR FLUOROSCOPY C-ARM TIME LESS THAN 1 HOUR (CPT=76000)    Result Date: 9/26/2023  PROCEDURE:  XR FLUOROSCOPY C-ARM TIME <1 HOUR  (CPT=76000)  INDICATIONS:  Left proximal femoral fracture. COMPARISON:  None. TECHNIQUE:  Intraoperative fluoroscopic guidance  FLUOROSCOPY IMAGES OBTAINED:  13 FLUOROSCOPY TIME:  1 minute and 3 seconds TECHNOLOGIST TIME:  1 hour RADIATION DOSE (AIR KERMA PRODUCT):  5.1013mGy   FINDINGS:  Intertrochanteric fracture in the proximal left femur is noted status post ORIF            CONCLUSION:  Intraoperative guidance during ORIF of the proximal left femur. LOCATION:  [id number]    Dictated by (CST): Moshe Clement MD on 9/26/2023 at 8:04 PM     Finalized by (CST): Moshe Clement MD on 9/26/2023 at 8:04 PM       XR HIP W OR WO PELVIS 2 OR 3 VIEWS, LEFT (CPT=73502)    Result Date: 9/25/2023  PROCEDURE:  XR HIP W OR WO PELVIS 2 OR 3 VIEWS, LEFT (CPT=73502)  TECHNIQUE:  Unilateral 2 to 3 views of the hip and pelvis if performed. COMPARISON:  None. INDICATIONS:  Patient had fall from standing height., Unknown if there was an LOC. Hypertensive  PATIENT STATED HISTORY: (As transcribed by Technologist)  Patient was at home and had unwitnessed fall. Patient also has deformity to left hip along with pain with movement. FINDINGS:  BONES:  Left intratrochanteric comminuted fracture with impaction and displacement by at least 2 cm. There is no dislocation. Degenerative change involves the hips bilaterally as well as visualized lower lumbar sacral spine. CONCLUSION:  1. Comminuted impacted displaced left intratrochanteric fracture as detailed above.    LOCATION:  [facility]   Dictated by (CST): Javier Chaidez MD on 9/25/2023 at 10:04 PM     Finalized by (CST): MINA MD Chrissy on 9/25/2023 at 10:05 PM       XR CHEST AP PORTABLE  (CPT=71045)    Result Date: 9/25/2023  PROCEDURE:  XR CHEST AP PORTABLE  (CPT=71045)  TECHNIQUE:  AP chest radiograph was obtained. COMPARISON:  [facility] , XR, CHEST PA   LATERAL, 11/22/2010, 3:59 PM.  INDICATIONS:  Patient had fall from standing height., Unknown if there was an LOC. Hypertensive  PATIENT STATED HISTORY: (As transcribed by Technologist)  Patient was at home and had unwitnessed fall. Patient also has deformity to left hip along with pain with movement. FINDINGS:  Suboptimal examination secondary to rotation. Enlarged cardiac silhouette. Mild left basilar atelectasis. Minimal pulmonary venous congestion. No significant pleural fluid or pneumothorax. Advanced degenerative changes in the left shoulder. Interstitial opacities are noted. CONCLUSION:  Mild left basilar atelectasis with minimal pulmonary venous congestion. Interstitial opacities may represent fibrotic changes. LOCATION:  [id number]      Dictated by (CST): Joseph Hernadez MD on 9/25/2023 at 10:00 PM     Finalized by (CST): Joseph Hernadez MD on 9/25/2023 at 10:02 PM       XR FEMUR MIN 2 VIEWS LEFT (CPT=73552)    Result Date: 9/25/2023  PROCEDURE:  XR FEMUR MIN 2 VIEWS LEFT (CPT=73552)  TECHNIQUE:  AP and lateral views were obtained. COMPARISON:  None. INDICATIONS:  Patient had fall from standing height., Unknown if there was an LOC. Hypertensive  PATIENT STATED HISTORY: (As transcribed by Technologist)  Patient was at home and had unwitnessed fall. Patient also has deformity to left hip along with pain with movement. FINDINGS:  Acute moderately displaced fracture through the left intertrochanteric region. There is medial angulation of the distal fracture fragment. No hip dislocation. Nonspecific linear mineralization at the proximal posterior left thigh. CONCLUSION:  See above.    LOCATION:  [id number]   Dictated by (CST): Joseph Hernadez MD on 9/25/2023 at 9:58 PM     Finalized by (CST): Arsenio Rojo MD on 9/25/2023 at 10:00 PM             Impression:  Patient Active Problem List:     Essential hypertension     High cholesterol     Shoulder arthritis     Anxiety     Nephrolithiasis     Fatty infiltration of liver     H/O right mastectomy     Chronic fatigue     Anxiety and depression     Chronic right shoulder pain     CKD (chronic kidney disease) stage 3, GFR 30-59 ml/min (HCC)     Primary hyperparathyroidism ([address]     Memory changes     H/O: stroke with residual effects     Anemia     Other insomnia     Balance problem     Physical deconditioning     Closed fracture of left hip, initial encounter [facility])      82year old female with hx of dementia, breast cancer, HTN, HL, CVA, who presented to the ED following unwitnessed fall. Imaging left intertrochanteric fracture. She was admitted and underwent Cephalomedullary nailing of left intertrochanteric fracture 9/26/23. Post operatively required multiple straight catheterizations for volumes 400-850mL. Rome catheter was placed last night without difficulty. Urology consulted. Patient currently resting in bed. Son at bedside provides interval history. Previously seen by urology services for nocturia. No evidence of retention at that time with post void residuals being scant. She was started on desmopressin low dose with improvement of her nocturia from x 5+ to x1. Overall had been doing well from  perspective. UA at time of presentation was negative. Reviewed with son current urinary retention likely secondary to anesthesia, constipation, narcotic pain medication, and decreased mobility. Recommend continuation of rome catheter for additional 5-7 days. Encouraged bowel regimen to avoid constipation. Recommendations:  As above. Rome for additional 5-7 days. May have voiding trial at facility or with  group if desired. We will sign off. Okay to d'c with rome.   Reviewed with son and nursing staff. Thank you for allowing me to participate in the care of your patient.     Millie Portillo PA-C  [facility]  9/29/2023  11:48 AM

## 2023-09-30 LAB
BASOPHILS # BLD AUTO: 0.06 X10(3) UL (ref 0–0.2)
BASOPHILS NFR BLD AUTO: 0.7 %
EOSINOPHIL # BLD AUTO: 0.2 X10(3) UL (ref 0–0.7)
EOSINOPHIL NFR BLD AUTO: 2.3 %
ERYTHROCYTE [DISTWIDTH] IN BLOOD BY AUTOMATED COUNT: 14.9 %
HCT VFR BLD AUTO: 20 %
HGB BLD-MCNC: 6.8 G/DL
HGB BLD-MCNC: 8.1 G/DL
IMM GRANULOCYTES # BLD AUTO: 0.06 X10(3) UL (ref 0–1)
IMM GRANULOCYTES NFR BLD: 0.7 %
LYMPHOCYTES # BLD AUTO: 1.67 X10(3) UL (ref 1–4)
LYMPHOCYTES NFR BLD AUTO: 19 %
MCH RBC QN AUTO: 29.4 PG (ref 26–34)
MCHC RBC AUTO-ENTMCNC: 34 G/DL (ref 31–37)
MCV RBC AUTO: 86.6 FL
MONOCYTES # BLD AUTO: 1.37 X10(3) UL (ref 0.1–1)
MONOCYTES NFR BLD AUTO: 15.6 %
NEUTROPHILS # BLD AUTO: 5.45 X10 (3) UL (ref 1.5–7.7)
NEUTROPHILS # BLD AUTO: 5.45 X10(3) UL (ref 1.5–7.7)
NEUTROPHILS NFR BLD AUTO: 61.7 %
PLATELET # BLD AUTO: 190 10(3)UL (ref 150–450)
RBC # BLD AUTO: 2.31 X10(6)UL
WBC # BLD AUTO: 8.8 X10(3) UL (ref 4–11)

## 2023-09-30 PROCEDURE — 99232 SBSQ HOSP IP/OBS MODERATE 35: CPT | Performed by: HOSPITALIST

## 2023-09-30 RX ORDER — SODIUM CHLORIDE 9 MG/ML
INJECTION, SOLUTION INTRAVENOUS ONCE
Status: COMPLETED | OUTPATIENT
Start: 2023-09-30 | End: 2023-09-30

## 2023-09-30 RX ORDER — ASPIRIN 81 MG/1
81 TABLET, CHEWABLE ORAL DAILY
Status: DISCONTINUED | OUTPATIENT
Start: 2023-09-30 | End: 2023-10-01

## 2023-09-30 NOTE — PLAN OF CARE
Patient is alert and oriented x3 this morning. VSS on RA Mild pain at rest. No complaint of numbness or tingling. Pulses bilateral +2. Plantar and dorsi flexions moderate to LLE. Aquacell dressing x2 to Left Hip, CDI no blood noted, some bruising noted under dressing, CDI. Patient hgb this AM 6.8, hospitalist notified, blood to be started. Ortho MD contacted to change blood thinners if appropriate. Sal in place, will remain in place while patient is at rehab. Moderate assist when ambulating. IS and ankle pumps encouraged. Belongings within reach. Encouraged to call for any needs. 1130: Transfusion of RBCs started, patient stable at this time. 1600: Repeat Hgb following transfusion was 8.1, MD notified.

## 2023-09-30 NOTE — CM/SW NOTE
CM updated by RN patient is not medically cleared for discharge. CM sent clinical updates and updated [facility] residence in [address] [place] [address]. CM/SW to follow up tomorrow for patient status.      Sarah Bro RN Case Manager [id number]

## 2023-09-30 NOTE — PLAN OF CARE
Received patient awake sitting in reclining chair. Daughter at bedside. Daughter said she needs Trazodone at night otherwise she wont sleep just like the other night. On room air, clear lungs. NSR on tele. Sal for retention. Discharge planning to [facility]. Ace wrap placed on legs before getting up to prevent dizziness. Denies any pain. Problem: Patient/Family Goals  Goal: Patient/Family Long Term Goal  Description: Patient's Long Term Goal: To have a full recovery and have no complications, able to go home with family. Interventions:  - home safety  - pain management  - PT/OT  - incision care  - follow-up appointments  - See additional Care Plan goals for specific interventions  Outcome: Progressing  Goal: Patient/Family Short Term Goal  Description: Patient's Short Term Goal: To be able to move around after surgery.     Interventions:   - pain control  - BR   - IVF  - oxygen  - mon BP  - See additional Care Plan goals for specific interventions  Outcome: Progressing     Problem: PAIN - ADULT  Goal: Verbalizes/displays adequate comfort level or patient's stated pain goal  Description: INTERVENTIONS:  - Encourage pt to monitor pain and request assistance  - Assess pain using appropriate pain scale  - Administer analgesics based on type and severity of pain and evaluate response  - Implement non-pharmacological measures as appropriate and evaluate response  - Consider cultural and social influences on pain and pain management  - Manage/alleviate anxiety  - Utilize distraction and/or relaxation techniques  - Monitor for opioid side effects  - Notify MD/LIP if interventions unsuccessful or patient reports new pain  - Anticipate increased pain with activity and pre-medicate as appropriate  Outcome: Progressing     Problem: SAFETY ADULT - FALL  Goal: Free from fall injury  Description: INTERVENTIONS:  - Assess pt frequently for physical needs  - Identify cognitive and physical deficits and behaviors that affect risk of falls.   - [place] fall precautions as indicated by assessment.  - Educate pt/family on patient safety including physical limitations  - Instruct pt to call for assistance with activity based on assessment  - Modify environment to reduce risk of injury  - Provide assistive devices as appropriate  - Consider OT/PT consult to assist with strengthening/mobility  - Encourage toileting schedule  Outcome: Progressing

## 2023-10-01 VITALS
TEMPERATURE: 99 F | BODY MASS INDEX: 23 KG/M2 | OXYGEN SATURATION: 95 % | RESPIRATION RATE: 26 BRPM | WEIGHT: 126 LBS | SYSTOLIC BLOOD PRESSURE: 158 MMHG | HEART RATE: 72 BPM | DIASTOLIC BLOOD PRESSURE: 71 MMHG

## 2023-10-01 LAB
ANION GAP SERPL CALC-SCNC: 3 MMOL/L (ref 0–18)
BASOPHILS # BLD AUTO: 0.06 X10(3) UL (ref 0–0.2)
BASOPHILS NFR BLD AUTO: 0.8 %
BLOOD TYPE BARCODE: 5100
BUN BLD-MCNC: 20 MG/DL (ref 7–18)
C DIFF TOX B STL QL: NEGATIVE
CALCIUM BLD-MCNC: 8.4 MG/DL (ref 8.5–10.1)
CHLORIDE SERPL-SCNC: 111 MMOL/L (ref 98–112)
CO2 SERPL-SCNC: 26 MMOL/L (ref 21–32)
CREAT BLD-MCNC: 0.63 MG/DL
EGFRCR SERPLBLD CKD-EPI 2021: 86 ML/MIN/1.73M2 (ref 60–?)
EOSINOPHIL # BLD AUTO: 0.28 X10(3) UL (ref 0–0.7)
EOSINOPHIL NFR BLD AUTO: 3.6 %
ERYTHROCYTE [DISTWIDTH] IN BLOOD BY AUTOMATED COUNT: 15.3 %
GLUCOSE BLD-MCNC: 95 MG/DL (ref 70–99)
HCT VFR BLD AUTO: 22.1 %
HGB BLD-MCNC: 7.5 G/DL
HGB BLD-MCNC: 8.5 G/DL
IMM GRANULOCYTES # BLD AUTO: 0.07 X10(3) UL (ref 0–1)
IMM GRANULOCYTES NFR BLD: 0.9 %
LYMPHOCYTES # BLD AUTO: 1.34 X10(3) UL (ref 1–4)
LYMPHOCYTES NFR BLD AUTO: 17.3 %
MCH RBC QN AUTO: 30.4 PG (ref 26–34)
MCHC RBC AUTO-ENTMCNC: 33.9 G/DL (ref 31–37)
MCV RBC AUTO: 89.5 FL
MONOCYTES # BLD AUTO: 1.13 X10(3) UL (ref 0.1–1)
MONOCYTES NFR BLD AUTO: 14.6 %
NEUTROPHILS # BLD AUTO: 4.88 X10 (3) UL (ref 1.5–7.7)
NEUTROPHILS # BLD AUTO: 4.88 X10(3) UL (ref 1.5–7.7)
NEUTROPHILS NFR BLD AUTO: 62.8 %
OSMOLALITY SERPL CALC.SUM OF ELEC: 292 MOSM/KG (ref 275–295)
PLATELET # BLD AUTO: 193 10(3)UL (ref 150–450)
POTASSIUM SERPL-SCNC: 4.1 MMOL/L (ref 3.5–5.1)
RBC # BLD AUTO: 2.47 X10(6)UL
SODIUM SERPL-SCNC: 140 MMOL/L (ref 136–145)
UNIT VOLUME: 350 ML
WBC # BLD AUTO: 7.8 X10(3) UL (ref 4–11)

## 2023-10-01 PROCEDURE — 99239 HOSP IP/OBS DSCHRG MGMT >30: CPT | Performed by: HOSPITALIST

## 2023-10-01 RX ORDER — ASPIRIN 81 MG/1
81 TABLET ORAL 2 TIMES DAILY
Status: SHIPPED | COMMUNITY
Start: 2023-10-01 | End: 2023-10-03

## 2023-10-01 RX ORDER — ASPIRIN 81 MG/1
81 TABLET, CHEWABLE ORAL 2 TIMES DAILY
Status: DISCONTINUED | OUTPATIENT
Start: 2023-10-01 | End: 2023-10-01

## 2023-10-01 NOTE — PROGRESS NOTES
NURSING DISCHARGE NOTE    Discharged Rehab facility via Ambulance. Accompanied by Family member  Belongings Taken by patient/family.

## 2023-10-01 NOTE — PROGRESS NOTES
Report called to [address] [place] [address], call back number given for any questions, Medication list faxed.

## 2023-10-01 NOTE — PLAN OF CARE
Patient is alert and oriented x3 this morning. Denies pain at rest. No complaint of numbness or tingling. Pulses bilateral +2. Plantar and dorsi flexions weak to LLE, some pain when performing. Aquacel dressings x2 to hip are both CDI. Some bruising noted to the hip with swelling. Vital signs in normal range. Patient ambulates max assist x2. IS and ankle pumps encouraged. Belongings within reach. Encouraged to call for any needs.

## 2023-10-01 NOTE — PLAN OF CARE
A&Ox 3, forgetful at times. VSS. On room air. . IS encouraged. Telemetry monitoring. SCDs on BLE. Ankle pumps encouraged. Tolerating diet. Last BM 9/30. Sal intact, draining. Denies numbness and tingling to lower extremities. Pain controlled with PO meds. Dressing to left hip C/D/I. AM labs pending. Discharge to [facility] when medically cleared. Patient updated on plan of care. Safety precautions maintained. Call light within reach.

## 2023-10-01 NOTE — CM/SW NOTE
10/01/23 1500   Discharge disposition   Expected discharge disposition subacute   Discharge transportation Nellie Hodge 148 noted that patient is cleared for DC and confirmed with [address] [place] [address] that they are able to accept patient today. [facility] [phone] has been requested to arrange ambulance for  time of 5pm. PCS form completed. [address] [place] [address] [phone]     SW will continue to follow for plan of care changes and remain available for any additional DC needs or concerns.      Jaguar RÍOS, [facility]  Discharge Planner   [id number]

## 2023-10-02 ENCOUNTER — INITIAL APN SNF VISIT (OUTPATIENT)
Dept: INTERNAL MEDICINE CLINIC | Age: 88
End: 2023-10-02

## 2023-10-02 DIAGNOSIS — E78.00 HIGH CHOLESTEROL: ICD-10-CM
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
## 2023-10-02 DIAGNOSIS — R33.9 URINE RETENTION: ICD-10-CM

## 2023-10-02 DIAGNOSIS — G47.00 INSOMNIA, UNSPECIFIED TYPE: ICD-10-CM

## 2023-10-02 DIAGNOSIS — Z47.1 AFTERCARE FOLLOWING LEFT HIP JOINT REPLACEMENT SURGERY: ICD-10-CM

## 2023-10-02 DIAGNOSIS — F32.9 MAJOR DEPRESSIVE DISORDER WITH CURRENT ACTIVE EPISODE, UNSPECIFIED DEPRESSION EPISODE SEVERITY, UNSPECIFIED WHETHER RECURRENT: ICD-10-CM

## 2023-10-02 DIAGNOSIS — I69.30 H/O: STROKE WITH RESIDUAL EFFECTS: ICD-10-CM

## 2023-10-02 DIAGNOSIS — Z96.642 AFTERCARE FOLLOWING LEFT HIP JOINT REPLACEMENT SURGERY: ICD-10-CM

## 2023-10-02 DIAGNOSIS — Z86.73 H/O: STROKE: ICD-10-CM

## 2023-10-02 DIAGNOSIS — I10 PRIMARY HYPERTENSION: ICD-10-CM

## 2023-10-02 DIAGNOSIS — G47.09 OTHER INSOMNIA: ICD-10-CM

## 2023-10-02 DIAGNOSIS — E23.2 DIABETES INSIPIDUS (HCC): ICD-10-CM

## 2023-10-02 DIAGNOSIS — Z97.8 FOLEY CATHETER STATUS: ICD-10-CM

## 2023-10-02 DIAGNOSIS — E78.5 HYPERLIPIDEMIA, UNSPECIFIED HYPERLIPIDEMIA TYPE: ICD-10-CM

## 2023-10-02 DIAGNOSIS — D62 ACUTE BLOOD LOSS ANEMIA: ICD-10-CM

## 2023-10-02 PROCEDURE — 1160F RVW MEDS BY RX/DR IN RCRD: CPT | Performed by: NURSE PRACTITIONER

## 2023-10-02 PROCEDURE — 99310 SBSQ NF CARE HIGH MDM 45: CPT | Performed by: NURSE PRACTITIONER

## 2023-10-02 PROCEDURE — 1111F DSCHRG MED/CURRENT MED MERGE: CPT | Performed by: NURSE PRACTITIONER

## 2023-10-02 PROCEDURE — 1159F MED LIST DOCD IN RCRD: CPT | Performed by: NURSE PRACTITIONER

## 2023-10-03 ENCOUNTER — TELEPHONE (OUTPATIENT)
Dept: ORTHOPEDICS CLINIC | Facility: CLINIC | Age: 88
End: 2023-10-03

## 2023-10-03 ENCOUNTER — EXTERNAL FACILITY (OUTPATIENT)
Dept: FAMILY MEDICINE CLINIC | Facility: CLINIC | Age: 88
End: 2023-10-03

## 2023-10-03 DIAGNOSIS — I10 ESSENTIAL HYPERTENSION: ICD-10-CM

## 2023-10-03 DIAGNOSIS — Z47.89 ORTHOPEDIC AFTERCARE: Primary | ICD-10-CM

## 2023-10-03 DIAGNOSIS — R33.9 URINARY RETENTION: ICD-10-CM

## 2023-10-03 DIAGNOSIS — R41.3 MEMORY CHANGES: ICD-10-CM

## 2023-10-03 DIAGNOSIS — S72.002A CLOSED FRACTURE OF LEFT HIP, INITIAL ENCOUNTER (HCC): ICD-10-CM

## 2023-10-03 DIAGNOSIS — Z96.642 STATUS POST LEFT HIP REPLACEMENT: Primary | ICD-10-CM

## 2023-10-03 DIAGNOSIS — Z98.890 S/P ORIF (OPEN REDUCTION INTERNAL FIXATION) FRACTURE: ICD-10-CM

## 2023-10-03 DIAGNOSIS — Z87.81 S/P ORIF (OPEN REDUCTION INTERNAL FIXATION) FRACTURE: ICD-10-CM

## 2023-10-03 PROCEDURE — 99306 1ST NF CARE HIGH MDM 50: CPT | Performed by: FAMILY MEDICINE

## 2023-10-03 PROCEDURE — G0317 PROLNG NSG FAC E/M EA ADDL 15 MIN: HCPCS | Performed by: FAMILY MEDICINE

## 2023-10-03 PROCEDURE — 1111F DSCHRG MED/CURRENT MED MERGE: CPT | Performed by: FAMILY MEDICINE

## 2023-10-03 RX ORDER — OMEPRAZOLE 20 MG/1
20 CAPSULE, DELAYED RELEASE ORAL EVERY MORNING
Status: ON HOLD | COMMUNITY

## 2023-10-03 RX ORDER — POLYETHYLENE GLYCOL 3350 17 G/17G
17 POWDER, FOR SOLUTION ORAL DAILY PRN
Status: ON HOLD | COMMUNITY

## 2023-10-03 RX ORDER — MELATONIN
325
Status: ON HOLD | COMMUNITY

## 2023-10-03 RX ORDER — DOCUSATE SODIUM 100 MG/1
100 CAPSULE, LIQUID FILLED ORAL 2 TIMES DAILY
Status: ON HOLD | COMMUNITY

## 2023-10-03 RX ORDER — ASPIRIN 325 MG
325 TABLET ORAL DAILY
Status: ON HOLD | COMMUNITY

## 2023-10-03 NOTE — TELEPHONE ENCOUNTER
Daughter calling to make a post-op appt. DOS: 09/26/23 // OPEN REDUCTION AND INTERNAL FIXATION OF LEFT FEMUR INTER TROCHANTERIC FRACTURE WITH TFNA NAIL     Please advise when to schedule. Thanks!

## 2023-10-03 NOTE — DISCHARGE SUMMARY
[facility]  DISCHARGE SUMMARY     Chris Sal Patient Status:  Inpatient    1935 MRN [id number]   [facility] 3SW-A Attending No att. providers found   Hosp Day # 5 PCP Rosangela Jhaveri MD     Date of Admission: 2023  Date of Discharge:  10/1/2023     Discharge Disposition: SNF Subacute Rehab    Discharge Diagnosis:  Acute comminuted impacted displaced trochanteric fracture on the left  Prior CVA   Urinary retention  Acute expected blood loss anemia    History of Present Illness:   Cydney Sanchez is a 82year old female with hx of dementia who lives at home with her daughter. Patient walks on her own most of the time and while trying to get up from the chair and walking around the house she suffered an unwitnessed mechanical fall. Her daughter was nearby and came quickly to help with and she states that patient was awake. Her daughter states that patient's blood pressure has been high recently and she was recently started by her cardiologist and [facility]. Patient has shortening and rotation of the left lower extremity. She denies chest pain shortness of breath abdominal pain. Patient is a poor historian most of the history is obtained from the daughter. Brief Synopsis:   Patient is a 44-year-old female admitted with a fall. She is noted an acute comminuted impacted displaced trochanteric fracture on the left. She had repair of this fracture and her pain was well controlled. She did develop expected postoperative blood loss anemia. She was transfused to maintain hemoglobin greater than 7. She remained stable and was discharged in good condition. Lace+ Score: 77  59-90 High Risk  29-58 Medium Risk  0-28   Low Risk       TCM Follow-Up Recommendation:  LACE > 58:  High Risk of readmission after discharge from the hospital.      Procedures during hospitalization:   Cephalomedullary nailing of left intertrochanteric fracture     Consultants:  Orthopedics    Discharge Medication List:     Discharge Medications        START taking these medications        Instructions Prescription details   losartan 100 MG Tabs  Commonly known as: Cozaar      Take 1 tablet (100 mg total) by mouth daily. Refills: 0            CHANGE how you take these medications        Instructions Prescription details   aspirin 81 MG Tbec  What changed:   when to take this  Another medication with the same name was removed. Continue taking this medication, and follow the directions you see here. Take 1 tablet (81 mg total) by mouth in the morning and 1 tablet (81 mg total) before bedtime. Refills: 0     escitalopram 20 MG Tabs  Commonly known as: Lexapro  What changed: when to take this      TAKE 1 TABLET(20 MG) BY MOUTH DAILY   Quantity: 90 tablet  Refills: 1     hydrALAZINE 10 MG Tabs  Commonly known as: Apresoline  What changed:   how much to take  when to take this      TAKE 1 TABLET(10 MG) BY MOUTH IN THE MORNING AND AT BEDTIME   Quantity: 60 tablet  Refills: 0            CONTINUE taking these medications        Instructions Prescription details   acetaminophen 325 MG Tabs  Commonly known as: Tylenol      Take 2 tablets (650 mg total) by mouth every 6 (six) hours as needed. Refills: 0     carvedilol 12.5 MG Tabs  Commonly known as: Coreg      Take 1 tablet (12.5 mg total) by mouth 2 (two) times daily with meals. Refills: 0     CRANBERRY EXTRACT OR      Take 4,200 mg by mouth 3 (three) times daily. Refills: 0     desmopressin 0.1 MG Tabs  Commonly known as: Ddavp      TAKE 1 TABLET(0.1 MG) BY MOUTH EVERY NIGHT   Quantity: 90 tablet  Refills: 1     Fish Oil 1200 MG Caps      Take 1,200 mg by mouth daily. Refills: 0     hydroCHLOROthiazide 12.5 MG Caps  Commonly known as: Microzide      Take 1 capsule (12.5 mg total) by mouth daily. Refills: 0     melatonin 3 MG Tabs      Take 1 tablet (3 mg total) by mouth nightly as needed. Refills: 0     Multi-Vitamin Tabs      Take 1 tablet by mouth daily. Refills: 0     MULTIVITAMIN OR      Take  by mouth daily. Refills: 0     pravastatin 40 MG Tabs  Commonly known as: Pravachol      Take 1 tablet (40 mg total) by mouth nightly. Quantity: 90 tablet  Refills: 2     traZODone 50 MG Tabs  Commonly known as: Desyrel      Take 1 tablet (50 mg total) by mouth nightly as needed for Sleep. Quantity: 90 tablet  Refills: 3            STOP taking these medications      Irbesartan 150 MG Tabs        metoprolol tartrate 25 MG Tabs  Commonly known as: Lopressor                 [place] reviewed: n[place]    Follow-up appointment:   Alonzo Khan PA-C  [address] [place] [address]  [phone]    Follow up  Voiding trial in 5-7 days. May be completed at facility or with urology group. Call if any questions come up. [address] [facility] [facility] [id number]  [phone]    Follow up in 2 week(s)      Mimi Jaquez MD  [address] [place] [address]  [phone]    Follow up in 1 week(s)      Appointments for Next 30 Days 10/3/2023 - 2023      None            Vital signs:       Physical Exam:    General: No acute distress   Lungs: clear to auscultation  Cardiovascular: S1, S2  Abdomen: Soft      -----------------------------------------------------------------------------------------------  PATIENT DISCHARGE INSTRUCTIONS: See electronic chart    Patricia Elias MD    Total time spent on discharge plannin minutes     The [facility] makes medical notes like these available to patients in the interest of transparency. Please be advised this is a medical document. Medical documents are intended to carry relevant information, facts as evident, and the clinical opinion of the practitioner. The medical note is intended as peer to peer communication and may appear blunt or direct. It is written in medical language and may contain abbreviations or verbiage that are unfamiliar.

## 2023-10-04 ENCOUNTER — SNF VISIT (OUTPATIENT)
Dept: INTERNAL MEDICINE CLINIC | Age: 88
End: 2023-10-04

## 2023-10-04 DIAGNOSIS — Z47.1 AFTERCARE FOLLOWING LEFT HIP JOINT REPLACEMENT SURGERY: Primary | ICD-10-CM

## 2023-10-04 DIAGNOSIS — Z97.8 FOLEY CATHETER STATUS: ICD-10-CM

## 2023-10-04 DIAGNOSIS — R33.9 URINE RETENTION: ICD-10-CM

## 2023-10-04 DIAGNOSIS — Z96.642 AFTERCARE FOLLOWING LEFT HIP JOINT REPLACEMENT SURGERY: Primary | ICD-10-CM

## 2023-10-04 DIAGNOSIS — Z86.73 H/O: STROKE: ICD-10-CM

## 2023-10-04 PROCEDURE — 1111F DSCHRG MED/CURRENT MED MERGE: CPT | Performed by: NURSE PRACTITIONER

## 2023-10-04 PROCEDURE — 99309 SBSQ NF CARE MODERATE MDM 30: CPT | Performed by: NURSE PRACTITIONER

## 2023-10-04 PROCEDURE — 1160F RVW MEDS BY RX/DR IN RCRD: CPT | Performed by: NURSE PRACTITIONER

## 2023-10-04 PROCEDURE — 3077F SYST BP >= 140 MM HG: CPT | Performed by: NURSE PRACTITIONER

## 2023-10-04 PROCEDURE — 1159F MED LIST DOCD IN RCRD: CPT | Performed by: NURSE PRACTITIONER

## 2023-10-04 PROCEDURE — 3079F DIAST BP 80-89 MM HG: CPT | Performed by: NURSE PRACTITIONER

## 2023-10-04 PROCEDURE — 1126F AMNT PAIN NOTED NONE PRSNT: CPT | Performed by: NURSE PRACTITIONER

## 2023-10-04 NOTE — PROGRESS NOTES
[facility], Subacute Rehab  [facility]    Fe Sal Author: MARISSA Canseco     1935 MRN [id number]   Oren White  Admission 23      Last Hospital Discharge 10/1/23 PCP Omar Julian MD     Hospital Discharge Diagnoses:  -Unwitnessed Fall  -Aftercare for ORIF of Left hip d/t fracture   -H/o Stroke  -Urine Retention  -Sal Status  -HTN  -HLD  -DM Insipidus  -Depression  -Dementia  -Insomnia  -Anemia    HPI:  Fe Sal  : 1935, Age 82year old  female patient with PMH sig for Stroke, Dementia, Depression, and DM Insipidis  who lives at home with her daughter. Patient walks on her own most of the time and while trying to get up from the chair and walking around the house she suffered an unwitnessed mechanical fall. Her daughter was nearby and came quickly to help with and she states that patient was awake. Her daughter states that patient's blood pressure has been high recently and she was recently started by her cardiologist and Core[facility]. Patient has shortening and rotation of the left lower extremity. Workup was done and patient was noted to have an acute comminuted impacted displaced trochanteric fracture on the left. She did develop expected postoperative blood loss anemia. She was transfused to maintain hgb greater than 7. The patient was discharged in stable condition to [facility] for rehabilitation and medical management. Today:  Seen in room with CG at her side. Doing well with no complaints. Will continue to monitor    Chief Complaint at visit:   Patient presents with: Follow - Up: Unwitnessed fall; Aftercare of ORIF of Left Hip; H/o Stroke; DM Insipidis, Depression, Dementia and Insomnia     ALLERGIES    She is allergic to bactrim [sulfamethoxazole w/trimethoprim] and nitrofurantoin.       CURRENT MEDS:    ferrous sulfate 325 (65 FE) MG Oral Tab EC, Take 1 tablet (325 mg total) by mouth daily with breakfast.  losartan 100 MG Oral Tab, Take 1 tablet (100 mg total) by mouth daily. carvedilol 12.5 MG Oral Tab, Take 1 tablet (12.5 mg total) by mouth 2 (two) times daily with meals. hydroCHLOROthiazide 12.5 MG Oral Cap, Take 1 capsule (12.5 mg total) by mouth daily. desmopressin 0.1 MG Oral Tab, TAKE 1 TABLET(0.1 MG) BY MOUTH EVERY NIGHT  Multiple Vitamin (MULTI-VITAMIN) Oral Tab, Take 1 tablet by mouth daily. ESCITALOPRAM 20 MG Oral Tab, TAKE 1 TABLET(20 MG) BY MOUTH DAILY (Patient taking differently: Take 1 tablet (20 mg total) by mouth at bedtime.)  HYDRALAZINE 10 MG Oral Tab, TAKE 1 TABLET(10 MG) BY MOUTH IN THE MORNING AND AT BEDTIME (Patient taking differently: Take 2 tablets (20 mg total) by mouth 3 (three) times daily.)  traZODone 50 MG Oral Tab, Take 1 tablet (50 mg total) by mouth nightly as needed for Sleep. (Patient taking differently: Take 1 tablet (50 mg total) by mouth nightly.)  pravastatin 40 MG Oral Tab, Take 1 tablet (40 mg total) by mouth nightly. acetaminophen 325 MG Oral Tab, Take 2 tablets (650 mg total) by mouth every 6 (six) hours as needed. melatonin 3 MG Oral Tab, Take 1 tablet (3 mg total) by mouth nightly as needed. CRANBERRY EXTRACT OR, Take 4,200 mg by mouth 3 (three) times daily. Omega-3 Fatty Acids (FISH OIL) 1200 MG Oral Cap, Take 1,200 mg by mouth daily. aspirin 325 MG Oral Tab, Take 1 tablet (325 mg total) by mouth daily. (Patient not taking: Reported on 10/3/2023)  omeprazole 20 MG Oral Capsule Delayed Release, Take 1 capsule (20 mg total) by mouth every morning. No current facility-administered medications on file prior to visit.         HISTORY:    She  has a past medical history of Anxiety, Arthritis, Breast CA ([address] (2001), Cancer ([facility] [address] (2001), Cystitis, interstitial, Flatulence/gas pain/belching, Frequent UTI, H/O mammogram (12/28/2011), H/O mammogram (02/25/2015), H/O pregnancy (7926,7899), HIGH BLOOD PRESSURE, HIGH CHOLESTEROL, History of endoscopy (08/21/2013), Loss of appetite, Nausea, Other and unspecified hyperlipidemia, Other and unspecified personal history of malignant neoplasm (2001), Pap smear for cervical cancer screening (09/18/2003), Renal cyst, right (07/15/2015), Rheumatic fever, Stroke ([facility] [address] and Unspecified essential hypertension. She  has a past surgical history that includes special service or report (2001); colonoscopy (2/02 4/08); unspecified diagnostic proce (2005); colonoscopy (08/21/2013); hysterectomy (1985); mastectomy right (Right, 2001); and needle biopsy right (Right, 2001). CODE STATUS VERIFIED: DNAR/Comfort      SUBJECTIVE:    REVIEW OF SYSTEMS:  GENERAL HEALTH:feels well otherwise  SKIN: denies any unusual skin lesions or rashes  WOUNDS: +Left hip   EYES:no visual complaints or deficits  HENT: denies nasal congestion, sinus pain or sore throat; and hearing loss negative  RESPIRATORY: denies shortness of breath, wheezing or cough   CARDIOVASCULAR:denies chest pain, no palpitations , denies syncope, denies orthopnea, denies cough  GI: denies nausea, vomiting, constipation, diarrhea; no rectal bleeding; no heartburn  :+Sal d/t urine retention  MUSCULOSKELETAL:no joint complaints upper or lower extremities  NEURO:no sensory or motor complaint, denies seizures, denies vertigo, denies tinnitus, and denies tremors  PSYCHE: no symptoms of depression or anxiety  HEMATOLOGY:denies hx anemia, denies bruising, denies excessive bleeding  ENDOCRINE: denies excessive thirst or urination; denies unexpected wt gain or wt loss  ALLERGY/IMM.: denies food or seasonal allergies      OBJECTIVE:  VITALS:  There were no vitals taken for this visit.     PHYSICAL EXAM:  GENERAL HEALTH: well developed, well nourished, in no apparent distress  LINES, TUBES, DRAINS:  Sal catheter  SKIN: pink, pale, warm  WOUND: +Left Hip-->Aquacel c/d/i  EYES: PERRLA, conjunctiva normal; no drainage from eyes  HENT: normocephalic; normal nose, no nasal drainage, mucous membranes pink, moist  NECK: full range of motion observed  RESPIRATORY:clear to percussion and auscultation, No wheezing/cough/accessory muscle use; on room air  CARDIOVASCULAR: S1, S2 normal, RRR; no S3, no S4; , no click, no murmur  ABDOMEN: normal active BS+, soft, non-distended; no apparent masses; observed, non-tender, no guarding during physical exam  :  +Sal  LYMPHATIC: no lymphedema  MUSCULOSKELETAL: no acute synovitis upper or lower extremity. Weakness R/T recent hospitalization/diagnoses/sequelae; will undergo therapies to rehab and improve strength, endurance and independence w/ ADLs as able  EXTREMITIES/VASCULAR: no cyanosis, clubbing or edema, radial pulses 2+, and dorsalis pedal pulses 2+  NEUROLOGIC: intact; no sensorimotor deficit, reflexes normal, cranial nerves intact II-XII, follows commands  PSYCHIATRIC: ---Alert to name, place and month, forgetful    DIAGNOSTICS REVIEWED AT THIS VISIT:    [address] [place] [address] records, notes, lab and imaging results reviewed. And diagnostics available in rehab records/[facility]. Medication reconciliation completed. CBC W/DIFF AND PLATELETS: dated: 96/1/00  WHITE BLOOD CELLS 7.95 THO/mm3 4.80 - 10.80 Final  RED BLOOD CELLS 2.53 MIL/mm3 4. 20 - 5.40 L Final  HEMOGLOBIN 7.8 gm/dL 12.0 - 16.0 L Final  RESULT VERIFIED, SUGGEST REPEAT TESTING IF NECESSARY. Hemoglobin measurements may be lower by 10% in postprandial  patients and lower by 5.7% in recumbent patients. This  variation should be considered when evaluating the Alert-A*  status of this Hemoglobin result.   Reference: Clinical Guide to Laboratory Tests 3rd Edition   Jolanta Laureano,   HEMATOCRIT 24.0 % 37.0 - 47.0 L Final  MCV 94.9 um3 81.0 - 99.0 Final  MCH 30.8 uug 27.0 - 33.0 Final  MCHC 32.5 gm/dL 32.0 - 36.0 Final  RDW-CV 16.3 % 11.5 - 15.2 H Final  PLATELET COUNT 664 THO/mm3 150 - 400 Final  MPV 9.0 um3 9.5 - 13.1 L Final  NEUTROPHILS, ABS 5.32 THO/mm3 1.40 - 6.80 Final  LYMPHOCYTES, ABS 1.18 THO/mm3 0.80 - 3.00 Final  MONOCYTES, ABS 1.13 THO/mm3 0.20 - 1.00 H Final  EOSINOPHILS, ABS 0.17 THO/mm3 0.00 - 0.50 Final  BASOPHILS, ABS 0.04 THO/mm3 0.00 - 0.10 Final  IMMATURE GRANS, ABS 0.11 THO/mm3 0.00 - 0.10 H Final  NEUTROPHILS % 67.0 % Final  LYMPHOCYTES % 14.8 % Final  MONOCYTES % 14.2 % Final  EOSINOPHILS % 2.1 % Final  BASOPHILS % 0.5 % Final  IMMATURE GRANS % 1.4 % Final    MAGNESIUM 1.9 mg/dL 1.4 - 2.3 Final    PHOSPHORUS, INOR. 2.0 mg/dL 2.8 - 4.8 L Final    COMPREHENSIVE METABOLIC: dated: 22/5/27  GLUCOSE 84 mg/dL 70 - 110 Final  BUN 17 mg/dL 7 - 28 Final  CREATININE 0.68 mg/dL 0.44 - 1.32 Final  BILIRUBIN, TOTAL 1.30 mg/dL 0.16 - 1.30 Final  PROTEIN, TOTAL 4.8 gm/dL 5.6 - 8.2 L Final  ALBUMIN 3.0 gm/dL 3.2 - 4.9 L Final  SODIUM 141 mEq/L 138 - 147 Final  POTASSIUM 4.1 mEq/L 3.6 - 5.0 Final  CHLORIDE 107 mEq/L 99 - 110 Final  CARBON DIOXIDE 27 mM/L 18 - 30 Final  SGPT(ALT) 9 U/L 0 - 42 Final  SGOT(AST) 20 U/L 9 - 35 Final  ALK.  PHOSPHATASE 41 U/L 34 - 143 Final  CALCIUM 8.7 mg/dL 8.7 - 10.5 Final  eGFR If African American > 60 mL/m/1.73m  2  >=60 Final  eGFR If Non-African American > 60 mL/m/1.73m  2  >=60 Final    SEE PLAN BELOW  Unwitnessed Fall/Aftercare for ORIF of Left hip d/t fracture Physical Deconditioning/Impaired mobility and ADLs/At risk for falling  -Fall Precautions  -PT/OT/ST to evaluate and treat  -Tylenol 650 mg q6h prn for fever/pain, if given for fever, notify MD/NP  -Remove Aquacel dressing on 10/3/23, per ortho  -LACHELLE team to establish care plan meeting with patient and POA/family as appropriate  -ELOS: TBD  -Anticipate DC on or before TBD; SW to assist patient/family w/ DC planning  -DC Plan:  TBD  -Dr. Hai Cesar, Orthopedic Surgeon as directed    H/o Stroke  -Lovenox completed  -ASA 81 mg bid changed to 325 mg every day, per daughter request d/t h/o stroke after meals  -Dr. Emeli Briones, Neurology as directed    Urine Retention  -Voiding trial to being 10/3/23 X 3 days  -Bladder scan after ea void, if yields >250 ml of urine straight cath, if yields >350 reinsert rome    DM Insipidus  -Desmopressin 0.1 mg qhs  -Monitor    HTN/HLD  -Vitals q shift  -Coreg 12.5 mg bid, hold for sbp<100 or hr <60  -Losartan 100 mg every day  -Hydralazine 10 mg bid for sbp <100  -hydrochlorothiazide 12.5 mg every day  -Pravastatin 40 mg qd    Depression  -Lexapro 20 mg every day    Dementia  -Monitor    Anemia  -Hgb: 7.8  -FeS04 325 mg every day  -Monitor labs    Insomnia  -Melatonin 3 mg at bedtime prn  -Trazadone 50 mg qhs    DVT Prophylaxis  -Completed Lovenox, on ASA per daughter's request  -Encourage early mobilization and participation in PT/OT as able    GI Prophylaxis  -Omeprazole 20 mg every day  -Mylanta 15 mg every day prn    Bowel Management Regimen/Constipation   -Add Colace 100 mg bid  -Add Miralax 17 g every day prn     Supplements  -MVI every day  -Fish Oil 1200 mg every day  -Cranberry 4200 mg tid    LABS  -CBC/CMP weekly while in [facility]    Follow Up Appointments  -Dr. King Roy, PCP within 1-2 weeks following [facility] discharge.   -Dr. Fritz Lamar, Orthopedic Surgeon as directed  -Dr. Malena Garcia, Neurology as directed    *Greater than 65 minutes spent w/ patient and family, reviewing medical records, labs, completing medication reconciliation and entering orders to establish plan of care in [facility].     Bob Valles, APRN  10/02/23   7:11 PM

## 2023-10-05 ENCOUNTER — HOSPITAL ENCOUNTER (INPATIENT)
Facility: HOSPITAL | Age: 88
LOS: 10 days | Discharge: NURSING, REHAB OR LONG-TERM CARE | DRG: 377 | End: 2023-10-15
Attending: EMERGENCY MEDICINE | Admitting: HOSPITALIST

## 2023-10-05 ENCOUNTER — APPOINTMENT (OUTPATIENT)
Dept: GENERAL RADIOLOGY | Facility: HOSPITAL | Age: 88
DRG: 377 | End: 2023-10-05
Attending: EMERGENCY MEDICINE

## 2023-10-05 ENCOUNTER — APPOINTMENT (OUTPATIENT)
Dept: CT IMAGING | Facility: HOSPITAL | Age: 88
DRG: 377 | End: 2023-10-05
Attending: EMERGENCY MEDICINE

## 2023-10-05 ENCOUNTER — HOSPITAL ENCOUNTER (INPATIENT)
Facility: HOSPITAL | Age: 88
LOS: 10 days | Discharge: NURSING, REHAB OR LONG-TERM CARE | End: 2023-10-15
Attending: EMERGENCY MEDICINE | Admitting: HOSPITALIST

## 2023-10-05 ENCOUNTER — APPOINTMENT (OUTPATIENT)
Dept: GENERAL RADIOLOGY | Facility: HOSPITAL | Age: 88
End: 2023-10-05
Attending: EMERGENCY MEDICINE

## 2023-10-05 ENCOUNTER — TELEPHONE (OUTPATIENT)
Dept: INTERNAL MEDICINE CLINIC | Age: 88
End: 2023-10-05

## 2023-10-05 ENCOUNTER — APPOINTMENT (OUTPATIENT)
Dept: CT IMAGING | Facility: HOSPITAL | Age: 88
End: 2023-10-05
Attending: EMERGENCY MEDICINE

## 2023-10-05 VITALS
SYSTOLIC BLOOD PRESSURE: 156 MMHG | RESPIRATION RATE: 18 BRPM | OXYGEN SATURATION: 96 % | TEMPERATURE: 98 F | DIASTOLIC BLOOD PRESSURE: 87 MMHG | HEART RATE: 78 BPM

## 2023-10-05 DIAGNOSIS — E87.1 HYPONATREMIA: Primary | ICD-10-CM

## 2023-10-05 DIAGNOSIS — I48.91 ATRIAL FIBRILLATION, NEW ONSET (HCC): ICD-10-CM

## 2023-10-05 DIAGNOSIS — D64.9 NORMOCYTIC ANEMIA: ICD-10-CM

## 2023-10-05 DIAGNOSIS — N30.00 ACUTE CYSTITIS WITHOUT HEMATURIA: ICD-10-CM

## 2023-10-05 DIAGNOSIS — G47.09 OTHER INSOMNIA: ICD-10-CM
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
## 2023-10-05 LAB
ALBUMIN SERPL-MCNC: 2.8 G/DL (ref 3.4–5)
ALBUMIN/GLOB SERPL: 0.8 {RATIO} (ref 1–2)
ALP LIVER SERPL-CCNC: 63 U/L
ALT SERPL-CCNC: 15 U/L
ANION GAP SERPL CALC-SCNC: 6 MMOL/L (ref 0–18)
AST SERPL-CCNC: 24 U/L (ref 15–37)
BASOPHILS # BLD AUTO: 0.04 X10(3) UL (ref 0–0.2)
BASOPHILS NFR BLD AUTO: 0.3 %
BILIRUB SERPL-MCNC: 2 MG/DL (ref 0.1–2)
BILIRUB UR QL STRIP.AUTO: NEGATIVE
BUN BLD-MCNC: 14 MG/DL (ref 7–18)
CALCIUM BLD-MCNC: 9.3 MG/DL (ref 8.5–10.1)
CHLORIDE SERPL-SCNC: 92 MMOL/L (ref 98–112)
CO2 SERPL-SCNC: 27 MMOL/L (ref 21–32)
CREAT BLD-MCNC: 0.82 MG/DL
EGFRCR SERPLBLD CKD-EPI 2021: 70 ML/MIN/1.73M2 (ref 60–?)
EOSINOPHIL # BLD AUTO: 0.12 X10(3) UL (ref 0–0.7)
EOSINOPHIL NFR BLD AUTO: 0.9 %
ERYTHROCYTE [DISTWIDTH] IN BLOOD BY AUTOMATED COUNT: 16.2 %
GLOBULIN PLAS-MCNC: 3.5 G/DL (ref 2.8–4.4)
GLUCOSE BLD-MCNC: 109 MG/DL (ref 70–99)
GLUCOSE UR STRIP.AUTO-MCNC: NORMAL MG/DL
HCT VFR BLD AUTO: 26.4 %
HGB BLD-MCNC: 8.8 G/DL
IMM GRANULOCYTES # BLD AUTO: 0.16 X10(3) UL (ref 0–1)
IMM GRANULOCYTES NFR BLD: 1.2 %
KETONES UR STRIP.AUTO-MCNC: NEGATIVE MG/DL
LACTATE SERPL-SCNC: 1.1 MMOL/L (ref 0.4–2)
LEUKOCYTE ESTERASE UR QL STRIP.AUTO: 500
LYMPHOCYTES # BLD AUTO: 1.08 X10(3) UL (ref 1–4)
LYMPHOCYTES NFR BLD AUTO: 8.3 %
MCH RBC QN AUTO: 30.3 PG (ref 26–34)
MCHC RBC AUTO-ENTMCNC: 33.3 G/DL (ref 31–37)
MCV RBC AUTO: 91 FL
MONOCYTES # BLD AUTO: 1.3 X10(3) UL (ref 0.1–1)
MONOCYTES NFR BLD AUTO: 10 %
NEUTROPHILS # BLD AUTO: 10.24 X10 (3) UL (ref 1.5–7.7)
NEUTROPHILS # BLD AUTO: 10.24 X10(3) UL (ref 1.5–7.7)
NEUTROPHILS NFR BLD AUTO: 79.3 %
NITRITE UR QL STRIP.AUTO: NEGATIVE
OSMOLALITY SERPL CALC.SUM OF ELEC: 261 MOSM/KG (ref 275–295)
PH UR STRIP.AUTO: 7 [PH] (ref 5–8)
PLATELET # BLD AUTO: 345 10(3)UL (ref 150–450)
POTASSIUM SERPL-SCNC: 3.6 MMOL/L (ref 3.5–5.1)
PROT SERPL-MCNC: 6.3 G/DL (ref 6.4–8.2)
PROT UR STRIP.AUTO-MCNC: NEGATIVE MG/DL
Q-T INTERVAL: 394 MS
QRS DURATION: 98 MS
QTC CALCULATION (BEZET): 449 MS
R AXIS: 9 DEGREES
RBC # BLD AUTO: 2.9 X10(6)UL
SODIUM SERPL-SCNC: 125 MMOL/L (ref 136–145)
SP GR UR STRIP.AUTO: 1.01 (ref 1–1.03)
T AXIS: 73 DEGREES
TROPONIN I HIGH SENSITIVITY: 33 NG/L
TSI SER-ACNC: 3.02 MIU/ML (ref 0.36–3.74)
UROBILINOGEN UR STRIP.AUTO-MCNC: NORMAL MG/DL
VENTRICULAR RATE: 78 BPM
WBC # BLD AUTO: 12.9 X10(3) UL (ref 4–11)
WBC #/AREA URNS AUTO: >50 /HPF

## 2023-10-05 PROCEDURE — 71045 X-RAY EXAM CHEST 1 VIEW: CPT | Performed by: EMERGENCY MEDICINE

## 2023-10-05 PROCEDURE — 99223 1ST HOSP IP/OBS HIGH 75: CPT | Performed by: INTERNAL MEDICINE

## 2023-10-05 PROCEDURE — 73502 X-RAY EXAM HIP UNI 2-3 VIEWS: CPT | Performed by: EMERGENCY MEDICINE

## 2023-10-05 PROCEDURE — 70450 CT HEAD/BRAIN W/O DYE: CPT | Performed by: EMERGENCY MEDICINE

## 2023-10-05 RX ORDER — SODIUM CHLORIDE 9 MG/ML
INJECTION, SOLUTION INTRAVENOUS CONTINUOUS
Status: DISCONTINUED | OUTPATIENT
Start: 2023-10-05 | End: 2023-10-06

## 2023-10-05 RX ORDER — LORAZEPAM 2 MG/ML
1 INJECTION INTRAMUSCULAR ONCE
Status: COMPLETED | OUTPATIENT
Start: 2023-10-05 | End: 2023-10-05

## 2023-10-05 RX ORDER — MELATONIN
3 NIGHTLY PRN
Status: DISCONTINUED | OUTPATIENT
Start: 2023-10-05 | End: 2023-10-15

## 2023-10-05 RX ORDER — ENOXAPARIN SODIUM 100 MG/ML
40 INJECTION SUBCUTANEOUS DAILY
Status: DISCONTINUED | OUTPATIENT
Start: 2023-10-06 | End: 2023-10-15

## 2023-10-05 RX ORDER — ONDANSETRON 2 MG/ML
4 INJECTION INTRAMUSCULAR; INTRAVENOUS EVERY 6 HOURS PRN
Status: DISCONTINUED | OUTPATIENT
Start: 2023-10-05 | End: 2023-10-15

## 2023-10-05 RX ORDER — SENNOSIDES 8.6 MG
17.2 TABLET ORAL NIGHTLY PRN
Status: DISCONTINUED | OUTPATIENT
Start: 2023-10-05 | End: 2023-10-15

## 2023-10-05 RX ORDER — HYDROCODONE BITARTRATE AND ACETAMINOPHEN 5; 325 MG/1; MG/1
2 TABLET ORAL EVERY 4 HOURS PRN
Status: DISCONTINUED | OUTPATIENT
Start: 2023-10-05 | End: 2023-10-07

## 2023-10-05 RX ORDER — ACETAMINOPHEN 325 MG/1
650 TABLET ORAL EVERY 4 HOURS PRN
Status: DISCONTINUED | OUTPATIENT
Start: 2023-10-05 | End: 2023-10-07

## 2023-10-05 RX ORDER — POLYETHYLENE GLYCOL 3350 17 G/17G
17 POWDER, FOR SOLUTION ORAL DAILY PRN
Status: DISCONTINUED | OUTPATIENT
Start: 2023-10-05 | End: 2023-10-15

## 2023-10-05 RX ORDER — ENEMA 19; 7 G/133ML; G/133ML
1 ENEMA RECTAL ONCE AS NEEDED
Status: DISCONTINUED | OUTPATIENT
Start: 2023-10-05 | End: 2023-10-15

## 2023-10-05 RX ORDER — HYDROCODONE BITARTRATE AND ACETAMINOPHEN 5; 325 MG/1; MG/1
1 TABLET ORAL EVERY 4 HOURS PRN
Status: DISCONTINUED | OUTPATIENT
Start: 2023-10-05 | End: 2023-10-07

## 2023-10-05 RX ORDER — HYDROMORPHONE HYDROCHLORIDE 1 MG/ML
0.5 INJECTION, SOLUTION INTRAMUSCULAR; INTRAVENOUS; SUBCUTANEOUS ONCE
Status: COMPLETED | OUTPATIENT
Start: 2023-10-05 | End: 2023-10-05

## 2023-10-05 RX ORDER — BISACODYL 10 MG
10 SUPPOSITORY, RECTAL RECTAL
Status: DISCONTINUED | OUTPATIENT
Start: 2023-10-05 | End: 2023-10-15

## 2023-10-05 RX ORDER — METOCLOPRAMIDE HYDROCHLORIDE 5 MG/ML
10 INJECTION INTRAMUSCULAR; INTRAVENOUS EVERY 8 HOURS PRN
Status: DISCONTINUED | OUTPATIENT
Start: 2023-10-05 | End: 2023-10-06

## 2023-10-05 NOTE — TELEPHONE ENCOUNTER
RN staff notified this writer that the patient had an unwitnessed fall. S/p orif of left hip; on  mg every day. This writer notified ED Case Manager and in-house PCP, Dr. Yessenia Ku. Sent out for evaluation.

## 2023-10-05 NOTE — PROGRESS NOTES
Fe Sal Author: Tanja Wilson MD     1935 MRN [id number]   [facility]  Admission 23      Last Hospital Discharge 10/1/23 PCP Daniel Villegas of Discharge  [facility]        CC --admitted to [address] [place] [address] from [facility] for subacute rehab, left intertrochanteric fracture, s/p ORIF    H. P.I Rogers Landaverde is a 82year old female with history of hypertension hyperlipidemia dementia recurrent UTIs, who lives at home with her daughter suffered unwitnessed fall, complained of left lower extremity pain, patient was brought into the emergency room where work-up revealed acute comminuted impacted displaced intertrochanteric fracture of the left hip. Patient was taken to the OR and underwent ORIF,  Her postoperative course was complicated by acute blood loss anemia urinary retention  Patient was transfused 1 unit of blood  Sal catheter was placed in the hospital and patient was transferred to [address] [place] [address] with the catheter. Voiding trial in 5 to 7 days  Discharge diagnosis  Acute comminuted impacted displaced trochanteric fracture on the left, s/p ORIF  Prior CVA   Urinary retention  Acute expected blood loss anemia    Patient seen in her room today, she is doing well and denies any complaints except for expected pain in her left hip area, she denies any chest pain shortness of breath dizziness headache nausea vomiting diarrhea abdominal pain  Her appetite is good        Past Medical History:   Diagnosis Date    Anxiety     Arthritis     knees and shoulders    Breast CA ([facility] [address]     right breast cancer with mastectomy.     Cancer [facility])     right breast ca    Cystitis, interstitial     history of----    Flatulence/gas pain/belching     Frequent UTI     H/O mammogram 2011    left breast    H/O mammogram 2015    benign    H/O pregnancy 4261,2107        HIGH BLOOD PRESSURE     HIGH CHOLESTEROL     History of endoscopy 2013    Dr. Dmitri Ying    Loss of appetite Nausea     Other and unspecified hyperlipidemia     Other and unspecified personal history of malignant neoplasm 2001    dr. Iona Corrigan, ill  breast cancer right    Pap smear for cervical cancer screening 09/18/2003    wnl    Renal cyst, right 07/15/2015    Refer to Dr. Shaffer/urology. Rheumatic fever     Stroke [facility])     Unspecified essential hypertension      Past Surgical History:   Procedure Laterality Date    COLONOSCOPY  2/02 4/08 2008 Dr. Arjun Sharp, repeat in five years, family history of colon cancer, Diverticulosis    COLONOSCOPY  08/21/2013    Dr. Jacquelyn Solano    partial hystero. MASTECTOMY RIGHT Right 2001    right breast cancer with mastectomy. 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    SPECIAL SERVICE OR REPORT  2001    mastectomy right    UNSPECIFIED DIAGNOSTIC PROCE  2005    cystoscopy     Family History   Problem Relation Age of Onset    Heart Disorder Father         chf    Cancer Mother         colon    Stroke Mother     Colon Cancer Mother     Breast Cancer Self 63    Hypertension Brother      Social History     Socioeconomic History    Marital status:    Tobacco Use    Smoking status: Never     Passive exposure: Never    Smokeless tobacco: Never   Vaping Use    Vaping Use: Never used   Substance and Sexual Activity    Alcohol use: No    Drug use: No    Sexual activity: Never   Other Topics Concern    Caffeine Concern Yes     Comment: tea     Exercise No       ALLERGIES:    Bactrim [Sulfametho*    NAUSEA ONLY  Nitrofurantoin          NAUSEA ONLY    CODE STATUS:  DNR    CURRENT MEDICATIONS   Current Outpatient Medications   Medication Sig Dispense Refill    aspirin 325 MG Oral Tab Take 1 tablet (325 mg total) by mouth daily. (Patient not taking: Reported on 10/3/2023)      omeprazole 20 MG Oral Capsule Delayed Release Take 1 capsule (20 mg total) by mouth every morning.       ferrous sulfate 325 (65 FE) MG Oral Tab EC Take 1 tablet (325 mg total) by mouth daily with breakfast.      polyethylene glycol, PEG 3350, 17 g Oral Powd Pack Take 17 g by mouth daily as needed. docusate sodium 100 MG Oral Cap Take 1 capsule (100 mg total) by mouth 2 (two) times daily. losartan 100 MG Oral Tab Take 1 tablet (100 mg total) by mouth daily. carvedilol 12.5 MG Oral Tab Take 1 tablet (12.5 mg total) by mouth 2 (two) times daily with meals. hydroCHLOROthiazide 12.5 MG Oral Cap Take 1 capsule (12.5 mg total) by mouth daily. desmopressin 0.1 MG Oral Tab TAKE 1 TABLET(0.1 MG) BY MOUTH EVERY NIGHT 90 tablet 1    Multiple Vitamin (MULTI-VITAMIN) Oral Tab Take 1 tablet by mouth daily. ESCITALOPRAM 20 MG Oral Tab TAKE 1 TABLET(20 MG) BY MOUTH DAILY (Patient taking differently: Take 1 tablet (20 mg total) by mouth at bedtime.) 90 tablet 1    HYDRALAZINE 10 MG Oral Tab TAKE 1 TABLET(10 MG) BY MOUTH IN THE MORNING AND AT BEDTIME (Patient taking differently: Take 2 tablets (20 mg total) by mouth 3 (three) times daily.) 60 tablet 0    traZODone 50 MG Oral Tab Take 1 tablet (50 mg total) by mouth nightly as needed for Sleep. (Patient taking differently: Take 1 tablet (50 mg total) by mouth nightly.) 90 tablet 3    pravastatin 40 MG Oral Tab Take 1 tablet (40 mg total) by mouth nightly. 90 tablet 2    acetaminophen 325 MG Oral Tab Take 2 tablets (650 mg total) by mouth every 6 (six) hours as needed. melatonin 3 MG Oral Tab Take 1 tablet (3 mg total) by mouth nightly as needed. CRANBERRY EXTRACT OR Take 4,200 mg by mouth 3 (three) times daily. Omega-3 Fatty Acids (FISH OIL) 1200 MG Oral Cap Take 1,200 mg by mouth daily. REVIEW OF SYSTEMS:  Review of Systems:   Constitutional: No fevers, chills, fatigue or night sweats. ENT: No mouth pain, neck pain, running nose, headaches or swollen glands. Skin: No rashes, pruritus or skin changes,  Respiratory: Denies cough, wheezing or shortness of breath.   CV: Denies chest pain, palpitations, orthopnea, PND or dizziness. Musculoskeletal: No joint pain, stiffness or swelling. GI: No nausea, vomiting or diarrhea. No blood in stools.   Neurologic: No seizures, tremors, weakness or numbness    VITALS: Pulse 72/min respiration 18/min temperature 98.6 blood pressure 150/70    PHYSICAL EXAM:  GENERAL: well developed, well nourished, in no apparent distress  SKIN: no rashes, no suspicious lesions  Wound; left hip in dressing  HEENT: atraumatic, normocephalic, ears and throat are clear  EYES: PERRLA, EOMI, conjunctiva are clear  NECK: supple, no adenopathy, no bruits  CHEST: no chest tenderness  LUNGS: clear to auscultation  CARDIO: RRR without murmur  GI: good BS's, no masses, HSM or tenderness  : deferred Sal in place  RECTAL: deferred  MUSCULOSKELETAL: back is not tender, FROM of the back  EXTREMITIES: no cyanosis, clubbing or edema  NEURO: Oriented times three, cranial nerves are intact, motor and sensory are grossly intact      DIAGNOSTICS REVIEWED AT THIS VISIT:  Lab Results   Component Value Date    GLU 95 10/01/2023    BUN 20 (H) 10/01/2023    BUNCREA 17.4 04/15/2021    CREATSERUM 0.63 10/01/2023    ANIONGAP 3 10/01/2023    GFR 62 12/01/2017    GFRNAA 51 (L) 04/21/2022    GFRAA 58 (L) 04/21/2022    CA 8.4 (L) 10/01/2023    OSMOCALC 292 10/01/2023    ALKPHO 49 (L) 05/16/2023    AST 19 05/16/2023    ALT 17 05/16/2023    BILT 1.0 05/16/2023    TP 6.9 05/16/2023    ALB 3.5 05/16/2023    GLOBULIN 3.4 05/16/2023    AGRATIO 1.5 04/21/2015     10/01/2023    K 4.1 10/01/2023     10/01/2023    CO2 26.0 10/01/2023       Lab Results   Component Value Date    WBC 7.8 10/01/2023    RBC 2.47 (L) 10/01/2023    HGB 8.5 (L) 10/01/2023    HCT 22.1 (L) 10/01/2023    .0 10/01/2023    MCV 89.5 10/01/2023    MCH 30.4 10/01/2023    MCHC 33.9 10/01/2023    RDW 15.3 10/01/2023    NEPRELIM 4.88 10/01/2023    NEPERCENT 62.8 10/01/2023    LYPERCENT 17.3 10/01/2023    MOPERCENT 14.6 10/01/2023    EOPERCENT 3.6 10/01/2023    BAPERCENT 0.8 10/01/2023    NE 4.88 10/01/2023    LYMABS 1.34 10/01/2023    MOABSO 1.13 (H) 10/01/2023    EOABSO 0.28 10/01/2023    BAABSO 0.06 10/01/2023     ASSESSMENT AND PLAN:      Diagnoses and all orders for this visit:    Orthopedic aftercare  Closed fracture of left hip, initial encounter ([facility] [address]  S/P ORIF (open reduction internal fixation) fracture  Pain control with Tylenol  - PT to work on ambulation, gait, balance, strength, endurance, transfers, safety  - OT to work on fine motor skills, ADLs, hygiene, toileting, transfers, safety  Essential hypertension  Continue losartan, hydralazine, Coreg  Memory changes/insomnia  On trazodone and melatonin  Urinary retention  Sal catheter in place  Voiding trial next week    - 24h nursing for medication administration, bowel/bladder care, pain/sleep assessment  - Physician supervision for multiple medical comorbidities, fall risk, DVT risk, infection risk, pain management  - Psych for adjustment to disability and cognitive deficits  - Social work/: for discharge planning needs and access to community resources as needed     [facility] medications reviewed reconciled and restarted   Check the labs i CBC CMP TSH    Time spent at appointment today is 95 minutes including preparing to see patient, reviewing test results, performing medically appropriate examination and evaluation and coordinating care, counseling and educating patient/family, ordering medications and testing, and documenting clinical information in EMR. Jamal Tineo MD   [address] [place] [address]    Electronically signed      This dictation was performed with a verbal recognition program [facility]) and it was checked for errors. It is possible that there are still dictated errors within this office note. If so, please bring any errors to my attention for an addendum.  All efforts were made to ensure that this office note is accurate

## 2023-10-06 ENCOUNTER — APPOINTMENT (OUTPATIENT)
Dept: CV DIAGNOSTICS | Facility: HOSPITAL | Age: 88
DRG: 377 | End: 2023-10-06
Attending: INTERNAL MEDICINE

## 2023-10-06 ENCOUNTER — NURSE ONLY (OUTPATIENT)
Dept: ELECTROPHYSIOLOGY | Facility: HOSPITAL | Age: 88
End: 2023-10-06
Attending: NURSE PRACTITIONER
Payer: MEDICARE

## 2023-10-06 ENCOUNTER — APPOINTMENT (OUTPATIENT)
Dept: CV DIAGNOSTICS | Facility: HOSPITAL | Age: 88
End: 2023-10-06
Attending: INTERNAL MEDICINE

## 2023-10-06 PROBLEM — E83.39 HYPOPHOSPHATEMIA: Status: ACTIVE | Noted: 2023-10-06

## 2023-10-06 PROBLEM — R41.0 DELIRIUM: Status: ACTIVE | Noted: 2023-10-06

## 2023-10-06 PROBLEM — E83.42 HYPOMAGNESEMIA: Status: ACTIVE | Noted: 2023-10-06

## 2023-10-06 PROBLEM — E87.6 HYPOKALEMIA: Status: ACTIVE | Noted: 2023-10-06

## 2023-10-06 PROBLEM — F02.80 ALZHEIMER'S DEMENTIA (HCC): Status: ACTIVE | Noted: 2023-10-06

## 2023-10-06 PROBLEM — R41.82 ALTERED MENTAL STATUS: Status: ACTIVE | Noted: 2023-10-06

## 2023-10-06 PROBLEM — N39.0 URINARY TRACT INFECTION WITHOUT HEMATURIA: Status: ACTIVE | Noted: 2023-10-06

## 2023-10-06 PROBLEM — G30.9 ALZHEIMER'S DEMENTIA (HCC): Status: ACTIVE | Noted: 2023-10-06

## 2023-10-06 LAB
ALBUMIN SERPL-MCNC: 2.8 G/DL (ref 3.4–5)
ALBUMIN/GLOB SERPL: 0.8 {RATIO} (ref 1–2)
ALP LIVER SERPL-CCNC: 56 U/L
ALT SERPL-CCNC: 15 U/L
ANION GAP SERPL CALC-SCNC: 8 MMOL/L (ref 0–18)
AST SERPL-CCNC: 24 U/L (ref 15–37)
ATRIAL RATE: 85 BPM
BILIRUB SERPL-MCNC: 2 MG/DL (ref 0.1–2)
BUN BLD-MCNC: 13 MG/DL (ref 7–18)
CALCIUM BLD-MCNC: 8.8 MG/DL (ref 8.5–10.1)
CHLORIDE SERPL-SCNC: 94 MMOL/L (ref 98–112)
CO2 SERPL-SCNC: 25 MMOL/L (ref 21–32)
CORTIS SERPL-MCNC: 24.5 UG/DL
CREAT BLD-MCNC: 0.61 MG/DL
EGFRCR SERPLBLD CKD-EPI 2021: 87 ML/MIN/1.73M2 (ref 60–?)
ERYTHROCYTE [DISTWIDTH] IN BLOOD BY AUTOMATED COUNT: 16.3 %
GLOBULIN PLAS-MCNC: 3.4 G/DL (ref 2.8–4.4)
GLUCOSE BLD-MCNC: 95 MG/DL (ref 70–99)
HCT VFR BLD AUTO: 23.6 %
HGB BLD-MCNC: 8.1 G/DL
MAGNESIUM SERPL-MCNC: 1.5 MG/DL (ref 1.6–2.6)
MCH RBC QN AUTO: 30.7 PG (ref 26–34)
MCHC RBC AUTO-ENTMCNC: 34.3 G/DL (ref 31–37)
MCV RBC AUTO: 89.4 FL
OSMOLALITY SERPL CALC.SUM OF ELEC: 264 MOSM/KG (ref 275–295)
OSMOLALITY UR: 526 MOSM/KG (ref 300–1300)
P AXIS: 84 DEGREES
P-R INTERVAL: 232 MS
PHOSPHATE SERPL-MCNC: 2.3 MG/DL (ref 2.5–4.9)
PLATELET # BLD AUTO: 315 10(3)UL (ref 150–450)
POTASSIUM SERPL-SCNC: 3 MMOL/L (ref 3.5–5.1)
PROT SERPL-MCNC: 6.2 G/DL (ref 6.4–8.2)
Q-T INTERVAL: 384 MS
QRS DURATION: 100 MS
QTC CALCULATION (BEZET): 456 MS
R AXIS: 20 DEGREES
RBC # BLD AUTO: 2.64 X10(6)UL
SODIUM SERPL-SCNC: 127 MMOL/L (ref 136–145)
SODIUM SERPL-SCNC: 147 MMOL/L
T AXIS: 83 DEGREES
TSI SER-ACNC: 2.93 MIU/ML (ref 0.36–3.74)
URATE SERPL-MCNC: 2.6 MG/DL
VENTRICULAR RATE: 85 BPM
WBC # BLD AUTO: 14.2 X10(3) UL (ref 4–11)

## 2023-10-06 PROCEDURE — 99223 1ST HOSP IP/OBS HIGH 75: CPT | Performed by: INTERNAL MEDICINE

## 2023-10-06 PROCEDURE — 93306 TTE W/DOPPLER COMPLETE: CPT | Performed by: INTERNAL MEDICINE

## 2023-10-06 PROCEDURE — 99233 SBSQ HOSP IP/OBS HIGH 50: CPT | Performed by: INTERNAL MEDICINE

## 2023-10-06 RX ORDER — TRAZODONE HYDROCHLORIDE 50 MG/1
50 TABLET ORAL NIGHTLY
Status: DISCONTINUED | OUTPATIENT
Start: 2023-10-06 | End: 2023-10-15

## 2023-10-06 RX ORDER — SODIUM CHLORIDE 9 MG/ML
INJECTION, SOLUTION INTRAVENOUS CONTINUOUS
Status: DISCONTINUED | OUTPATIENT
Start: 2023-10-06 | End: 2023-10-09

## 2023-10-06 RX ORDER — ASPIRIN 325 MG
325 TABLET ORAL DAILY
Status: DISCONTINUED | OUTPATIENT
Start: 2023-10-06 | End: 2023-10-08

## 2023-10-06 RX ORDER — LOSARTAN POTASSIUM 100 MG/1
100 TABLET ORAL DAILY
Status: DISCONTINUED | OUTPATIENT
Start: 2023-10-06 | End: 2023-10-15

## 2023-10-06 RX ORDER — DOCUSATE SODIUM 100 MG/1
100 CAPSULE, LIQUID FILLED ORAL 2 TIMES DAILY
Status: DISCONTINUED | OUTPATIENT
Start: 2023-10-06 | End: 2023-10-09

## 2023-10-06 RX ORDER — ATORVASTATIN CALCIUM 10 MG/1
10 TABLET, FILM COATED ORAL NIGHTLY
Status: DISCONTINUED | OUTPATIENT
Start: 2023-10-06 | End: 2023-10-15

## 2023-10-06 RX ORDER — HYDROCHLOROTHIAZIDE 12.5 MG/1
12.5 TABLET ORAL DAILY
Status: DISCONTINUED | OUTPATIENT
Start: 2023-10-06 | End: 2023-10-06

## 2023-10-06 RX ORDER — HYDRALAZINE HYDROCHLORIDE 10 MG/1
20 TABLET, FILM COATED ORAL 3 TIMES DAILY
Status: DISCONTINUED | OUTPATIENT
Start: 2023-10-06 | End: 2023-10-15

## 2023-10-06 RX ORDER — MAGNESIUM OXIDE 400 MG/1
800 TABLET ORAL ONCE
Status: DISCONTINUED | OUTPATIENT
Start: 2023-10-06 | End: 2023-10-15

## 2023-10-06 RX ORDER — ESCITALOPRAM OXALATE 20 MG/1
20 TABLET ORAL NIGHTLY
Status: DISCONTINUED | OUTPATIENT
Start: 2023-10-06 | End: 2023-10-15

## 2023-10-06 RX ORDER — MAGNESIUM SULFATE HEPTAHYDRATE 40 MG/ML
2 INJECTION, SOLUTION INTRAVENOUS ONCE
Status: COMPLETED | OUTPATIENT
Start: 2023-10-06 | End: 2023-10-06

## 2023-10-06 RX ORDER — ACETAMINOPHEN 325 MG/1
650 TABLET ORAL EVERY 6 HOURS PRN
Status: DISCONTINUED | OUTPATIENT
Start: 2023-10-06 | End: 2023-10-15

## 2023-10-06 RX ORDER — QUETIAPINE FUMARATE 25 MG/1
25 TABLET, FILM COATED ORAL 2 TIMES DAILY PRN
Status: DISCONTINUED | OUTPATIENT
Start: 2023-10-06 | End: 2023-10-07

## 2023-10-06 RX ORDER — DESMOPRESSIN ACETATE 0.1 MG/1
0.1 TABLET ORAL NIGHTLY
Status: DISCONTINUED | OUTPATIENT
Start: 2023-10-06 | End: 2023-10-06

## 2023-10-06 RX ORDER — CARVEDILOL 12.5 MG/1
12.5 TABLET ORAL 2 TIMES DAILY WITH MEALS
Status: DISCONTINUED | OUTPATIENT
Start: 2023-10-06 | End: 2023-10-09

## 2023-10-06 RX ORDER — PANTOPRAZOLE SODIUM 20 MG/1
20 TABLET, DELAYED RELEASE ORAL
Status: DISCONTINUED | OUTPATIENT
Start: 2023-10-06 | End: 2023-10-08

## 2023-10-06 NOTE — IMAGING NOTE
TTE 7/13/23  The study quality is good. The left ventricle is moderately enlarged. Global left ventricular systolic function borderline. The left ventricular ejection fraction is 50%. The left ventricle diastolic function is impaired (Grade II) with an elevated left atrial pressure. Mild concentric left ventricular hypertrophy is present. No regional wall motion abnormalities were identifed. The left atrial diameter is mildly increased. Left atrial diameter is 4.1 cms. Moderate (2+) mitral regurgitation. Mild  aortic regurgitation. Mild tricuspid regurgitation. The estimated pulmonary artery systolic pressure is 40 mmHg assuming a right atrial pressure of 3 mmHg.

## 2023-10-06 NOTE — PLAN OF CARE
Fe Sal Patient Status:  Emergency    1935 MRN [id number]   Location [address] Attending Kevon Rosales MD   Hosp Day # 0 PCP Ascencion Patrick MD       Cardiology Nocturnal APN Note    Page Received: Dr Juma Ray @2599    HPI:     Patient is a 82year old female  with PMH of HTN, HLD, anxiety, fatty infiltration of liver, CKD, hyperparathyroidism, CVA, s/p hip surgery . Pt was brought ED after having a fall from nursing home. In ED pt was noted to be in afib. [facility] was consulted for afib. Pt follows up with Dr Soila Phillips as outpatient. HPI obtained from chart review and information provided by ER provider. EKG: Afib  Vital Signs:       10/5/2023     9:15 PM 10/5/2023     9:30 PM   Vitals History   /84 185/85   Pulse 76 87   Resp 17    SpO2 97 % 98 %        Labs:   Lab Results   Component Value Date    WBC 12.9 10/05/2023    HGB 8.8 10/05/2023    HCT 26.4 10/05/2023    .0 10/05/2023    CREATSERUM 0.82 10/05/2023    BUN 14 10/05/2023     10/05/2023    K 3.6 10/05/2023    CL 92 10/05/2023    CO2 27.0 10/05/2023     10/05/2023    CA 9.3 10/05/2023    ALB 2.8 10/05/2023    ALKPHO 63 10/05/2023    BILT 2.0 10/05/2023    TP 6.3 10/05/2023    AST 24 10/05/2023    ALT 15 10/05/2023       Diagnostics:   CT BRAIN OR HEAD (05921)    Result Date: 10/5/2023  CONCLUSION:  Chronic changes as described, no acute process    LOCATION:  [facility]   Dictated by (CST): Madison Crespo MD on 10/05/2023 at 8:59 PM     Finalized by (CST): Madison Crespo MD on 10/05/2023 at 9:04 PM       XR CHEST AP PORTABLE  (CPT=71045)    Result Date: 10/5/2023  CONCLUSION:    Stable cardiac enlargement, aortic vascular calcifications and some tortuosity aorta. No sign of CHF, or pneumothorax. The right chest appears clear. Mild atelectasis left base. No pneumothorax. Glenohumeral DJD bilaterally.   LOCATION:  [facility]      Dictated by (CST): Madison Crespo MD on 10/05/2023 at 7:18 PM     Finalized by (CST): Moises Dixon MD on 10/05/2023 at 7:19 PM       XR HIP W OR WO PELVIS 2 OR 3 VIEWS, LEFT (CPT=73502)    Result Date: 10/5/2023  CONCLUSION:    Femur fracture has been maintained in near anatomic alignment and position with intramedullary álvaro and screws. Fracture line remains visible, continued clinical and x-ray follow-up advised. No new fracture identified after the recent fall. LOCATION:  [facility]   Dictated by (CST): Moises Dixon MD on 10/05/2023 at 7:17 PM     Finalized by (CST): Moises Dixon MD on 10/05/2023 at 7:18 PM       XR FLUOROSCOPY C-ARM TIME LESS THAN 1 HOUR (CPT=76000)    Result Date: 9/26/2023  CONCLUSION:  Intraoperative guidance during ORIF of the proximal left femur. LOCATION:  [id number]    Dictated by (CST): Hazel Tellez MD on 9/26/2023 at 8:04 PM     Finalized by (CST): Hazel Tellez MD on 9/26/2023 at 8:04 PM       XR HIP W OR WO PELVIS 2 OR 3 VIEWS, LEFT (CPT=73502)    Result Date: 9/25/2023  CONCLUSION:  1. Comminuted impacted displaced left intratrochanteric fracture as detailed above. LOCATION:  [facility]   Dictated by (CST): Tip York MD on 9/25/2023 at 10:04 PM     Finalized by (CST): Tip York MD on 9/25/2023 at 10:05 PM       XR CHEST AP PORTABLE  (CPT=71045)    Result Date: 9/25/2023  CONCLUSION:  Mild left basilar atelectasis with minimal pulmonary venous congestion. Interstitial opacities may represent fibrotic changes. LOCATION:  [id number]      Dictated by (CST): Sandeep Kay MD on 9/25/2023 at 10:00 PM     Finalized by (CST): Sandeep Kay MD on 9/25/2023 at 10:02 PM       XR FEMUR MIN 2 VIEWS LEFT (MNH=54534)    Result Date: 9/25/2023  CONCLUSION:  See above.    LOCATION:  [id number]   Dictated by (CST): Sandeep Kay MD on 9/25/2023 at 9:58 PM     Finalized by (CST): Sandeep Kay MD on 9/25/2023 at 10:00 PM        Allergies:    Bactrim [Sulfametho*    NAUSEA ONLY  Nitrofurantoin          NAUSEA ONLY    Medications:  No current facility-administered medications for this encounter.        Assessment/Plan:  -  AC not started with low HGB   - Continue to monitor overnight  - Formal Cardiology consult to follow in AM.     Jean Pierre [address], [address]  10/5/2023  10:20 PM

## 2023-10-06 NOTE — PROGRESS NOTES
NURSING ADMISSION NOTE      Patient admitted via Cart  Oriented to room. Safety precautions initiated. Bed in low position. Call light in reach. Patient is alert to self    Patient accompanied by daughter, agitated, fixated on getting out of bed and going to the bathroom, reinforced the need to call before getting out of bed, bed alarm and video monitoring activated. Patient with low sodium IVF started per order. Patient with irregular HR, s.c Lovenox in the am. Regular/General diet, no risk factors for dysphagia, screening passed. Patient is incontinent, briefed, rolls from side to side. Discussed current POC with patient's daughter Jimbo Zhou, she is agreeable. Patient comfortable.

## 2023-10-06 NOTE — PHYSICAL THERAPY NOTE
Following pt for PT this AM.  Pt with increased agitation earlier this AM and had just fallen asleep. D/w RN, will check back as schedule permits or later during admit.

## 2023-10-06 NOTE — PROGRESS NOTES
Pt is A&Ox 2-3. Seizure precautions. Right limb precaution. Can get agitated and anxious; gets Seroquel BID PRN. Last dose was 1225. Vital sings stable, afebrile. Gets Norco for pain. SPO2 maintained on 2L. Continuous pulse ox. 1st degree AV heart block with paroxysmal supraventricular tachy. Denies any SOB, cough. Tele. Lovenox. Reg diet. Denies any n/v/d. Incontinent with a perwick. .9NS at 75ml/hr in the Left [facility]. IV Rocephen. Will continue to monitor. Pt is updated with plan of care.

## 2023-10-06 NOTE — OCCUPATIONAL THERAPY NOTE
Attempted to see pt for skilled OT services this date. First attempt RN asking to HOLD due to agitation, second attempt, RN requesting HOLD due to pt sleeping Will re-attempt as schedule allows.  Shaan Johnson, 10/06/23, 10:19 AM

## 2023-10-07 PROBLEM — G93.40 ENCEPHALOPATHY: Status: ACTIVE | Noted: 2023-10-07

## 2023-10-07 PROBLEM — F03.918 DEMENTIA WITH BEHAVIORAL DISTURBANCE (HCC): Status: ACTIVE | Noted: 2023-10-07

## 2023-10-07 PROBLEM — F02.80 ALZHEIMER'S DEMENTIA (HCC): Status: RESOLVED | Noted: 2023-10-06 | Resolved: 2023-10-07

## 2023-10-07 PROBLEM — G30.9 ALZHEIMER'S DEMENTIA (HCC): Status: RESOLVED | Noted: 2023-10-06 | Resolved: 2023-10-07

## 2023-10-07 PROBLEM — I48.0 PAROXYSMAL ATRIAL FIBRILLATION (HCC): Status: ACTIVE | Noted: 2023-10-05

## 2023-10-07 PROBLEM — F32.A DEPRESSION: Status: ACTIVE | Noted: 2023-10-07

## 2023-10-07 PROBLEM — G93.40 ACUTE ENCEPHALOPATHY: Status: ACTIVE | Noted: 2023-10-07

## 2023-10-07 LAB
ANION GAP SERPL CALC-SCNC: 7 MMOL/L (ref 0–18)
BASOPHILS # BLD AUTO: 0.05 X10(3) UL (ref 0–0.2)
BASOPHILS NFR BLD AUTO: 0.4 %
BUN BLD-MCNC: 32 MG/DL (ref 7–18)
CALCIUM BLD-MCNC: 9 MG/DL (ref 8.5–10.1)
CHLORIDE SERPL-SCNC: 102 MMOL/L (ref 98–112)
CO2 SERPL-SCNC: 22 MMOL/L (ref 21–32)
CREAT BLD-MCNC: 0.85 MG/DL
DEPRECATED HBV CORE AB SER IA-ACNC: 618.3 NG/ML
EGFRCR SERPLBLD CKD-EPI 2021: 67 ML/MIN/1.73M2 (ref 60–?)
EOSINOPHIL # BLD AUTO: 0.12 X10(3) UL (ref 0–0.7)
EOSINOPHIL NFR BLD AUTO: 1 %
ERYTHROCYTE [DISTWIDTH] IN BLOOD BY AUTOMATED COUNT: 17.1 %
GLUCOSE BLD-MCNC: 99 MG/DL (ref 70–99)
HCT VFR BLD AUTO: 21.7 %
HGB BLD-MCNC: 7.1 G/DL
IMM GRANULOCYTES # BLD AUTO: 0.11 X10(3) UL (ref 0–1)
IMM GRANULOCYTES NFR BLD: 0.9 %
IRON SATN MFR SERPL: 24 %
IRON SERPL-MCNC: 57 UG/DL
LYMPHOCYTES # BLD AUTO: 0.93 X10(3) UL (ref 1–4)
LYMPHOCYTES NFR BLD AUTO: 7.7 %
MAGNESIUM SERPL-MCNC: 2.3 MG/DL (ref 1.6–2.6)
MCH RBC QN AUTO: 30.7 PG (ref 26–34)
MCHC RBC AUTO-ENTMCNC: 32.7 G/DL (ref 31–37)
MCV RBC AUTO: 93.9 FL
MONOCYTES # BLD AUTO: 1.2 X10(3) UL (ref 0.1–1)
MONOCYTES NFR BLD AUTO: 10 %
NEUTROPHILS # BLD AUTO: 9.64 X10 (3) UL (ref 1.5–7.7)
NEUTROPHILS # BLD AUTO: 9.64 X10(3) UL (ref 1.5–7.7)
NEUTROPHILS NFR BLD AUTO: 80 %
OSMOLALITY SERPL CALC.SUM OF ELEC: 279 MOSM/KG (ref 275–295)
PHOSPHATE SERPL-MCNC: 2.2 MG/DL (ref 2.5–4.9)
PLATELET # BLD AUTO: 322 10(3)UL (ref 150–450)
POTASSIUM SERPL-SCNC: 4 MMOL/L (ref 3.5–5.1)
RBC # BLD AUTO: 2.31 X10(6)UL
SODIUM SERPL-SCNC: 131 MMOL/L (ref 136–145)
TIBC SERPL-MCNC: 237 UG/DL (ref 240–450)
TRANSFERRIN SERPL-MCNC: 159 MG/DL (ref 200–360)
WBC # BLD AUTO: 12.1 X10(3) UL (ref 4–11)

## 2023-10-07 PROCEDURE — 99232 SBSQ HOSP IP/OBS MODERATE 35: CPT | Performed by: INTERNAL MEDICINE

## 2023-10-07 PROCEDURE — 90792 PSYCH DIAG EVAL W/MED SRVCS: CPT | Performed by: PHYSICIAN ASSISTANT

## 2023-10-07 PROCEDURE — 99233 SBSQ HOSP IP/OBS HIGH 50: CPT | Performed by: OTHER

## 2023-10-07 RX ORDER — OLANZAPINE 5 MG/1
5 TABLET ORAL NIGHTLY
Status: DISCONTINUED | OUTPATIENT
Start: 2023-10-07 | End: 2023-10-11

## 2023-10-07 RX ORDER — OLANZAPINE 2.5 MG/1
2.5 TABLET ORAL 2 TIMES DAILY PRN
Status: DISCONTINUED | OUTPATIENT
Start: 2023-10-07 | End: 2023-10-15

## 2023-10-07 NOTE — PROGRESS NOTES
Patient seen for follow-up. Patient's daughter available at time of visit. Patient slightly better compared to yesterday but probably not back to her baseline. Drowsy but arousable. Answering questions appropriately no witnessed episodes of seizure or loss of consciousness noted. Denies any pain. On exam, drowsy but arousable, answering questions, following simple commands. No dysarthria or aphasia noted. Moving all 4 extremities. Investigations:  CT scan of the head: No evidence of acute intracranial abnormality noted. Chronic ischemic changes noted. EEG: Mild to moderate intermittent delta and theta slowing noted. No epileptiform activity noted. Consistent with encephalopathy. Hyponatremia. Sodium improved from 1 27-1 31. Hypomagnesemia. Magnesium improved from 1.5-2.3. Anemia. Last hemoglobin 7.1      Impression/medical decision making:    Encephalopathy, possibly complicated by underlying cognitive decline, further complicated by multiple metabolic abnormalities including hyponatremia and anemia as well as medications including escitalopram, Zyprexa and pain meds. Situation discussed with patient daughter in detail. She feels that patient's baseline has significantly changed in the last few weeks. She is also mentioned about dropping in hemoglobin. Advised to discuss further with the hospitalist to see if patient would be a candidate for transfusion. No further neurological intervention needed at this time. Possibility of requesting an MRI was discussed. It was agreed that at this time the daughter would like to monitor patient's situation.

## 2023-10-07 NOTE — PLAN OF CARE
Patient AAOx2-3. Room air. Tele, can be irregular. Purewick/brief. Bruising and mepilex to left hip. Cardiac diet, pills whole one at a time. IVF. Nephrology paged regarding electrolyte replacement, no need to replace per MD. PT worked with patient, max assist x2, sitting up in chair. Seen by psych, managing psych meds to be changed. Patient rounded on routinely. Patient and family updated on plan of care. Problem: PAIN - ADULT  Goal: Verbalizes/displays adequate comfort level or patient's stated pain goal  Description: INTERVENTIONS:  - Encourage pt to monitor pain and request assistance  - Assess pain using appropriate pain scale  - Administer analgesics based on type and severity of pain and evaluate response  - Implement non-pharmacological measures as appropriate and evaluate response  - Consider cultural and social influences on pain and pain management  - Manage/alleviate anxiety  - Utilize distraction and/or relaxation techniques  - Monitor for opioid side effects  - Notify MD/LIP if interventions unsuccessful or patient reports new pain  - Anticipate increased pain with activity and pre-medicate as appropriate  Outcome: Progressing     Problem: RISK FOR INFECTION - ADULT  Goal: Absence of fever/infection during anticipated neutropenic period  Description: INTERVENTIONS  - Monitor WBC  - Administer growth factors as ordered  - Implement neutropenic guidelines  Outcome: Progressing     Problem: SAFETY ADULT - FALL  Goal: Free from fall injury  Description: INTERVENTIONS:  - Assess pt frequently for physical needs  - Identify cognitive and physical deficits and behaviors that affect risk of falls.   - [place] fall precautions as indicated by assessment.  - Educate pt/family on patient safety including physical limitations  - Instruct pt to call for assistance with activity based on assessment  - Modify environment to reduce risk of injury  - Provide assistive devices as appropriate  - Consider OT/PT consult to assist with strengthening/mobility  - Encourage toileting schedule  Outcome: Progressing     Problem: DISCHARGE PLANNING  Goal: Discharge to home or other facility with appropriate resources  Description: INTERVENTIONS:  - Identify barriers to discharge w/pt and caregiver  - Include patient/family/discharge partner in discharge planning  - Arrange for needed discharge resources and transportation as appropriate  - Identify discharge learning needs (meds, wound care, etc)  - Arrange for interpreters to assist at discharge as needed  - Consider post-discharge preferences of patient/family/discharge partner  - Complete POLST form as appropriate  - Assess patient's ability to be responsible for managing their own health  - Refer to Case Management Department for coordinating discharge planning if the patient needs post-hospital services based on physician/LIP order or complex needs related to functional status, cognitive ability or social support system  Outcome: Progressing

## 2023-10-07 NOTE — PHYSICAL THERAPY NOTE
PHYSICAL THERAPY EVALUATION - INPATIENT     Room Number: 522/522-A  Evaluation Date: 10/7/2023  Type of Evaluation: Initial  Physician Order: PT Eval and Treat    Presenting Problem: unwitnessed fall  Co-Morbidities : L IM nailing 9/25 due to L displaced trochanteric fx, CKD, CVA, HTN, anxiety, hyperparathyroidism, Dementia, Breast Ca  Reason for Therapy: Mobility Dysfunction and Discharge Planning    History related to current admission: Patient is a 82year old female admitted on 10/5/2023 from [place]. [facility] for unwitnessed fall. Pt diagnosed with hyponatremia, acute cystitis without hematuria, fall, a-fib. 10/5 -ray L hip/pelvis:  Femur fracture has been maintained in near anatomic alignment and position with intramedullary álvaro and screws. Fracture line remains visible, continued clinical and x-ray follow-up advised. No new fracture identified after the recent   fall. 10/5 Chest X-ray: Stable cardiac enlargement, aortic vascular calcifications and some tortuosity aorta. No sign of CHF, or pneumothorax. The right chest appears clear. Mild atelectasis left base. No pneumothorax. Glenohumeral DJD bilaterally. 10/5 CT brain/head:  Chronic changes as described, no acute process       Recent Admission:   9/25-10/1 due to fall resulting to L displaced trochanteric fracture with IM nailing, discharged to [address] [place] [address]   In this PT evaluation, the patient presents with the following impairments: alertness, activity tolerance, gait tolerance, standing tolerance, safety, cognition. Patient reports of increase in pain in the L LE with functional mobility tasks. These impairments and comorbidities manifest themselves as functional limitations in independent bed mobility, transfers, and gait. The patient is below baseline and would benefit from skilled inpatient PT to address the above deficits to assist patient in returning to prior to level of function.    Functional outcome measures completed include [facility]. The AM-PAC '6-Clicks' Inpatient Basic Mobility Short Form was completed and this patient is demonstrating a Approx Degree of Impairment: 61.29%  degree of impairment in mobility. Research supports that patients with this level of impairment may benefit from LACHELLE. DISCHARGE RECOMMENDATIONS  PT Discharge Recommendations: Sub-acute rehabilitation    PLAN  PT Treatment Plan: Bed mobility; Patient education;Gait training;Range of motion;Strengthening;Transfer training  Rehab Potential : Fair  Frequency (Obs): 3-5x/week         CURRENT GOALS    Goal #1 Patient is able to demonstrate supine - sit EOB @ level: supervision     Goal #2 Patient is able to demonstrate transfers Sit to/from Stand at assistance level: SBA     Goal #3 Patient is able to ambulate 20 feet with assist device: walker - rolling at assistance level: minimum assistance     Goal #4    Goal #5    Goal #6    Goal Comments: Goals established on 10/7/2023    HOME SITUATION  Type of Home: House   Home Layout: Multi-level;Bed/bath upstairs        Stairs to Bedroom: 7  Railing: Yes    Lives With: Daughter;Family (Daughter, MIMI, grandkids)  Drives: No  Patient Owned Equipment: Rolling walker       Prior Level of Kay: Daughter reports patient was supervision gait without device, supervision with stair negotiation and needed assistance with dressing prior to L trochanteric fracture and surgery. Patient has caregiver M-F 4 hrs/day. SUBJECTIVE  \"I can't do it\" when asked to get up      OBJECTIVE  Precautions: Bed/chair alarm  Fall Risk: High fall risk    WEIGHT BEARING RESTRICTION  Weight Bearing Restriction: L lower extremity           L Lower Extremity: Weight Bearing as Tolerated (per PT daughter report)    PAIN ASSESSMENT  Rating: Unable to rate  Location: L LE  Management Techniques:  Activity promotion;Repositioning;Relaxation    COGNITION  Arousal/Alertness:  delayed responses to stimuli  Attention Span:  attends with cues to redirect  Orientation Level:  oriented to place and oriented to person  Following Commands:  follows one step commands with increased time and follows one step commands with repetition  Initiation: cues to initiate tasks and hand over hand to initiate tasks  Motor Planning: impaired  Safety Judgement:  decreased awareness of need for assistance and decreased awareness of need for safety  Awareness of Errors:  assistance required to identify errors made, assistance required to correct errors made, and decreased awareness of errors     RANGE OF MOTION AND STRENGTH ASSESSMENT  Upper extremity ROM and strength are within functional limits     Lower extremity ROM is within functional limits, L LE AAROM    Lower extremity strength is within functional limits; L hip 3-/5      BALANCE  Static Sitting: Fair  Dynamic Sitting: Fair -  Static Standing: Poor - (with RW)  Dynamic Standing: Poor - (with RW)    ADDITIONAL TESTS                                    ACTIVITY TOLERANCE                         O2 WALK       NEUROLOGICAL FINDINGS                        AM-PAC '6-Clicks' INPATIENT SHORT FORM - BASIC MOBILITY  How much difficulty does the patient currently have. .. Patient Difficulty: Turning over in bed (including adjusting bedclothes, sheets and blankets)?: A Little   Patient Difficulty: Sitting down on and standing up from a chair with arms (e.g., wheelchair, bedside commode, etc.): A Little   Patient Difficulty: Moving from lying on back to sitting on the side of the bed?: A Little   How much help from another person does the patient currently need. ..    Help from Another: Moving to and from a bed to a chair (including a wheelchair)?: A Lot   Help from Another: Need to walk in hospital room?: A Lot   Help from Another: Climbing 3-5 steps with a railing?: Total       AM-PAC Score:  Raw Score: 14   Approx Degree of Impairment: 61.29%   Standardized Score (AM-PAC Scale): 38.1   CMS Modifier (G-Code): CL    FUNCTIONAL ABILITY STATUS  Gait Assessment   Functional Mobility/Gait Assessment  Gait Assistance: Moderate assistance  Distance (ft): 3  Assistive Device: Rolling walker  Pattern:  (Decr darshana/step length/heel-toe pattern, drags L foot)    Skilled Therapy Provided     Bed Mobility:  Rolling: min assist with HOB elevated towards R  Supine to sit: mod assist to trunk towards R with HOB elevated   Sit to supine: NT     Transfer Mobility:  Sit to stand: min assist with the RW   Stand to sit: mod assist/min-mod assist x 2  Gait = mod assist x 2    Therapist's Comments: RN approved session, patient was received in bed sleeping with HOB elevated. Patient needed cues to stay wake up; patient performed bed mobility as above supine->sit, patient assumed sitting at the EOB reporting dizziness, BP stable. Patient was encouraged to do transfers and gait, patient required mod assist x 2 during gait with constant cues provided for safety and sequence, patient assumed forward flexed posture with the RW and did not clear L foot off the floor, increase in WB thru UE on the RW and required mod assist to sit down in the chair, patient was able to scoot back in the chair min assist provided, LE elevated sitting in the recliner chair. Exercise/Education Provided:  Discussed role of PT, goals for the session, POC. Patient performed AAROM LE sitting in the recliner chair. Patient was educated on safety, activity recommendations and emphasized to patient need to be OOB as tolerated and call staff for any mobility to decrease fall risk, patient verbalized understanding. Patient End of Session: Up in chair;Needs met;Call light within reach;RN aware of session/findings; All patient questions and concerns addressed; Alarm set; Family present      Patient Evaluation Complexity Level:  History Moderate - 1 or 2 personal factors and/or co-morbidities   Examination of body systems Moderate - addressing a total of 3 or more elements   Clinical Presentation Moderate - Evolving   Clinical Decision Making Moderate - Evolving       PT Session Time: 35 minutes  Gait Trainin minutes  Therapeutic Activity: 10 minutes  Neuromuscular Re-education:  minutes  Therapeutic Exercise: 5 minutes

## 2023-10-08 ENCOUNTER — APPOINTMENT (OUTPATIENT)
Dept: ULTRASOUND IMAGING | Facility: HOSPITAL | Age: 88
End: 2023-10-08
Attending: INTERNAL MEDICINE

## 2023-10-08 ENCOUNTER — APPOINTMENT (OUTPATIENT)
Dept: CT IMAGING | Facility: HOSPITAL | Age: 88
End: 2023-10-08
Attending: INTERNAL MEDICINE

## 2023-10-08 ENCOUNTER — APPOINTMENT (OUTPATIENT)
Dept: CT IMAGING | Facility: HOSPITAL | Age: 88
DRG: 377 | End: 2023-10-08
Attending: INTERNAL MEDICINE

## 2023-10-08 ENCOUNTER — APPOINTMENT (OUTPATIENT)
Dept: ULTRASOUND IMAGING | Facility: HOSPITAL | Age: 88
DRG: 377 | End: 2023-10-08
Attending: INTERNAL MEDICINE
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
## 2023-10-08 PROBLEM — F41.9 ANXIETY DISORDER: Status: ACTIVE | Noted: 2023-10-08

## 2023-10-08 PROBLEM — D64.9 NORMOCYTIC ANEMIA: Status: ACTIVE | Noted: 2023-10-08

## 2023-10-08 LAB
ANION GAP SERPL CALC-SCNC: 4 MMOL/L (ref 0–18)
ANTIBODY SCREEN: NEGATIVE
APTT PPP: 26.9 SECONDS (ref 23.3–35.6)
BASOPHILS # BLD AUTO: 0.03 X10(3) UL (ref 0–0.2)
BASOPHILS # BLD AUTO: 0.04 X10(3) UL (ref 0–0.2)
BASOPHILS NFR BLD AUTO: 0.3 %
BASOPHILS NFR BLD AUTO: 0.3 %
BUN BLD-MCNC: 42 MG/DL (ref 7–18)
CALCIUM BLD-MCNC: 8.7 MG/DL (ref 8.5–10.1)
CHLORIDE SERPL-SCNC: 109 MMOL/L (ref 98–112)
CO2 SERPL-SCNC: 25 MMOL/L (ref 21–32)
CREAT BLD-MCNC: 0.83 MG/DL
CRP SERPL-MCNC: 6.13 MG/DL (ref ?–0.3)
EGFRCR SERPLBLD CKD-EPI 2021: 69 ML/MIN/1.73M2 (ref 60–?)
EOSINOPHIL # BLD AUTO: 0.04 X10(3) UL (ref 0–0.7)
EOSINOPHIL # BLD AUTO: 0.16 X10(3) UL (ref 0–0.7)
EOSINOPHIL NFR BLD AUTO: 0.3 %
EOSINOPHIL NFR BLD AUTO: 1.5 %
ERYTHROCYTE [DISTWIDTH] IN BLOOD BY AUTOMATED COUNT: 16.6 %
ERYTHROCYTE [DISTWIDTH] IN BLOOD BY AUTOMATED COUNT: 16.8 %
ERYTHROCYTE [DISTWIDTH] IN BLOOD BY AUTOMATED COUNT: 18 %
ERYTHROCYTE [SEDIMENTATION RATE] IN BLOOD: 1 MM/HR
FOLATE SERPL-MCNC: 19.1 NG/ML (ref 8.7–?)
GLUCOSE BLD-MCNC: 100 MG/DL (ref 70–99)
HAPTOGLOB SERPL-MCNC: 64.6 MG/DL (ref 30–200)
HCT VFR BLD AUTO: 17.6 %
HCT VFR BLD AUTO: 18.2 %
HCT VFR BLD AUTO: 23.2 %
HGB BLD-MCNC: 5.6 G/DL
HGB BLD-MCNC: 6 G/DL
HGB BLD-MCNC: 7.7 G/DL
HGB RETIC QN AUTO: 32.6 PG (ref 28.2–36.6)
IMM GRANULOCYTES # BLD AUTO: 0.13 X10(3) UL (ref 0–1)
IMM GRANULOCYTES # BLD AUTO: 0.15 X10(3) UL (ref 0–1)
IMM GRANULOCYTES NFR BLD: 1.1 %
IMM GRANULOCYTES NFR BLD: 1.2 %
IMM RETICS NFR: 0.37 RATIO (ref 0.1–0.3)
INR BLD: 1.31 (ref 0.85–1.16)
LDH SERPL L TO P-CCNC: 253 U/L
LYMPHOCYTES # BLD AUTO: 1.01 X10(3) UL (ref 1–4)
LYMPHOCYTES # BLD AUTO: 1.26 X10(3) UL (ref 1–4)
LYMPHOCYTES NFR BLD AUTO: 9.4 %
LYMPHOCYTES NFR BLD AUTO: 9.5 %
MAGNESIUM SERPL-MCNC: 2.1 MG/DL (ref 1.6–2.6)
MCH RBC QN AUTO: 30.3 PG (ref 26–34)
MCH RBC QN AUTO: 30.8 PG (ref 26–34)
MCH RBC QN AUTO: 31.6 PG (ref 26–34)
MCHC RBC AUTO-ENTMCNC: 31.8 G/DL (ref 31–37)
MCHC RBC AUTO-ENTMCNC: 33 G/DL (ref 31–37)
MCHC RBC AUTO-ENTMCNC: 33.2 G/DL (ref 31–37)
MCV RBC AUTO: 92.8 FL
MCV RBC AUTO: 95.1 FL
MCV RBC AUTO: 95.8 FL
MONOCYTES # BLD AUTO: 1.08 X10(3) UL (ref 0.1–1)
MONOCYTES # BLD AUTO: 1.35 X10(3) UL (ref 0.1–1)
MONOCYTES NFR BLD AUTO: 10.1 %
MONOCYTES NFR BLD AUTO: 10.2 %
NEUTROPHILS # BLD AUTO: 10.42 X10 (3) UL (ref 1.5–7.7)
NEUTROPHILS # BLD AUTO: 10.42 X10(3) UL (ref 1.5–7.7)
NEUTROPHILS # BLD AUTO: 8.31 X10 (3) UL (ref 1.5–7.7)
NEUTROPHILS # BLD AUTO: 8.31 X10(3) UL (ref 1.5–7.7)
NEUTROPHILS NFR BLD AUTO: 77.5 %
NEUTROPHILS NFR BLD AUTO: 78.6 %
OSMOLALITY SERPL CALC.SUM OF ELEC: 297 MOSM/KG (ref 275–295)
PHOSPHATE SERPL-MCNC: 2.6 MG/DL (ref 2.5–4.9)
PLATELET # BLD AUTO: 270 10(3)UL (ref 150–450)
PLATELET # BLD AUTO: 289 10(3)UL (ref 150–450)
PLATELET # BLD AUTO: 322 10(3)UL (ref 150–450)
POTASSIUM SERPL-SCNC: 4 MMOL/L (ref 3.5–5.1)
PROTHROMBIN TIME: 16.3 SECONDS (ref 11.6–14.8)
RBC # BLD AUTO: 1.85 X10(6)UL
RBC # BLD AUTO: 1.9 X10(6)UL
RBC # BLD AUTO: 2.5 X10(6)UL
RETICS # AUTO: 143.8 X10(3) UL (ref 22.5–147.5)
RETICS/RBC NFR AUTO: 7.6 %
RH BLOOD TYPE: POSITIVE
SODIUM SERPL-SCNC: 138 MMOL/L (ref 136–145)
VIT B12 SERPL-MCNC: 720 PG/ML (ref 193–986)
WBC # BLD AUTO: 10.7 X10(3) UL (ref 4–11)
WBC # BLD AUTO: 13.3 X10(3) UL (ref 4–11)
WBC # BLD AUTO: 8.6 X10(3) UL (ref 4–11)

## 2023-10-08 PROCEDURE — 99232 SBSQ HOSP IP/OBS MODERATE 35: CPT | Performed by: INTERNAL MEDICINE

## 2023-10-08 PROCEDURE — 76882 US LMTD JT/FCL EVL NVASC XTR: CPT | Performed by: INTERNAL MEDICINE

## 2023-10-08 PROCEDURE — 74176 CT ABD & PELVIS W/O CONTRAST: CPT | Performed by: INTERNAL MEDICINE

## 2023-10-08 PROCEDURE — 30233N1 TRANSFUSION OF NONAUTOLOGOUS RED BLOOD CELLS INTO PERIPHERAL VEIN, PERCUTANEOUS APPROACH: ICD-10-PCS | Performed by: INTERNAL MEDICINE

## 2023-10-08 PROCEDURE — 99233 SBSQ HOSP IP/OBS HIGH 50: CPT | Performed by: INTERNAL MEDICINE

## 2023-10-08 PROCEDURE — 99232 SBSQ HOSP IP/OBS MODERATE 35: CPT | Performed by: PHYSICIAN ASSISTANT

## 2023-10-08 PROCEDURE — 99223 1ST HOSP IP/OBS HIGH 75: CPT | Performed by: INTERNAL MEDICINE

## 2023-10-08 PROCEDURE — 75635 CT ANGIO ABDOMINAL ARTERIES: CPT | Performed by: INTERNAL MEDICINE

## 2023-10-08 PROCEDURE — 73706 CT ANGIO LWR EXTR W/O&W/DYE: CPT | Performed by: INTERNAL MEDICINE

## 2023-10-08 RX ORDER — PANTOPRAZOLE SODIUM 40 MG/1
40 TABLET, DELAYED RELEASE ORAL
Status: DISCONTINUED | OUTPATIENT
Start: 2023-10-09 | End: 2023-10-08

## 2023-10-08 RX ORDER — SODIUM CHLORIDE 9 MG/ML
INJECTION, SOLUTION INTRAVENOUS ONCE
Status: COMPLETED | OUTPATIENT
Start: 2023-10-08 | End: 2023-10-08

## 2023-10-08 RX ORDER — PANTOPRAZOLE SODIUM 40 MG/1
40 TABLET, DELAYED RELEASE ORAL
Status: DISCONTINUED | OUTPATIENT
Start: 2023-10-08 | End: 2023-10-09

## 2023-10-08 NOTE — PLAN OF CARE
Patient AAOx2-3. Room air. Tele. Purewick/brief. Bruising and mepilex to left hip. Cardiac diet, pills whole one at a time. IVF. max assist x2. PRN zyprexa. Critical hgb of 5.6, MD paged. 1 unit of PRBCs transfused. Orders received for CT abd/pelvis and leg ultrasound, both completed. Consults placed to GI, heme/onc, and ortho, all paged in. Patient rounded on routinely. Patient and family updated on plan of care. Post transfusion hgb 6.0, hospitalist paged and 2nd unit ordered. Patient also with BM, black liquid. Hospitalist and GI paged. Ortho paged for repeat hgb level. Patient also with 9 second run of SVT per tele, hospitalist paged. Problem: PAIN - ADULT  Goal: Verbalizes/displays adequate comfort level or patient's stated pain goal  Description: INTERVENTIONS:  - Encourage pt to monitor pain and request assistance  - Assess pain using appropriate pain scale  - Administer analgesics based on type and severity of pain and evaluate response  - Implement non-pharmacological measures as appropriate and evaluate response  - Consider cultural and social influences on pain and pain management  - Manage/alleviate anxiety  - Utilize distraction and/or relaxation techniques  - Monitor for opioid side effects  - Notify MD/LIP if interventions unsuccessful or patient reports new pain  - Anticipate increased pain with activity and pre-medicate as appropriate  Outcome: Progressing     Problem: RISK FOR INFECTION - ADULT  Goal: Absence of fever/infection during anticipated neutropenic period  Description: INTERVENTIONS  - Monitor WBC  - Administer growth factors as ordered  - Implement neutropenic guidelines  Outcome: Progressing     Problem: SAFETY ADULT - FALL  Goal: Free from fall injury  Description: INTERVENTIONS:  - Assess pt frequently for physical needs  - Identify cognitive and physical deficits and behaviors that affect risk of falls.   - [place] fall precautions as indicated by assessment.  - Educate pt/family on patient safety including physical limitations  - Instruct pt to call for assistance with activity based on assessment  - Modify environment to reduce risk of injury  - Provide assistive devices as appropriate  - Consider OT/PT consult to assist with strengthening/mobility  - Encourage toileting schedule  Outcome: Progressing     Problem: DISCHARGE PLANNING  Goal: Discharge to home or other facility with appropriate resources  Description: INTERVENTIONS:  - Identify barriers to discharge w/pt and caregiver  - Include patient/family/discharge partner in discharge planning  - Arrange for needed discharge resources and transportation as appropriate  - Identify discharge learning needs (meds, wound care, etc)  - Arrange for interpreters to assist at discharge as needed  - Consider post-discharge preferences of patient/family/discharge partner  - Complete POLST form as appropriate  - Assess patient's ability to be responsible for managing their own health  - Refer to Case Management Department for coordinating discharge planning if the patient needs post-hospital services based on physician/LIP order or complex needs related to functional status, cognitive ability or social support system  Outcome: Progressing

## 2023-10-08 NOTE — OCCUPATIONAL THERAPY NOTE
OCCUPATIONAL THERAPY                              OT order received, chart reviewed.  Patient's hemoglobin 5.6 and is currently receiving a blood transfusion Will hold initiation of OT services today and will follow up at a later date as appropriate

## 2023-10-08 NOTE — PROGRESS NOTES
Patient alert and oriented to self. Continued to wake up and call out frequently during the early evening, but slept soundly during the second part of the night. More easily redirectable, more coherent when awake, compared to previous two nights. She is on RA, sinus rhythm on telemetry, irregular HR<100 bmp. S,c. Lovenox. Regular diet, tolerating well, on stool softener, no BM, passing gas. Incontinent, briefed with purewick, states less pain with urination. On IV Rocephin, NS @ 50 mL/hr. Patient able to roll from side to side but turning on left side painful due to hip fx. She is high fall risk, video monitoring maintained.

## 2023-10-08 NOTE — CM/SW NOTE
SW noted PT recommendation for LACHELLE. Chart reviewed, pt admitted from [facility]. [facility] for [facility]HELLE. Referral sent to [facility]. [facility] in [address] [place] [address] Road. Insurance authorization needed prior to discharge. ÁNGEL will continue to follow.     LETY Littlejohn  Discharge Planner

## 2023-10-09 ENCOUNTER — ANESTHESIA (OUTPATIENT)
Dept: ENDOSCOPY | Facility: HOSPITAL | Age: 88
End: 2023-10-09
Payer: MEDICARE

## 2023-10-09 ENCOUNTER — ANESTHESIA EVENT (OUTPATIENT)
Dept: ENDOSCOPY | Facility: HOSPITAL | Age: 88
End: 2023-10-09
Payer: MEDICARE

## 2023-10-09 PROBLEM — N17.9 AKI (ACUTE KIDNEY INJURY): Status: ACTIVE | Noted: 2023-10-09

## 2023-10-09 PROBLEM — D62 ACUTE BLOOD LOSS ANEMIA: Status: ACTIVE | Noted: 2023-10-09

## 2023-10-09 PROBLEM — R34 OLIGURIA: Status: ACTIVE | Noted: 2023-10-09

## 2023-10-09 PROBLEM — R79.89 AZOTEMIA: Status: ACTIVE | Noted: 2023-10-09

## 2023-10-09 PROBLEM — K92.2 GASTROINTESTINAL HEMORRHAGE: Status: ACTIVE | Noted: 2023-10-09

## 2023-10-09 PROBLEM — I95.9 ARTERIAL HYPOTENSION: Status: ACTIVE | Noted: 2023-10-09

## 2023-10-09 PROBLEM — N17.9 AKI (ACUTE KIDNEY INJURY) (HCC): Status: ACTIVE | Noted: 2023-10-09

## 2023-10-09 PROBLEM — S80.12XA HEMATOMA OF LEFT LOWER EXTREMITY: Status: ACTIVE | Noted: 2023-10-09

## 2023-10-09 PROBLEM — D62 ANEMIA ASSOCIATED WITH ACUTE BLOOD LOSS: Status: ACTIVE | Noted: 2023-10-09

## 2023-10-09 LAB
ANION GAP SERPL CALC-SCNC: 5 MMOL/L (ref 0–18)
BASOPHILS # BLD AUTO: 0.03 X10(3) UL (ref 0–0.2)
BASOPHILS # BLD AUTO: 0.05 X10(3) UL (ref 0–0.2)
BASOPHILS NFR BLD AUTO: 0.3 %
BASOPHILS NFR BLD AUTO: 0.4 %
BLOOD TYPE BARCODE: 5100
BUN BLD-MCNC: 50 MG/DL (ref 7–18)
CALCIUM BLD-MCNC: 8.5 MG/DL (ref 8.5–10.1)
CHLORIDE SERPL-SCNC: 113 MMOL/L (ref 98–112)
CO2 SERPL-SCNC: 22 MMOL/L (ref 21–32)
CREAT BLD-MCNC: 0.83 MG/DL
EGFRCR SERPLBLD CKD-EPI 2021: 69 ML/MIN/1.73M2 (ref 60–?)
EOSINOPHIL # BLD AUTO: 0.04 X10(3) UL (ref 0–0.7)
EOSINOPHIL # BLD AUTO: 0.2 X10(3) UL (ref 0–0.7)
EOSINOPHIL NFR BLD AUTO: 0.4 %
EOSINOPHIL NFR BLD AUTO: 1.7 %
ERYTHROCYTE [DISTWIDTH] IN BLOOD BY AUTOMATED COUNT: 16.2 %
ERYTHROCYTE [DISTWIDTH] IN BLOOD BY AUTOMATED COUNT: 17.7 %
GLUCOSE BLD-MCNC: 111 MG/DL (ref 70–99)
HCT VFR BLD AUTO: 19.8 %
HCT VFR BLD AUTO: 23.9 %
HGB BLD-MCNC: 6.5 G/DL
HGB BLD-MCNC: 7.9 G/DL
IMM GRANULOCYTES # BLD AUTO: 0.13 X10(3) UL (ref 0–1)
IMM GRANULOCYTES # BLD AUTO: 0.17 X10(3) UL (ref 0–1)
IMM GRANULOCYTES NFR BLD: 1.1 %
IMM GRANULOCYTES NFR BLD: 1.5 %
LYMPHOCYTES # BLD AUTO: 0.81 X10(3) UL (ref 1–4)
LYMPHOCYTES # BLD AUTO: 1.46 X10(3) UL (ref 1–4)
LYMPHOCYTES NFR BLD AUTO: 12.1 %
LYMPHOCYTES NFR BLD AUTO: 7.3 %
MAGNESIUM SERPL-MCNC: 2.2 MG/DL (ref 1.6–2.6)
MCH RBC QN AUTO: 30.7 PG (ref 26–34)
MCH RBC QN AUTO: 31.1 PG (ref 26–34)
MCHC RBC AUTO-ENTMCNC: 32.8 G/DL (ref 31–37)
MCHC RBC AUTO-ENTMCNC: 33.1 G/DL (ref 31–37)
MCV RBC AUTO: 93.4 FL
MCV RBC AUTO: 94.1 FL
MONOCYTES # BLD AUTO: 0.78 X10(3) UL (ref 0.1–1)
MONOCYTES # BLD AUTO: 1.24 X10(3) UL (ref 0.1–1)
MONOCYTES NFR BLD AUTO: 10.3 %
MONOCYTES NFR BLD AUTO: 7.1 %
MRSA DNA SPEC QL NAA+PROBE: NEGATIVE
NEUTROPHILS # BLD AUTO: 8.99 X10 (3) UL (ref 1.5–7.7)
NEUTROPHILS # BLD AUTO: 8.99 X10(3) UL (ref 1.5–7.7)
NEUTROPHILS # BLD AUTO: 9.22 X10 (3) UL (ref 1.5–7.7)
NEUTROPHILS # BLD AUTO: 9.22 X10(3) UL (ref 1.5–7.7)
NEUTROPHILS NFR BLD AUTO: 74.4 %
NEUTROPHILS NFR BLD AUTO: 83.4 %
OSMOLALITY SERPL CALC.SUM OF ELEC: 304 MOSM/KG (ref 275–295)
PLATELET # BLD AUTO: 201 10(3)UL (ref 150–450)
PLATELET # BLD AUTO: 243 10(3)UL (ref 150–450)
POTASSIUM SERPL-SCNC: 3.9 MMOL/L (ref 3.5–5.1)
RBC # BLD AUTO: 2.12 X10(6)UL
RBC # BLD AUTO: 2.54 X10(6)UL
SODIUM SERPL-SCNC: 140 MMOL/L (ref 136–145)
UNIT VOLUME: 350 ML
WBC # BLD AUTO: 11.1 X10(3) UL (ref 4–11)
WBC # BLD AUTO: 12.1 X10(3) UL (ref 4–11)

## 2023-10-09 PROCEDURE — 3E0G8GC INTRODUCTION OF OTHER THERAPEUTIC SUBSTANCE INTO UPPER GI, VIA NATURAL OR ARTIFICIAL OPENING ENDOSCOPIC: ICD-10-PCS | Performed by: STUDENT IN AN ORGANIZED HEALTH CARE EDUCATION/TRAINING PROGRAM

## 2023-10-09 PROCEDURE — 99223 1ST HOSP IP/OBS HIGH 75: CPT | Performed by: INTERNAL MEDICINE

## 2023-10-09 PROCEDURE — 0W3P8ZZ CONTROL BLEEDING IN GASTROINTESTINAL TRACT, VIA NATURAL OR ARTIFICIAL OPENING ENDOSCOPIC: ICD-10-PCS | Performed by: STUDENT IN AN ORGANIZED HEALTH CARE EDUCATION/TRAINING PROGRAM

## 2023-10-09 PROCEDURE — 99291 CRITICAL CARE FIRST HOUR: CPT | Performed by: INTERNAL MEDICINE

## 2023-10-09 PROCEDURE — 99232 SBSQ HOSP IP/OBS MODERATE 35: CPT | Performed by: PHYSICIAN ASSISTANT

## 2023-10-09 PROCEDURE — 99232 SBSQ HOSP IP/OBS MODERATE 35: CPT | Performed by: INTERNAL MEDICINE

## 2023-10-09 RX ORDER — ONDANSETRON 2 MG/ML
INJECTION INTRAMUSCULAR; INTRAVENOUS AS NEEDED
Status: DISCONTINUED | OUTPATIENT
Start: 2023-10-09 | End: 2023-10-09 | Stop reason: SURG

## 2023-10-09 RX ORDER — SODIUM CHLORIDE 9 MG/ML
INJECTION, SOLUTION INTRAVENOUS CONTINUOUS PRN
Status: DISCONTINUED | OUTPATIENT
Start: 2023-10-09 | End: 2023-10-09 | Stop reason: SURG

## 2023-10-09 RX ORDER — SODIUM CHLORIDE 9 MG/ML
INJECTION, SOLUTION INTRAVENOUS ONCE
Status: COMPLETED | OUTPATIENT
Start: 2023-10-09 | End: 2023-10-09

## 2023-10-09 RX ORDER — SODIUM CHLORIDE 9 MG/ML
INJECTION, SOLUTION INTRAVENOUS ONCE
Status: DISCONTINUED | OUTPATIENT
Start: 2023-10-09 | End: 2023-10-15

## 2023-10-09 RX ORDER — ONDANSETRON 2 MG/ML
4 INJECTION INTRAMUSCULAR; INTRAVENOUS AS NEEDED
Status: ACTIVE | OUTPATIENT
Start: 2023-10-09 | End: 2023-10-09

## 2023-10-09 RX ORDER — SODIUM CHLORIDE, SODIUM LACTATE, POTASSIUM CHLORIDE, CALCIUM CHLORIDE 600; 310; 30; 20 MG/100ML; MG/100ML; MG/100ML; MG/100ML
INJECTION, SOLUTION INTRAVENOUS CONTINUOUS
Status: DISCONTINUED | OUTPATIENT
Start: 2023-10-09 | End: 2023-10-11

## 2023-10-09 RX ORDER — SODIUM CHLORIDE, SODIUM LACTATE, POTASSIUM CHLORIDE, CALCIUM CHLORIDE 600; 310; 30; 20 MG/100ML; MG/100ML; MG/100ML; MG/100ML
INJECTION, SOLUTION INTRAVENOUS CONTINUOUS
Status: DISCONTINUED | OUTPATIENT
Start: 2023-10-09 | End: 2023-10-10

## 2023-10-09 RX ORDER — LIDOCAINE HYDROCHLORIDE 10 MG/ML
INJECTION, SOLUTION EPIDURAL; INFILTRATION; INTRACAUDAL; PERINEURAL AS NEEDED
Status: DISCONTINUED | OUTPATIENT
Start: 2023-10-09 | End: 2023-10-09 | Stop reason: SURG

## 2023-10-09 RX ORDER — ROCURONIUM BROMIDE 10 MG/ML
INJECTION, SOLUTION INTRAVENOUS AS NEEDED
Status: DISCONTINUED | OUTPATIENT
Start: 2023-10-09 | End: 2023-10-09 | Stop reason: SURG

## 2023-10-09 RX ORDER — FOLIC ACID 1 MG/1
1 TABLET ORAL DAILY
Status: DISCONTINUED | OUTPATIENT
Start: 2023-10-09 | End: 2023-10-15

## 2023-10-09 RX ORDER — HYDROCODONE BITARTRATE AND ACETAMINOPHEN 5; 325 MG/1; MG/1
1 TABLET ORAL EVERY 6 HOURS PRN
Status: DISCONTINUED | OUTPATIENT
Start: 2023-10-09 | End: 2023-10-15

## 2023-10-09 RX ADMIN — ROCURONIUM BROMIDE 10 MG: 10 INJECTION, SOLUTION INTRAVENOUS at 12:12:00

## 2023-10-09 RX ADMIN — LIDOCAINE HYDROCHLORIDE 50 MG: 10 INJECTION, SOLUTION EPIDURAL; INFILTRATION; INTRACAUDAL; PERINEURAL at 11:02:00

## 2023-10-09 RX ADMIN — SODIUM CHLORIDE: 9 INJECTION, SOLUTION INTRAVENOUS at 12:49:00

## 2023-10-09 RX ADMIN — SODIUM CHLORIDE: 9 INJECTION, SOLUTION INTRAVENOUS at 12:02:00

## 2023-10-09 RX ADMIN — SODIUM CHLORIDE: 9 INJECTION, SOLUTION INTRAVENOUS at 10:58:00

## 2023-10-09 RX ADMIN — ONDANSETRON 4 MG: 2 INJECTION INTRAMUSCULAR; INTRAVENOUS at 12:09:00

## 2023-10-09 NOTE — ANESTHESIA PROCEDURE NOTES
Airway  Date/Time: 10/9/2023 11:58 AM  Urgency: elective      General Information and Staff    Patient location during procedure: OR  Anesthesiologist: Sameera Jasmine MD  Performed: anesthesiologist   Performed by: Sameera Jasmine MD  Authorized by: Sameera Jasmine MD      Indications and Patient Condition  Indications for airway management: anesthesia  Sedation level: deep  Preoxygenated: yes  Patient position: sniffing  Mask difficulty assessment: 1 - vent by mask    Final Airway Details  Final airway type: endotracheal airway      Successful airway: ETT  Cuffed: yes   Successful intubation technique: Video laryngoscopy  Facilitating devices/methods: intubating stylet  Endotracheal tube insertion site: oral  Blade: GlideScope  Blade size: #3  ETT size (mm): 7.0    Cormack-Lehane Classification: grade I - full view of glottis  Placement verified by: capnometry   Measured from: lips  ETT to lips (cm): 20  Number of attempts at approach: 1

## 2023-10-09 NOTE — PLAN OF CARE
Reinaldo Sal Patient Status:  Inpatient    1935 MRN [id number]   [facility] 5NW-A Attending Shanda Garcia MD   Hosp Day # 3 PCP Isabel Smith MD     Cardiology Nocturnal APN Plan of Care     Page Received: Bedside RN [id number]    Acute blood loss anemia, getting PRBC tx;  9 sec SVT      Assessment/Plan:   - Continue transfusing, post tx CBC  - Likely s/t acute blood loss anemia         Laurel Zhu, [address]  10/8/2023  7:11 PM

## 2023-10-09 NOTE — PLAN OF CARE
GI Plan of Care Note    Received call about patient having multiple melanotic stools overnight and was hypotensive this mornign and transferred to the ICU. Additionally noted with hemoglobin of 6.5 this morning and transfused 1 U PRBC. Pantoprazole increased from daily to IV BID. Plan for endoscopic evaluation with EGD today after discussion with patient's daughter, Philipp Del Valle [facility]). Will also give Erythromycin 250 mg IV prior to procedure to assist with gastric emptying and visualization during procedure. Unable to use Reglan at this time due to interactions with antipsychotic medications. Informed Consent:   The planned procedure(s), the explanation of the procedure, its expected benefits, the potential complications and risks and possible alternatives and their benefits and risks were discussed with the patient or the patient's surrogate. The discussion of risks, not limited to but including bleeding, infection, perforation, adverse effects from anesthesia, and possible prolonged hospitalization/emergency surgery, and risk of missed lesion(s) were discussed with patient. Pt and/or surrogate understood the proposed procedure(s), its risks, benefits and alternatives and wish to proceed with procedure(s). All questions answered in full.         Lola Madden DO  9:20 AM  10/9/2023  [facility]

## 2023-10-09 NOTE — PROGRESS NOTES
10/09/23 0734   Provider Notification   Reason for Communication Critical value  (Hgb 6.5)   Provider Name Other (comment)  Mo Russell)   Method of Communication Page   Response Waiting for response   Notification Time [id number]

## 2023-10-09 NOTE — OPERATIVE REPORT
EGD Operative Report  Patient Name: Stanford Room  YOB: 1935  MRN: [id number]  Procedure: Esophagogastroduodenoscopy (EGD) with control of bleed  Pre-operative Diagnosis & Indication: Melena, anemia, hypotension  Post-operative Diagnosis:   Normal-appearing esophagus with GE junction at 38 cm  Mild gastritis  8 mm ulcer in the distal duodenum sweep with actively oozing visible vessel treated using 7 cc of 1:10,000 epinephrine injection, bipolar cautery, and placement of 2 endoclips with good hemostasis  7 mm clean-based ulcer in the mid duodenal sweep  Attending Endoscopist: Danny Reed D.O. Informed Consent: The planned procedure(s), the explanation of the procedure, its expected benefits, the potential complications and risks and possible alternatives and their benefits and risks were discussed with the patient or the patient's surrogate. The discussion of risks, not limited to but including bleeding, infection, perforation, adverse effects from anesthesia, need for emergency surgery/prolonged hospitalization,  cardiac arrhythmias,  and aspiration were discussed with patient. Pt and/or surrogate understood the proposed procedure(s), its risks, benefits and alternatives and wish to proceed with procedure(s). All questions answered in full. After all questions were answered to their satisfaction, a signed, informed, and witnessed consent was obtained. A TIME OUT WAS COMPLETED prior to the procedure to confirm the patient, procedure(s) and complete endoscopy safety procedure. Sedation: Monitored Anesthesia Care; ASA class per anesthesiology team   Monitoring: Pulsoximetry, pulse, respirations, and blood pressure , vitals were monitored throughout the entire procedure under monitored anesthesia care. Procedure: The patient was then brought to the endoscopy suite where his/her pulse, pulse oximetry and blood pressure were monitored.  The patient was placed in the left lateral decubitus position and deep sedation was administered. Once adequate sedation was achieved, a bite block was placed and a lubricated tip of an Olympus video upper endoscope was inserted through the oropharynx and gently manipulated through the esophagus into the stomach and the second portion of the duodenum. Upon withdrawal of the endoscope, careful visualization of the mucosa was performed. The endoscope was then withdrawn into the gastric antrum and placed in a retroflexed position. The endoscope was then righted, and air was suctioned from the stomach. The endoscope was then withdrawn from the patient, with careful visual inspection of the mucosa. Olympus video side-viewing endoscope was then obtained for better visualization of the duodenal sweep with gold probe cautery and 2 endoclips placed. Endoscope was then withdrawn from patient and a double channel endoscope was obtained for additional attempted clip placement given positioning of the ulcer however was unable to be placed. However no further bleeding was noted in the end of the procedure after the placement of 2 endoclips and gold probe cautery. Air was then suctioned and scope was withdrawn from the patient. The patient left the procedure room in stable condition for recovery. Findings and endotherapy as listed below  Toleration: Patient tolerated procedure well  Complications: No immediate complications  Technical Difficulty: The procedure was not technically difficult   Estimated Blood Loss: Minimal, less than 5mL of estimated blood loss. Findings and Therapeutics:  Esophagus: The mucosa was normal.   There were no strictures or stenosis. GEJ junction traversed with endoscope without resistance. The Z-line was irregular, appreciated at 38 cm from the incisors. Stomach: The gastric body, antrum, fundus, cardia, and angularis were mildly erythematous endoscopically consistent with mild gastritis. No ulcers, erosions or masses visualized.  Endoscope was placed in a retroflexion view in the stomach. There was no evidence of a hiatal hernia. No biopsies were obtained due to the nature of exam.  Duodenum:   There was an 8 mm ulcer in the distal duodenum sweep with actively oozing visible vessel treated using 7 cc of 1:10,000 epinephrine injection, bipolar cautery, and placement of 2 endoclips with good hemostasis. There was an additional 7 mm clean-based ulcer in the mid duodenal sweep. Recommendations:  Post EGD precautions, watch for bleeding, infection, perforation, adverse drug reactions   Start pantoprazole drip  CLD  Avoid non-aspirin NSAID  Repeat EGD in 8 weeks.   Continue to monitor HgB q8h and transfuse for HgB < [address], DO  10/9/2023  12:45 PM

## 2023-10-09 NOTE — PLAN OF CARE
Pt A&O X 2-3. Room air. IV Abx. NSR on Tele. Occult stool positive, GI MD aware. IVF. Denies pain. Cardiac diet. Takes pills whole one at a time. Max assist. CT of Leg done, awaiting results. Patient is a R arm precaution and has limited IV access, MD said okay to use R arm. No further needs at this time. Problem: PAIN - ADULT  Goal: Verbalizes/displays adequate comfort level or patient's stated pain goal  Description: INTERVENTIONS:  - Encourage pt to monitor pain and request assistance  - Assess pain using appropriate pain scale  - Administer analgesics based on type and severity of pain and evaluate response  - Implement non-pharmacological measures as appropriate and evaluate response  - Consider cultural and social influences on pain and pain management  - Manage/alleviate anxiety  - Utilize distraction and/or relaxation techniques  - Monitor for opioid side effects  - Notify MD/LIP if interventions unsuccessful or patient reports new pain  - Anticipate increased pain with activity and pre-medicate as appropriate  Outcome: Progressing     Problem: RISK FOR INFECTION - ADULT  Goal: Absence of fever/infection during anticipated neutropenic period  Description: INTERVENTIONS  - Monitor WBC  - Administer growth factors as ordered  - Implement neutropenic guidelines  Outcome: Progressing     Problem: SAFETY ADULT - FALL  Goal: Free from fall injury  Description: INTERVENTIONS:  - Assess pt frequently for physical needs  - Identify cognitive and physical deficits and behaviors that affect risk of falls.   - [place] fall precautions as indicated by assessment.  - Educate pt/family on patient safety including physical limitations  - Instruct pt to call for assistance with activity based on assessment  - Modify environment to reduce risk of injury  - Provide assistive devices as appropriate  - Consider OT/PT consult to assist with strengthening/mobility  - Encourage toileting schedule  Outcome: Progressing Problem: DISCHARGE PLANNING  Goal: Discharge to home or other facility with appropriate resources  Description: INTERVENTIONS:  - Identify barriers to discharge w/pt and caregiver  - Include patient/family/discharge partner in discharge planning  - Arrange for needed discharge resources and transportation as appropriate  - Identify discharge learning needs (meds, wound care, etc)  - Arrange for interpreters to assist at discharge as needed  - Consider post-discharge preferences of patient/family/discharge partner  - Complete POLST form as appropriate  - Assess patient's ability to be responsible for managing their own health  - Refer to Case Management Department for coordinating discharge planning if the patient needs post-hospital services based on physician/LIP order or complex needs related to functional status, cognitive ability or social support system  Outcome: Progressing

## 2023-10-09 NOTE — PROGRESS NOTES
Patient received asleep. Daughter at the bedside asked to not disturb the patient. Daughter asked for GI and hospitalist paged as she concerned about Hgb 6.5 and hypotension. Paged MD Shaver and all the other consulting doctors. Order received to transfer the patient to ICU. Nurse report given to ICU nurse.

## 2023-10-09 NOTE — PLAN OF CARE
Received patient from PMU at [address] [place] [address]. Pt A&OX3 on RA with some drowsiness. Pt came to ICU for Hypotension, retaken with stable BP. Hgb 6.5 , given 1 unit. Taken to Endoscopy for EDG, given second unit PRBC and ulcer found and cauterized/clipped. Protonix gtt started as well as LR. Hgb recheck 7.9. Pt family updated on POC. Safety and [id number] maintained throughout shift. Problem: PAIN - ADULT  Goal: Verbalizes/displays adequate comfort level or patient's stated pain goal  Description: INTERVENTIONS:  - Encourage pt to monitor pain and request assistance  - Assess pain using appropriate pain scale  - Administer analgesics based on type and severity of pain and evaluate response  - Implement non-pharmacological measures as appropriate and evaluate response  - Consider cultural and social influences on pain and pain management  - Manage/alleviate anxiety  - Utilize distraction and/or relaxation techniques  - Monitor for opioid side effects  - Notify MD/LIP if interventions unsuccessful or patient reports new pain  - Anticipate increased pain with activity and pre-medicate as appropriate  10/9/2023 1636 by Kaleb Fox RN  Outcome: Progressing  10/9/2023 1636 by Kaleb Fox RN  Outcome: Progressing     Problem: SAFETY ADULT - FALL  Goal: Free from fall injury  Description: INTERVENTIONS:  - Assess pt frequently for physical needs  - Identify cognitive and physical deficits and behaviors that affect risk of falls.   - [facility] fall precautions as indicated by assessment.  - Educate pt/family on patient safety including physical limitations  - Instruct pt to call for assistance with activity based on assessment  - Modify environment to reduce risk of injury  - Provide assistive devices as appropriate  - Consider OT/PT consult to assist with strengthening/mobility  - Encourage toileting schedule  10/9/2023 1636 by Kaleb Fox RN  Outcome: Progressing  10/9/2023 1636 by Kaleb Fox RN  Outcome: Progressing     Problem: GASTROINTESTINAL - ADULT  Goal: Maintains or returns to baseline bowel function  Description: INTERVENTIONS:  - Assess bowel function  - Maintain adequate hydration with IV or PO as ordered and tolerated  - Evaluate effectiveness of GI medications  - Encourage mobilization and activity  - Obtain nutritional consult as needed  - Establish a toileting routine/schedule  - Consider collaborating with pharmacy to review patient's medication profile  Outcome: Progressing     Problem: SKIN/TISSUE INTEGRITY - ADULT  Goal: Skin integrity remains intact  Description: INTERVENTIONS  - Assess and document risk factors for pressure ulcer development  - Assess and document skin integrity  - Monitor for areas of redness and/or skin breakdown  - Initiate interventions, skin care algorithm/standards of care as needed  Outcome: Progressing

## 2023-10-09 NOTE — PROGRESS NOTES
10/09/23 0820   Provider Notification   Reason for Communication Evaluate   Test/Procedure Name BP is low   Provider Name Nataly Pisano MD   Method of Communication Page   Response See orders  (ICU)   Notification Time [id number]     Patient is transferring to ICU room 469. Nurse report given to Silvia at [id number].

## 2023-10-09 NOTE — PROGRESS NOTES
[facility] to findings and recommendations of psychiatry consult.

## 2023-10-09 NOTE — PHYSICAL THERAPY NOTE
IP PT f/u, chart reviewed. Pt now c 6.5 Hg. Pt t/f to ICU 2/2 hypotension  for further workup and monitoring. Will hold per protocol and f/u as schedule permits and as appropriate.

## 2023-10-09 NOTE — OCCUPATIONAL THERAPY NOTE
Attempted to see pt this AM for OT eval, pt with low hgb at 6.5, OT will hold per dept protocol due to low hgb, spoke with RN pt also t/f'ing to ICU, OT will follow up as pt is approp.

## 2023-10-10 PROBLEM — K26.4 DUODENAL ULCER WITH HEMORRHAGE: Status: ACTIVE | Noted: 2023-10-10

## 2023-10-10 LAB
ANION GAP SERPL CALC-SCNC: 4 MMOL/L (ref 0–18)
ANION GAP SERPL CALC-SCNC: 6 MMOL/L (ref 0–18)
BASOPHILS # BLD AUTO: 0.04 X10(3) UL (ref 0–0.2)
BASOPHILS NFR BLD AUTO: 0.4 %
BLOOD TYPE BARCODE: 5100
BUN BLD-MCNC: 27 MG/DL (ref 7–18)
BUN BLD-MCNC: 29 MG/DL (ref 7–18)
CALCIUM BLD-MCNC: 8.1 MG/DL (ref 8.5–10.1)
CALCIUM BLD-MCNC: 8.4 MG/DL (ref 8.5–10.1)
CHLORIDE SERPL-SCNC: 115 MMOL/L (ref 98–112)
CHLORIDE SERPL-SCNC: 115 MMOL/L (ref 98–112)
CO2 SERPL-SCNC: 20 MMOL/L (ref 21–32)
CO2 SERPL-SCNC: 23 MMOL/L (ref 21–32)
CREAT BLD-MCNC: 0.61 MG/DL
CREAT BLD-MCNC: 0.64 MG/DL
EGFRCR SERPLBLD CKD-EPI 2021: 86 ML/MIN/1.73M2 (ref 60–?)
EGFRCR SERPLBLD CKD-EPI 2021: 87 ML/MIN/1.73M2 (ref 60–?)
EOSINOPHIL # BLD AUTO: 0.37 X10(3) UL (ref 0–0.7)
EOSINOPHIL NFR BLD AUTO: 3.9 %
ERYTHROCYTE [DISTWIDTH] IN BLOOD BY AUTOMATED COUNT: 17.6 %
GLUCOSE BLD-MCNC: 72 MG/DL (ref 70–99)
GLUCOSE BLD-MCNC: 83 MG/DL (ref 70–99)
HCT VFR BLD AUTO: 24.8 %
HGB BLD-MCNC: 7.7 G/DL
HGB BLD-MCNC: 7.7 G/DL
HGB BLD-MCNC: 7.9 G/DL
HGB BLD-MCNC: 8.2 G/DL
IMM GRANULOCYTES # BLD AUTO: 0.15 X10(3) UL (ref 0–1)
IMM GRANULOCYTES NFR BLD: 1.6 %
LYMPHOCYTES # BLD AUTO: 1.56 X10(3) UL (ref 1–4)
LYMPHOCYTES NFR BLD AUTO: 16.3 %
MCH RBC QN AUTO: 30.8 PG (ref 26–34)
MCHC RBC AUTO-ENTMCNC: 33.1 G/DL (ref 31–37)
MCV RBC AUTO: 93.2 FL
MONOCYTES # BLD AUTO: 1.07 X10(3) UL (ref 0.1–1)
MONOCYTES NFR BLD AUTO: 11.2 %
NEUTROPHILS # BLD AUTO: 6.37 X10 (3) UL (ref 1.5–7.7)
NEUTROPHILS # BLD AUTO: 6.37 X10(3) UL (ref 1.5–7.7)
NEUTROPHILS NFR BLD AUTO: 66.6 %
OSMOLALITY SERPL CALC.SUM OF ELEC: 296 MOSM/KG (ref 275–295)
OSMOLALITY SERPL CALC.SUM OF ELEC: 299 MOSM/KG (ref 275–295)
PLATELET # BLD AUTO: 187 10(3)UL (ref 150–450)
POTASSIUM SERPL-SCNC: 3.6 MMOL/L (ref 3.5–5.1)
POTASSIUM SERPL-SCNC: 3.6 MMOL/L (ref 3.5–5.1)
RBC # BLD AUTO: 2.66 X10(6)UL
SODIUM SERPL-SCNC: 141 MMOL/L (ref 136–145)
SODIUM SERPL-SCNC: 142 MMOL/L (ref 136–145)
UNIT VOLUME: 350 ML
WBC # BLD AUTO: 9.6 X10(3) UL (ref 4–11)

## 2023-10-10 PROCEDURE — 99232 SBSQ HOSP IP/OBS MODERATE 35: CPT | Performed by: PHYSICIAN ASSISTANT

## 2023-10-10 PROCEDURE — 99232 SBSQ HOSP IP/OBS MODERATE 35: CPT | Performed by: INTERNAL MEDICINE

## 2023-10-10 PROCEDURE — 99233 SBSQ HOSP IP/OBS HIGH 50: CPT | Performed by: INTERNAL MEDICINE

## 2023-10-10 RX ORDER — ATORVASTATIN CALCIUM 10 MG/1
10 TABLET, FILM COATED ORAL NIGHTLY
Qty: 30 TABLET | Refills: 3 | Status: SHIPPED | OUTPATIENT
Start: 2023-10-10 | End: 2023-10-15

## 2023-10-10 NOTE — PLAN OF CARE
Resumed care at 0730. Aox2-3, vitals stable. Drowsy this morning, more alert this afternoon. Tolerating clear liquid diet, advanced to full per GI note. Hgb stable, protonix drip running as ordered. Purewick in place. OOB in chair, working with PT/OT. Denies pain. L hip mepilex in place, bruising. Transferred to [address] [place] [address] around [phone]. Report given to Deborah MARQUEZ. Transferred with son at bedside. Applied tele monitor, slide to new bed.

## 2023-10-10 NOTE — PROGRESS NOTES
[facility]  Report of Psychiatric Progress    [facility] Patient Status:  Inpatient    1935 MRN [id number]   [facility] Attending Dyana Wolfe MD   Hosp Day # 5 PCP Debbie Heath MD     Date of Admission: 10/5/2023  Date of Service: 10/10/2023    Reason for Consultation:   Altered mental status/delirium    Impression:    Primary Psychiatric Diagnosis:  Acute encephalopathy  -Etiologies to include: underlying dementia,  electrolyte imbalance (Na, K, Mg, Phos), antibiotic administration for UTI, anemia    Major neurocognitive disorder with behavioral disturbance    Depression, unspecified    Anxiety, unspecified    Pertinent Medical Diagnoses:  Paroxysmal a-fib  Electrolyte imbalances as noted above  UTI  Recent left hip fracture  Hypertension    Recommendations:  1) Discontinue Seroquel. 2) Continue Lexapro 20mg by mouth daily for history of anxiety and depression. 3) Continue trazodone 50mg by mouth at bedtime, for insomnia. 4) Continue Zyprexa 5mg by mouth at bedtime for agitation, psychosis, insomnia - if patient is still agitated/restless/calling out may give a follow-up Zyprexa 2.5mg by mouth. May also give Zyprexa 2.5mg PO/IV twice daily as needed for acute agitation or psychosis. Patient agrees with medication changes, son is also at bedside and provides consent. 5) Limit/judicious use of opioids and benzodiazepines as these medications can worsen delirium. 6) Continue non-pharm delirium care: orientation, cognitive stimulation, family visits, lights on and blinds open in daytime, and quiet and dark at night, PT when appropriate. Cory Cherry, CAQ-Psychiatry  10/10/2023  2:15 PM      History of Present Illness:  82-year-old woman with history of anxiety and depression is seen for evaluation of altered mental status. She came to the ED after experiencing a fall at [facility] she was receiving rehab.   Her daughter Qing Pickett is physically present at bedside and provides additional collateral.    Patient is alert to self, place, and situation. She tells me it is spring and cannot tell me the day/year. Patient states she is here at the hospital because of \"her leg\" and that she is \"not doing good\". She tells me her sleep has been okay, but per report she has been sleeping poorly at night here in the hospital.  She describes her mood as \"terrible\". Daughter tells me that there are likely multiple factors influencing her change in mental status. She has a history of \"on and off\" depression and anxiety, with medications managed by PCP that include Lexapro and trazodone. She was diagnosed with dementia almost a year ago but was able to function, walk, and care for herself. Daughter did notice that patient had \"the blues\" but the outpatient psych meds had been helping. A little over a week ago, patient fell and broke her hip. She spent 6 days at [facility] then discharged to [facility] for [facility]. After being there for 2.5 days, staff found the patient on the floor. Daughter also notes patient has not been eating or drinking as much, and appeared depressed and lethargic. She notes patient seems to be constantly worried and calling out for help but unable to articulate her needs or say what she needs help with. Interval History  10/8/2023 - Zyprexa started at bedtime last night. Continued to be somewhat anxious and calling out during the early part of the evening, but was able to sleep well around 2:00am.  Discussed with RN and son at bedside - can give an additional Zyprexa 2.5mg as needed if sleep not achieved/restlessness continues. Per report, patient is more coherent and redirectable when awake. She tells me she feels better today and that sleep was improved. 10/9/2023 - Patient did well with Zyprexa last night. Daughter was at bedside and stated patient was able to settle better last with the Zyprexa.   Patient was asleep (post-anesthesia) while we spoke to daughter following her EGD. Daughter and nurse were both made aware that an additional 2.5 mg of Zyprexa could be given an hour after having her 5 mg dose if patient was still agitated. Daughter and nurse both verbalized understanding. I reiterated at this time we would make no changes to meds from a psychiatric standpoint and try to keep her at baseline or better in order to recover from her medical issues. 10/10/2023 - Today, patient looks much better than at previous visits - more alert and coherent speech. Patient did well with scheduled Zyprexa last night. She did not need PRN Zyprexa last night. Patient was awake and with increased alertness upon evaluation today as compared to yesterday. Patient was able to appropriately answer questions. She is aware that she had a GI procedure done yesterday but believes she is at [facility]. Past Psychiatric History:  History of anxiety and depression. PCP managing medications - Lexapro 20mg and trazodone 50 mg. Daughter is interested in therapy recommendations, notes that waiting lists/times to see a provider who takes her mother's insurance are \"months to a year\". Substance Use History:  Denies    Psych Family History:  Denies    Social and Developmental History:   Patient had previously been living by herself in [place] until December 2022. She was found by family members after she had been in a bathtub for over 24 hours. Once hospitalized it was determined that she had a small stroke. Patient then moved in with her daughter. She is . 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 Past Medical History:   Diagnosis Date    Anxiety     Arthritis     knees and shoulders    Breast CA ([address] 2001    right breast cancer with mastectomy.     Cancer [facility]) 2001    right breast ca    Cystitis, interstitial     history of----    Flatulence/gas pain/belching     Frequent UTI     H/O mammogram 12/28/2011 left breast    H/O mammogram 2015    benign    H/O pregnancy [id number],[id number]        HIGH BLOOD PRESSURE     HIGH CHOLESTEROL     History of endoscopy 2013    Dr. Meghan Yi    Loss of appetite     Nausea     Other and unspecified hyperlipidemia     Other and unspecified personal history of malignant neoplasm     dr. Carmelo Calderon, ill  breast cancer right    Pap smear for cervical cancer screening 2003    wnl    Renal cyst, right 07/15/2015    Refer to Dr. Shaffer/urology. Rheumatic fever     Stroke [facility])     Unspecified essential hypertension      Past Surgical History:   Procedure Laterality Date    COLONOSCOPY   Dr. Etienne Gunter, repeat in five years, family history of colon cancer, Diverticulosis    COLONOSCOPY  2013    Dr. Gunjan Caldwell    partial hystero. MASTECTOMY RIGHT Right     right breast cancer with mastectomy. NEEDLE BIOPSY RIGHT Right     DCIS    SPECIAL SERVICE OR REPORT      mastectomy right    UNSPECIFIED DIAGNOSTIC PROCE  2005    cystoscopy     Family History   Problem Relation Age of Onset    Heart Disorder Father         chf    Cancer Mother         colon    Stroke Mother     Colon Cancer Mother     Breast Cancer Self 63    Hypertension Brother       reports that she has never smoked. She has never been exposed to tobacco smoke. She has never used smokeless tobacco. She reports that she does not drink alcohol and does not use drugs.     Allergies:    Bactrim [Sulfametho*    NAUSEA ONLY  Nitrofurantoin          NAUSEA ONLY    Medications:    Current Facility-Administered Medications:     folic acid (Folvite) tab 1 mg, 1 mg, Oral, Daily    sodium chloride 0.9% infusion, , Intravenous, Once    sodium chloride 0.9% infusion, , Intravenous, Once    lactated ringers infusion, , Intravenous, Continuous    pantoprazole (Protonix) 80 mg in sodium chloride 0.9% 100 mL infusion, 8 mg/hr, Intravenous, Continuous metoprolol tartrate (Lopressor) partial tab 12.5 mg, 12.5 mg, Oral, TID Beta Blocker/Cardiac    HYDROcodone-acetaminophen (Norco) 5-325 MG per tab 1 tablet, 1 tablet, Oral, Q6H PRN    ceFAZolin (Ancef) 1 g in dextrose 5% 100mL IVPB-ADD, 1 g, Intravenous, Q8H    OLANZapine (ZyPREXA) tab 5 mg, 5 mg, Oral, Nightly    OLANZapine (ZyPREXA) tab 2.5 mg, 2.5 mg, Oral, BID PRN **OR** OLANZapine (Zyprexa) 2.5238 mg in sterile water for injection (PF) IM injection, 2.5238 mg, Intramuscular, BID PRN    influenza vaccine high dose quad (Fluzone QIV HD) 0.7 mL IM injection (ages >/= 65 years) 0.7 mL, 0.7 mL, Intramuscular, Prior to discharge    acetaminophen (Tylenol) tab 650 mg, 650 mg, Oral, Q6H PRN    escitalopram (Lexapro) tab 20 mg, 20 mg, Oral, Nightly    [Held by provider] hydrALAZINE (Apresoline) tab 20 mg, 20 mg, Oral, TID    losartan (Cozaar) tab 100 mg, 100 mg, Oral, Daily    atorvastatin (Lipitor) tab 10 mg, 10 mg, Oral, Nightly    traZODone (Desyrel) tab 50 mg, 50 mg, Oral, Nightly    magnesium oxide (Mag-Ox) tab 800 mg, 800 mg, Oral, Once    [Held by provider] enoxaparin (Lovenox) 40 MG/0.4ML SUBQ injection 40 mg, 40 mg, Subcutaneous, Daily    melatonin tab 3 mg, 3 mg, Oral, Nightly PRN    polyethylene glycol (PEG 3350) (Miralax) 17 g oral packet 17 g, 17 g, Oral, Daily PRN    sennosides (Senokot) tab 17.2 mg, 17.2 mg, Oral, Nightly PRN    bisacodyl (Dulcolax) 10 MG rectal suppository 10 mg, 10 mg, Rectal, Daily PRN    fleet enema (Fleet) 7-19 GM/118ML rectal enema 133 mL, 1 enema, Rectal, Once PRN    ondansetron (Zofran) 4 MG/2ML injection 4 mg, 4 mg, Intravenous, Q6H PRN    Review of Systems   Constitutional:  Positive for malaise/fatigue. \"Pain\"   Psychiatric/Behavioral:  Positive for depression. Negative for substance abuse and suicidal ideas. The patient is nervous/anxious and has insomnia.           Mental Status Exam:     Risk Assessment  Suicidal ideation: no suicidal ideation  Homicidal ideation: None noted    Objective       10/10/23  1000   BP: 145/65   Pulse: 60   Resp: 16   Temp:      Appearance: grooming improved  Behavior:  somewhat hypoactive, improved over yesterday  Attitude: cooperative and pleasant  Gait: did not observe    Speech: normal rate and rhythm, soft, and quiet    Mood:  \"better\"  Affect: Congruent    Thought process:  linear  Thought content: no delusions, obsessions, or other thought abnormalities  Perceptions: no hallucinations  Associations: Intact    Orientation: Alert, oriented person, and oriented situation  Attention and Concentration:   good  Memory:  improved  Language: English, normal  Fund of Knowledge:  average as evidenced by language usage and ability to communicate with provider  Insight: fair to good   Judgment: fair to good     Laboratory Data:  Lab Results   Component Value Date    WBC 9.6 10/10/2023    HGB 8.2 10/10/2023    HCT 24.8 10/10/2023    .0 10/10/2023    CREATSERUM 0.64 10/10/2023    BUN 29 10/10/2023     10/10/2023    K 3.6 10/10/2023     10/10/2023    CO2 23.0 10/10/2023    GLU 83 10/10/2023    CA 8.4 10/10/2023         STUDIES:

## 2023-10-10 NOTE — PLAN OF CARE
Initially drowsy but now is alert and oriented,  is at [facility], states date Tuesday Oct 2023 initially (was still Monday),  is here because she had a procedure earlier today (unable to elaborate), speech is somewhat delayed and note left side somewhat weaker than right. Pupils brisk, she remembers my name and recalls conversations we've had earlier in the night as the night goes on. Lungs diminished to left field with crackles in posterior base, remains on room air. Denies dyspnea and nausea. Norco for abdominal pain, presently asleep. Hyperactive bowel sounds (EGD today - ulcer), passing flatus. Incontinent urine, purewick changed with CHG bath, she brushed her teeth with assist.  Refused dinner tray. Offered water multiple times and she refuses unless she's taking meds. SCD's intact. Left hip ecchymotic and yellow with resolving blood, swollen, painful when I manually move it. She refuses to be turned. Hemog Q8H. See assessments and flowsheets for further data.

## 2023-10-10 NOTE — PLAN OF CARE
Pt transferred from ICU   Son at the bedside  Protonix gtt @ 10mL/hr  LR @ 50mL/hr  Safety measures in place  All needs met at this time

## 2023-10-10 NOTE — PHYSICAL THERAPY NOTE
PHYSICAL THERAPY TREATMENT NOTE - INPATIENT    Room Number: 469/469-A     Session: 1      History related to current admission: Patient is a 82year old female admitted on 10/5/2023 from [place] for unwitnessed fall. Pt diagnosed with hyponatremia, acute cystitis without hematuria, fall, a-fib. Transferred to ICU 10/9 2/2 hypotension. S/p EGD 10/9 - duodenal ulcer. Presenting Problem: unwitnessed fall  Co-Morbidities : L IM nailing 9/25 due to L displaced trochanteric fx, CKD, CVA, HTN, anxiety, hyperparathyroidism, Dementia, Breast Ca  ASSESSMENT     Pt with decreased activity tolerance and endurance compared to previous session - max A stand pivot transfer R to bedside chair. Recommend assist x2 with nursing staff. Pt able to participate in LE exercises appropriately in supine and EOB. Attention/Arousal improved throughout session after transfer EOB max A x2. Reporting mild dizziness throughout session not correlated with /55 sitting EOB. The patient is below baseline and would benefit from skilled inpatient PT to address the above deficits to assist patient in returning to prior to level of function. DISCHARGE RECOMMENDATIONS  PT Discharge Recommendations: Sub-acute rehabilitation     PLAN  PT Treatment Plan: Bed mobility; Patient education;Gait training;Range of motion;Strengthening;Transfer training  Rehab Potential : Fair  Frequency (Obs): 3-5x/week    CURRENT GOALS     Goal #1 Patient is able to demonstrate supine - sit EOB @ level: supervision      Goal #2 Patient is able to demonstrate transfers Sit to/from Stand at assistance level: SBA      Goal #3 Patient is able to ambulate 20 feet with assist device: walker - rolling at assistance level: minimum assistance      Goal #4     Goal #5     Goal #6     Goal Comments: Goals established on 10/7/2023    10/10/2023 all goals ongoing      SUBJECTIVE  \" I can't \" - regarding moving her LLE    OBJECTIVE  Precautions: Bed/chair alarm    WEIGHT BEARING RESTRICTION  Weight Bearing Restriction: L lower extremity           L Lower Extremity: Weight Bearing as Tolerated (per PT daughter report)    PAIN ASSESSMENT   Ratin  Location: L LE  Management Techniques: Activity promotion;Repositioning;Relaxation    BALANCE                                                                                                                       Static Sitting: Poor +  Dynamic Sitting: Poor +           Static Standing: Poor -  Dynamic Standing: Poor -    ACTIVITY TOLERANCE                         O2 WALK         AM-PAC '6-Clicks' INPATIENT SHORT FORM - BASIC MOBILITY  How much difficulty does the patient currently have. .. Patient Difficulty: Turning over in bed (including adjusting bedclothes, sheets and blankets)?: A Little   Patient Difficulty: Sitting down on and standing up from a chair with arms (e.g., wheelchair, bedside commode, etc.): A Lot   Patient Difficulty: Moving from lying on back to sitting on the side of the bed?: A Lot   How much help from another person does the patient currently need. ..    Help from Another: Moving to and from a bed to a chair (including a wheelchair)?: A Lot   Help from Another: Need to walk in hospital room?: Total   Help from Another: Climbing 3-5 steps with a railing?: Total       AM-PAC Score:  Raw Score: 11   Approx Degree of Impairment: 72.57%   Standardized Score (AM-PAC Scale): 33.86   CMS Modifier (G-Code): CL    FUNCTIONAL ABILITY STATUS  Gait Assessment   Functional Mobility/Gait Assessment  Gait Assistance: Not tested  Distance (ft): 0  Assistive Device: Rolling walker  Pattern:  (Decr darshana/step length/heel-toe pattern, drags L foot)    Skilled Therapy Provided    Bed Mobility:  Rolling: max A    Supine<>Sit: max A x2 *educated on body mechanics for pain management      Transfer Mobility:  Sit<>Stand: max A - retropulsive, relying on edge of bed for stability, unable to perform appropriate anterior pelvic shift with external assist    Stand<>Sit: max A   Stand Pivot Transfer: pt unable to weight shift appropriately to take side steps to bedside chair with cuing, performed transfer with RW and max A     Therapist's Comments: pt expressing fear of falling throughout session and prefers therapist have hands on /CGA while sitting unsupported EOB      THERAPEUTIC EXERCISES  Lower Extremity Ankle pumps  Glut sets  Heel slides  LAQ     Upper Extremity      Position Sitting and Supine     Repetitions   10-15   Sets   1     Patient End of Session: Up in chair;Needs met;Call light within reach;RN aware of session/findings; All patient questions and concerns addressed; Alarm set (pts home health aid present)    PT Session Time: 25 minutes  Gait Training:  minutes  Therapeutic Activity: 15 minutes  Therapeutic Exercise: 10 minutes   Neuromuscular Re-education:  minutes

## 2023-10-11 LAB
ANION GAP SERPL CALC-SCNC: 7 MMOL/L (ref 0–18)
BASOPHILS # BLD AUTO: 0.04 X10(3) UL (ref 0–0.2)
BASOPHILS NFR BLD AUTO: 0.4 %
BUN BLD-MCNC: 21 MG/DL (ref 7–18)
CALCIUM BLD-MCNC: 8.9 MG/DL (ref 8.5–10.1)
CHLORIDE SERPL-SCNC: 112 MMOL/L (ref 98–112)
CO2 SERPL-SCNC: 23 MMOL/L (ref 21–32)
CREAT BLD-MCNC: 0.81 MG/DL
EGFRCR SERPLBLD CKD-EPI 2021: 71 ML/MIN/1.73M2 (ref 60–?)
EOSINOPHIL # BLD AUTO: 0.47 X10(3) UL (ref 0–0.7)
EOSINOPHIL NFR BLD AUTO: 4.9 %
ERYTHROCYTE [DISTWIDTH] IN BLOOD BY AUTOMATED COUNT: 18.2 %
GLUCOSE BLD-MCNC: 86 MG/DL (ref 70–99)
HCT VFR BLD AUTO: 27.5 %
HGB BLD-MCNC: 8.9 G/DL
IMM GRANULOCYTES # BLD AUTO: 0.16 X10(3) UL (ref 0–1)
IMM GRANULOCYTES NFR BLD: 1.7 %
LYMPHOCYTES # BLD AUTO: 1.53 X10(3) UL (ref 1–4)
LYMPHOCYTES NFR BLD AUTO: 16 %
MCH RBC QN AUTO: 31 PG (ref 26–34)
MCHC RBC AUTO-ENTMCNC: 32.4 G/DL (ref 31–37)
MCV RBC AUTO: 95.8 FL
MONOCYTES # BLD AUTO: 1.27 X10(3) UL (ref 0.1–1)
MONOCYTES NFR BLD AUTO: 13.3 %
NEUTROPHILS # BLD AUTO: 6.1 X10 (3) UL (ref 1.5–7.7)
NEUTROPHILS # BLD AUTO: 6.1 X10(3) UL (ref 1.5–7.7)
NEUTROPHILS NFR BLD AUTO: 63.7 %
OSMOLALITY SERPL CALC.SUM OF ELEC: 296 MOSM/KG (ref 275–295)
PLATELET # BLD AUTO: 219 10(3)UL (ref 150–450)
POTASSIUM SERPL-SCNC: 3.7 MMOL/L (ref 3.5–5.1)
RBC # BLD AUTO: 2.87 X10(6)UL
SODIUM SERPL-SCNC: 142 MMOL/L (ref 136–145)
WBC # BLD AUTO: 9.6 X10(3) UL (ref 4–11)

## 2023-10-11 PROCEDURE — 99232 SBSQ HOSP IP/OBS MODERATE 35: CPT | Performed by: INTERNAL MEDICINE

## 2023-10-11 PROCEDURE — 99232 SBSQ HOSP IP/OBS MODERATE 35: CPT | Performed by: PHYSICIAN ASSISTANT

## 2023-10-11 PROCEDURE — 99233 SBSQ HOSP IP/OBS HIGH 50: CPT | Performed by: HOSPITALIST

## 2023-10-11 RX ORDER — OLANZAPINE 5 MG/1
5 TABLET ORAL NIGHTLY PRN
Status: DISCONTINUED | OUTPATIENT
Start: 2023-10-11 | End: 2023-10-15

## 2023-10-11 RX ORDER — POTASSIUM CHLORIDE 20 MEQ/1
20 TABLET, EXTENDED RELEASE ORAL ONCE
Status: COMPLETED | OUTPATIENT
Start: 2023-10-11 | End: 2023-10-11

## 2023-10-11 NOTE — CM/SW NOTE
CM called patient's daughter Elizabeth Alvarado (per request) for discharge plan update; Candie inquired about patient discharge to [address] [place] [address]. CM reviewed acute rehab referral process including Physical Medicine & Rehab evaluation. Hospitalist and RN updated and acute rehab evaluation order requested. Update:  1500: CM spoke to hospitalist for update; verbal order received and entered into Epic for Physical Medicine & Rehab (PMR) Consult. CM started acute rehab referral for [facility] in [facility], will attach PMR evaluation when available.       Pedro Fong RN Case Manager [id number]

## 2023-10-11 NOTE — PLAN OF CARE
Assumed care at 0730  A/O x3 -4. Disoriented to situation partially. Lethargic this AM  RA  NSR on tele   VSS  Denies pain  Protoniox gtt going at 10 ml/hr in R AC PIV c/d/I  LR @ 50 ml/hr infusing in L hand IV c/d/I  R arm precautions - No BP but ok for IV and blood draws  IV ancef Q8  Lovenox for VTE on hold  Non-cardiac EP  Trend hgb daily  Incontinent. Purewick in place. Bedpan fo BM d/t weakness. Advanced to soft diet per GI  No signs of bleeding observed by this RN  Pt recommending [facility]  All needs met. Pt and pts daughter updated with plan. Will continue with plan of care. 1745: pt verbalizes need to have BM and unable to use bedpan for complete BM. PRN miralax given earlier with no relief. Attempted to pivot patient to [facility] with help of tech but pt was too weak for transfer. Pt unable to sit with chest and head raised while sitting on EOB. Pt also reports significant dizziness with position changes. Pt placed safely back in bed and bedpan was offered but declined. 1845: pt becoming agitated and unable to reorient. PRN zyprexa given per MAR.     Problem: PAIN - ADULT  Goal: Verbalizes/displays adequate comfort level or patient's stated pain goal  Description: INTERVENTIONS:  - Encourage pt to monitor pain and request assistance  - Assess pain using appropriate pain scale  - Administer analgesics based on type and severity of pain and evaluate response  - Implement non-pharmacological measures as appropriate and evaluate response  - Consider cultural and social influences on pain and pain management  - Manage/alleviate anxiety  - Utilize distraction and/or relaxation techniques  - Monitor for opioid side effects  - Notify MD/LIP if interventions unsuccessful or patient reports new pain  - Anticipate increased pain with activity and pre-medicate as appropriate  Outcome: Progressing     Problem: SAFETY ADULT - FALL  Goal: Free from fall injury  Description: INTERVENTIONS:  - Assess pt frequently for physical needs  - Identify cognitive and physical deficits and behaviors that affect risk of falls.   - [place] fall precautions as indicated by assessment.  - Educate pt/family on patient safety including physical limitations  - Instruct pt to call for assistance with activity based on assessment  - Modify environment to reduce risk of injury  - Provide assistive devices as appropriate  - Consider OT/PT consult to assist with strengthening/mobility  - Encourage toileting schedule  Outcome: Progressing     Problem: DISCHARGE PLANNING  Goal: Discharge to home or other facility with appropriate resources  Description: INTERVENTIONS:  - Identify barriers to discharge w/pt and caregiver  - Include patient/family/discharge partner in discharge planning  - Arrange for needed discharge resources and transportation as appropriate  - Identify discharge learning needs (meds, wound care, etc)  - Arrange for interpreters to assist at discharge as needed  - Consider post-discharge preferences of patient/family/discharge partner  - Complete POLST form as appropriate  - Assess patient's ability to be responsible for managing their own health  - Refer to Case Management Department for coordinating discharge planning if the patient needs post-hospital services based on physician/LIP order or complex needs related to functional status, cognitive ability or social support system  Outcome: Progressing     Problem: GASTROINTESTINAL - ADULT  Goal: Maintains or returns to baseline bowel function  Description: INTERVENTIONS:  - Assess bowel function  - Maintain adequate hydration with IV or PO as ordered and tolerated  - Evaluate effectiveness of GI medications  - Encourage mobilization and activity  - Obtain nutritional consult as needed  - Establish a toileting routine/schedule  - Consider collaborating with pharmacy to review patient's medication profile  Outcome: Progressing  Goal: Maintains adequate nutritional intake (undernourished)  Description: INTERVENTIONS:  - Monitor percentage of each meal consumed  - Identify factors contributing to decreased intake, treat as appropriate  - Assist with meals as needed  - Monitor I&O, WT and lab values  - Obtain nutritional consult as needed  - Optimize oral hygiene and moisture  - Encourage food from home; allow for food preferences  - Enhance eating environment  Outcome: Progressing     Problem: SKIN/TISSUE INTEGRITY - ADULT  Goal: Skin integrity remains intact  Description: INTERVENTIONS  - Assess and document risk factors for pressure ulcer development  - Assess and document skin integrity  - Monitor for areas of redness and/or skin breakdown  - Initiate interventions, skin care algorithm/standards of care as needed  Outcome: Progressing     Problem: HEMATOLOGIC - ADULT  Goal: Maintains hematologic stability  Description: INTERVENTIONS  - Assess for signs and symptoms of bleeding or hemorrhage  - Monitor labs and vital signs for trends  - Administer supportive blood products/factors, fluids and medications as ordered and appropriate  - Administer supportive blood products/factors as ordered and appropriate  Outcome: Progressing     Problem: MUSCULOSKELETAL - ADULT  Goal: Return mobility to safest level of function  Description: INTERVENTIONS:  - Assess patient stability and activity tolerance for standing, transferring and ambulating w/ or w/o assistive devices  - Assist with transfers and ambulation using safe patient handling equipment as needed  - Ensure adequate protection for wounds/incisions during mobilization  - Obtain PT/OT consults as needed  - Advance activity as appropriate  - Communicate ordered activity level and limitations with patient/family  Outcome: Progressing  Goal: Maintain proper alignment of affected body part  Description: INTERVENTIONS:  - Support and protect limb and body alignment per provider's orders  - Instruct and reinforce with patient and family use of appropriate assistive device and precautions (e.g. spinal or hip dislocation precautions)  Outcome: Progressing     Problem: Impaired Functional Mobility  Goal: Achieve highest/safest level of mobility/gait  Description: Interventions:  - Assess patient's functional ability and stability  - Promote increasing activity/tolerance for mobility and gait  - Educate and engage patient/family in tolerated activity level and precautions  - Recommend use of sit-stand lift for transfers  Outcome: Progressing     Problem: Impaired Activities of Daily Living  Goal: Achieve highest/safest level of independence in self care  Description: Interventions:  - Assess ability and encourage patient to participate in ADLs to maximize function  - Promote sitting position while performing ADLs such as feeding, grooming, and bathing  - Educate and encourage patient/family in tolerated functional activity level and precautions during self-care  - Encourage patient to incorporate impaired side during daily activities to promote function  Outcome: Progressing

## 2023-10-11 NOTE — PLAN OF CARE
Pt is A&O x3, forgetful. Some residual delayed speech and L sided weakness d/t previous CVA. VSS- NSR with 1st degree AV block on tele. RA- lungs clear. PRN norco given for pain. IVF- LR @ 50mL/hr infusing to L hand. Protonix gtt infusing at 8mg/hr. FLD. SCDs in place. Purewick and brief for incontinence. Pt up with 2 assist and walker. 2 mepilex to L hip remain CD&I. Incisions look clean and well approxiamated. Nonpitting edema to L hip and leg. R arm precautions. Trend hgb q8hr. Cardiology and pulm signed off.   GI, psych and heme following case. 0550: pt slept well with scheduled trazodone. No agitation this shift. Problem: PAIN - ADULT  Goal: Verbalizes/displays adequate comfort level or patient's stated pain goal  Description: INTERVENTIONS:  - Encourage pt to monitor pain and request assistance  - Assess pain using appropriate pain scale  - Administer analgesics based on type and severity of pain and evaluate response  - Implement non-pharmacological measures as appropriate and evaluate response  - Consider cultural and social influences on pain and pain management  - Manage/alleviate anxiety  - Utilize distraction and/or relaxation techniques  - Monitor for opioid side effects  - Notify MD/LIP if interventions unsuccessful or patient reports new pain  - Anticipate increased pain with activity and pre-medicate as appropriate  Outcome: Progressing     Problem: SAFETY ADULT - FALL  Goal: Free from fall injury  Description: INTERVENTIONS:  - Assess pt frequently for physical needs  - Identify cognitive and physical deficits and behaviors that affect risk of falls.   - Hurley fall precautions as indicated by assessment.  - Educate pt/family on patient safety including physical limitations  - Instruct pt to call for assistance with activity based on assessment  - Modify environment to reduce risk of injury  - Provide assistive devices as appropriate  - Consider OT/PT consult to assist with strengthening/mobility  - Encourage toileting schedule  Outcome: Progressing     Problem: DISCHARGE PLANNING  Goal: Discharge to home or other facility with appropriate resources  Description: INTERVENTIONS:  - Identify barriers to discharge w/pt and caregiver  - Include patient/family/discharge partner in discharge planning  - Arrange for needed discharge resources and transportation as appropriate  - Identify discharge learning needs (meds, wound care, etc)  - Arrange for interpreters to assist at discharge as needed  - Consider post-discharge preferences of patient/family/discharge partner  - Complete POLST form as appropriate  - Assess patient's ability to be responsible for managing their own health  - Refer to Case Management Department for coordinating discharge planning if the patient needs post-hospital services based on physician/LIP order or complex needs related to functional status, cognitive ability or social support system  Outcome: Progressing     Problem: GASTROINTESTINAL - ADULT  Goal: Maintains or returns to baseline bowel function  Description: INTERVENTIONS:  - Assess bowel function  - Maintain adequate hydration with IV or PO as ordered and tolerated  - Evaluate effectiveness of GI medications  - Encourage mobilization and activity  - Obtain nutritional consult as needed  - Establish a toileting routine/schedule  - Consider collaborating with pharmacy to review patient's medication profile  Outcome: Progressing  Goal: Maintains adequate nutritional intake (undernourished)  Description: INTERVENTIONS:  - Monitor percentage of each meal consumed  - Identify factors contributing to decreased intake, treat as appropriate  - Assist with meals as needed  - Monitor I&O, WT and lab values  - Obtain nutritional consult as needed  - Optimize oral hygiene and moisture  - Encourage food from home; allow for food preferences  - Enhance eating environment  Outcome: Progressing     Problem: SKIN/TISSUE INTEGRITY - ADULT  Goal: Skin integrity remains intact  Description: INTERVENTIONS  - Assess and document risk factors for pressure ulcer development  - Assess and document skin integrity  - Monitor for areas of redness and/or skin breakdown  - Initiate interventions, skin care algorithm/standards of care as needed  Outcome: Progressing     Problem: Patient/Family Goals  Goal: Patient/Family Long Term Goal  Description: Patient's Long Term Goal: return home    Interventions:  - PT/OT, cooperate with maximal effort  - move legs in bed    - See additional Care Plan goals for specific interventions  Outcome: Progressing  Goal: Patient/Family Short Term Goal  Description: Patient's Short Term Goal: have no more GI bleeding    Interventions:   - clear liquids  - Protonix drip    - See additional Care Plan goals for specific interventions  Outcome: Progressing     Problem: HEMATOLOGIC - ADULT  Goal: Maintains hematologic stability  Description: INTERVENTIONS  - Assess for signs and symptoms of bleeding or hemorrhage  - Monitor labs and vital signs for trends  - Administer supportive blood products/factors, fluids and medications as ordered and appropriate  - Administer supportive blood products/factors as ordered and appropriate  Outcome: Progressing     Problem: MUSCULOSKELETAL - ADULT  Goal: Return mobility to safest level of function  Description: INTERVENTIONS:  - Assess patient stability and activity tolerance for standing, transferring and ambulating w/ or w/o assistive devices  - Assist with transfers and ambulation using safe patient handling equipment as needed  - Ensure adequate protection for wounds/incisions during mobilization  - Obtain PT/OT consults as needed  - Advance activity as appropriate  - Communicate ordered activity level and limitations with patient/family  Outcome: Progressing  Goal: Maintain proper alignment of affected body part  Description: INTERVENTIONS:  - Support and protect limb and body alignment per provider's orders  - Instruct and reinforce with patient and family use of appropriate assistive device and precautions (e.g. spinal or hip dislocation precautions)  Outcome: Progressing     Problem: Impaired Functional Mobility  Goal: Achieve highest/safest level of mobility/gait  Description: Interventions:  - Assess patient's functional ability and stability  - Promote increasing activity/tolerance for mobility and gait  - Educate and engage patient/family in tolerated activity level and precautions  - Elevate left upper extremity  Outcome: Progressing     Problem: Impaired Activities of Daily Living  Goal: Achieve highest/safest level of independence in self care  Description: Interventions:  - Assess ability and encourage patient to participate in ADLs to maximize function  - Promote sitting position while performing ADLs such as feeding, grooming, and bathing  - Educate and encourage patient/family in tolerated functional activity level and precautions during self-care  - Encourage patient to incorporate impaired side during daily activities to promote function  Outcome: Progressing

## 2023-10-11 NOTE — CONSULTS
Ozzie Lance  female  [id number]  [id number]  10/5/2023  Dawson Morris MD  65/1935  82year old      REMOTE PM&R CONSULT    History of Present Illness: This is a 82year old female PMH significant for recent admission 9/25/23-10/1/23 after a fall at home and was found to have left displaced trochanteric fracture status post cephalomedullary nailing on 9/25/2023. Stop course complicated by urinary retention s/p Sal placement, acute blood loss anemia requiring transfusions. Patient discharged to his subacute facility. Past medical history also notable for dementia, prior stroke, hypertension, HL, anxiety, breast cancer. Patient [facility] ED from nursing home after unwitnessed fall. ED work-up notable for hypertension 180/89, hyponatremia with sodium level 125, troponin 33 hip x-ray negative for new fractures chest x-ray negative for acute findings, head CT negative for acute findings. Patient admitted for further evaluation. Hospital course notable for GI bleeding secondary to actively bleeding duodenal ulcer s/p cautery and Endo Clip (10/9) patient also found to have left leg subcutaneous hematoma lateral aspect of left thigh due to recurrent falls. Hemoglobin 5.6 on 10/8/23/P packed red blood cell transfusion x2 units. Recurrent anemia with hemoglobin 6.5 on 10/9. Hgb 8.9 on 10/11. Hyponatremia resolved. Treated for E. coli UTI pansensitive. Azotemia resolved. GI patient advance to soft diet and to continue pantoprazole drip until 10/12. Plan to transfuse for hemoglobin less than 7. Psychiatry following for acute encephalopathy.        Current functional status:  10/10 OT  FUNCTIONAL TRANSFER ASSESSMENT  Sit to Stand: Edge of Bed  Edge of Bed: Dependent     BED MOBILITY  Supine to Sit : Maximum Assist     BALANCE ASSESSMENT  FUNCTIONAL ADL ASSESSMENT    10/10 PT  FUNCTIONAL ABILITY STATUS  Gait Assessment   Functional Mobility/Gait Assessment  Gait Assistance: Not tested  Distance (ft): 0  Assistive Device: Rolling walker  Pattern:  (Decr darshana/step length/heel-toe pattern, drags L foot)     Skilled Therapy Provided     Bed Mobility:  Rolling: max A     Supine<>Sit: max A x2 *educated on body mechanics for pain management               Transfer Mobility:  Sit<>Stand: max A - retropulsive, relying on edge of bed for stability, unable to perform appropriate anterior pelvic shift with external assist            Stand<>Sit: max A            Stand Pivot Transfer: pt unable to weight shift appropriately to take side steps to bedside chair with cuing, performed transfer with RW and max A      Therapist's Comments: pt expressing fear of falling throughout session and prefers therapist have hands on /CGA while sitting unsupported EOB           Past Medical History:   Diagnosis Date    Anxiety     Arthritis     knees and shoulders    Breast CA ([address]     right breast cancer with mastectomy. Cancer [facility])     right breast ca    Cystitis, interstitial     history of----    Flatulence/gas pain/belching     Frequent UTI     H/O mammogram 2011    left breast    H/O mammogram 2015    benign    H/O pregnancy [id number]        HIGH BLOOD PRESSURE     HIGH CHOLESTEROL     History of endoscopy 2013    Dr. Franklin Barnett    Loss of appetite     Nausea     Other and unspecified hyperlipidemia     Other and unspecified personal history of malignant neoplasm     dr. Rani Espinal, ill  breast cancer right    Pap smear for cervical cancer screening 2003    wnl    Renal cyst, right 07/15/2015    Refer to Dr. Shaffer/urology. Rheumatic fever     Stroke [facility])     Unspecified essential hypertension          Past Surgical History:   Procedure Laterality Date    COLONOSCOPY   Dr. Renee Bello, repeat in five years, family history of colon cancer, Diverticulosis    COLONOSCOPY  2013    Dr. Cheryl Osorio    partial hystero. MASTECTOMY RIGHT Right 2001    right breast cancer with mastectomy. 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    SPECIAL SERVICE OR REPORT  2001    mastectomy right    UNSPECIFIED DIAGNOSTIC PROCE  2005    cystoscopy       Social Connections: Not on file      Family History   Problem Relation Age of Onset    Heart Disorder Father         chf    Cancer Mother         colon    Stroke Mother     Colon Cancer Mother     Breast Cancer Self 61    Hypertension Brother        Rehabilitation Social History   Presents from SNF      @Meds@    Bactrim [Sulfametho*    NAUSEA ONLY  Nitrofurantoin          NAUSEA ONLY        Principal Problem:    Hyponatremia  Active Problems:    Hypertension    Memory changes    Acute cystitis without hematuria    Fall    Paroxysmal atrial fibrillation (HCC)    Hypomagnesemia    Hypophosphatemia    Altered mental status    Delirium    Hypokalemia    Urinary tract infection without hematuria    Encephalopathy    Dementia with behavioral disturbance (HCC)    Depression    Acute encephalopathy    Anxiety disorder    Normocytic anemia    Arterial hypotension    Anemia associated with acute blood loss    Gastrointestinal hemorrhage    Hematoma of left lower extremity    [address]JAY (acute kidney injury) [address] [address]    Azotemia    Oliguria    Acute blood loss anemia    Duodenal ulcer with hemorrhage      Primary Eitologic Rehabilitation Diagnosis   Acute GIB    Active Medical Comorbidities   1. Acute blood loss anemia   2.  Recent left displaced trochanteric fracture status post cephalomedullary nailing   3. dementia          Recommendations     Level of Care: SNF recommended      Thank you for the opportunity to participate in the care of this patient    Jr Mccarty MD  PM&R

## 2023-10-12 ENCOUNTER — APPOINTMENT (OUTPATIENT)
Dept: ULTRASOUND IMAGING | Facility: HOSPITAL | Age: 88
DRG: 377 | End: 2023-10-12
Attending: HOSPITALIST

## 2023-10-12 ENCOUNTER — APPOINTMENT (OUTPATIENT)
Dept: ULTRASOUND IMAGING | Facility: HOSPITAL | Age: 88
End: 2023-10-12
Attending: HOSPITALIST

## 2023-10-12 LAB
ANION GAP SERPL CALC-SCNC: 5 MMOL/L (ref 0–18)
BASOPHILS # BLD AUTO: 0.04 X10(3) UL (ref 0–0.2)
BASOPHILS NFR BLD AUTO: 0.5 %
BILIRUB UR QL STRIP.AUTO: NEGATIVE
BUN BLD-MCNC: 16 MG/DL (ref 7–18)
CALCIUM BLD-MCNC: 8.5 MG/DL (ref 8.5–10.1)
CHLORIDE SERPL-SCNC: 111 MMOL/L (ref 98–112)
CLARITY UR REFRACT.AUTO: CLEAR
CO2 SERPL-SCNC: 24 MMOL/L (ref 21–32)
CREAT BLD-MCNC: 0.79 MG/DL
EGFRCR SERPLBLD CKD-EPI 2021: 73 ML/MIN/1.73M2 (ref 60–?)
EOSINOPHIL # BLD AUTO: 0.22 X10(3) UL (ref 0–0.7)
EOSINOPHIL NFR BLD AUTO: 2.5 %
ERYTHROCYTE [DISTWIDTH] IN BLOOD BY AUTOMATED COUNT: 17.8 %
GLUCOSE BLD-MCNC: 120 MG/DL (ref 70–99)
GLUCOSE UR STRIP.AUTO-MCNC: NORMAL MG/DL
HCT VFR BLD AUTO: 26.5 %
HGB BLD-MCNC: 8.4 G/DL
IMM GRANULOCYTES # BLD AUTO: 0.1 X10(3) UL (ref 0–1)
IMM GRANULOCYTES NFR BLD: 1.1 %
KETONES UR STRIP.AUTO-MCNC: NEGATIVE MG/DL
LACTATE SERPL-SCNC: 1.9 MMOL/L (ref 0.4–2)
LACTATE SERPL-SCNC: 3 MMOL/L (ref 0.4–2)
LEUKOCYTE ESTERASE UR QL STRIP.AUTO: NEGATIVE
LYMPHOCYTES # BLD AUTO: 0.92 X10(3) UL (ref 1–4)
LYMPHOCYTES NFR BLD AUTO: 10.5 %
MCH RBC QN AUTO: 31.3 PG (ref 26–34)
MCHC RBC AUTO-ENTMCNC: 31.7 G/DL (ref 31–37)
MCV RBC AUTO: 98.9 FL
MONOCYTES # BLD AUTO: 1.18 X10(3) UL (ref 0.1–1)
MONOCYTES NFR BLD AUTO: 13.5 %
NEUTROPHILS # BLD AUTO: 6.28 X10 (3) UL (ref 1.5–7.7)
NEUTROPHILS # BLD AUTO: 6.28 X10(3) UL (ref 1.5–7.7)
NEUTROPHILS NFR BLD AUTO: 71.9 %
NITRITE UR QL STRIP.AUTO: NEGATIVE
OSMOLALITY SERPL CALC.SUM OF ELEC: 292 MOSM/KG (ref 275–295)
PH UR STRIP.AUTO: 6 [PH] (ref 5–8)
PLATELET # BLD AUTO: 204 10(3)UL (ref 150–450)
POTASSIUM SERPL-SCNC: 3.7 MMOL/L (ref 3.5–5.1)
PROCALCITONIN SERPL-MCNC: <0.05 NG/ML (ref ?–0.16)
PROT UR STRIP.AUTO-MCNC: NEGATIVE MG/DL
RBC # BLD AUTO: 2.68 X10(6)UL
SODIUM SERPL-SCNC: 140 MMOL/L (ref 136–145)
SP GR UR STRIP.AUTO: 1.01 (ref 1–1.03)
UROBILINOGEN UR STRIP.AUTO-MCNC: NORMAL MG/DL
WBC # BLD AUTO: 8.7 X10(3) UL (ref 4–11)

## 2023-10-12 PROCEDURE — 93971 EXTREMITY STUDY: CPT | Performed by: HOSPITALIST

## 2023-10-12 PROCEDURE — 99232 SBSQ HOSP IP/OBS MODERATE 35: CPT | Performed by: INTERNAL MEDICINE

## 2023-10-12 PROCEDURE — 99232 SBSQ HOSP IP/OBS MODERATE 35: CPT | Performed by: HOSPITALIST

## 2023-10-12 RX ORDER — FOLIC ACID 1 MG/1
1 TABLET ORAL DAILY
Qty: 30 TABLET | Refills: 0 | Status: SHIPPED | OUTPATIENT
Start: 2023-10-12 | End: 2023-10-15

## 2023-10-12 NOTE — CM/SW NOTE
PT continues to rec [facility]. Discussed with daughter john explained that without supporting therapy recs for acute rehab- we will not be able to get [employer] medicare to approve acute. Discussed with [facility] liaison also. Daughter states plan will be for pt to return to [facility]. Updates sent to [facility] and asked that they start Auth for return there.       Augusto Griffin, JONATHON  /Discharge Planner

## 2023-10-12 NOTE — CM/SW NOTE
10/12/23 0900   CM/SW Referral Data   Referral Source Social Work (self-referral)   Reason for Referral Discharge planning   Informant Patient;Daughter;EMR;Clinical Staff Member   Patient Info   Patient's Current Mental Status at Time of Assessment Alert;Oriented   Patient's [address]   Patient lives with Daughter   Patient Status Prior to Admission   Independent with ADLs and Mobility No   Pt. requires assistance with Housework;Driving;Meals   Discharge Needs   Anticipated D/C needs Acute rehab       Sw met with pt and her daughter Jair Rios while pt was participating in PT today to discuss dc planning. Pt normally lives with her daughter and son-in-law and has part time caregiver ( 4-6 hours per day through [email or web address]). Daughter is a PT and son-in-law works from home. Pt had fall at home 9/25 and fractured hip- went to [address] [place] [address] for rehab on 10/1 -- was there about 3 days and was re-admitted to [facility] due to fall- pt got up be herself from bed to go to bathroom since no one was responding to  her call light . Daughter feels strongly that pt needs acute rehab. She has hx of acute rehab an [facility] but would also be interested in [facility]. She does not feel she will get the therapy she needs in [facility]. Prior to fall, pt was able to ambulate without device- she did require supervision for stair climbing. PT/OT to re-eval today - prior PT recs were for [facility] and PMR did remote consult advising [facility] also. Await PT/OT input today- discussed with [facility] liaison and will ask Dr. Linda Mccallum to re-eval for acute rehab today after PT/OT notes are in- daughter would like to speak to Dr Linda Mccallum also. Daughter has the ability to take FMLA and hire more care giving assistance once she returns home. .    Explained to daughter that [place] Medicare would also need to approve acute rehab.   Advised that daughter have back up plan I- either [facility] or home with caregiver and [facility] Caldwell if unable to get approval for acute rehab.     Esther Kang LCSW  /Discharge Planner

## 2023-10-12 NOTE — PHYSICAL THERAPY NOTE
PHYSICAL THERAPY TREATMENT NOTE - INPATIENT    Room Number: 0128/1146-L       Session: 1          Presenting Problem: unwitnessed fall  Co-Morbidities : L IM nailing  due to L displaced trochanteric fx, CKD, CVA, HTN, anxiety, hyperparathyroidism, Dementia, Breast Ca  ASSESSMENT     Pt seen this AM for bed mobility, transfers, and BLE strengthening. Pt with improved standing tolerance this session, when compared to PT Eval.  Pt still requiring Max A for bed mobility, and sit<>stand from chair - bed. Pt unable to take functional forward steps - even with max verbal cues, and encouragement. At this time, Pt. presents with decreased balance, impaired strength, difficulty with gait/transfers resulting in downgrade of overall functional mobility. Due to above deficits, Pt will benefit from continued IP PT, so that patient may achieve highest functional independence/return to baseline. DISCHARGE RECOMMENDATIONS  PT Discharge Recommendations: Sub-acute rehabilitation     PLAN  PT Treatment Plan: Bed mobility; Patient education;Gait training;Range of motion;Strengthening;Transfer training  Rehab Potential : Fair  Frequency (Obs): 3-5x/week    CURRENT GOALS     Goal #1 Patient is able to demonstrate supine - sit EOB @ level: supervision      Goal #2 Patient is able to demonstrate transfers Sit to/from Stand at assistance level: SBA      Goal #3 Patient is able to ambulate 20 feet with assist device: walker - rolling at assistance level: minimum assistance      Goal #4     Goal #5     Goal #6     Goal Comments: Goals established on 10/7/2023    10/12/2023 all goals ongoing      SUBJECTIVE  \"I am trying, its just hard\"    OBJECTIVE  Precautions: Limb alert - right;Bed/chair alarm    WEIGHT BEARING RESTRICTION  Weight Bearing Restriction: L lower extremity           L Lower Extremity: Weight Bearing as Tolerated (per PT daughter report)    PAIN ASSESSMENT   Ratin  Location: L LE  Management Techniques: Activity promotion;Repositioning;Relaxation    BALANCE                                                                                                                       Static Sitting: Poor +  Dynamic Sitting: Poor +           Static Standing: Poor -  Dynamic Standing: Poor -    ACTIVITY TOLERANCE                         O2 WALK         AM-PAC '6-Clicks' INPATIENT SHORT FORM - BASIC MOBILITY  How much difficulty does the patient currently have. .. Patient Difficulty: Turning over in bed (including adjusting bedclothes, sheets and blankets)?: A Little   Patient Difficulty: Sitting down on and standing up from a chair with arms (e.g., wheelchair, bedside commode, etc.): A Lot   Patient Difficulty: Moving from lying on back to sitting on the side of the bed?: A Lot   How much help from another person does the patient currently need. .. Help from Another: Moving to and from a bed to a chair (including a wheelchair)?: A Lot   Help from Another: Need to walk in hospital room?: Total   Help from Another: Climbing 3-5 steps with a railing?: Total       AM-PAC Score:  Raw Score: 11   Approx Degree of Impairment: 72.57%   Standardized Score (AM-PAC Scale): 33.86   CMS Modifier (G-Code): CL    FUNCTIONAL ABILITY STATUS  Gait Assessment   Functional Mobility/Gait Assessment  Gait Assistance: Not tested  Distance (ft): 0 (unable to take functional steps)  Assistive Device: Rolling walker  Pattern:  (Decr darshana/step length/heel-toe pattern, drags L foot)    Skilled Therapy Provided    Bed Mobility:  Rolling: NT   Supine<>Sit: Max A (use of draw sheet)   Sit<>Supine: NT     Transfer Mobility:  Sit<>Stand: Max A   Stand<>Sit: Max A   Gait: NA    Therapist's Comments:   Writer attempted sit<>stand x 4 trials from chair. Pt with inconsistent effort noted - tends to lean posteriorly/bracing against chair. Pt able to complete SPT from EOB<>chair with Max A x 1 person and use of RW.      Pt brought into hallway - attempted to stand x 2 attempts. Pt unable to safely take steps, and pushing out RW anteriorly. Pt then able to complete pull to stand to hallway railing with chair placed perpendicular to railing. Pt able to stand x 3 trials: 10 seconds, 8 seconds and 20 seconds respectfully. Also completed sitting balance exercises and BUE reaching. Pt reported pain in LUE - RN made aware. THERAPEUTIC EXERCISES  Lower Extremity Alternating marching  Glut sets  Knee extension     Upper Extremity Elbow flex/ext and  - open/close     Position Sitting     Repetitions   10   Sets   1     Patient End of Session: Up in chair;Needs met;Call light within reach;RN aware of session/findings; All patient questions and concerns addressed    PT Session Time: 45 minutes  Gait Trainin minutes  Therapeutic Activity: 15 minutes  Therapeutic Exercise: 8 minutes   Neuromuscular Re-education: 15 minutes

## 2023-10-12 NOTE — PLAN OF CARE
Assumed care at 0730  A/O x 3-4. Disoriented to situation at times. RA  NSR with 1st degree HB on tele  VSS - BPs slightly elevated to SBP of 170's. Scheduled BP meds given per MAR. Swelling and pain with bruise noted to LUE - pt complaining of pain with arm movement. US ordered to r/o DVT. Loveonox held for VTE  Hbg daily - stable today  Pt with fever overnight. Afebrile today. Lactic checked and elevated. ID consulted. Pt still with burning with urination. Ancef q8  Up 2 max assist with walker. Sit to stand appropriate if needed. Non-cardiac EP - no needs  R arm precautions  Bruising and edema to LLE - mepilex x2 in place  SCDs  GI signed off. Soft diet. plan to discontinue protonix gtt. Waiting orders from ID. Pts family still determining placement for patient at discharge. All needs met. Pt and pts family updated with plan of care. Will continue with plan of care. Problem: PAIN - ADULT  Goal: Verbalizes/displays adequate comfort level or patient's stated pain goal  Description: INTERVENTIONS:  - Encourage pt to monitor pain and request assistance  - Assess pain using appropriate pain scale  - Administer analgesics based on type and severity of pain and evaluate response  - Implement non-pharmacological measures as appropriate and evaluate response  - Consider cultural and social influences on pain and pain management  - Manage/alleviate anxiety  - Utilize distraction and/or relaxation techniques  - Monitor for opioid side effects  - Notify MD/LIP if interventions unsuccessful or patient reports new pain  - Anticipate increased pain with activity and pre-medicate as appropriate  Outcome: Progressing     Problem: SAFETY ADULT - FALL  Goal: Free from fall injury  Description: INTERVENTIONS:  - Assess pt frequently for physical needs  - Identify cognitive and physical deficits and behaviors that affect risk of falls.   - [place] fall precautions as indicated by assessment.  - Educate pt/family on patient safety including physical limitations  - Instruct pt to call for assistance with activity based on assessment  - Modify environment to reduce risk of injury  - Provide assistive devices as appropriate  - Consider OT/PT consult to assist with strengthening/mobility  - Encourage toileting schedule  Outcome: Progressing     Problem: DISCHARGE PLANNING  Goal: Discharge to home or other facility with appropriate resources  Description: INTERVENTIONS:  - Identify barriers to discharge w/pt and caregiver  - Include patient/family/discharge partner in discharge planning  - Arrange for needed discharge resources and transportation as appropriate  - Identify discharge learning needs (meds, wound care, etc)  - Arrange for interpreters to assist at discharge as needed  - Consider post-discharge preferences of patient/family/discharge partner  - Complete POLST form as appropriate  - Assess patient's ability to be responsible for managing their own health  - Refer to Case Management Department for coordinating discharge planning if the patient needs post-hospital services based on physician/LIP order or complex needs related to functional status, cognitive ability or social support system  Outcome: Progressing     Problem: GASTROINTESTINAL - ADULT  Goal: Maintains or returns to baseline bowel function  Description: INTERVENTIONS:  - Assess bowel function  - Maintain adequate hydration with IV or PO as ordered and tolerated  - Evaluate effectiveness of GI medications  - Encourage mobilization and activity  - Obtain nutritional consult as needed  - Establish a toileting routine/schedule  - Consider collaborating with pharmacy to review patient's medication profile  Outcome: Progressing  Goal: Maintains adequate nutritional intake (undernourished)  Description: INTERVENTIONS:  - Monitor percentage of each meal consumed  - Identify factors contributing to decreased intake, treat as appropriate  - Assist with meals as needed  - Monitor I&O, WT and lab values  - Obtain nutritional consult as needed  - Optimize oral hygiene and moisture  - Encourage food from home; allow for food preferences  - Enhance eating environment  Outcome: Progressing     Problem: SKIN/TISSUE INTEGRITY - ADULT  Goal: Skin integrity remains intact  Description: INTERVENTIONS  - Assess and document risk factors for pressure ulcer development  - Assess and document skin integrity  - Monitor for areas of redness and/or skin breakdown  - Initiate interventions, skin care algorithm/standards of care as needed  Outcome: Progressing     Problem: HEMATOLOGIC - ADULT  Goal: Maintains hematologic stability  Description: INTERVENTIONS  - Assess for signs and symptoms of bleeding or hemorrhage  - Monitor labs and vital signs for trends  - Administer supportive blood products/factors, fluids and medications as ordered and appropriate  - Administer supportive blood products/factors as ordered and appropriate  Outcome: Progressing     Problem: MUSCULOSKELETAL - ADULT  Goal: Return mobility to safest level of function  Description: INTERVENTIONS:  - Assess patient stability and activity tolerance for standing, transferring and ambulating w/ or w/o assistive devices  - Assist with transfers and ambulation using safe patient handling equipment as needed  - Ensure adequate protection for wounds/incisions during mobilization  - Obtain PT/OT consults as needed  - Advance activity as appropriate  - Communicate ordered activity level and limitations with patient/family  Outcome: Progressing  Goal: Maintain proper alignment of affected body part  Description: INTERVENTIONS:  - Support and protect limb and body alignment per provider's orders  - Instruct and reinforce with patient and family use of appropriate assistive device and precautions (e.g. spinal or hip dislocation precautions)  Outcome: Progressing     Problem: Impaired Functional Mobility  Goal: Achieve highest/safest level of mobility/gait  Description: Interventions:  - Assess patient's functional ability and stability  - Promote increasing activity/tolerance for mobility and gait  - Educate and engage patient/family in tolerated activity level and precautions  - Recommend use of  RW for transfers and ambulation  Outcome: Progressing     Problem: Impaired Activities of Daily Living  Goal: Achieve highest/safest level of independence in self care  Description: Interventions:  - Assess ability and encourage patient to participate in ADLs to maximize function  - Promote sitting position while performing ADLs such as feeding, grooming, and bathing  - Educate and encourage patient/family in tolerated functional activity level and precautions during self-care  - Encourage patient to incorporate impaired side during daily activities to promote function  Outcome: Progressing

## 2023-10-12 NOTE — PLAN OF CARE
End of shift note: Assumed care of pt 10/11 at 1. Pt awake, confused. Temp of 100.4-md notified, -pt already receiving IV ABX. C/O pain to abd that was relieved by tylenol. On tele, SR; SB while asleep. Room air. No bowel movements overnight. Still on protonix gtt. Incontinent of urine w/ purewick in place. Pt slept well overnight w/ no issues after tylenol was given. Son was at bedside at the begining of shift and updated on POC, all questions answered. Problem: PAIN - ADULT  Goal: Verbalizes/displays adequate comfort level or patient's stated pain goal  Description: INTERVENTIONS:  - Encourage pt to monitor pain and request assistance  - Assess pain using appropriate pain scale  - Administer analgesics based on type and severity of pain and evaluate response  - Implement non-pharmacological measures as appropriate and evaluate response  - Consider cultural and social influences on pain and pain management  - Manage/alleviate anxiety  - Utilize distraction and/or relaxation techniques  - Monitor for opioid side effects  - Notify MD/LIP if interventions unsuccessful or patient reports new pain  - Anticipate increased pain with activity and pre-medicate as appropriate  Outcome: Progressing     Problem: SAFETY ADULT - FALL  Goal: Free from fall injury  Description: INTERVENTIONS:  - Assess pt frequently for physical needs  - Identify cognitive and physical deficits and behaviors that affect risk of falls.   - [place] fall precautions as indicated by assessment.  - Educate pt/family on patient safety including physical limitations  - Instruct pt to call for assistance with activity based on assessment  - Modify environment to reduce risk of injury  - Provide assistive devices as appropriate  - Consider OT/PT consult to assist with strengthening/mobility  - Encourage toileting schedule  Outcome: Progressing     Problem: GASTROINTESTINAL - ADULT  Goal: Maintains or returns to baseline bowel function  Description: INTERVENTIONS:  - Assess bowel function  - Maintain adequate hydration with IV or PO as ordered and tolerated  - Evaluate effectiveness of GI medications  - Encourage mobilization and activity  - Obtain nutritional consult as needed  - Establish a toileting routine/schedule  - Consider collaborating with pharmacy to review patient's medication profile  Outcome: Progressing  Goal: Maintains adequate nutritional intake (undernourished)  Description: INTERVENTIONS:  - Monitor percentage of each meal consumed  - Identify factors contributing to decreased intake, treat as appropriate  - Assist with meals as needed  - Monitor I&O, WT and lab values  - Obtain nutritional consult as needed  - Optimize oral hygiene and moisture  - Encourage food from home; allow for food preferences  - Enhance eating environment  Outcome: Progressing

## 2023-10-13 LAB
ANION GAP SERPL CALC-SCNC: 6 MMOL/L (ref 0–18)
BASOPHILS # BLD AUTO: 0.04 X10(3) UL (ref 0–0.2)
BASOPHILS NFR BLD AUTO: 0.5 %
BUN BLD-MCNC: 20 MG/DL (ref 7–18)
CALCIUM BLD-MCNC: 8.5 MG/DL (ref 8.5–10.1)
CHLORIDE SERPL-SCNC: 111 MMOL/L (ref 98–112)
CO2 SERPL-SCNC: 23 MMOL/L (ref 21–32)
CREAT BLD-MCNC: 0.78 MG/DL
EGFRCR SERPLBLD CKD-EPI 2021: 74 ML/MIN/1.73M2 (ref 60–?)
EOSINOPHIL # BLD AUTO: 0.21 X10(3) UL (ref 0–0.7)
EOSINOPHIL NFR BLD AUTO: 2.4 %
ERYTHROCYTE [DISTWIDTH] IN BLOOD BY AUTOMATED COUNT: 17.6 %
GLUCOSE BLD-MCNC: 99 MG/DL (ref 70–99)
HCT VFR BLD AUTO: 26.3 %
HGB BLD-MCNC: 8.3 G/DL
IMM GRANULOCYTES # BLD AUTO: 0.11 X10(3) UL (ref 0–1)
IMM GRANULOCYTES NFR BLD: 1.3 %
LYMPHOCYTES # BLD AUTO: 1.29 X10(3) UL (ref 1–4)
LYMPHOCYTES NFR BLD AUTO: 14.8 %
MCH RBC QN AUTO: 31.2 PG (ref 26–34)
MCHC RBC AUTO-ENTMCNC: 31.6 G/DL (ref 31–37)
MCV RBC AUTO: 98.9 FL
MONOCYTES # BLD AUTO: 0.89 X10(3) UL (ref 0.1–1)
MONOCYTES NFR BLD AUTO: 10.2 %
NEUTROPHILS # BLD AUTO: 6.16 X10 (3) UL (ref 1.5–7.7)
NEUTROPHILS # BLD AUTO: 6.16 X10(3) UL (ref 1.5–7.7)
NEUTROPHILS NFR BLD AUTO: 70.8 %
OSMOLALITY SERPL CALC.SUM OF ELEC: 293 MOSM/KG (ref 275–295)
PLATELET # BLD AUTO: 196 10(3)UL (ref 150–450)
POTASSIUM SERPL-SCNC: 4.1 MMOL/L (ref 3.5–5.1)
RBC # BLD AUTO: 2.66 X10(6)UL
SODIUM SERPL-SCNC: 140 MMOL/L (ref 136–145)
WBC # BLD AUTO: 8.7 X10(3) UL (ref 4–11)

## 2023-10-13 PROCEDURE — 99232 SBSQ HOSP IP/OBS MODERATE 35: CPT | Performed by: INTERNAL MEDICINE

## 2023-10-13 PROCEDURE — 99233 SBSQ HOSP IP/OBS HIGH 50: CPT | Performed by: HOSPITALIST

## 2023-10-13 RX ORDER — PANTOPRAZOLE SODIUM 40 MG/1
40 TABLET, DELAYED RELEASE ORAL
Qty: 60 TABLET | Refills: 0 | Status: SHIPPED | OUTPATIENT
Start: 2023-10-13 | End: 2023-10-15

## 2023-10-13 NOTE — PLAN OF CARE
Assumed care at 0730. No acute distress noted. Pt A&Ox2-3. Forgetful at times. Room air. NSR on tele. Non cardiac electrolyte protocol. Low fiber/soft diet. Protonix q12. Incontinent. Brief and purewick in place. PT/OT rec SNF. Plan is for returning to [address] [place] [address]. Insurance auth pending. SW following. Daily Hgb, 8.3 today. Meds per MAR. C/o shoulder pain. No n/v/d. Ancef q 8. Family and patient updated on POC. Safety precautions in place. Visiting with son at this time. Needs met at this time. Problem: PAIN - ADULT  Goal: Verbalizes/displays adequate comfort level or patient's stated pain goal  Description: INTERVENTIONS:  - Encourage pt to monitor pain and request assistance  - Assess pain using appropriate pain scale  - Administer analgesics based on type and severity of pain and evaluate response  - Implement non-pharmacological measures as appropriate and evaluate response  - Consider cultural and social influences on pain and pain management  - Manage/alleviate anxiety  - Utilize distraction and/or relaxation techniques  - Monitor for opioid side effects  - Notify MD/LIP if interventions unsuccessful or patient reports new pain  - Anticipate increased pain with activity and pre-medicate as appropriate  Outcome: Progressing     Problem: SAFETY ADULT - FALL  Goal: Free from fall injury  Description: INTERVENTIONS:  - Assess pt frequently for physical needs  - Identify cognitive and physical deficits and behaviors that affect risk of falls.   - [place] fall precautions as indicated by assessment.  - Educate pt/family on patient safety including physical limitations  - Instruct pt to call for assistance with activity based on assessment  - Modify environment to reduce risk of injury  - Provide assistive devices as appropriate  - Consider OT/PT consult to assist with strengthening/mobility  - Encourage toileting schedule  Outcome: Progressing     Problem: DISCHARGE PLANNING  Goal: Discharge to home or other facility with appropriate resources  Description: INTERVENTIONS:  - Identify barriers to discharge w/pt and caregiver  - Include patient/family/discharge partner in discharge planning  - Arrange for needed discharge resources and transportation as appropriate  - Identify discharge learning needs (meds, wound care, etc)  - Arrange for interpreters to assist at discharge as needed  - Consider post-discharge preferences of patient/family/discharge partner  - Complete POLST form as appropriate  - Assess patient's ability to be responsible for managing their own health  - Refer to Case Management Department for coordinating discharge planning if the patient needs post-hospital services based on physician/LIP order or complex needs related to functional status, cognitive ability or social support system  Outcome: Progressing     Problem: GASTROINTESTINAL - ADULT  Goal: Maintains or returns to baseline bowel function  Description: INTERVENTIONS:  - Assess bowel function  - Maintain adequate hydration with IV or PO as ordered and tolerated  - Evaluate effectiveness of GI medications  - Encourage mobilization and activity  - Obtain nutritional consult as needed  - Establish a toileting routine/schedule  - Consider collaborating with pharmacy to review patient's medication profile  Outcome: Progressing  Goal: Maintains adequate nutritional intake (undernourished)  Description: INTERVENTIONS:  - Monitor percentage of each meal consumed  - Identify factors contributing to decreased intake, treat as appropriate  - Assist with meals as needed  - Monitor I&O, WT and lab values  - Obtain nutritional consult as needed  - Optimize oral hygiene and moisture  - Encourage food from home; allow for food preferences  - Enhance eating environment  Outcome: Progressing     Problem: SKIN/TISSUE INTEGRITY - ADULT  Goal: Skin integrity remains intact  Description: INTERVENTIONS  - Assess and document risk factors for pressure ulcer development  - Assess and document skin integrity  - Monitor for areas of redness and/or skin breakdown  - Initiate interventions, skin care algorithm/standards of care as needed  Outcome: Progressing

## 2023-10-13 NOTE — PLAN OF CARE
End of shift note: Assumed care of pt 10/12 at 1900. Pt is alert, can be forgetful. Pt denied any pain overnight. On tele, SR. Room air-no SOB. No bowel movements overnight. Incontinent of urine-w/ purewick in place. Still on IV abx for UTI. Daily hgb check. Pt appeared to sleep well. Bed alarm on, call light in reach. Educated pt on POC, all questions answered. Problem: SAFETY ADULT - FALL  Goal: Free from fall injury  Description: INTERVENTIONS:  - Assess pt frequently for physical needs  - Identify cognitive and physical deficits and behaviors that affect risk of falls.   - [place] fall precautions as indicated by assessment.  - Educate pt/family on patient safety including physical limitations  - Instruct pt to call for assistance with activity based on assessment  - Modify environment to reduce risk of injury  - Provide assistive devices as appropriate  - Consider OT/PT consult to assist with strengthening/mobility  - Encourage toileting schedule  Outcome: Progressing

## 2023-10-13 NOTE — PHYSICAL THERAPY NOTE
PHYSICAL THERAPY TREATMENT NOTE - INPATIENT    Room Number: 2937/1878-O       Session: 2          Presenting Problem: unwitnessed fall  Co-Morbidities : L IM nailing  due to L displaced trochanteric fx, CKD, CVA, HTN, anxiety, hyperparathyroidism, Dementia, Breast Ca  ASSESSMENT     Pt participated in EOB sitting balance exercises and postural mechanics. Pt with worsening anterior weight shifting when compared to previous session. Pt with extreme fear of falling, tends to overcompensate by leaning forward insisting \"this is the middle\". Pt's caregiver Attila Morin witnessed session, and helpful with encouragement. At this time, Pt. presents with decreased balance, impaired strength, difficulty with gait/transfers resulting in downgrade of overall functional mobility. Due to above deficits, Pt will benefit from continued IP PT, so that patient may achieve highest functional independence/return to baseline. DISCHARGE RECOMMENDATIONS  PT Discharge Recommendations: Sub-acute rehabilitation     PLAN  PT Treatment Plan: Bed mobility; Patient education;Gait training;Range of motion;Strengthening;Transfer training  Rehab Potential : Fair  Frequency (Obs): 3-5x/week    CURRENT GOALS     Goal #1 Patient is able to demonstrate supine - sit EOB @ level: supervision      Goal #2 Patient is able to demonstrate transfers Sit to/from Stand at assistance level: SBA      Goal #3 Patient is able to ambulate 20 feet with assist device: walker - rolling at assistance level: minimum assistance      Goal #4     Goal #5     Goal #6     Goal Comments: Goals established on 10/7/2023    10/13/2023 all goals ongoing    SUBJECTIVE  \"I am trying hunny\"    OBJECTIVE  Precautions: Limb alert - right;Bed/chair alarm    WEIGHT BEARING RESTRICTION  Weight Bearing Restriction: L lower extremity           L Lower Extremity: Weight Bearing as Tolerated (per PT daughter report)    PAIN ASSESSMENT   Ratin  Location: L LE  Management Techniques: Activity promotion;Repositioning;Relaxation    BALANCE                                                                                                                       Static Sitting: Poor +  Dynamic Sitting: Poor +           Static Standing: Poor -  Dynamic Standing: Poor -    ACTIVITY TOLERANCE                         O2 WALK         AM-PAC '6-Clicks' INPATIENT SHORT FORM - BASIC MOBILITY  How much difficulty does the patient currently have. .. Patient Difficulty: Turning over in bed (including adjusting bedclothes, sheets and blankets)?: A Little   Patient Difficulty: Sitting down on and standing up from a chair with arms (e.g., wheelchair, bedside commode, etc.): A Lot   Patient Difficulty: Moving from lying on back to sitting on the side of the bed?: Unable   How much help from another person does the patient currently need. .. Help from Another: Moving to and from a bed to a chair (including a wheelchair)?: Total   Help from Another: Need to walk in hospital room?: Total   Help from Another: Climbing 3-5 steps with a railing?: Total       AM-PAC Score:  Raw Score: 9   Approx Degree of Impairment: 81.38%   Standardized Score (AM-PAC Scale): 30.55   CMS Modifier (G-Code): CM    FUNCTIONAL ABILITY STATUS  Gait Assessment   Functional Mobility/Gait Assessment  Gait Assistance: Not tested  Distance (ft): 0  Assistive Device: Rolling walker  Pattern:  (Decr darshana/step length/heel-toe pattern, drags L foot)    Skilled Therapy Provided    Bed Mobility:  Rolling: NT   Supine<>Sit: Max A (use of draw sheet)   Sit<>Supine: NT     Transfer Mobility:  Sit<>Stand: Max A   Stand<>Sit: Max A   Gait: NA    Therapist's Comments:   Writer placed bedside table in front of patient to encourage BUE forearm WB'ing and upright posture. Pt with improved postural mechanics. Worked on side bending to L and R side. Also completed sitting balance exercises and BUE reaching.   Pt reported pain in bilateral UE - gentle ROM provided. THERAPEUTIC EXERCISES  Lower Extremity Alternating marching  Glut sets  Knee extension     Upper Extremity Elbow flex/ext and  - open/close     Position Sitting     Repetitions   10   Sets   1     Patient End of Session: In bed;Needs met;Call light within reach;RN aware of session/findings; All patient questions and concerns addressed; Alarm set (caregiver present)    PT Session Time: 23 minutes  Gait Trainin minutes  Therapeutic Activity: 0 minutes  Therapeutic Exercise: 8 minutes   Neuromuscular Re-education: 15 minutes

## 2023-10-14 PROCEDURE — 99233 SBSQ HOSP IP/OBS HIGH 50: CPT | Performed by: HOSPITALIST

## 2023-10-14 NOTE — PLAN OF CARE
Assumed care at [address]. No acute distress noted. Daughter at bedside. A&Ox3. Room air. NSR. Low fiber/soft diet. Incontinent of bladder. Brief and purewick in place. BM yesterday evening. Meds per MAR. C/o shoulder pain. No n/v/d. IV protonix q12. Hgb 8.3 this AM. No signs of bleeding noted. Insurance authorization for [address] [place] [address] approved. ID paged, need to wait for cultures to be 2 days no growth. Patient and family updated. Safety precautions in place. Needs met at this time. 1900: No growth on blood cultures, atbx discontinued. Problem: PAIN - ADULT  Goal: Verbalizes/displays adequate comfort level or patient's stated pain goal  Description: INTERVENTIONS:  - Encourage pt to monitor pain and request assistance  - Assess pain using appropriate pain scale  - Administer analgesics based on type and severity of pain and evaluate response  - Implement non-pharmacological measures as appropriate and evaluate response  - Consider cultural and social influences on pain and pain management  - Manage/alleviate anxiety  - Utilize distraction and/or relaxation techniques  - Monitor for opioid side effects  - Notify MD/LIP if interventions unsuccessful or patient reports new pain  - Anticipate increased pain with activity and pre-medicate as appropriate  Outcome: Progressing     Problem: SAFETY ADULT - FALL  Goal: Free from fall injury  Description: INTERVENTIONS:  - Assess pt frequently for physical needs  - Identify cognitive and physical deficits and behaviors that affect risk of falls.   - [place] fall precautions as indicated by assessment.  - Educate pt/family on patient safety including physical limitations  - Instruct pt to call for assistance with activity based on assessment  - Modify environment to reduce risk of injury  - Provide assistive devices as appropriate  - Consider OT/PT consult to assist with strengthening/mobility  - Encourage toileting schedule  Outcome: Progressing     Problem: DISCHARGE PLANNING  Goal: Discharge to home or other facility with appropriate resources  Description: INTERVENTIONS:  - Identify barriers to discharge w/pt and caregiver  - Include patient/family/discharge partner in discharge planning  - Arrange for needed discharge resources and transportation as appropriate  - Identify discharge learning needs (meds, wound care, etc)  - Arrange for interpreters to assist at discharge as needed  - Consider post-discharge preferences of patient/family/discharge partner  - Complete POLST form as appropriate  - Assess patient's ability to be responsible for managing their own health  - Refer to Case Management Department for coordinating discharge planning if the patient needs post-hospital services based on physician/LIP order or complex needs related to functional status, cognitive ability or social support system  Outcome: Progressing     Problem: GASTROINTESTINAL - ADULT  Goal: Maintains or returns to baseline bowel function  Description: INTERVENTIONS:  - Assess bowel function  - Maintain adequate hydration with IV or PO as ordered and tolerated  - Evaluate effectiveness of GI medications  - Encourage mobilization and activity  - Obtain nutritional consult as needed  - Establish a toileting routine/schedule  - Consider collaborating with pharmacy to review patient's medication profile  Outcome: Progressing  Goal: Maintains adequate nutritional intake (undernourished)  Description: INTERVENTIONS:  - Monitor percentage of each meal consumed  - Identify factors contributing to decreased intake, treat as appropriate  - Assist with meals as needed  - Monitor I&O, WT and lab values  - Obtain nutritional consult as needed  - Optimize oral hygiene and moisture  - Encourage food from home; allow for food preferences  - Enhance eating environment  Outcome: Progressing     Problem: SKIN/TISSUE INTEGRITY - ADULT  Goal: Skin integrity remains intact  Description: INTERVENTIONS  - Assess and document risk factors for pressure ulcer development  - Assess and document skin integrity  - Monitor for areas of redness and/or skin breakdown  - Initiate interventions, skin care algorithm/standards of care as needed  Outcome: Progressing

## 2023-10-14 NOTE — CM/SW NOTE
10/14/23 0900   Discharge disposition   Expected discharge disposition subacute   Post Acute Care Provider [facility]     SW informed by [facility]. Jessica's insurance auth approved. ÁNGEL notified RN, awaiting medical clearance to dc to [facility]. ÁNGEL to remain available for dc planning, or additional need for support. Addendum 1225:  ÁNGEL informed by RN pt will likely be cleared for dc tomorrow. [facility] updated via [facility], clinical updates sent.     Betzy Carlton, [facility]  Discharge [address] [place] [address],[address]

## 2023-10-14 NOTE — PROGRESS NOTES
Pt A&Ox3 Can be forgetful. Room Air  Resting in bed  NSR on Tele  Denies pain /No distress Noted  Voids Purewick in place  IV Abx Q8 [address], [address] for [facility].    Safety precaution Maintained  Plan of Care ongoing

## 2023-10-15 ENCOUNTER — EXTERNAL FACILITY (OUTPATIENT)
Dept: FAMILY MEDICINE CLINIC | Facility: CLINIC | Age: 88
End: 2023-10-15

## 2023-10-15 VITALS
BODY MASS INDEX: 23.26 KG/M2 | DIASTOLIC BLOOD PRESSURE: 75 MMHG | TEMPERATURE: 97 F | HEART RATE: 72 BPM | RESPIRATION RATE: 24 BRPM | OXYGEN SATURATION: 92 % | SYSTOLIC BLOOD PRESSURE: 125 MMHG | WEIGHT: 136.25 LBS | HEIGHT: 64 IN

## 2023-10-15 DIAGNOSIS — K25.4 GASTROINTESTINAL HEMORRHAGE ASSOCIATED WITH GASTRIC ULCER: Primary | ICD-10-CM

## 2023-10-15 DIAGNOSIS — F01.54 VASCULAR DEMENTIA WITH ANXIETY, UNSPECIFIED DEMENTIA SEVERITY (HCC): ICD-10-CM

## 2023-10-15 DIAGNOSIS — Z87.81 S/P ORIF (OPEN REDUCTION INTERNAL FIXATION) FRACTURE: ICD-10-CM

## 2023-10-15 DIAGNOSIS — N30.01 ACUTE CYSTITIS WITH HEMATURIA: ICD-10-CM

## 2023-10-15 DIAGNOSIS — D62 ANEMIA ASSOCIATED WITH ACUTE BLOOD LOSS: ICD-10-CM

## 2023-10-15 DIAGNOSIS — S70.02XS HEMATOMA OF LEFT HIP, SEQUELA: ICD-10-CM

## 2023-10-15 DIAGNOSIS — Z98.890 S/P ORIF (OPEN REDUCTION INTERNAL FIXATION) FRACTURE: ICD-10-CM

## 2023-10-15 DIAGNOSIS — I10 ESSENTIAL HYPERTENSION: ICD-10-CM

## 2023-10-15 PROCEDURE — 99239 HOSP IP/OBS DSCHRG MGMT >30: CPT | Performed by: HOSPITALIST

## 2023-10-15 PROCEDURE — 3074F SYST BP LT 130 MM HG: CPT | Performed by: HOSPITALIST

## 2023-10-15 PROCEDURE — 3008F BODY MASS INDEX DOCD: CPT | Performed by: HOSPITALIST

## 2023-10-15 PROCEDURE — 1159F MED LIST DOCD IN RCRD: CPT | Performed by: HOSPITALIST

## 2023-10-15 PROCEDURE — 3078F DIAST BP <80 MM HG: CPT | Performed by: HOSPITALIST

## 2023-10-15 PROCEDURE — G0317 PROLNG NSG FAC E/M EA ADDL 15 MIN: HCPCS | Performed by: FAMILY MEDICINE

## 2023-10-15 PROCEDURE — 99306 1ST NF CARE HIGH MDM 50: CPT | Performed by: FAMILY MEDICINE

## 2023-10-15 RX ORDER — PANTOPRAZOLE SODIUM 40 MG/1
40 TABLET, DELAYED RELEASE ORAL
Status: DISCONTINUED | OUTPATIENT
Start: 2023-10-15 | End: 2023-10-15

## 2023-10-15 RX ORDER — OLANZAPINE 2.5 MG/1
2.5 TABLET ORAL 2 TIMES DAILY PRN
Qty: 30 TABLET | Refills: 1 | Status: SHIPPED | OUTPATIENT
Start: 2023-10-15

## 2023-10-15 RX ORDER — TRAZODONE HYDROCHLORIDE 50 MG/1
50 TABLET ORAL NIGHTLY
Qty: 30 TABLET | Refills: 1 | Status: SHIPPED | OUTPATIENT
Start: 2023-10-15

## 2023-10-15 RX ORDER — ATORVASTATIN CALCIUM 10 MG/1
10 TABLET, FILM COATED ORAL NIGHTLY
Qty: 30 TABLET | Refills: 3 | Status: SHIPPED | OUTPATIENT
Start: 2023-10-15

## 2023-10-15 RX ORDER — PANTOPRAZOLE SODIUM 40 MG/1
40 TABLET, DELAYED RELEASE ORAL
Qty: 60 TABLET | Refills: 0 | Status: SHIPPED | OUTPATIENT
Start: 2023-10-15 | End: 2023-10-16

## 2023-10-15 RX ORDER — OLANZAPINE 5 MG/1
5 TABLET ORAL NIGHTLY PRN
Qty: 30 TABLET | Refills: 1 | Status: SHIPPED | OUTPATIENT
Start: 2023-10-15

## 2023-10-15 RX ORDER — FOLIC ACID 1 MG/1
1 TABLET ORAL DAILY
Qty: 30 TABLET | Refills: 0 | Status: SHIPPED | OUTPATIENT
Start: 2023-10-15

## 2023-10-15 NOTE — PLAN OF CARE
Assumed care at 0730  A/O x 3 - disoriented to situation. Oriented to location in the sense that she knows it's a hospital but not always sure which one. NSR on tele  VSS  Denies pain  R forearm IV c/I - leaking with flushing but pt denies pain  Non-cardiac EP - no needs  Soft diet tolerating well  No overt signs of bleeding   Lovenox held  Up 1 max assist / sit to stand when needed. Pt recommending [facility]. Bruising to L hip from recent hip fracture. No sutures. Incontinent of urine at times. Purewick in place. Pt up in chair this morning. No complaints. All needs met. Plan for discharge today. Pt and pt's daughter, Marla Quezada, updated. Will continue with  plan of care. Problem: PAIN - ADULT  Goal: Verbalizes/displays adequate comfort level or patient's stated pain goal  Description: INTERVENTIONS:  - Encourage pt to monitor pain and request assistance  - Assess pain using appropriate pain scale  - Administer analgesics based on type and severity of pain and evaluate response  - Implement non-pharmacological measures as appropriate and evaluate response  - Consider cultural and social influences on pain and pain management  - Manage/alleviate anxiety  - Utilize distraction and/or relaxation techniques  - Monitor for opioid side effects  - Notify MD/LIP if interventions unsuccessful or patient reports new pain  - Anticipate increased pain with activity and pre-medicate as appropriate  Outcome: Progressing     Problem: SAFETY ADULT - FALL  Goal: Free from fall injury  Description: INTERVENTIONS:  - Assess pt frequently for physical needs  - Identify cognitive and physical deficits and behaviors that affect risk of falls.   - [place] fall precautions as indicated by assessment.  - Educate pt/family on patient safety including physical limitations  - Instruct pt to call for assistance with activity based on assessment  - Modify environment to reduce risk of injury  - Provide assistive devices as appropriate  - Consider OT/PT consult to assist with strengthening/mobility  - Encourage toileting schedule  Outcome: Progressing     Problem: DISCHARGE PLANNING  Goal: Discharge to home or other facility with appropriate resources  Description: INTERVENTIONS:  - Identify barriers to discharge w/pt and caregiver  - Include patient/family/discharge partner in discharge planning  - Arrange for needed discharge resources and transportation as appropriate  - Identify discharge learning needs (meds, wound care, etc)  - Arrange for interpreters to assist at discharge as needed  - Consider post-discharge preferences of patient/family/discharge partner  - Complete POLST form as appropriate  - Assess patient's ability to be responsible for managing their own health  - Refer to Case Management Department for coordinating discharge planning if the patient needs post-hospital services based on physician/LIP order or complex needs related to functional status, cognitive ability or social support system  Outcome: Progressing     Problem: GASTROINTESTINAL - ADULT  Goal: Maintains or returns to baseline bowel function  Description: INTERVENTIONS:  - Assess bowel function  - Maintain adequate hydration with IV or PO as ordered and tolerated  - Evaluate effectiveness of GI medications  - Encourage mobilization and activity  - Obtain nutritional consult as needed  - Establish a toileting routine/schedule  - Consider collaborating with pharmacy to review patient's medication profile  Outcome: Progressing  Goal: Maintains adequate nutritional intake (undernourished)  Description: INTERVENTIONS:  - Monitor percentage of each meal consumed  - Identify factors contributing to decreased intake, treat as appropriate  - Assist with meals as needed  - Monitor I&O, WT and lab values  - Obtain nutritional consult as needed  - Optimize oral hygiene and moisture  - Encourage food from home; allow for food preferences  - Enhance eating environment  Outcome: Progressing     Problem: MUSCULOSKELETAL - ADULT  Goal: Return mobility to safest level of function  Description: INTERVENTIONS:  - Assess patient stability and activity tolerance for standing, transferring and ambulating w/ or w/o assistive devices  - Assist with transfers and ambulation using safe patient handling equipment as needed  - Ensure adequate protection for wounds/incisions during mobilization  - Obtain PT/OT consults as needed  - Advance activity as appropriate  - Communicate ordered activity level and limitations with patient/family  Outcome: Progressing     Problem: Impaired Functional Mobility  Goal: Achieve highest/safest level of mobility/gait  Description: Interventions:  - Assess patient's functional ability and stability  - Promote increasing activity/tolerance for mobility and gait  - Educate and engage patient/family in tolerated activity level and precautions  - Recommend use of  RW for transfers and ambulation  Outcome: Progressing

## 2023-10-15 NOTE — PROGRESS NOTES
Fe Sal Author: Rodrigo Lopez MD     1935 MRN [id number]   [facility]  Admission 10/5/23      Last Hospital Discharge 10/15/23 PCP Chu Tang [facility] of Discharge  [facility]        CC --readmitted to [address] [place] [address] from [facility] for subacute rehab, acute gastrointestinal bleeding, UTI, left hip fracture    H. P.I Angel Hart is a 82year old female who was sent out to the emergency room at [facility] from [address] [place] [address] after she was found to be on the floor, patient had suffered a fall  Work-up revealed anemia as well as hematoma onto the left hip area  Serial CBCs were done, her hemoglobin went down to 5.6, patient was transfused blood. Initial thoughts were that hematoma might be responsible for the anemia but then patient had to black stools. She was seen by gastroenterology and underwent EGD, found to have a bleeding ulcer that was clipped  Her hemoglobin stabilized  She was also found to have UTI and was treated with IV cefazolin  Patient finished the course of antibiotics  Her CBC at the time of discharge showed hemoglobin stable around 8.2  She continues to have pain on the left hip area  She was transferred back to [address] [place] [address] today  Discharge diagnosis  Acute gastrointestinal bleeding, peptic ulcer disease, s/p clipping  UTI  Left hip hematoma  Acute blood loss anemia  History of left trochanteric fracture s/p ORIF  History of stroke  Osteoarthritis  Vascular dementia  Hypertension  Anxiety disorder      Patient seen in her room today with her daughter  She is doing very well and denies any complaints  She feels tired and sleepy, states it was a rough ride from the hospital  Review of system is negative except for pain in the hip        Past Medical History:   Diagnosis Date    Anxiety     Arthritis     knees and shoulders    Breast CA ([facility] [address]     right breast cancer with mastectomy.     Cancer [facility]) 2001    right breast ca    Cystitis, interstitial     history of----    Flatulence/gas pain/belching     Frequent UTI     H/O mammogram 2011    left breast    H/O mammogram 2015    benign    H/O pregnancy 0619,1306        HIGH BLOOD PRESSURE     HIGH CHOLESTEROL     History of endoscopy 2013    Dr. Brad Soares    Loss of appetite     Nausea     Other and unspecified hyperlipidemia     Other and unspecified personal history of malignant neoplasm     dr. Lanny Zabala, ill  breast cancer right    Pap smear for cervical cancer screening 2003    wnl    Renal cyst, right 07/15/2015    Refer to Dr. Shaffer/urology. Rheumatic fever     Stroke [facility])     Unspecified essential hypertension      Past Surgical History:   Procedure Laterality Date    COLONOSCOPY   Dr. Indu Jacob, repeat in five years, family history of colon cancer, Diverticulosis    COLONOSCOPY  2013    Dr. Zulay Stone    partial hystero. MASTECTOMY RIGHT Right     right breast cancer with mastectomy. NEEDLE BIOPSY RIGHT Right     DCIS    SPECIAL SERVICE OR REPORT      mastectomy right    UNSPECIFIED DIAGNOSTIC PROCE  2005    cystoscopy     Family History   Problem Relation Age of Onset    Heart Disorder Father         chf    Cancer Mother         colon    Stroke Mother     Colon Cancer Mother     Breast Cancer Self 63    Hypertension Brother      Social History     Socioeconomic History    Marital status:    Tobacco Use    Smoking status: Never     Passive exposure: Never    Smokeless tobacco: Never   Vaping Use    Vaping Use: Never used   Substance and Sexual Activity    Alcohol use: No    Drug use: No    Sexual activity: Never   Other Topics Concern    Caffeine Concern Yes     Comment: tea     Exercise No   Social History Narrative    Lives with daughter.    Social Determinants of Health  Food Insecurity: No Food Insecurity (10/5/2023)      Food Insecurity          Food Insecurity: Never true  Transportation Needs: No Transportation Needs (10/5/2023)      Transportation Needs          Lack of Transportation: No  Housing Stability: Low Risk  (10/5/2023)      Housing Stability          Housing Instability: No    ALLERGIES:    Bactrim [Sulfametho*    NAUSEA ONLY  Nitrofurantoin          NAUSEA ONLY    CODE STATUS:  DNR    CURRENT MEDICATIONS   Current Outpatient Medications   Medication Sig Dispense Refill    metoprolol tartrate 25 MG Oral Tab Take 0.5 tablets (12.5 mg total) by mouth TID Beta Blocker/Cardiac. 30 tablet 2    folic acid 1 MG Oral Tab Take 1 tablet (1 mg total) by mouth daily. 30 tablet 0    atorvastatin 10 MG Oral Tab Take 1 tablet (10 mg total) by mouth nightly. 30 tablet 3    pantoprazole 40 MG Oral Tab EC Take 1 tablet (40 mg total) by mouth 2 (two) times daily before meals. For at least 8 weeks. 60 tablet 0    traZODone 50 MG Oral Tab Take 1 tablet (50 mg total) by mouth nightly. 30 tablet 1    OLANZapine 5 MG Oral Tab Take 1 tablet (5 mg total) by mouth nightly as needed (agitation, aggression, insomnia). 30 tablet 1    OLANZapine 2.5 MG Oral Tab Take 1 tablet (2.5 mg total) by mouth 2 (two) times daily as needed (agitation, psychosis). 30 tablet 1    aspirin 325 MG Oral Tab Take 1 tablet (325 mg total) by mouth daily. ferrous sulfate 325 (65 FE) MG Oral Tab EC Take 1 tablet (325 mg total) by mouth daily with breakfast.      polyethylene glycol, PEG 3350, 17 g Oral Powd Pack Take 17 g by mouth daily as needed. docusate sodium 100 MG Oral Cap Take 1 capsule (100 mg total) by mouth 2 (two) times daily. losartan 100 MG Oral Tab Take 1 tablet (100 mg total) by mouth daily. desmopressin 0.1 MG Oral Tab TAKE 1 TABLET(0.1 MG) BY MOUTH EVERY NIGHT 90 tablet 1    Multiple Vitamin (MULTI-VITAMIN) Oral Tab Take 1 tablet by mouth daily.       ESCITALOPRAM 20 MG Oral Tab TAKE 1 TABLET(20 MG) BY MOUTH DAILY (Patient taking differently: Take 1 tablet (20 mg total) by mouth at bedtime.) 90 tablet 1 acetaminophen 325 MG Oral Tab Take 2 tablets (650 mg total) by mouth every 6 (six) hours as needed. melatonin 3 MG Oral Tab Take 1 tablet (3 mg total) by mouth nightly as needed. CRANBERRY EXTRACT OR Take 4,200 mg by mouth 3 (three) times daily. Omega-3 Fatty Acids (FISH OIL) 1200 MG Oral Cap Take 1,200 mg by mouth daily. REVIEW OF SYSTEMS:  Review of Systems:   Constitutional: No fevers, chills, fatigue or night sweats. ENT: No mouth pain, neck pain, running nose, headaches or swollen glands. Skin: No rashes, pruritus or skin changes,  Respiratory: Denies cough, wheezing or shortness of breath. CV: Denies chest pain, palpitations, orthopnea, PND or dizziness. Musculoskeletal: No joint pain, stiffness or swelling. GI: No nausea, vomiting or diarrhea. No blood in stools. Neurologic: No seizures, tremors, weakness or numbness    VITALS:  There were no vitals taken for this visit. PHYSICAL EXAM:  GENERAL: well developed, well nourished, in no apparent distress  SKIN: no rashes, no suspicious lesions  Wound;   HEENT: atraumatic, normocephalic, ears and throat are clear  EYES: PERRLA, EOMI, conjunctiva are clear  NECK: supple, no adenopathy, no bruits  CHEST: no chest tenderness  LUNGS: clear to auscultation  CARDIO: RRR without murmur  GI: good BS's, no masses, HSM or tenderness  : deferred  RECTAL: deferred  MUSCULOSKELETAL: back is not tender, FROM of the back  EXTREMITIES: no cyanosis, clubbing or edema  NEURO: Oriented times three, cranial nerves are intact, motor and sensory are grossly intact      DIAGNOSTICS REVIEWED AT THIS VISIT:  Lab Results   Component Value Date    GLU 99 10/13/2023    BUN 20 (H) 10/13/2023    BUNCREA 17.4 04/15/2021    CREATSERUM 0.78 10/13/2023    ANIONGAP 6 10/13/2023    GFR 62 12/01/2017    GFRNAA 51 (L) 04/21/2022    GFRAA 58 (L) 04/21/2022    CA 8.5 10/13/2023    OSMOCALC 293 10/13/2023    ALKPHO 56 10/06/2023    AST 24 10/06/2023    ALT 15 10/06/2023 BILT 2.0 10/06/2023    TP 6.2 (L) 10/06/2023    ALB 2.8 (L) 10/06/2023    GLOBULIN 3.4 10/06/2023    AGRATIO 1.5 04/21/2015     10/13/2023    K 4.1 10/13/2023     10/13/2023    CO2 23.0 10/13/2023       Lab Results   Component Value Date    WBC 8.7 10/13/2023    RBC 2.66 (L) 10/13/2023    HGB 8.3 (L) 10/13/2023    HCT 26.3 (L) 10/13/2023    .0 10/13/2023    MCV 98.9 10/13/2023    MCH 31.2 10/13/2023    MCHC 31.6 10/13/2023    RDW 17.6 10/13/2023    NEPRELIM 6.16 10/13/2023    NEPERCENT 70.8 10/13/2023    LYPERCENT 14.8 10/13/2023    MOPERCENT 10.2 10/13/2023    EOPERCENT 2.4 10/13/2023    BAPERCENT 0.5 10/13/2023    NE 6.16 10/13/2023    LYMABS 1.29 10/13/2023    MOABSO 0.89 10/13/2023    EOABSO 0.21 10/13/2023    BAABSO 0.04 10/13/2023     ASSESSMENT AND PLAN:      Diagnoses and all orders for this visit:    Gastrointestinal hemorrhage associated with gastric ulcer  Protonix 40 mg twice a day before meals for 8 weeks  Monitor H&H  Acute cystitis with hematuria  Finished the course of antibiotics in the hospital  Check UA next week  Anemia associated with acute blood loss  Hematoma of left hip, sequela  Monitor H&H, check CBC tomorrow  Continue iron sulfate 325 daily  S/P ORIF (open reduction internal fixation) fracture  - PT to work on ambulation, gait, balance, strength, endurance, transfers, safety  - OT to work on fine motor skills, ADLs, hygiene, toileting, transfers, safety  Aspirin restarted  Pain control with Tylenol  Vascular dementia with anxiety, unspecified dementia severity ([address]  On Lexapro, trazodone at bedtime  Olanzapine as needed  Essential hypertension  Under control with metoprolol, losartan  - 24h nursing for medication administration, bowel/bladder care, pain/sleep assessment  - Physician supervision for multiple medical comorbidities, fall risk, DVT risk, infection risk, pain management  - Psych for adjustment to disability and cognitive deficits  - Social work/case manager: for discharge planning needs and access to community resources as needed     [facility] medications reviewed reconciled and restarted   We will check her labs in the morning  Level of care plan discussed with the patient and her daughter in the room    Time spent at appointment today is 95 minutes including preparing to see patient, reviewing test results, performing medically appropriate examination and evaluation and coordinating care, counseling and educating patient/family, ordering medications and testing, and documenting clinical information in EMR. Amanda Miller MD   [address], [address] [place] [address]    Electronically signed      This dictation was performed with a verbal recognition program [facility]) and it was checked for errors. It is possible that there are still dictated errors within this office note. If so, please bring any errors to my attention for an addendum.  All efforts were made to ensure that this office note is accurate

## 2023-10-15 NOTE — PROGRESS NOTES
NURSING DISCHARGE NOTE    Discharged Nursing home via Ambulance. Accompanied by Family member and Support staff  Belongings Taken by patient/family. AVS printed and reviewed with patient and patient's daughter, Namita Gant. HOLLY & shahana removed  Pt discharged in stable condition  Printed prescriptions sent with patients daughter   Report called to Nurse Reza Layton at [address] [place] [address]. Removed given at [phone].

## 2023-10-15 NOTE — CM/SW NOTE
10/15/23 1100   Discharge disposition   Expected discharge disposition subacute   Post Acute Care Provider [facility]   Discharge transportation [facility]       Informed by RN that patient is medically cleared for discharge. Spoke with Lissette Graham from [address] [place] [address] who confirmed bed available today. Spoke with [facility] ambulance and scheduled  for 1:30pm today. PCS form completed. RN kindly updating family of discharge. SW will remain available.     [address] [place] [address] Residence  Phone: [phone]    [facility]  [phone] or [facility]  Discharge Planner  [phone]

## 2023-10-15 NOTE — PLAN OF CARE
Pt A&Ox3 Can be forgetful. Room Air  Resting in bed  NSR on Tele  Denies pain /No distress Noted  Low Fiber Diet  Voids Purewick in place  IV Abx Q8 Ancef d/c  Awaiting Insurance Auth for [employer]. PT rec LACHELLE  Safety precaution Maintained  Plan of Care ongoing     Problem: PAIN - ADULT  Goal: Verbalizes/displays adequate comfort level or patient's stated pain goal  Description: INTERVENTIONS:  - Encourage pt to monitor pain and request assistance  - Assess pain using appropriate pain scale  - Administer analgesics based on type and severity of pain and evaluate response  - Implement non-pharmacological measures as appropriate and evaluate response  - Consider cultural and social influences on pain and pain management  - Manage/alleviate anxiety  - Utilize distraction and/or relaxation techniques  - Monitor for opioid side effects  - Notify MD/LIP if interventions unsuccessful or patient reports new pain  - Anticipate increased pain with activity and pre-medicate as appropriate  Outcome: Progressing     Problem: SAFETY ADULT - FALL  Goal: Free from fall injury  Description: INTERVENTIONS:  - Assess pt frequently for physical needs  - Identify cognitive and physical deficits and behaviors that affect risk of falls.   - [place] fall precautions as indicated by assessment.  - Educate pt/family on patient safety including physical limitations  - Instruct pt to call for assistance with activity based on assessment  - Modify environment to reduce risk of injury  - Provide assistive devices as appropriate  - Consider OT/PT consult to assist with strengthening/mobility  - Encourage toileting schedule  Outcome: Progressing     Problem: DISCHARGE PLANNING  Goal: Discharge to home or other facility with appropriate resources  Description: INTERVENTIONS:  - Identify barriers to discharge w/pt and caregiver  - Include patient/family/discharge partner in discharge planning  - Arrange for needed discharge resources and transportation as appropriate  - Identify discharge learning needs (meds, wound care, etc)  - Arrange for interpreters to assist at discharge as needed  - Consider post-discharge preferences of patient/family/discharge partner  - Complete POLST form as appropriate  - Assess patient's ability to be responsible for managing their own health  - Refer to Case Management Department for coordinating discharge planning if the patient needs post-hospital services based on physician/LIP order or complex needs related to functional status, cognitive ability or social support system  Outcome: Progressing     Problem: GASTROINTESTINAL - ADULT  Goal: Maintains or returns to baseline bowel function  Description: INTERVENTIONS:  - Assess bowel function  - Maintain adequate hydration with IV or PO as ordered and tolerated  - Evaluate effectiveness of GI medications  - Encourage mobilization and activity  - Obtain nutritional consult as needed  - Establish a toileting routine/schedule  - Consider collaborating with pharmacy to review patient's medication profile  Outcome: Progressing  Goal: Maintains adequate nutritional intake (undernourished)  Description: INTERVENTIONS:  - Monitor percentage of each meal consumed  - Identify factors contributing to decreased intake, treat as appropriate  - Assist with meals as needed  - Monitor I&O, WT and lab values  - Obtain nutritional consult as needed  - Optimize oral hygiene and moisture  - Encourage food from home; allow for food preferences  - Enhance eating environment  Outcome: Progressing     Problem: SKIN/TISSUE INTEGRITY - ADULT  Goal: Skin integrity remains intact  Description: INTERVENTIONS  - Assess and document risk factors for pressure ulcer development  - Assess and document skin integrity  - Monitor for areas of redness and/or skin breakdown  - Initiate interventions, skin care algorithm/standards of care as needed  Outcome: Progressing

## 2023-10-16 ENCOUNTER — INITIAL APN SNF VISIT (OUTPATIENT)
Dept: INTERNAL MEDICINE CLINIC | Age: 88
End: 2023-10-16

## 2023-10-16 VITALS
HEART RATE: 84 BPM | SYSTOLIC BLOOD PRESSURE: 149 MMHG | TEMPERATURE: 99 F | DIASTOLIC BLOOD PRESSURE: 82 MMHG | RESPIRATION RATE: 18 BRPM | OXYGEN SATURATION: 94 %

## 2023-10-16 DIAGNOSIS — E23.2 DIABETES INSIPIDUS (HCC): ICD-10-CM

## 2023-10-16 DIAGNOSIS — Z86.73 H/O: STROKE: ICD-10-CM

## 2023-10-16 DIAGNOSIS — I69.30 H/O: STROKE WITH RESIDUAL EFFECTS: ICD-10-CM

## 2023-10-16 DIAGNOSIS — G47.09 OTHER INSOMNIA: ICD-10-CM

## 2023-10-16 DIAGNOSIS — Z96.642 AFTERCARE FOLLOWING LEFT HIP JOINT REPLACEMENT SURGERY: ICD-10-CM

## 2023-10-16 DIAGNOSIS — Z47.1 AFTERCARE FOLLOWING LEFT HIP JOINT REPLACEMENT SURGERY: ICD-10-CM

## 2023-10-16 DIAGNOSIS — E78.5 HYPERLIPIDEMIA, UNSPECIFIED HYPERLIPIDEMIA TYPE: ICD-10-CM

## 2023-10-16 DIAGNOSIS — K92.2 ACUTE GI BLEEDING: Primary | ICD-10-CM

## 2023-10-16 DIAGNOSIS — G47.00 INSOMNIA, UNSPECIFIED TYPE: ICD-10-CM

## 2023-10-16 DIAGNOSIS — F03.90 DEMENTIA, UNSPECIFIED DEMENTIA SEVERITY, UNSPECIFIED DEMENTIA TYPE, UNSPECIFIED WHETHER BEHAVIORAL, PSYCHOTIC, OR MOOD DISTURBANCE OR ANXIETY (HCC): ICD-10-CM

## 2023-10-16 DIAGNOSIS — I10 HYPERTENSION, UNSPECIFIED TYPE: ICD-10-CM

## 2023-10-16 DIAGNOSIS — N39.0 URINARY TRACT INFECTION WITHOUT HEMATURIA, SITE UNSPECIFIED: ICD-10-CM

## 2023-10-16 DIAGNOSIS — F32.9 MAJOR DEPRESSIVE DISORDER WITH CURRENT ACTIVE EPISODE, UNSPECIFIED DEPRESSION EPISODE SEVERITY, UNSPECIFIED WHETHER RECURRENT: ICD-10-CM

## 2023-10-16 RX ORDER — OMEPRAZOLE 20 MG/1
20 CAPSULE, DELAYED RELEASE ORAL EVERY MORNING
COMMUNITY

## 2023-10-16 NOTE — PAYOR COMM NOTE
--------------  DISCHARGE REVIEW    Payor: Bentley Lovelace #:  [id number]  Authorization Number: N/A    Admit date: 10/5/23  Admit time:  10:24 PM  Discharge Date: 10/15/2023  2:46 PM     Admitting Physician: Scott Robins MD  Attending Physician:  Brooke att. providers found  Primary Care Physician: Deneen Craig MD

## 2023-10-19 ENCOUNTER — SNF VISIT (OUTPATIENT)
Dept: INTERNAL MEDICINE CLINIC | Age: 88
End: 2023-10-19

## 2023-10-19 VITALS
RESPIRATION RATE: 16 BRPM | SYSTOLIC BLOOD PRESSURE: 159 MMHG | TEMPERATURE: 98 F | OXYGEN SATURATION: 93 % | DIASTOLIC BLOOD PRESSURE: 89 MMHG | HEART RATE: 69 BPM

## 2023-10-19 DIAGNOSIS — R52 PAIN: Primary | ICD-10-CM

## 2023-10-19 DIAGNOSIS — K92.2 ACUTE GI BLEEDING: ICD-10-CM

## 2023-10-19 DIAGNOSIS — Z86.73 H/O: STROKE: ICD-10-CM

## 2023-10-19 DIAGNOSIS — Z47.1 AFTERCARE FOLLOWING LEFT HIP JOINT REPLACEMENT SURGERY: ICD-10-CM

## 2023-10-19 DIAGNOSIS — N39.0 URINARY TRACT INFECTION WITHOUT HEMATURIA, SITE UNSPECIFIED: ICD-10-CM

## 2023-10-19 DIAGNOSIS — Z96.642 AFTERCARE FOLLOWING LEFT HIP JOINT REPLACEMENT SURGERY: ICD-10-CM

## 2023-10-19 PROCEDURE — 1159F MED LIST DOCD IN RCRD: CPT | Performed by: NURSE PRACTITIONER

## 2023-10-19 PROCEDURE — 99308 SBSQ NF CARE LOW MDM 20: CPT | Performed by: NURSE PRACTITIONER

## 2023-10-19 PROCEDURE — 1125F AMNT PAIN NOTED PAIN PRSNT: CPT | Performed by: NURSE PRACTITIONER

## 2023-10-19 PROCEDURE — 3079F DIAST BP 80-89 MM HG: CPT | Performed by: NURSE PRACTITIONER

## 2023-10-19 PROCEDURE — 3077F SYST BP >= 140 MM HG: CPT | Performed by: NURSE PRACTITIONER

## 2023-10-19 PROCEDURE — 1160F RVW MEDS BY RX/DR IN RCRD: CPT | Performed by: NURSE PRACTITIONER

## 2023-10-19 RX ORDER — LIDOCAINE 50 MG/G
1 PATCH TOPICAL EVERY 24 HOURS
COMMUNITY
Start: 2023-10-19

## 2023-10-21 ENCOUNTER — EXTERNAL FACILITY (OUTPATIENT)
Dept: FAMILY MEDICINE CLINIC | Facility: CLINIC | Age: 88
End: 2023-10-21

## 2023-10-21 DIAGNOSIS — R06.02 SHORTNESS OF BREATH: Primary | ICD-10-CM

## 2023-10-21 DIAGNOSIS — D62 ANEMIA ASSOCIATED WITH ACUTE BLOOD LOSS: ICD-10-CM

## 2023-10-21 DIAGNOSIS — I10 ESSENTIAL HYPERTENSION: ICD-10-CM

## 2023-10-21 DIAGNOSIS — F01.54 VASCULAR DEMENTIA WITH ANXIETY, UNSPECIFIED DEMENTIA SEVERITY (HCC): ICD-10-CM

## 2023-10-21 DIAGNOSIS — K25.4 GASTROINTESTINAL HEMORRHAGE ASSOCIATED WITH GASTRIC ULCER: ICD-10-CM

## 2023-10-21 DIAGNOSIS — S70.02XS HEMATOMA OF LEFT HIP, SEQUELA: ICD-10-CM

## 2023-10-22 NOTE — PROGRESS NOTES
Fe Sal Author: Dana Pittman MD     1935 MRN [id number]   [facility]  Admission 10/5/23      Last Hospital Discharge 10/15/23 PCP Shavon Ayala MD   Hospital of Discharge  [facility]        CC --follow-up, shortness of breath    H. P.I Bertin Pina is a 82year old female who was sent out to the emergency room at [facility] from [address] [place] [address] after she was found to be on the floor, patient had suffered a fall  Work-up revealed anemia as well as hematoma onto the left hip area  Serial CBCs were done, her hemoglobin went down to 5.6, patient was transfused blood. Initial thoughts were that hematoma might be responsible for the anemia but then patient had to black stools.   She was seen by gastroenterology and underwent EGD, found to have a bleeding ulcer that was clipped  Her hemoglobin stabilized  She was also found to have UTI and was treated with IV cefazolin  Patient finished the course of antibiotics  Her CBC at the time of discharge showed hemoglobin stable around 8.2  She continues to have pain on the left hip area  She was transferred back to [address] [place] [address] today  Discharge diagnosis  Acute gastrointestinal bleeding, peptic ulcer disease, s/p clipping  UTI  Left hip hematoma  Acute blood loss anemia  History of left trochanteric fracture s/p ORIF  History of stroke  Osteoarthritis  Vascular dementia  Hypertension  Anxiety disorder    Patient seen in her room today with her daughter  She is doing very well and denies any complaints  She feels tired and sleepy, states it was a rough ride from the hospital  Review of system is negative except for pain in the hip    10/21/2023  Patient is seen and examined in the room  Patient had complained of shortness of breath  Chest x-ray was ordered that showed pulmonary congestion  Stat labs were ordered at [facility]  That showed, hemoglobin of 9.2 hematocrit 28, normal WBC count and platelets, however her proBNP was found to be elevated at 4801, chemistry showed BUN of 24 and creatinine of 1.9  Patient was started on Lasix  She had good urine output and her shortness of breath is much better    She is seen in her room today and she is doing very well denies any complaints  We discussed her labs that showed improvement in her hemoglobin from 8.3-9.2, I reassured her that there is no bleeding and her hemoglobin is actually stable  She is complaining of left hip pain, pain medications are adequate  Denies any chest pain shortness of breath dizziness headache nausea vomiting diarrhea abdominal pain        Past Medical History:   Diagnosis Date    Anxiety     Arthritis     knees and shoulders    Breast CA ([address]     right breast cancer with mastectomy. Cancer [facility])     right breast ca    Cystitis, interstitial     history of----    Flatulence/gas pain/belching     Frequent UTI     H/O mammogram 2011    left breast    H/O mammogram 2015    benign    H/O pregnancy 4074,6540        HIGH BLOOD PRESSURE     HIGH CHOLESTEROL     History of endoscopy 2013    Dr. Cassie Carter    Loss of appetite     Nausea     Other and unspecified hyperlipidemia     Other and unspecified personal history of malignant neoplasm     dr. Branden Montalvo, ill  breast cancer right    Pap smear for cervical cancer screening 2003    wnl    Renal cyst, right 07/15/2015    Refer to Dr. Shaffer/urology. Rheumatic fever     Stroke [facility])     Unspecified essential hypertension      Past Surgical History:   Procedure Laterality Date    COLONOSCOPY   Dr. Deloris Kearney, repeat in five years, family history of colon cancer, Diverticulosis    COLONOSCOPY  2013    Dr. Albina Hay    partial hystero. MASTECTOMY RIGHT Right     right breast cancer with mastectomy.     NEEDLE BIOPSY RIGHT Right     DCIS    SPECIAL SERVICE OR REPORT  2001    mastectomy right    UNSPECIFIED DIAGNOSTIC PROCE  2005    cystoscopy Family History   Problem Relation Age of Onset    Heart Disorder Father         chf    Cancer Mother         colon    Stroke Mother     Colon Cancer Mother     Breast Cancer Self 63    Hypertension Brother      Social History     Socioeconomic History    Marital status:    Tobacco Use    Smoking status: Never     Passive exposure: Never    Smokeless tobacco: Never   Vaping Use    Vaping Use: Never used   Substance and Sexual Activity    Alcohol use: No    Drug use: No    Sexual activity: Never   Other Topics Concern    Caffeine Concern Yes     Comment: tea     Exercise No   Social History Narrative    Lives with daughter. Social Determinants of Health  Food Insecurity: No Food Insecurity (10/5/2023)      Food Insecurity          Food Insecurity: Never true  Transportation Needs: No Transportation Needs (10/5/2023)      Transportation Needs          Lack of Transportation: No  Housing Stability: Low Risk  (10/5/2023)      Housing Stability          Housing Instability: No    ALLERGIES:    Bactrim [Sulfametho*    NAUSEA ONLY  Nitrofurantoin          NAUSEA ONLY    CODE STATUS:  DNR    CURRENT MEDICATIONS   Current Outpatient Medications   Medication Sig Dispense Refill    lidocaine 5 % External Patch Place 1 patch onto the skin daily. 12h on and 12h off      omeprazole 20 MG Oral Capsule Delayed Release Take 1 capsule (20 mg total) by mouth every morning. metoprolol tartrate 25 MG Oral Tab Take 0.5 tablets (12.5 mg total) by mouth TID Beta Blocker/Cardiac. 30 tablet 2    folic acid 1 MG Oral Tab Take 1 tablet (1 mg total) by mouth daily. 30 tablet 0    atorvastatin 10 MG Oral Tab Take 1 tablet (10 mg total) by mouth nightly. 30 tablet 3    traZODone 50 MG Oral Tab Take 1 tablet (50 mg total) by mouth nightly. 30 tablet 1    OLANZapine 5 MG Oral Tab Take 1 tablet (5 mg total) by mouth nightly as needed (agitation, aggression, insomnia).  30 tablet 1    OLANZapine 2.5 MG Oral Tab Take 1 tablet (2.5 mg total) by mouth 2 (two) times daily as needed (agitation, psychosis). 30 tablet 1    aspirin 325 MG Oral Tab Take 1 tablet (325 mg total) by mouth daily. ferrous sulfate 325 (65 FE) MG Oral Tab EC Take 1 tablet (325 mg total) by mouth daily with breakfast.      docusate sodium 100 MG Oral Cap Take 1 capsule (100 mg total) by mouth 2 (two) times daily. losartan 100 MG Oral Tab Take 1 tablet (100 mg total) by mouth daily. desmopressin 0.1 MG Oral Tab TAKE 1 TABLET(0.1 MG) BY MOUTH EVERY NIGHT 90 tablet 1    Multiple Vitamin (MULTI-VITAMIN) Oral Tab Take 1 tablet by mouth daily. ESCITALOPRAM 20 MG Oral Tab TAKE 1 TABLET(20 MG) BY MOUTH DAILY (Patient taking differently: Take 1 tablet (20 mg total) by mouth at bedtime.) 90 tablet 1    acetaminophen 325 MG Oral Tab Take 2 tablets (650 mg total) by mouth every 6 (six) hours as needed. CRANBERRY EXTRACT OR Take 4,200 mg by mouth 3 (three) times daily. Omega-3 Fatty Acids (FISH OIL) 1200 MG Oral Cap Take 1,200 mg by mouth daily. REVIEW OF SYSTEMS:  Review of Systems:   Constitutional: No fevers, chills, fatigue or night sweats. ENT: No mouth pain, neck pain, running nose, headaches or swollen glands. Skin: No rashes, pruritus or skin changes,  Respiratory: Denies cough, wheezing or shortness of breath. CV: Denies chest pain, palpitations, orthopnea, PND or dizziness. Musculoskeletal: No joint pain, stiffness or swelling. GI: No nausea, vomiting or diarrhea. No blood in stools.   Neurologic: No seizures, tremors, weakness or numbness    VITALS: Her weight is 141 pounds blood pressure 132/70 respiration 18/min temperature 97.3 heart rate is 66/min saturations have been ranging between 93 and 98%    PHYSICAL EXAM:  GENERAL: well developed, well nourished, in no apparent distress  SKIN: no rashes, no suspicious lesions  HEENT: atraumatic, normocephalic, ears and throat are clear  EYES: PERRLA, EOMI, conjunctiva are clear  NECK: supple, no adenopathy, no bruits  CHEST: no chest tenderness  LUNGS: clear to auscultation  CARDIO: RRR without murmur  GI: good BS's, no masses, HSM or tenderness  : deferred  RECTAL: deferred  MUSCULOSKELETAL: back is not tender, FROM of the back  EXTREMITIES: no cyanosis, clubbing or edema  NEURO: Oriented times three, cranial nerves are intact, motor and sensory are grossly intact      DIAGNOSTICS REVIEWED AT THIS VISIT:  Lab Results   Component Value Date     (H) 10/20/2023    BUN 24 (H) 10/20/2023    BUNCREA 17.4 04/15/2021    CREATSERUM 0.93 10/20/2023    ANIONGAP 4 10/20/2023    GFR 62 12/01/2017    GFRNAA 51 (L) 04/21/2022    GFRAA 58 (L) 04/21/2022    CA 8.6 10/20/2023    OSMOCALC 285 10/20/2023    ALKPHO 92 10/20/2023    AST 23 10/20/2023    ALT 14 10/20/2023    BILT 0.7 10/20/2023    TP 5.7 (L) 10/20/2023    ALB 2.4 (L) 10/20/2023    GLOBULIN 3.3 10/20/2023    AGRATIO 1.5 04/21/2015     (L) 10/20/2023    K 4.3 10/20/2023     10/20/2023    CO2 26.0 10/20/2023       Lab Results   Component Value Date    WBC 5.2 10/20/2023    RBC 2.95 (L) 10/20/2023    HGB 9.2 (L) 10/20/2023    HCT 28.9 (L) 10/20/2023    .0 10/20/2023    MCV 98.0 10/20/2023    MCH 31.2 10/20/2023    MCHC 31.8 10/20/2023    RDW 15.9 10/20/2023    NEPRELIM 3.46 10/20/2023    NEPERCENT 67.1 10/20/2023    LYPERCENT 14.6 10/20/2023    MOPERCENT 13.6 10/20/2023    EOPERCENT 3.7 10/20/2023    BAPERCENT 0.6 10/20/2023    NE 3.46 10/20/2023    LYMABS 0.75 (L) 10/20/2023    MOABSO 0.70 10/20/2023    EOABSO 0.19 10/20/2023    BAABSO 0.03 10/20/2023     ASSESSMENT AND PLAN:  Shortness of breath  We will continue her Lasix 20 mg once a day  Check BUN/creatinine and potassium on Monday    Gastrointestinal hemorrhage associated with gastric ulcer  Protonix 40 mg twice a day before meals for 8 weeks  Monitor H&H  Anemia associated with acute blood loss  Hematoma of left hip, sequela  Monitor H&H, check CBC tomorrow  Continue iron sulfate 325 daily  S/P ORIF (open reduction internal fixation) fracture  - PT to work on ambulation, gait, balance, strength, endurance, transfers, safety  - OT to work on fine motor skills, ADLs, hygiene, toileting, transfers, safety  Aspirin restarted  Pain control with Tylenol  Vascular dementia with anxiety, unspecified dementia severity ([facility] [address]  On Lexapro, trazodone at bedtime  Olanzapine as needed  Essential hypertension  Under control with metoprolol, losartan  - 24h nursing for medication administration, bowel/bladder care, pain/sleep assessment  - Physician supervision for multiple medical comorbidities, fall risk, DVT risk, infection risk, pain management  - Psych for adjustment to disability and cognitive deficits  Level of care plan discussed with the patient      Isidoro Bello MD   [address] [place] [address], [address] [place] [address] Drive., [facility] [address]    Electronically signed      This dictation was performed with a verbal recognition program (DRAGON) and it was checked for errors. It is possible that there are still dictated errors within this office note. If so, please bring any errors to my attention for an addendum.  All efforts were made to ensure that this office note is accurate

## 2023-10-23 ENCOUNTER — MED REC SCAN ONLY (OUTPATIENT)
Dept: FAMILY MEDICINE CLINIC | Facility: CLINIC | Age: 88
End: 2023-10-23

## 2023-10-23 ENCOUNTER — SNF VISIT (OUTPATIENT)
Dept: INTERNAL MEDICINE CLINIC | Age: 88
End: 2023-10-23

## 2023-10-23 VITALS
TEMPERATURE: 98 F | HEART RATE: 80 BPM | DIASTOLIC BLOOD PRESSURE: 78 MMHG | OXYGEN SATURATION: 95 % | SYSTOLIC BLOOD PRESSURE: 150 MMHG | RESPIRATION RATE: 18 BRPM

## 2023-10-23 DIAGNOSIS — R79.89 ELEVATED BRAIN NATRIURETIC PEPTIDE (BNP) LEVEL: ICD-10-CM

## 2023-10-23 DIAGNOSIS — R39.89 ABNORMAL URINE COLOR: ICD-10-CM

## 2023-10-23 DIAGNOSIS — R30.9 VOIDING PAIN: Primary | ICD-10-CM

## 2023-10-23 DIAGNOSIS — R09.89 CHEST CONGESTION: ICD-10-CM

## 2023-10-23 RX ORDER — SACCHAROMYCES BOULARDII 250 MG
250 CAPSULE ORAL 2 TIMES DAILY
COMMUNITY
Start: 2023-10-23 | End: 2023-11-06

## 2023-10-23 RX ORDER — GARLIC EXTRACT 500 MG
1 CAPSULE ORAL 2 TIMES DAILY WITH MEALS
COMMUNITY
Start: 2023-10-23 | End: 2023-10-23

## 2023-10-23 RX ORDER — MAGNESIUM HYDROXIDE/ALUMINUM HYDROXICE/SIMETHICONE 120; 1200; 1200 MG/30ML; MG/30ML; MG/30ML
15 SUSPENSION ORAL DAILY PRN
COMMUNITY

## 2023-10-23 RX ORDER — CEFUROXIME AXETIL 500 MG/1
500 TABLET ORAL 2 TIMES DAILY
COMMUNITY
Start: 2023-10-23 | End: 2023-11-01

## 2023-10-26 ENCOUNTER — SNF VISIT (OUTPATIENT)
Dept: INTERNAL MEDICINE CLINIC | Age: 88
End: 2023-10-26

## 2023-10-26 VITALS
HEART RATE: 81 BPM | SYSTOLIC BLOOD PRESSURE: 120 MMHG | DIASTOLIC BLOOD PRESSURE: 70 MMHG | OXYGEN SATURATION: 97 % | RESPIRATION RATE: 18 BRPM | TEMPERATURE: 99 F

## 2023-10-26 DIAGNOSIS — Z47.1 AFTERCARE FOLLOWING LEFT HIP JOINT REPLACEMENT SURGERY: ICD-10-CM

## 2023-10-26 DIAGNOSIS — N39.0 URINARY TRACT INFECTION WITHOUT HEMATURIA, SITE UNSPECIFIED: Primary | ICD-10-CM

## 2023-10-26 DIAGNOSIS — R53.83 LETHARGY: ICD-10-CM

## 2023-10-26 DIAGNOSIS — Z96.642 AFTERCARE FOLLOWING LEFT HIP JOINT REPLACEMENT SURGERY: ICD-10-CM

## 2023-10-26 DIAGNOSIS — Z86.73 H/O: STROKE: ICD-10-CM

## 2023-10-26 PROCEDURE — 1160F RVW MEDS BY RX/DR IN RCRD: CPT | Performed by: NURSE PRACTITIONER

## 2023-10-26 PROCEDURE — 1159F MED LIST DOCD IN RCRD: CPT | Performed by: NURSE PRACTITIONER

## 2023-10-26 PROCEDURE — 3078F DIAST BP <80 MM HG: CPT | Performed by: NURSE PRACTITIONER

## 2023-10-26 PROCEDURE — 99309 SBSQ NF CARE MODERATE MDM 30: CPT | Performed by: NURSE PRACTITIONER

## 2023-10-26 PROCEDURE — 3074F SYST BP LT 130 MM HG: CPT | Performed by: NURSE PRACTITIONER

## 2023-10-26 PROCEDURE — 1126F AMNT PAIN NOTED NONE PRSNT: CPT | Performed by: NURSE PRACTITIONER

## 2023-10-26 RX ORDER — CIPROFLOXACIN 500 MG/1
500 TABLET, FILM COATED ORAL 2 TIMES DAILY
COMMUNITY
Start: 2023-10-26 | End: 2023-11-02

## 2023-10-30 ENCOUNTER — SNF VISIT (OUTPATIENT)
Dept: INTERNAL MEDICINE CLINIC | Age: 88
End: 2023-10-30

## 2023-10-30 DIAGNOSIS — Z47.1 AFTERCARE FOLLOWING LEFT HIP JOINT REPLACEMENT SURGERY: ICD-10-CM

## 2023-10-30 DIAGNOSIS — Z86.73 H/O: STROKE: ICD-10-CM

## 2023-10-30 DIAGNOSIS — R53.83 LETHARGY: ICD-10-CM

## 2023-10-30 DIAGNOSIS — N39.0 URINARY TRACT INFECTION WITHOUT HEMATURIA, SITE UNSPECIFIED: Primary | ICD-10-CM

## 2023-10-30 DIAGNOSIS — Z96.642 AFTERCARE FOLLOWING LEFT HIP JOINT REPLACEMENT SURGERY: ICD-10-CM

## 2023-10-30 PROCEDURE — 1160F RVW MEDS BY RX/DR IN RCRD: CPT | Performed by: NURSE PRACTITIONER

## 2023-10-30 PROCEDURE — 99307 SBSQ NF CARE SF MDM 10: CPT | Performed by: NURSE PRACTITIONER

## 2023-10-30 PROCEDURE — 1126F AMNT PAIN NOTED NONE PRSNT: CPT | Performed by: NURSE PRACTITIONER

## 2023-10-30 PROCEDURE — 1159F MED LIST DOCD IN RCRD: CPT | Performed by: NURSE PRACTITIONER

## 2023-10-30 PROCEDURE — 3077F SYST BP >= 140 MM HG: CPT | Performed by: NURSE PRACTITIONER

## 2023-10-30 PROCEDURE — 3079F DIAST BP 80-89 MM HG: CPT | Performed by: NURSE PRACTITIONER

## 2023-10-30 NOTE — PROCEDURES
.  EEG REPORT;    Reason for Examination: Encephalopathy    Technical Summary:   18 Channels of EEG and 1 Channel of EKG was performed utilizing internation 10/20 method. Background Activity: The background activity consisted of 9 hz waveforms, reactive to eye opening/ external stimulation. Abnormality:  Throughout the recording, mild, intermittent, low to medium voltage polymorphic 3 to 6 Hz slow activity was noted diffusely over both hemispheres. Activation:    Hyperventilation:   Not Performed. Photic Stimulation:  Driving response seen. No       Sleep:  Stage I sleep seen. Impression: This is a Abnormal EEG. Mild diffuse slowing into delta and theta range was noted. This constellation of findings can be seen in encephalopathy due to metabolic/toxic etiology, medication effects or diffuse cerebral injury. No focal, lateralized or generalized epileptiform activity seen. Clinical correlation is recommended. Gume Foreman MD  [facility].

## 2023-10-31 VITALS
DIASTOLIC BLOOD PRESSURE: 80 MMHG | HEART RATE: 64 BPM | TEMPERATURE: 99 F | OXYGEN SATURATION: 95 % | SYSTOLIC BLOOD PRESSURE: 152 MMHG | RESPIRATION RATE: 16 BRPM

## 2023-11-02 ENCOUNTER — SNF VISIT (OUTPATIENT)
Dept: INTERNAL MEDICINE CLINIC | Age: 88
End: 2023-11-02

## 2023-11-02 DIAGNOSIS — Z86.73 H/O: STROKE: ICD-10-CM

## 2023-11-02 DIAGNOSIS — R53.83 LETHARGY: ICD-10-CM

## 2023-11-02 DIAGNOSIS — Z96.642 AFTERCARE FOLLOWING LEFT HIP JOINT REPLACEMENT SURGERY: ICD-10-CM

## 2023-11-02 DIAGNOSIS — Z47.1 AFTERCARE FOLLOWING LEFT HIP JOINT REPLACEMENT SURGERY: ICD-10-CM

## 2023-11-02 DIAGNOSIS — N39.0 URINARY TRACT INFECTION WITHOUT HEMATURIA, SITE UNSPECIFIED: Primary | ICD-10-CM

## 2023-11-02 PROCEDURE — 1111F DSCHRG MED/CURRENT MED MERGE: CPT | Performed by: NURSE PRACTITIONER

## 2023-11-02 PROCEDURE — 3077F SYST BP >= 140 MM HG: CPT | Performed by: NURSE PRACTITIONER

## 2023-11-02 PROCEDURE — 3080F DIAST BP >= 90 MM HG: CPT | Performed by: NURSE PRACTITIONER

## 2023-11-02 PROCEDURE — 1159F MED LIST DOCD IN RCRD: CPT | Performed by: NURSE PRACTITIONER

## 2023-11-02 PROCEDURE — 1160F RVW MEDS BY RX/DR IN RCRD: CPT | Performed by: NURSE PRACTITIONER

## 2023-11-02 PROCEDURE — 99307 SBSQ NF CARE SF MDM 10: CPT | Performed by: NURSE PRACTITIONER

## 2023-11-02 PROCEDURE — 1126F AMNT PAIN NOTED NONE PRSNT: CPT | Performed by: NURSE PRACTITIONER

## 2023-11-03 ENCOUNTER — EXTERNAL FACILITY (OUTPATIENT)
Dept: FAMILY MEDICINE CLINIC | Facility: CLINIC | Age: 88
End: 2023-11-03

## 2023-11-03 DIAGNOSIS — D62 ANEMIA ASSOCIATED WITH ACUTE BLOOD LOSS: ICD-10-CM

## 2023-11-03 DIAGNOSIS — F01.54 VASCULAR DEMENTIA WITH ANXIETY, UNSPECIFIED DEMENTIA SEVERITY (HCC): ICD-10-CM

## 2023-11-03 DIAGNOSIS — I10 ESSENTIAL HYPERTENSION: ICD-10-CM

## 2023-11-03 DIAGNOSIS — Z98.890 S/P ORIF (OPEN REDUCTION INTERNAL FIXATION) FRACTURE: ICD-10-CM

## 2023-11-03 DIAGNOSIS — S70.02XS HEMATOMA OF LEFT HIP, SEQUELA: ICD-10-CM

## 2023-11-03 DIAGNOSIS — Z87.81 S/P ORIF (OPEN REDUCTION INTERNAL FIXATION) FRACTURE: ICD-10-CM

## 2023-11-03 DIAGNOSIS — K25.4 GASTROINTESTINAL HEMORRHAGE ASSOCIATED WITH GASTRIC ULCER: Primary | ICD-10-CM

## 2023-11-03 PROCEDURE — 99309 SBSQ NF CARE MODERATE MDM 30: CPT | Performed by: FAMILY MEDICINE

## 2023-11-03 PROCEDURE — 1111F DSCHRG MED/CURRENT MED MERGE: CPT | Performed by: FAMILY MEDICINE

## 2023-11-04 VITALS
TEMPERATURE: 98 F | HEART RATE: 83 BPM | SYSTOLIC BLOOD PRESSURE: 150 MMHG | RESPIRATION RATE: 18 BRPM | DIASTOLIC BLOOD PRESSURE: 90 MMHG | OXYGEN SATURATION: 96 %

## 2023-11-05 NOTE — PROGRESS NOTES
Fe Sal Author: Iban Pierce MD     1935 MRN [id number]   [facility]  Admission 10/5/23      Last Hospital Discharge 10/15/23 PCP Harika Forbes MD   Hospital of Discharge  [facility]        CC --follow-up, shortness of breath    H. P.I Evangelist Head is a 82year old female who was sent out to the emergency room at [facility] from [address] [place] [address] after she was found to be on the floor, patient had suffered a fall  Work-up revealed anemia as well as hematoma onto the left hip area  Serial CBCs were done, her hemoglobin went down to 5.6, patient was transfused blood. Initial thoughts were that hematoma might be responsible for the anemia but then patient had to black stools.   She was seen by gastroenterology and underwent EGD, found to have a bleeding ulcer that was clipped  Her hemoglobin stabilized  She was also found to have UTI and was treated with IV cefazolin  Patient finished the course of antibiotics  Her CBC at the time of discharge showed hemoglobin stable around 8.2  She continues to have pain on the left hip area  She was transferred back to [address] [place] [address] today  Discharge diagnosis  Acute gastrointestinal bleeding, peptic ulcer disease, s/p clipping  UTI  Left hip hematoma  Acute blood loss anemia  History of left trochanteric fracture s/p ORIF  History of stroke  Osteoarthritis  Vascular dementia  Hypertension  Anxiety disorder    Patient seen in her room today with her daughter  She is doing very well and denies any complaints  She feels tired and sleepy, states it was a rough ride from the hospital  Review of system is negative except for pain in the hip    10/21/2023  Patient is seen and examined in the room  Patient had complained of shortness of breath  Chest x-ray was ordered that showed pulmonary congestion  Stat labs were ordered at [facility]  That showed, hemoglobin of 9.2 hematocrit 28, normal WBC count and platelets, however her proBNP was found to be elevated at 4801, chemistry showed BUN of 24 and creatinine of 1.9  Patient was started on Lasix  She had good urine output and her shortness of breath is much better  We discussed her labs that showed improvement in her hemoglobin from 8.3-9.2, I reassured her that there is no bleeding and her hemoglobin is actually stable  She is complaining of left hip pain, pain medications are adequate  Denies any chest pain shortness of breath dizziness headache nausea vomiting diarrhea abdominal pain    11/3/2023  Patient doing well denies any complaints  Seen in the dining room  She is having her lunch  She has been participating in the therapy  Her cough and congestion has resolved  Recent labs reviewed from 2023 with a hemoglobin of 9.5 and hematocrit 29 her chemistry showed BUN of 32 and creatinine of 1.04 rest of her labs were in acceptable limits    Review of system is negative except for the left hip pain which is much better compared to last week    Past Medical History:   Diagnosis Date    Anxiety     Arthritis     knees and shoulders    Breast CA ([address]     right breast cancer with mastectomy. Cancer [facility])     right breast ca    Cystitis, interstitial     history of----    Flatulence/gas pain/belching     Frequent UTI     H/O mammogram 2011    left breast    H/O mammogram 2015    benign    H/O pregnancy 8301,9468        HIGH BLOOD PRESSURE     HIGH CHOLESTEROL     History of endoscopy 2013    Dr. Breanne Galindo    Loss of appetite     Nausea     Other and unspecified hyperlipidemia     Other and unspecified personal history of malignant neoplasm     dr. Chetan Drake, ill  breast cancer right    Pap smear for cervical cancer screening 2003    wnl    Renal cyst, right 07/15/2015    Refer to Dr. Shaffer/urology.     Rheumatic fever     Stroke [facility])     Unspecified essential hypertension      Past Surgical History:   Procedure Laterality Date    COLONOSCOPY   Dr. Lashay Kim, repeat in five years, family history of colon cancer, Diverticulosis    COLONOSCOPY  08/21/2013    Dr. Catarino Leach    partial hystero. MASTECTOMY RIGHT Right 2001    right breast cancer with mastectomy. 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    SPECIAL SERVICE OR REPORT  2001    mastectomy right    UNSPECIFIED DIAGNOSTIC PROCE  2005    cystoscopy     Family History   Problem Relation Age of Onset    Heart Disorder Father         chf    Cancer Mother         colon    Stroke Mother     Colon Cancer Mother     Breast Cancer Self 63    Hypertension Brother      Social History     Socioeconomic History    Marital status:    Tobacco Use    Smoking status: Never     Passive exposure: Never    Smokeless tobacco: Never   Vaping Use    Vaping Use: Never used   Substance and Sexual Activity    Alcohol use: No    Drug use: No    Sexual activity: Never   Other Topics Concern    Caffeine Concern Yes     Comment: tea     Exercise No   Social History Narrative    Lives with daughter. Social Determinants of Health  Food Insecurity: No Food Insecurity (10/5/2023)      Food Insecurity          Food Insecurity: Never true  Transportation Needs: No Transportation Needs (10/5/2023)      Transportation Needs          Lack of Transportation: No  Housing Stability: Low Risk  (10/5/2023)      Housing Stability          Housing Instability: No    ALLERGIES:    Bactrim [Sulfametho*    NAUSEA ONLY  Nitrofurantoin          NAUSEA ONLY    CODE STATUS:  DNR    CURRENT MEDICATIONS   Current Outpatient Medications   Medication Sig Dispense Refill    alum-mag hydroxide-simethicone 454-564-45 MG/5ML Oral Suspension Take 15 mL by mouth daily as needed for Indigestion. saccharomyces boulardii 250 MG Oral Cap Take 1 capsule (250 mg total) by mouth 2 (two) times daily. lidocaine 5 % External Patch Place 1 patch onto the skin daily.  12h on and 12h off      omeprazole 20 MG Oral Capsule Delayed Release Take 1 capsule (20 mg total) by mouth every morning. metoprolol tartrate 25 MG Oral Tab Take 0.5 tablets (12.5 mg total) by mouth TID Beta Blocker/Cardiac. 30 tablet 2    folic acid 1 MG Oral Tab Take 1 tablet (1 mg total) by mouth daily. 30 tablet 0    atorvastatin 10 MG Oral Tab Take 1 tablet (10 mg total) by mouth nightly. 30 tablet 3    traZODone 50 MG Oral Tab Take 1 tablet (50 mg total) by mouth nightly. 30 tablet 1    OLANZapine 5 MG Oral Tab Take 1 tablet (5 mg total) by mouth nightly as needed (agitation, aggression, insomnia). 30 tablet 1    OLANZapine 2.5 MG Oral Tab Take 1 tablet (2.5 mg total) by mouth 2 (two) times daily as needed (agitation, psychosis). 30 tablet 1    aspirin 325 MG Oral Tab Take 1 tablet (325 mg total) by mouth daily. ferrous sulfate 325 (65 FE) MG Oral Tab EC Take 1 tablet (325 mg total) by mouth daily with breakfast.      docusate sodium 100 MG Oral Cap Take 1 capsule (100 mg total) by mouth 2 (two) times daily. losartan 100 MG Oral Tab Take 1 tablet (100 mg total) by mouth daily. desmopressin 0.1 MG Oral Tab TAKE 1 TABLET(0.1 MG) BY MOUTH EVERY NIGHT 90 tablet 1    Multiple Vitamin (MULTI-VITAMIN) Oral Tab Take 1 tablet by mouth daily. ESCITALOPRAM 20 MG Oral Tab TAKE 1 TABLET(20 MG) BY MOUTH DAILY (Patient taking differently: Take 1 tablet (20 mg total) by mouth at bedtime.) 90 tablet 1    acetaminophen 325 MG Oral Tab Take 2 tablets (650 mg total) by mouth every 6 (six) hours as needed. CRANBERRY EXTRACT OR Take 4,200 mg by mouth 3 (three) times daily. Omega-3 Fatty Acids (FISH OIL) 1200 MG Oral Cap Take 1,200 mg by mouth daily. REVIEW OF SYSTEMS:  Review of Systems:   Constitutional: No fevers, chills, fatigue or night sweats. ENT: No mouth pain, neck pain, running nose, headaches or swollen glands. Skin: No rashes, pruritus or skin changes,  Respiratory: Denies cough, wheezing or shortness of breath.   CV: Denies chest pain, palpitations, orthopnea, PND or dizziness. Musculoskeletal: No joint pain, stiffness or swelling. GI: No nausea, vomiting or diarrhea. No blood in stools.   Neurologic: No seizures, tremors, weakness or numbness    VITALS: Blood pressure 140/90 temperature 98.3 pulse 94/min respiration 16/min pulse ox 96%    PHYSICAL EXAM:  GENERAL: well developed, well nourished, in no apparent distress  SKIN: no rashes, no suspicious lesions  HEENT: atraumatic, normocephalic,   CHEST: no chest tenderness  LUNGS: clear to auscultation  CARDIO: RRR without murmur  GI: good BS's, no masses, HSM or tenderness  : deferred  RECTAL: deferred  MUSCULOSKELETAL: back is not tender, FROM of the back  Left hip wound clean dry and intact, mild to moderate tenderness in the area  EXTREMITIES: Trace bilateral pedal edema  NEURO: Oriented times three, cranial nerves are intact, motor and sensory are grossly intact      DIAGNOSTICS REVIEWED AT THIS VISIT:    ASSESSMENT AND PLAN:    Gastrointestinal hemorrhage associated with gastric ulcer  Continue Protonix twice a day  Anemia associated with acute blood loss  Hematoma of left hip, sequela  Monitor H&H, check CBC tomorrow  Continue iron sulfate 325 daily  S/P ORIF (open reduction internal fixation) fracture  - PT to work on ambulation, gait, balance, strength, endurance, transfers, safety  - OT to work on fine motor skills, ADLs, hygiene, toileting, transfers, safety  Pain control with Tylenol  DVT prophylaxis-aspirin  Vascular dementia with anxiety, unspecified dementia severity ([facility] [address]  On Lexapro, trazodone at bedtime  Olanzapine as needed  Essential hypertension  Under control with metoprolol, losartan  - 24h nursing for medication administration, bowel/bladder care, pain/sleep assessment        Clif Abraham MD   [address] [place] [address], [address] [place] [address] Drive., [place]. [address] [facility] [address]    Electronically signed      This dictation was performed with a verbal recognition program (DRAGON) and it was checked for errors. It is possible that there are still dictated errors within this office note. If so, please bring any errors to my attention for an addendum.  All efforts were made to ensure that this office note is accurate

## 2023-11-06 ENCOUNTER — SNF VISIT (OUTPATIENT)
Dept: INTERNAL MEDICINE CLINIC | Age: 88
End: 2023-11-06

## 2023-11-06 DIAGNOSIS — Z47.1 AFTERCARE FOLLOWING LEFT HIP JOINT REPLACEMENT SURGERY: Primary | ICD-10-CM

## 2023-11-06 DIAGNOSIS — R53.81 PHYSICAL DECONDITIONING: ICD-10-CM

## 2023-11-06 DIAGNOSIS — R53.1 WEAKNESS: ICD-10-CM

## 2023-11-06 DIAGNOSIS — Z96.642 AFTERCARE FOLLOWING LEFT HIP JOINT REPLACEMENT SURGERY: Primary | ICD-10-CM

## 2023-11-06 DIAGNOSIS — Z86.73 H/O: STROKE: ICD-10-CM

## 2023-11-06 PROCEDURE — 1111F DSCHRG MED/CURRENT MED MERGE: CPT | Performed by: NURSE PRACTITIONER

## 2023-11-06 PROCEDURE — 1126F AMNT PAIN NOTED NONE PRSNT: CPT | Performed by: NURSE PRACTITIONER

## 2023-11-06 PROCEDURE — 3074F SYST BP LT 130 MM HG: CPT | Performed by: NURSE PRACTITIONER

## 2023-11-06 PROCEDURE — 99307 SBSQ NF CARE SF MDM 10: CPT | Performed by: NURSE PRACTITIONER

## 2023-11-06 PROCEDURE — 1159F MED LIST DOCD IN RCRD: CPT | Performed by: NURSE PRACTITIONER

## 2023-11-06 PROCEDURE — 1160F RVW MEDS BY RX/DR IN RCRD: CPT | Performed by: NURSE PRACTITIONER

## 2023-11-06 PROCEDURE — 3078F DIAST BP <80 MM HG: CPT | Performed by: NURSE PRACTITIONER

## 2023-11-07 VITALS
SYSTOLIC BLOOD PRESSURE: 109 MMHG | RESPIRATION RATE: 18 BRPM | TEMPERATURE: 99 F | DIASTOLIC BLOOD PRESSURE: 63 MMHG | HEART RATE: 77 BPM

## 2023-11-09 ENCOUNTER — SNF VISIT (OUTPATIENT)
Dept: INTERNAL MEDICINE CLINIC | Age: 88
End: 2023-11-09

## 2023-11-09 DIAGNOSIS — Z96.642 AFTERCARE FOLLOWING LEFT HIP JOINT REPLACEMENT SURGERY: ICD-10-CM

## 2023-11-09 DIAGNOSIS — R53.1 WEAKNESS: ICD-10-CM

## 2023-11-09 DIAGNOSIS — R19.7 DIARRHEA, UNSPECIFIED TYPE: Primary | ICD-10-CM

## 2023-11-09 DIAGNOSIS — Z47.1 AFTERCARE FOLLOWING LEFT HIP JOINT REPLACEMENT SURGERY: ICD-10-CM

## 2023-11-09 DIAGNOSIS — R53.81 PHYSICAL DECONDITIONING: ICD-10-CM

## 2023-11-09 PROCEDURE — 1160F RVW MEDS BY RX/DR IN RCRD: CPT | Performed by: NURSE PRACTITIONER

## 2023-11-09 PROCEDURE — 99309 SBSQ NF CARE MODERATE MDM 30: CPT | Performed by: NURSE PRACTITIONER

## 2023-11-09 PROCEDURE — 1159F MED LIST DOCD IN RCRD: CPT | Performed by: NURSE PRACTITIONER

## 2023-11-09 PROCEDURE — 3075F SYST BP GE 130 - 139MM HG: CPT | Performed by: NURSE PRACTITIONER

## 2023-11-09 PROCEDURE — 1111F DSCHRG MED/CURRENT MED MERGE: CPT | Performed by: NURSE PRACTITIONER

## 2023-11-09 PROCEDURE — 3079F DIAST BP 80-89 MM HG: CPT | Performed by: NURSE PRACTITIONER

## 2023-11-09 PROCEDURE — 1126F AMNT PAIN NOTED NONE PRSNT: CPT | Performed by: NURSE PRACTITIONER

## 2023-11-10 VITALS
DIASTOLIC BLOOD PRESSURE: 84 MMHG | OXYGEN SATURATION: 97 % | TEMPERATURE: 98 F | HEART RATE: 64 BPM | SYSTOLIC BLOOD PRESSURE: 132 MMHG | RESPIRATION RATE: 16 BRPM

## 2023-11-10 RX ORDER — SACCHAROMYCES BOULARDII 250 MG
250 CAPSULE ORAL DAILY
COMMUNITY

## 2023-11-13 ENCOUNTER — SNF VISIT (OUTPATIENT)
Dept: INTERNAL MEDICINE CLINIC | Age: 88
End: 2023-11-13

## 2023-11-13 VITALS
DIASTOLIC BLOOD PRESSURE: 76 MMHG | OXYGEN SATURATION: 95 % | TEMPERATURE: 98 F | RESPIRATION RATE: 18 BRPM | SYSTOLIC BLOOD PRESSURE: 138 MMHG | HEART RATE: 73 BPM

## 2023-11-13 DIAGNOSIS — Z47.1 AFTERCARE FOLLOWING LEFT HIP JOINT REPLACEMENT SURGERY: ICD-10-CM

## 2023-11-13 DIAGNOSIS — R53.1 WEAKNESS: ICD-10-CM

## 2023-11-13 DIAGNOSIS — R53.83 LETHARGY: ICD-10-CM

## 2023-11-13 DIAGNOSIS — R19.7 DIARRHEA, UNSPECIFIED TYPE: Primary | ICD-10-CM

## 2023-11-13 DIAGNOSIS — Z96.642 AFTERCARE FOLLOWING LEFT HIP JOINT REPLACEMENT SURGERY: ICD-10-CM

## 2023-11-16 ENCOUNTER — SNF VISIT (OUTPATIENT)
Dept: INTERNAL MEDICINE CLINIC | Age: 88
End: 2023-11-16

## 2023-11-16 VITALS
RESPIRATION RATE: 16 BRPM | DIASTOLIC BLOOD PRESSURE: 76 MMHG | HEART RATE: 69 BPM | SYSTOLIC BLOOD PRESSURE: 150 MMHG | TEMPERATURE: 98 F | OXYGEN SATURATION: 94 %

## 2023-11-16 DIAGNOSIS — R19.7 DIARRHEA, UNSPECIFIED TYPE: Primary | ICD-10-CM

## 2023-11-16 DIAGNOSIS — Z47.1 AFTERCARE FOLLOWING LEFT HIP JOINT REPLACEMENT SURGERY: ICD-10-CM

## 2023-11-16 DIAGNOSIS — R53.81 PHYSICAL DECONDITIONING: ICD-10-CM

## 2023-11-16 DIAGNOSIS — R53.1 WEAKNESS: ICD-10-CM

## 2023-11-16 DIAGNOSIS — Z96.642 AFTERCARE FOLLOWING LEFT HIP JOINT REPLACEMENT SURGERY: ICD-10-CM

## 2023-11-16 PROCEDURE — 3078F DIAST BP <80 MM HG: CPT | Performed by: NURSE PRACTITIONER

## 2023-11-16 PROCEDURE — 1160F RVW MEDS BY RX/DR IN RCRD: CPT | Performed by: NURSE PRACTITIONER

## 2023-11-16 PROCEDURE — 1126F AMNT PAIN NOTED NONE PRSNT: CPT | Performed by: NURSE PRACTITIONER

## 2023-11-16 PROCEDURE — 99307 SBSQ NF CARE SF MDM 10: CPT | Performed by: NURSE PRACTITIONER

## 2023-11-16 PROCEDURE — 3077F SYST BP >= 140 MM HG: CPT | Performed by: NURSE PRACTITIONER

## 2023-11-16 PROCEDURE — 1159F MED LIST DOCD IN RCRD: CPT | Performed by: NURSE PRACTITIONER

## 2023-11-16 RX ORDER — CHOLESTYRAMINE 4 G/9G
4 POWDER, FOR SUSPENSION ORAL 2 TIMES DAILY
COMMUNITY

## 2023-11-20 ENCOUNTER — SNF DISCHARGE (OUTPATIENT)
Dept: INTERNAL MEDICINE CLINIC | Age: 88
End: 2023-11-20

## 2023-11-20 VITALS
OXYGEN SATURATION: 96 % | SYSTOLIC BLOOD PRESSURE: 164 MMHG | RESPIRATION RATE: 16 BRPM | DIASTOLIC BLOOD PRESSURE: 80 MMHG | HEART RATE: 71 BPM | TEMPERATURE: 98 F

## 2023-11-20 DIAGNOSIS — Z47.1 AFTERCARE FOLLOWING LEFT HIP JOINT REPLACEMENT SURGERY: ICD-10-CM

## 2023-11-20 DIAGNOSIS — Z96.642 AFTERCARE FOLLOWING LEFT HIP JOINT REPLACEMENT SURGERY: ICD-10-CM

## 2023-11-20 DIAGNOSIS — R53.1 WEAKNESS: ICD-10-CM

## 2023-11-20 DIAGNOSIS — N39.0 FREQUENT UTI: ICD-10-CM

## 2023-11-20 DIAGNOSIS — R19.7 DIARRHEA, UNSPECIFIED TYPE: Primary | ICD-10-CM

## 2023-11-20 RX ORDER — METHENAMINE HIPPURATE 1000 MG/1
1 TABLET ORAL 2 TIMES DAILY
COMMUNITY

## 2023-12-17 ENCOUNTER — EXTERNAL FACILITY (OUTPATIENT)
Dept: FAMILY MEDICINE CLINIC | Facility: CLINIC | Age: 88
End: 2023-12-17

## 2023-12-17 DIAGNOSIS — D62 ANEMIA ASSOCIATED WITH ACUTE BLOOD LOSS: ICD-10-CM

## 2023-12-17 DIAGNOSIS — F01.54 VASCULAR DEMENTIA WITH ANXIETY, UNSPECIFIED DEMENTIA SEVERITY (HCC): ICD-10-CM

## 2023-12-17 DIAGNOSIS — K25.4 GASTROINTESTINAL HEMORRHAGE ASSOCIATED WITH GASTRIC ULCER: Primary | ICD-10-CM

## 2023-12-17 DIAGNOSIS — I10 ESSENTIAL HYPERTENSION: ICD-10-CM

## 2023-12-17 DIAGNOSIS — Z98.890 S/P ORIF (OPEN REDUCTION INTERNAL FIXATION) FRACTURE: ICD-10-CM

## 2023-12-17 DIAGNOSIS — Z87.81 S/P ORIF (OPEN REDUCTION INTERNAL FIXATION) FRACTURE: ICD-10-CM

## 2023-12-18 NOTE — PROGRESS NOTES
Fe Sal Author: Iban Pierce MD     1935 MRN [id number]   [facility]  Admission 10/5/23      Last Hospital Discharge 10/15/23 PCP Harika Forbes MD   Hospital of Discharge  [facility]        CC --follow-up,     H.P.I Evangelist Head is a 82year old female who was sent out to the emergency room at [facility] from [address] [place] [address] after she was found to be on the floor, patient had suffered a fall  Work-up revealed anemia as well as hematoma onto the left hip area  Serial CBCs were done, her hemoglobin went down to 5.6, patient was transfused blood. Initial thoughts were that hematoma might be responsible for the anemia but then patient had to black stools.   She was seen by gastroenterology and underwent EGD, found to have a bleeding ulcer that was clipped  Her hemoglobin stabilized  She was also found to have UTI and was treated with IV cefazolin  Patient finished the course of antibiotics  Her CBC at the time of discharge showed hemoglobin stable around 8.2  She continues to have pain on the left hip area  She was transferred back to [address] [place] [address] today  Discharge diagnosis  Acute gastrointestinal bleeding, peptic ulcer disease, s/p clipping  UTI  Left hip hematoma  Acute blood loss anemia  History of left trochanteric fracture s/p ORIF  History of stroke  Osteoarthritis  Vascular dementia  Hypertension  Anxiety disorder    Patient seen in her room today with her daughter  She is doing very well and denies any complaints  She feels tired and sleepy, states it was a rough ride from the hospital  Review of system is negative except for pain in the hip    10/21/2023  Patient is seen and examined in the room  Patient had complained of shortness of breath  Chest x-ray was ordered that showed pulmonary congestion  Stat labs were ordered at [facility]  That showed, hemoglobin of 9.2 hematocrit 28, normal WBC count and platelets, however her proBNP was found to be elevated at 4801, chemistry showed BUN of 24 and creatinine of 1.9  Patient was started on Lasix  She had good urine output and her shortness of breath is much better  We discussed her labs that showed improvement in her hemoglobin from 8.3-9.2, I reassured her that there is no bleeding and her hemoglobin is actually stable  She is complaining of left hip pain, pain medications are adequate  Denies any chest pain shortness of breath dizziness headache nausea vomiting diarrhea abdominal pain    11/3/2023  Patient doing well denies any complaints  Seen in the dining room  She is having her lunch  She has been participating in the therapy  Her cough and congestion has resolved  Recent labs reviewed from 2023 with a hemoglobin of 9.5 and hematocrit 29 her chemistry showed BUN of 32 and creatinine of 1.04 rest of her labs were in acceptable limits    Review of system is negative except for the left hip pain which is much better compared to last week    2023  Patient seen in dining room today  Denies any complaints  Has good appetite  Review of system is otherwise negative  Recent labs reviewed from 2023 CBC showed stable and improved hemoglobin at 11.2 hematocrit 35.6 platelets 109 her chemistry shows slightly elevated BUN at 29 and creatinine of 0.8 otherwise normal  Recent UA was also found to be in normal limits         Past Medical History:   Diagnosis Date    Anxiety     Arthritis     knees and shoulders    Breast CA ([facility] [address]     right breast cancer with mastectomy.     Cancer [facility])     right breast ca    Cystitis, interstitial     history of----    Flatulence/gas pain/belching     Frequent UTI     H/O mammogram 2011    left breast    H/O mammogram 2015    benign    H/O pregnancy 1025,5374        HIGH BLOOD PRESSURE     HIGH CHOLESTEROL     History of endoscopy 2013    Dr. Rickey Dhaliwal    Loss of appetite     Nausea     Other and unspecified hyperlipidemia     Other and unspecified personal history of malignant neoplasm 2001    dr. Dean Lyons, ill  breast cancer right    Pap smear for cervical cancer screening 09/18/2003    wnl    Renal cyst, right 07/15/2015    Refer to Dr. Shaffer/urology. Rheumatic fever     Stroke [facility])     Unspecified essential hypertension      Past Surgical History:   Procedure Laterality Date    COLONOSCOPY  2/02 4/08 2008 Dr. Damari Ortiz, repeat in five years, family history of colon cancer, Diverticulosis    COLONOSCOPY  08/21/2013    Dr. Mariela Medrano    partial hystero. MASTECTOMY RIGHT Right 2001    right breast cancer with mastectomy. 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    SPECIAL SERVICE OR REPORT  2001    mastectomy right    UNSPECIFIED DIAGNOSTIC PROCE  2005    cystoscopy     Family History   Problem Relation Age of Onset    Heart Disorder Father         chf    Cancer Mother         colon    Stroke Mother     Colon Cancer Mother     Breast Cancer Self 63    Hypertension Brother      Social History     Socioeconomic History    Marital status:    Tobacco Use    Smoking status: Never     Passive exposure: Never    Smokeless tobacco: Never   Vaping Use    Vaping Use: Never used   Substance and Sexual Activity    Alcohol use: No    Drug use: No    Sexual activity: Never   Other Topics Concern    Caffeine Concern Yes     Comment: tea     Exercise No   Social History Narrative    Lives with daughter.      Social Determinants of Health     Food Insecurity: No Food Insecurity (10/5/2023)    Food Insecurity     Food Insecurity: Never true   Transportation Needs: No Transportation Needs (10/5/2023)    Transportation Needs     Lack of Transportation: No   Housing Stability: Low Risk  (10/5/2023)    Housing Stability     Housing Instability: No       ALLERGIES:  Allergies   Allergen Reactions    Bactrim [Sulfamethoxazole W/Trimethoprim] NAUSEA ONLY    Nitrofurantoin NAUSEA ONLY       CODE STATUS:  DNR    CURRENT MEDICATIONS   Current Outpatient Medications Medication Sig Dispense Refill    methenamine 1 g Oral Tab Take 1 tablet (1 g total) by mouth 2 (two) times daily. Cholestyramine 4 g Oral Powd Pack Take 1 packet by mouth 2 (two) times daily. saccharomyces boulardii 250 MG Oral Cap Take 1 capsule (250 mg total) by mouth daily. alum-mag hydroxide-simethicone 557-416-50 MG/5ML Oral Suspension Take 15 mL by mouth daily as needed for Indigestion. lidocaine 5 % External Patch Place 1 patch onto the skin daily. 12h on and 12h off      omeprazole 20 MG Oral Capsule Delayed Release Take 1 capsule (20 mg total) by mouth every morning. metoprolol tartrate 25 MG Oral Tab Take 0.5 tablets (12.5 mg total) by mouth TID Beta Blocker/Cardiac. 30 tablet 2    folic acid 1 MG Oral Tab Take 1 tablet (1 mg total) by mouth daily. 30 tablet 0    atorvastatin 10 MG Oral Tab Take 1 tablet (10 mg total) by mouth nightly. 30 tablet 3    traZODone 50 MG Oral Tab Take 1 tablet (50 mg total) by mouth nightly. 30 tablet 1    OLANZapine 5 MG Oral Tab Take 1 tablet (5 mg total) by mouth nightly as needed (agitation, aggression, insomnia). 30 tablet 1    OLANZapine 2.5 MG Oral Tab Take 1 tablet (2.5 mg total) by mouth 2 (two) times daily as needed (agitation, psychosis). 30 tablet 1    aspirin 325 MG Oral Tab Take 1 tablet (325 mg total) by mouth daily. ferrous sulfate 325 (65 FE) MG Oral Tab EC Take 1 tablet (325 mg total) by mouth daily with breakfast.      losartan 100 MG Oral Tab Take 1 tablet (100 mg total) by mouth daily. desmopressin 0.1 MG Oral Tab TAKE 1 TABLET(0.1 MG) BY MOUTH EVERY NIGHT 90 tablet 1    Multiple Vitamin (MULTI-VITAMIN) Oral Tab Take 1 tablet by mouth daily. ESCITALOPRAM 20 MG Oral Tab TAKE 1 TABLET(20 MG) BY MOUTH DAILY (Patient taking differently: Take 1 tablet (20 mg total) by mouth at bedtime.) 90 tablet 1    acetaminophen 325 MG Oral Tab Take 2 tablets (650 mg total) by mouth every 6 (six) hours as needed.       CRANBERRY EXTRACT OR Take 4,200 mg by mouth 3 (three) times daily. Omega-3 Fatty Acids (FISH OIL) 1200 MG Oral Cap Take 1,200 mg by mouth daily. REVIEW OF SYSTEMS:  Review of Systems:   Constitutional: No fevers, chills, fatigue or night sweats. ENT: No mouth pain, neck pain, running nose, headaches or swollen glands. Skin: No rashes, pruritus or skin changes,  Respiratory: Denies cough, wheezing or shortness of breath. CV: Denies chest pain, palpitations, orthopnea, PND or dizziness. Musculoskeletal: No joint pain, stiffness or swelling. GI: No nausea, vomiting or diarrhea. No blood in stools.   Neurologic: No seizures, tremors, weakness or numbness    VITALS: Blood pressure 129/70 pulse 82/min respiration 18/min temperature 98.7    PHYSICAL EXAM:  GENERAL: well developed, well nourished, in no apparent distress  SKIN: no rashes, no suspicious lesions  HEENT: atraumatic, normocephalic,   CHEST: no chest tenderness  LUNGS: clear to auscultation  CARDIO: RRR without murmur  GI: good BS's, no masses, HSM or tenderness  : deferred  RECTAL: deferred  MUSCULOSKELETAL: back is not tender, FROM of the back  Left hip wound clean dry and intact, mild to moderate tenderness in the area  EXTREMITIES: Trace bilateral pedal edema  NEURO: Oriented times three, cranial nerves are intact, motor and sensory are grossly intact      DIAGNOSTICS REVIEWED AT THIS VISIT:    ASSESSMENT AND PLAN:    Gastrointestinal hemorrhage associated with gastric ulcer  Continue Protonix twice a day  Anemia associated with acute blood loss  Monitor H&H, check CBC monthly  Continue iron sulfate 325 daily  S/P ORIF (open reduction internal fixation) fracture  Receiving restorative PT/OT  Pain control with Tylenol  DVT prophylaxis-aspirin  Vascular dementia with anxiety, unspecified dementia severity (HCC)  On Lexapro, trazodone at bedtime  Olanzapine as needed  Essential hypertension  Under control with metoprolol, losartan  - 24h nursing for medication administration, bowel/bladder care, pain/sleep assessment        Tanja Tucker [address] [place] [address]., [address] [address]    Electronically signed      This dictation was performed with a verbal recognition program [facility]) and it was checked for errors. It is possible that there are still dictated errors within this office note. If so, please bring any errors to my attention for an addendum.  All efforts were made to ensure that this office note is accurate

## 2023-12-29 ENCOUNTER — HOSPITAL ENCOUNTER (EMERGENCY)
Facility: HOSPITAL | Age: 88
Discharge: HOME | End: 2023-12-29
Attending: EMERGENCY MEDICINE
Payer: MEDICARE

## 2023-12-29 ENCOUNTER — APPOINTMENT (OUTPATIENT)
Dept: CT IMAGING | Facility: HOSPITAL | Age: 88
End: 2023-12-29
Attending: EMERGENCY MEDICINE
Payer: MEDICARE

## 2023-12-29 VITALS
HEART RATE: 82 BPM | TEMPERATURE: 99 F | RESPIRATION RATE: 20 BRPM | SYSTOLIC BLOOD PRESSURE: 160 MMHG | DIASTOLIC BLOOD PRESSURE: 94 MMHG | OXYGEN SATURATION: 94 %
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
## 2023-12-29 DIAGNOSIS — S00.03XA HEMATOMA OF SCALP, INITIAL ENCOUNTER: ICD-10-CM

## 2023-12-29 PROCEDURE — 70450 CT HEAD/BRAIN W/O DYE: CPT | Performed by: EMERGENCY MEDICINE

## 2023-12-29 PROCEDURE — 99284 EMERGENCY DEPT VISIT MOD MDM: CPT

## 2023-12-29 RX ORDER — QUETIAPINE FUMARATE 50 MG/1
100 TABLET, FILM COATED ORAL NIGHTLY
COMMUNITY
Start: 2023-12-23 | End: 2024-01-15

## 2023-12-30 NOTE — ED INITIAL ASSESSMENT (HPI)
Patient A&Ox1, baseline for her, brought in by [facility] for an unwitnessed fall around 2015 tonight. Pt from [facility], no LOC or thinner use, no complaints from pt. Only injury noted is a bump on the back of the head. C-collar in place from EMS, per daughter she takes ASA daily.

## 2023-12-30 NOTE — ED QUICK NOTES
[facility] up for discharge, [facility]. Reviewed test done here and result and follow-up care. Nurse verbalized understanding.

## 2024-01-03 NOTE — TELEPHONE ENCOUNTER
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  Patient does appear to be nearing discharge status.  Patient does need home oxygen and we will requalify her for continuous home oxygen prior to discharge.  Patient also has had blood cultures positive for Haemophilus influenzae sensitivities are pending   Pt stated she had labs and wants to discuss results with a nurse. Pt stated she doesn't have a computer, her daughter is on her mychart.   Please advise

## 2024-01-09 ENCOUNTER — APPOINTMENT (OUTPATIENT)
Dept: CT IMAGING | Facility: HOSPITAL | Age: 89
End: 2024-01-09
Attending: EMERGENCY MEDICINE
Payer: MEDICARE

## 2024-01-09 ENCOUNTER — HOSPITAL ENCOUNTER (INPATIENT)
Facility: HOSPITAL | Age: 89
LOS: 3 days | Discharge: NURSING, REHAB OR LONG-TERM CARE | End: 2024-01-15
Attending: EMERGENCY MEDICINE | Admitting: HOSPITALIST
Payer: MEDICARE

## 2024-01-09 ENCOUNTER — APPOINTMENT (OUTPATIENT)
Dept: GENERAL RADIOLOGY | Facility: HOSPITAL | Age: 89
End: 2024-01-09
Attending: EMERGENCY MEDICINE
Payer: MEDICARE

## 2024-01-09 DIAGNOSIS — I50.9 ACUTE ON CHRONIC CONGESTIVE HEART FAILURE, UNSPECIFIED HEART FAILURE TYPE (HCC): ICD-10-CM
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
## 2024-01-09 DIAGNOSIS — S00.83XA CONTUSION OF FOREHEAD, INITIAL ENCOUNTER: ICD-10-CM

## 2024-01-09 PROBLEM — F03.90 MAJOR NEUROCOGNITIVE DISORDER (HCC): Status: ACTIVE | Noted: 2023-10-07

## 2024-01-09 PROBLEM — I50.30 HEART FAILURE WITH PRESERVED EJECTION FRACTION (HCC): Status: ACTIVE | Noted: 2024-01-09

## 2024-01-09 LAB
ALBUMIN SERPL-MCNC: 3.4 G/DL (ref 3.4–5)
ALBUMIN/GLOB SERPL: 1 {RATIO} (ref 1–2)
ALP LIVER SERPL-CCNC: 84 U/L
ALT SERPL-CCNC: 18 U/L
ANION GAP SERPL CALC-SCNC: 7 MMOL/L (ref 0–18)
AST SERPL-CCNC: 17 U/L (ref 15–37)
ATRIAL RATE: 83 BPM
BASOPHILS # BLD AUTO: 0.05 X10(3) UL (ref 0–0.2)
BASOPHILS NFR BLD AUTO: 0.8 %
BILIRUB SERPL-MCNC: 0.6 MG/DL (ref 0.1–2)
BILIRUB UR QL STRIP.AUTO: NEGATIVE
BUN BLD-MCNC: 34 MG/DL (ref 9–23)
CALCIUM BLD-MCNC: 9.5 MG/DL (ref 8.5–10.1)
CHLORIDE SERPL-SCNC: 98 MMOL/L (ref 98–112)
CLARITY UR REFRACT.AUTO: CLEAR
CO2 SERPL-SCNC: 26 MMOL/L (ref 21–32)
COLOR UR AUTO: COLORLESS
CREAT BLD-MCNC: 1 MG/DL
EGFRCR SERPLBLD CKD-EPI 2021: 55 ML/MIN/1.73M2 (ref 60–?)
EOSINOPHIL # BLD AUTO: 0.21 X10(3) UL (ref 0–0.7)
EOSINOPHIL NFR BLD AUTO: 3.2 %
ERYTHROCYTE [DISTWIDTH] IN BLOOD BY AUTOMATED COUNT: 14.1 %
FLUAV + FLUBV RNA SPEC NAA+PROBE: NEGATIVE
FLUAV + FLUBV RNA SPEC NAA+PROBE: NEGATIVE
GLOBULIN PLAS-MCNC: 3.4 G/DL (ref 2.8–4.4)
GLUCOSE BLD-MCNC: 90 MG/DL (ref 70–99)
GLUCOSE UR STRIP.AUTO-MCNC: NORMAL MG/DL
HCT VFR BLD AUTO: 35.2 %
HGB BLD-MCNC: 11.9 G/DL
IMM GRANULOCYTES # BLD AUTO: 0.02 X10(3) UL (ref 0–1)
IMM GRANULOCYTES NFR BLD: 0.3 %
KETONES UR STRIP.AUTO-MCNC: NEGATIVE MG/DL
LEUKOCYTE ESTERASE UR QL STRIP.AUTO: NEGATIVE
LYMPHOCYTES # BLD AUTO: 1.57 X10(3) UL (ref 1–4)
LYMPHOCYTES NFR BLD AUTO: 24.1 %
MCH RBC QN AUTO: 30 PG (ref 26–34)
MCHC RBC AUTO-ENTMCNC: 33.8 G/DL (ref 31–37)
MCV RBC AUTO: 88.7 FL
MONOCYTES # BLD AUTO: 1.1 X10(3) UL (ref 0.1–1)
MONOCYTES NFR BLD AUTO: 16.9 %
NEUTROPHILS # BLD AUTO: 3.57 X10 (3) UL (ref 1.5–7.7)
NEUTROPHILS # BLD AUTO: 3.57 X10(3) UL (ref 1.5–7.7)
NEUTROPHILS NFR BLD AUTO: 54.7 %
NITRITE UR QL STRIP.AUTO: NEGATIVE
NT-PROBNP SERPL-MCNC: 2609 PG/ML (ref ?–450)
OSMOLALITY SERPL CALC.SUM OF ELEC: 279 MOSM/KG (ref 275–295)
P AXIS: 68 DEGREES
P-R INTERVAL: 248 MS
PH UR STRIP.AUTO: 7.5 [PH] (ref 5–8)
PLATELET # BLD AUTO: 248 10(3)UL (ref 150–450)
POTASSIUM SERPL-SCNC: 4.2 MMOL/L (ref 3.5–5.1)
PROT SERPL-MCNC: 6.8 G/DL (ref 6.4–8.2)
PROT UR STRIP.AUTO-MCNC: NEGATIVE MG/DL
Q-T INTERVAL: 390 MS
QRS DURATION: 88 MS
QTC CALCULATION (BEZET): 458 MS
R AXIS: 6 DEGREES
RBC # BLD AUTO: 3.97 X10(6)UL
RSV RNA SPEC NAA+PROBE: NEGATIVE
SARS-COV-2 RNA RESP QL NAA+PROBE: NOT DETECTED
SODIUM SERPL-SCNC: 131 MMOL/L (ref 136–145)
SP GR UR STRIP.AUTO: 1.01 (ref 1–1.03)
T AXIS: 61 DEGREES
TROPONIN I SERPL HS-MCNC: 19 NG/L
UROBILINOGEN UR STRIP.AUTO-MCNC: NORMAL MG/DL
VENTRICULAR RATE: 83 BPM
WBC # BLD AUTO: 6.5 X10(3) UL (ref 4–11)

## 2024-01-09 PROCEDURE — 70450 CT HEAD/BRAIN W/O DYE: CPT | Performed by: EMERGENCY MEDICINE

## 2024-01-09 PROCEDURE — 99223 1ST HOSP IP/OBS HIGH 75: CPT | Performed by: INTERNAL MEDICINE

## 2024-01-09 PROCEDURE — 99223 1ST HOSP IP/OBS HIGH 75: CPT | Performed by: HOSPITALIST

## 2024-01-09 PROCEDURE — 71045 X-RAY EXAM CHEST 1 VIEW: CPT | Performed by: EMERGENCY MEDICINE

## 2024-01-09 PROCEDURE — 90792 PSYCH DIAG EVAL W/MED SRVCS: CPT

## 2024-01-09 RX ORDER — MELATONIN
3 NIGHTLY PRN
Status: DISCONTINUED | OUTPATIENT
Start: 2024-01-09 | End: 2024-01-13

## 2024-01-09 RX ORDER — HEPARIN SODIUM 5000 [USP'U]/ML
5000 INJECTION, SOLUTION INTRAVENOUS; SUBCUTANEOUS EVERY 12 HOURS SCHEDULED
Status: DISCONTINUED | OUTPATIENT
Start: 2024-01-10 | End: 2024-01-15

## 2024-01-09 RX ORDER — FOLIC ACID 1 MG/1
1 TABLET ORAL DAILY
Status: DISCONTINUED | OUTPATIENT
Start: 2024-01-09 | End: 2024-01-15

## 2024-01-09 RX ORDER — SENNOSIDES 8.6 MG
17.2 TABLET ORAL NIGHTLY PRN
Status: DISCONTINUED | OUTPATIENT
Start: 2024-01-09 | End: 2024-01-15

## 2024-01-09 RX ORDER — ACETAMINOPHEN 500 MG
500 TABLET ORAL EVERY 4 HOURS PRN
Status: DISCONTINUED | OUTPATIENT
Start: 2024-01-09 | End: 2024-01-15

## 2024-01-09 RX ORDER — ATORVASTATIN CALCIUM 10 MG/1
10 TABLET, FILM COATED ORAL NIGHTLY
Status: DISCONTINUED | OUTPATIENT
Start: 2024-01-09 | End: 2024-01-15

## 2024-01-09 RX ORDER — OLANZAPINE 5 MG/1
5 TABLET ORAL NIGHTLY
Status: DISCONTINUED | OUTPATIENT
Start: 2024-01-09 | End: 2024-01-10

## 2024-01-09 RX ORDER — FUROSEMIDE 10 MG/ML
40 INJECTION INTRAMUSCULAR; INTRAVENOUS ONCE
Status: COMPLETED | OUTPATIENT
Start: 2024-01-09 | End: 2024-01-09

## 2024-01-09 RX ORDER — POLYETHYLENE GLYCOL 3350 17 G/17G
17 POWDER, FOR SOLUTION ORAL DAILY PRN
Status: DISCONTINUED | OUTPATIENT
Start: 2024-01-09 | End: 2024-01-15

## 2024-01-09 RX ORDER — FUROSEMIDE 10 MG/ML
40 INJECTION INTRAMUSCULAR; INTRAVENOUS DAILY
Status: DISCONTINUED | OUTPATIENT
Start: 2024-01-10 | End: 2024-01-10

## 2024-01-09 RX ORDER — BISACODYL 10 MG
10 SUPPOSITORY, RECTAL RECTAL
Status: DISCONTINUED | OUTPATIENT
Start: 2024-01-09 | End: 2024-01-15

## 2024-01-09 RX ORDER — OLANZAPINE 2.5 MG/1
2.5 TABLET, FILM COATED ORAL 2 TIMES DAILY PRN
Status: DISCONTINUED | OUTPATIENT
Start: 2024-01-09 | End: 2024-01-10

## 2024-01-09 RX ORDER — BENZONATATE 200 MG/1
200 CAPSULE ORAL 3 TIMES DAILY PRN
Status: DISCONTINUED | OUTPATIENT
Start: 2024-01-09 | End: 2024-01-15

## 2024-01-09 RX ORDER — DESMOPRESSIN ACETATE 0.1 MG/1
0.1 TABLET ORAL NIGHTLY
Status: DISCONTINUED | OUTPATIENT
Start: 2024-01-09 | End: 2024-01-09

## 2024-01-09 RX ORDER — ONDANSETRON 2 MG/ML
8 INJECTION INTRAMUSCULAR; INTRAVENOUS EVERY 6 HOURS PRN
Status: DISCONTINUED | OUTPATIENT
Start: 2024-01-09 | End: 2024-01-15

## 2024-01-09 RX ORDER — QUETIAPINE FUMARATE 25 MG/1
100 TABLET, FILM COATED ORAL NIGHTLY
Status: DISCONTINUED | OUTPATIENT
Start: 2024-01-09 | End: 2024-01-09

## 2024-01-09 RX ORDER — ENOXAPARIN SODIUM 100 MG/ML
40 INJECTION SUBCUTANEOUS DAILY
Status: DISCONTINUED | OUTPATIENT
Start: 2024-01-10 | End: 2024-01-09

## 2024-01-09 RX ORDER — ESCITALOPRAM OXALATE 20 MG/1
20 TABLET ORAL NIGHTLY
Status: DISCONTINUED | OUTPATIENT
Start: 2024-01-09 | End: 2024-01-15

## 2024-01-09 RX ORDER — OLANZAPINE 2.5 MG/1
2.5 TABLET, FILM COATED ORAL NIGHTLY PRN
Status: DISCONTINUED | OUTPATIENT
Start: 2024-01-09 | End: 2024-01-10

## 2024-01-09 RX ORDER — DIAZEPAM 5 MG/ML
2.5 INJECTION, SOLUTION INTRAMUSCULAR; INTRAVENOUS ONCE
Status: COMPLETED | OUTPATIENT
Start: 2024-01-09 | End: 2024-01-09

## 2024-01-09 RX ORDER — LOSARTAN POTASSIUM 100 MG/1
100 TABLET ORAL DAILY
Status: DISCONTINUED | OUTPATIENT
Start: 2024-01-09 | End: 2024-01-15

## 2024-01-09 RX ORDER — MELATONIN
325
Status: DISCONTINUED | OUTPATIENT
Start: 2024-01-10 | End: 2024-01-15

## 2024-01-09 RX ORDER — METHENAMINE HIPPURATE 1000 MG/1
1 TABLET ORAL 2 TIMES DAILY
Status: DISCONTINUED | OUTPATIENT
Start: 2024-01-09 | End: 2024-01-15

## 2024-01-09 RX ORDER — PANTOPRAZOLE SODIUM 20 MG/1
20 TABLET, DELAYED RELEASE ORAL
Status: DISCONTINUED | OUTPATIENT
Start: 2024-01-10 | End: 2024-01-15

## 2024-01-09 RX ORDER — HALOPERIDOL 5 MG/ML
5 INJECTION INTRAMUSCULAR ONCE
Status: COMPLETED | OUTPATIENT
Start: 2024-01-09 | End: 2024-01-09

## 2024-01-09 RX ORDER — ECHINACEA PURPUREA EXTRACT 125 MG
1 TABLET ORAL
Status: DISCONTINUED | OUTPATIENT
Start: 2024-01-09 | End: 2024-01-15

## 2024-01-09 RX ORDER — IPRATROPIUM BROMIDE AND ALBUTEROL SULFATE 2.5; .5 MG/3ML; MG/3ML
3 SOLUTION RESPIRATORY (INHALATION) ONCE
Status: COMPLETED | OUTPATIENT
Start: 2024-01-09 | End: 2024-01-09

## 2024-01-09 RX ORDER — LABETALOL HYDROCHLORIDE 5 MG/ML
10 INJECTION, SOLUTION INTRAVENOUS EVERY 6 HOURS PRN
Status: DISCONTINUED | OUTPATIENT
Start: 2024-01-09 | End: 2024-01-10

## 2024-01-09 NOTE — ED QUICK NOTES
Orders for admission, patient is aware of plan and ready to go upstairs. Any questions, please call ED RN Ruthie at extension [phone].     Patient Covid vaccination status: Fully vaccinated     COVID Test Ordered in ED: SARS-CoV-2/Flu A and B/RSV by PCR (GeneXpert)    COVID Suspicion at Admission: N/A    Running Infusions:  None    Mental Status/LOC at time of transport: A&O x 1, baseline    Other pertinent information:   CIWA score: N/A   NIH score:  N/A

## 2024-01-09 NOTE — IMAGING NOTE
[facility] Echo 7/13/2023  1. The study quality is good.   2. The left ventricle is moderately enlarged. Global left ventricular systolic function borderline. The left ventricular ejection fraction is 50%. The left ventricle diastolic function is impaired (Grade II) with an elevated left atrial pressure. Mild concentric left ventricular hypertrophy is present. No regional wall motion abnormalities were identifed.  3. The left atrial diameter is mildly increased. Left atrial diameter is 4.1 cms.   4. Moderate (2+) mitral regurgitation.   5. Mild  aortic regurgitation.   6. Mild tricuspid regurgitation.   7. The estimated pulmonary artery systolic pressure is 40 mmHg assuming a right atrial pressure of 3 mmHg.

## 2024-01-09 NOTE — ED INITIAL ASSESSMENT (HPI)
Pt arrived via EMS from [facility] with c/o falling out of her chair and hitting the right side of her face. Fall was witnessed by staff as they walked by. Pt on 325 ASA. Pt complaining of no pain. Small laceration on R upper cheek.

## 2024-01-09 NOTE — CONSULTS
[facility]  Cardiology Consultation    Fe Sal Patient Status:  Observation    1935 MRN [id number]   Location [facility] 2NE-A Attending Tom Choudhury MD   Hosp Day # 0 PCP Ankit Singh MD     Reason for Consultation:  Fall and pulmonary edema    History of Present Illness:  Fe Sal is a a(n) 88 year old female who presents with witnessed fall at a senior living facility.  She has followed in the past with my colleague Dr. Mehta and recent office note has been copied into current EMR chart for reference.  Son is at bedside and reports patient has been sundowning more recently.  She has no complaint of chest pain, dyspnea or increased lower extremity swelling.  However, son has noticed increased abdominal girth.    History:  Past Medical History:   Diagnosis Date    Anxiety     Arthritis     knees and shoulders    Breast CA (HCC)     right breast cancer with mastectomy.    Cancer (HCC)     right breast ca    Cystitis, interstitial     history of----    Flatulence/gas pain/belching     Frequent UTI     GI bleed     H/O mammogram 2011    left breast    H/O mammogram 2015    benign    H/O pregnancy 1967,1969        HIGH BLOOD PRESSURE     HIGH CHOLESTEROL     History of endoscopy 2013    Dr. Mesa    History of stomach ulcers     Loss of appetite     Nausea     Osteoarthritis     Other and unspecified hyperlipidemia     Other and unspecified personal history of malignant neoplasm     dr. luann mccall, [place]  breast cancer right    Pap smear for cervical cancer screening 2003    [facility]    Renal cyst, right 07/15/2015    Refer to Dr. Shaffer/urology.    Rheumatic fever     Stroke (HCC)     Unspecified essential hypertension     Visual impairment      Past Surgical History:   Procedure Laterality Date    COLONOSCOPY   Dr. Perez, repeat in five years, family history of colon cancer, Diverticulosis    COLONOSCOPY   08/21/2013    Dr. Maciej Mesa    HYSTERECTOMY  1985    partial hystero.    MASTECTOMY RIGHT Right 2001    right breast cancer with mastectomy.    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    SPECIAL SERVICE OR REPORT  2001    mastectomy right    UNSPECIFIED DIAGNOSTIC PROCE  2005    cystoscopy     Family History   Problem Relation Age of Onset    Heart Disorder Father         chf    Cancer Mother         colon    Stroke Mother     Colon Cancer Mother     Breast Cancer Self 65    Hypertension Brother       reports that she has never smoked. She has never been exposed to tobacco smoke. She has never used smokeless tobacco. She reports that she does not drink alcohol and does not use drugs.    Allergies:  Allergies   Allergen Reactions    Bactrim [Sulfamethoxazole W/Trimethoprim] NAUSEA ONLY    Nitrofurantoin NAUSEA ONLY       Medications:    Current Facility-Administered Medications:     losartan (Cozaar) tab 100 mg, 100 mg, Oral, Daily    metoprolol tartrate (Lopressor) partial tab 12.5 mg, 12.5 mg, Oral, TID Beta Blocker/Cardiac    labetalol (Trandate) 5 mg/mL injection 10 mg, 10 mg, Intravenous, Q6H PRN    acetaminophen (Tylenol Extra Strength) tab 500 mg, 500 mg, Oral, Q4H PRN    melatonin tab 3 mg, 3 mg, Oral, Nightly PRN    polyethylene glycol (PEG 3350) (Miralax) 17 g oral packet 17 g, 17 g, Oral, Daily PRN    sennosides (Senokot) tab 17.2 mg, 17.2 mg, Oral, Nightly PRN    bisacodyl (Dulcolax) 10 MG rectal suppository 10 mg, 10 mg, Rectal, Daily PRN    ondansetron (Zofran) 4 MG/2ML injection 8 mg, 8 mg, Intravenous, Q6H PRN    benzonatate (Tessalon) cap 200 mg, 200 mg, Oral, TID PRN    glycerin-hypromellose- (Artifical Tears) 0.2-0.2-1 % ophthalmic solution 1 drop, 1 drop, Both Eyes, QID PRN    sodium chloride (Saline Mist) 0.65 % nasal solution 1 spray, 1 spray, Each Nare, Q3H PRN    [START ON 1/10/2024] furosemide (Lasix) 10 mg/mL injection 40 mg, 40 mg, Intravenous, Daily    [START ON 1/10/2024]  heparin (Porcine) 5000 UNIT/ML injection 5,000 Units, 5,000 Units, Subcutaneous, 2 times per day    atorvastatin (Lipitor) tab 10 mg, 10 mg, Oral, Nightly    desmopressin (Ddavp) tab 0.1 mg, 0.1 mg, Oral, Nightly    escitalopram (Lexapro) tab 20 mg, 20 mg, Oral, Nightly    folic acid (Folvite) tab 1 mg, 1 mg, Oral, Daily    [START ON 1/10/2024] ferrous sulfate DR tab 325 mg, 325 mg, Oral, Daily with breakfast    methenamine (Hiprex) tab 1 g, 1 g, Oral, BID    [START ON 1/10/2024] pantoprazole (Protonix) DR tab 20 mg, 20 mg, Oral, QAM AC    OLANZapine (ZyPREXA) tab 5 mg, 5 mg, Oral, Nightly **AND** OLANZapine (ZyPREXA) tab 2.5 mg, 2.5 mg, Oral, Nightly PRN    OLANZapine (ZyPREXA) tab 2.5 mg, 2.5 mg, Oral, BID PRN **OR** OLANZapine (Zyprexa) 2.5238 mg in sterile water for injection (PF) IM injection, 2.5238 mg, Intramuscular, BID PRN    Review of Systems:  A comprehensive review of systems was negative if not otherwise mention in above HPI.    /75 (BP Location: Right arm)   Pulse 84   Temp 97.1 °F (36.2 °C) (Oral)   Resp 18   Wt 136 lb 11 oz (62 kg)   SpO2 92%   BMI 23.46 kg/m²   Temp (24hrs), Av °F (36.7 °C), Min:97.1 °F (36.2 °C), Max:98.4 °F (36.9 °C)       Intake/Output Summary (Last 24 hours) at 2024 1426  Last data filed at 2024 1228  Gross per 24 hour   Intake --   Output 2450 ml   Net -2450 ml     Wt Readings from Last 3 Encounters:   24 136 lb 11 oz (62 kg)   10/10/23 136 lb 3.9 oz (61.8 kg)   23 126 lb (57.2 kg)       Physical Exam:   General: Confused and sundowning. No apparent distress. No respiratory or constitutional distress.  HEENT: Normocephalic, anicteric sclera, neck supple.  Neck: No JVD, carotids 2+, no bruits.  Cardiac: Regular rate and rhythm. S1, S2 normal. No murmur, pericardial rub, S3.  Lungs: Diminished without wheezes, rales or rhonchi.    Abdomen: Soft, non-tender.   Extremities: Without clubbing, cyanosis or edema.  Peripheral pulses are  2+.  Neurologic: Confused and sundowning. MAEx4  Skin: Warm and dry.     Laboratory Data:  Lab Results   Component Value Date    WBC 6.5 01/09/2024    HGB 11.9 01/09/2024    HCT 35.2 01/09/2024    .0 01/09/2024    CREATSERUM 1.00 01/09/2024    BUN 34 01/09/2024     01/09/2024    K 4.2 01/09/2024    CL 98 01/09/2024    CO2 26.0 01/09/2024    GLU 90 01/09/2024    CA 9.5 01/09/2024    ALB 3.4 01/09/2024    ALKPHO 84 01/09/2024    BILT 0.6 01/09/2024    TP 6.8 01/09/2024    AST 17 01/09/2024    ALT 18 01/09/2024     Recent Labs   Lab 01/09/24  0657   TROPHS 19     Imaging:  EKG 1/9/2024: sinus with 1st degree AV block with no major ST/T wave changes    CXR 1/9/2024: CONCLUSION:    Stable cardiac and mediastinal contours.  Diffuse interstitial thickening most in keeping with moderate interstitial pulmonary edema.  No discrete airspace consolidation.  No significant pleural effusion or appreciable pneumothorax.   Osteoarthritis of the shoulders and spine.  No acute osseous findings.   Dictated by (CST): Chrissy Hinojosa MD on 1/09/2024 at 7:34 AM     Echo 10/6/2023 Conclusions:   1. Left ventricle: The cavity size was mildly increased. Wall thickness was moderately increased. Systolic function was normal. The estimated ejection fraction was 60-65%. Wall motion is normal; there are no regional wall motion abnormalities. Left ventricular diastolic function parameters were normal for the patient's age.   2. Aortic valve: There was mild to moderate regurgitation.   3. Mitral valve: There was mild regurgitation.   4. Tricuspid valve: There was moderate regurgitation.   5. Pulmonary arteries: Systolic pressure was estimated to be 50mm Hg.  Estimated pulmonary artery diastolic pressure was 12mm Hg.   6. Pericardium, extracardiac: A small pericardial effusion was identified posterior to the heart. There was no evidence of hemodynamic compromise.   Impressions:  This study is compared with previous dated 05/12/2022:  Valvular insufficiency is more prominent.     Impression:  Fall, initial encounter  Contusion of forehead, initial encounter  Acute diastolic congestive heart failure  Hyponatremia  Dementia with sundowning  Hx breast cancer  Hx stroke  Hx PUD; s/p epi injection, cautery, endoclips 10/9/23; resume ppi;   Hx hip fx s/p repair    Recommendations:  -tele monitoring  -agree with IV diuretics and will reassess daily based on clinical response, I/O, daily weights and lytes (renal consulted to help with DDAVP dosing and patient hyponatremic)  -on low dose BB, ARB and statin    D/w son at bedside with patient    Thank you for allowing me to participate in the care of your patient.    [place]ana Hinojosa MD  1/9/2024  2:26 PM

## 2024-01-09 NOTE — CONSULTS
[facility]  Report of Psychiatric Consultation    Fe Sal Patient Status:  Observation    1935 MRN [id number]   Location [facility] 2NE-A Attending Tom Choudhury MD   Hosp Day # 0 PCP Ankit Singh MD     Date of Admission: 2024  Date of Consult: 2024  Reason for Consultation: delirium with history of major neurocognitive disorder    Impression: 88-year-old female presents with noted delirium with noted behavioral disturbances and a history of dementia.    Primary Psychiatric Diagnosis:  Acute encephalopathy  -Etiologies to include: underlying dementia,  electrolyte imbalance (Na, K, Mg, Phos), CHF exacerbation      Major neurocognitive disorder with behavioral disturbance     Depression, unspecified     Anxiety, unspecified    Personality Traits:  Deferred     Pertinent Medical Diagnoses:  Hyponatremia, CHF, and PUD    Recommendations:  Discontinue Seroquel.  Start Zyprexa 5 mg nightly with additional 2.5 mg PRN available if patient is not asleep after 45 minutes-1 hour.   Start on Zyprexa 2.5 mg PO/IV twice daily as needed for anxiety/agitation.   Continue Lexapro 20 mg for depression and anxiety  Delirium protocol:  Continue non-pharm delirium care- orientation, cognitive stimulation, family visits, lights on and blinds open in daytime and quiet and dark at night and PT when appropriate.  Avoid benzos and opiates if possible since they can increase symptoms of delirium.  EKG completed on 2024.  ms.  UA ordered due to to patient's history of frequent urinary tract infections.  Please document how many hours patient sleeps and quality.     MARISSA Ann NP-C    History of Present Illness:  Patient presented to [facility] on 2024 after she was brought in by EMS from [facility] from home provide cardiomyopathy on the floor.  Staff witnessed the fall.  While in the ER patient was noted to have audible wheezing and an elevated proBNP.  She was also  noted to be mildly hyponatremic at 131.  Chest x-ray noted that imaging was consistent with heart failure.  Patient was given IV Lasix and ER MD spoke to hospitalist and cardiology and patient was admitted for further treatment.     Patient was previously seen by our psychiatric services when she was hospitalized in October 2023 for similar presentation.  Patient has a reported history of dementia.  She has a history of developing delirium especially secondary to UTIs.  He reports that since she was discharged from the hospital in October 2023 that she has now become a resident of [facility].  Is reported that [facility] notes that patient is not sleeping at night and is impulsive.  She is hard to redirect.  Patient impulsive behavior causes her to be at risk for falls.  He notes that patient has a previous history of having trouble sleeping at night.  Reports that this was an issue when she was living at home as well but when she would receive trazodone that this was efficient and not.  Does note that she has a history of rumination.  Son does report that he feels like patient is depressed.  Son indicates that [facility] felt she might be hallucinating but was unsure on the details. Currently is prescribed Lexapro.  Reports recently prescribed Restoril, Xanax, and Seroquel with no noted effect. Son does not report that patient was benefiting from those either. She is currently prescribed Lexapro.    Patient was noted to be sleeping when APRN first entered room. Patient had received PRN medications due to being restless and impulsive. Had just received PRN IM dose of Haldol 5 mg around 1:15 PM. She was holding her son's hand that was at bedside. Son stepped in the hallway to speak with APRN. Patient was sleeping still at first but then increased restlessness was noted when she realized that son was no longer present. APRN and son re-entered room. Patient denies feeling depressed or anxious. Denies any auditory  or visual hallucinations. She endorses she is in the hospital due to a fall but reports year is . She indicates that she wants her son to hold her hand so that she \"feels comforted\".     Discussion with son that when patient was hospitalized in 2023 that she was started on Olanzapine (discharged with it being PRN) that seemed to aid in clearing up delirium and also aided in sleep. Discussed retrial of this medications to see if this is effective here as well. Discussed that medication could not result in results that we are attempting to achieve such as decreased agitation, restlessness, and sleep. It is possible that medication can also result in excessive sedation. Son is understanding and in agreement to trial of Zyprexa. PRN dosing will be available as well. Spoke with Dr. Scott, who is in agreement in ordering UA.    Past Psychiatric History:  History of anxiety and depression. No history of inpatient or outpatient psychiatric hospitalizations.      Substance Use History:  Denies     Psych Family History:  Denies     Social and Developmental History:   Patient had previously been living by herself in [place] until 2022.  She was found by family members after she had been in a bathtub for over 24 hours.  Once hospitalized it was determined that she had a small stroke. Patient then moved in with her daughter. She is now residing at [facility]. She is . 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    Past Medical History:   Diagnosis Date    Anxiety     Arthritis     knees and shoulders    Breast CA ([facility])     right breast cancer with mastectomy.    Cancer ([facility])     right breast ca    Cystitis, interstitial     history of----    Flatulence/gas pain/belching     Frequent UTI     GI bleed     H/O mammogram 2011    left breast    H/O mammogram 2015    benign    H/O pregnancy 1967,1969        HIGH BLOOD PRESSURE     HIGH CHOLESTEROL      History of endoscopy 08/21/2013    Dr. Mesa    History of stomach ulcers     Loss of appetite     Nausea     Osteoarthritis     Other and unspecified hyperlipidemia     Other and unspecified personal history of malignant neoplasm 2001    dr. luann mccall, ill  breast cancer right    Pap smear for cervical cancer screening 09/18/2003    [facility]    Renal cyst, right 07/15/2015    Refer to Dr. Shaffer/urology.    Rheumatic fever     Stroke (HCC)     Unspecified essential hypertension     Visual impairment      Past Surgical History:   Procedure Laterality Date    COLONOSCOPY  2/02 4/08 2008 Dr. Perez, repeat in five years, family history of colon cancer, Diverticulosis    COLONOSCOPY  08/21/2013    Dr. Maciej Mesa    HYSTERECTOMY  1985    partial hystero.    MASTECTOMY RIGHT Right 2001    right breast cancer with mastectomy.    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    SPECIAL SERVICE OR REPORT  2001    mastectomy right    UNSPECIFIED DIAGNOSTIC PROCE  2005    cystoscopy     Family History   Problem Relation Age of Onset    Heart Disorder Father         chf    Cancer Mother         colon    Stroke Mother     Colon Cancer Mother     Breast Cancer Self 65    Hypertension Brother       reports that she has never smoked. She has never been exposed to tobacco smoke. She has never used smokeless tobacco. She reports that she does not drink alcohol and does not use drugs.    Allergies:  Allergies   Allergen Reactions    Bactrim [Sulfamethoxazole W/Trimethoprim] NAUSEA ONLY    Nitrofurantoin NAUSEA ONLY     Medications:  Current Facility-Administered Medications:     losartan (Cozaar) tab 100 mg, 100 mg, Oral, Daily    metoprolol tartrate (Lopressor) partial tab 12.5 mg, 12.5 mg, Oral, TID Beta Blocker/Cardiac    labetalol (Trandate) 5 mg/mL injection 10 mg, 10 mg, Intravenous, Q6H PRN    acetaminophen (Tylenol Extra Strength) tab 500 mg, 500 mg, Oral, Q4H PRN    melatonin tab 3 mg, 3 mg, Oral, Nightly PRN     polyethylene glycol (PEG 3350) (Miralax) 17 g oral packet 17 g, 17 g, Oral, Daily PRN    sennosides (Senokot) tab 17.2 mg, 17.2 mg, Oral, Nightly PRN    bisacodyl (Dulcolax) 10 MG rectal suppository 10 mg, 10 mg, Rectal, Daily PRN    ondansetron (Zofran) 4 MG/2ML injection 8 mg, 8 mg, Intravenous, Q6H PRN    benzonatate (Tessalon) cap 200 mg, 200 mg, Oral, TID PRN    glycerin-hypromellose- (Artifical Tears) 0.2-0.2-1 % ophthalmic solution 1 drop, 1 drop, Both Eyes, QID PRN    sodium chloride (Saline Mist) 0.65 % nasal solution 1 spray, 1 spray, Each Nare, Q3H PRN    [START ON 1/10/2024] furosemide (Lasix) 10 mg/mL injection 40 mg, 40 mg, Intravenous, Daily    [START ON 1/10/2024] heparin (Porcine) 5000 UNIT/ML injection 5,000 Units, 5,000 Units, Subcutaneous, 2 times per day    atorvastatin (Lipitor) tab 10 mg, 10 mg, Oral, Nightly    desmopressin (Ddavp) tab 0.1 mg, 0.1 mg, Oral, Nightly    escitalopram (Lexapro) tab 20 mg, 20 mg, Oral, Nightly    folic acid (Folvite) tab 1 mg, 1 mg, Oral, Daily    [START ON 1/10/2024] ferrous sulfate DR tab 325 mg, 325 mg, Oral, Daily with breakfast    methenamine (Hiprex) tab 1 g, 1 g, Oral, BID    [START ON 1/10/2024] pantoprazole (Protonix) DR tab 20 mg, 20 mg, Oral, QAM AC    QUEtiapine (SEROquel) tab 100 mg, 100 mg, Oral, Nightly    Review of Systems   Unable to perform ROS: Dementia     Psychiatry Review Systems: Possibly having hallucinations at [facility] per son. Not sleeping at night.     Mental Status Exam:   Risk Assessment  Suicidal ideation: no suicidal ideation  Homicidal ideation: None noted    Appearance: disheveled  Behavior: calm and cooperative while son was sitting with her and holding her hand but noted to become restless when he left to speak with APRN  Attitude: cooperative  Gait: Not observed    Speech: soft and quiet    Mood: anxious  Affect: Congruent    Thought process: logical  Thought content: ruminations  Perceptions: no  hallucinations  Associations: Unable to assess    Orientation: Alert and oriented x 3  Attention and Concentration:   fair  Memory:  intact recent  Language: Unable to assess  Fund of Knowledge: Unable to assess    Insight: Limited   Judgment: Limited     Objective    Vitals:    01/09/24 1228   BP:    Pulse: 84   Resp: 18   Temp:      Laboratory Data:  Lab Results   Component Value Date    WBC 6.5 01/09/2024    HGB 11.9 01/09/2024    HCT 35.2 01/09/2024    .0 01/09/2024    CREATSERUM 1.00 01/09/2024    BUN 34 01/09/2024     01/09/2024    K 4.2 01/09/2024    CL 98 01/09/2024    CO2 26.0 01/09/2024    GLU 90 01/09/2024    CA 9.5 01/09/2024    ALB 3.4 01/09/2024    ALKPHO 84 01/09/2024    BILT 0.6 01/09/2024    TP 6.8 01/09/2024    AST 17 01/09/2024    ALT 18 01/09/2024       STUDIES:    Hayley ANN NP-C

## 2024-01-09 NOTE — H&P
[facility]     Fe Sal Patient Status:  Observation    1935 MRN [id number]   Location [facility] 2NE-A Attending Tom Choudhury MD   Hosp Day # 0 PCP Ankit Singh MD     Chief Complaint: delirium    Subjective:    History of Present Illness:     Fe Sal is a 88 year old female with delirium.  Fell out of chair.  Son says she has been more confused and sundowning lately.  Also with some increased weight gain 20lbs last month.  CXR in ER with pulm edema changes.  Patient cannot give hx at this time.  No recent fevers, n/v, diarrhea, cough per son.    History/Other:    Past Medical History:  Past Medical History:   Diagnosis Date    Anxiety     Arthritis     knees and shoulders    Breast CA (HCC)     right breast cancer with mastectomy.    Cancer (HCC)     right breast ca    Cystitis, interstitial     history of----    Flatulence/gas pain/belching     Frequent UTI     GI bleed     H/O mammogram 2011    left breast    H/O mammogram 2015    benign    H/O pregnancy 1967,1969        HIGH BLOOD PRESSURE     HIGH CHOLESTEROL     History of endoscopy 2013    Dr. Mesa    History of stomach ulcers     Loss of appetite     Nausea     Osteoarthritis     Other and unspecified hyperlipidemia     Other and unspecified personal history of malignant neoplasm     dr. luann mccall, [facility]  breast cancer right    Pap smear for cervical cancer screening 2003    wnl    Renal cyst, right 07/15/2015    Refer to Dr. Shaffer/urology.    Rheumatic fever     Stroke (HCC)     Unspecified essential hypertension     Visual impairment      Past Surgical History:   Past Surgical History:   Procedure Laterality Date    COLONOSCOPY   Dr. Perez, repeat in five years, family history of colon cancer, Diverticulosis    COLONOSCOPY  2013    Dr. Maciej Mesa    HYSTERECTOMY  1985    partial hystero.    MASTECTOMY RIGHT Right      right breast cancer with mastectomy.    NEEDLE BIOPSY RIGHT Right     DCIS    SPECIAL SERVICE OR REPORT  2001    mastectomy right    UNSPECIFIED DIAGNOSTIC PROCE  2005    cystoscopy      Family History:   Family History   Problem Relation Age of Onset    Heart Disorder Father         chf    Cancer Mother         colon    Stroke Mother     Colon Cancer Mother     Breast Cancer Self 65    Hypertension Brother        hypertension Social History:    reports that she has never smoked. She has never been exposed to tobacco smoke. She has never used smokeless tobacco. She reports that she does not drink alcohol and does not use drugs.     Allergies:   Allergies   Allergen Reactions    Bactrim [Sulfamethoxazole W/Trimethoprim] NAUSEA ONLY    Nitrofurantoin NAUSEA ONLY       Medications:    Current Facility-Administered Medications on File Prior to Encounter   Medication Dose Route Frequency Provider Last Rate Last Admin    [COMPLETED] potassium chloride (K-Dur) tab 20 mEq  20 mEq Oral Once Brigido King MD   20 mEq at 10/11/23 1403    [COMPLETED] influenza vaccine high dose quad (Fluzone QIV HD) 0.7 mL IM injection (ages >/= 65 years) 0.7 mL  0.7 mL Intramuscular Prior to discharge Tom Choudhury MD   0.7 mL at 10/15/23 1304     Current Outpatient Medications on File Prior to Encounter   Medication Sig Dispense Refill    QUEtiapine 50 MG Oral Tab Take 2 tablets (100 mg total) by mouth nightly.      methenamine 1 g Oral Tab Take 1 tablet (1 g total) by mouth 2 (two) times daily.      Cholestyramine 4 g Oral Powd Pack Take 1 packet by mouth 2 (two) times daily.      saccharomyces boulardii 250 MG Oral Cap Take 1 capsule (250 mg total) by mouth daily.      [] ciprofloxacin 500 MG Oral Tab Take 1 tablet (500 mg total) by mouth 2 (two) times daily.      alum-mag hydroxide-simethicone 200-200-20 MG/5ML Oral Suspension Take 15 mL by mouth daily as needed for Indigestion.      [] saccharomyces boulardii 250 MG  Oral Cap Take 1 capsule (250 mg total) by mouth 2 (two) times daily.      lidocaine 5 % External Patch Place 1 patch onto the skin daily. 12h on and 12h off      omeprazole 20 MG Oral Capsule Delayed Release Take 1 capsule (20 mg total) by mouth every morning.      metoprolol tartrate 25 MG Oral Tab Take 0.5 tablets (12.5 mg total) by mouth TID Beta Blocker/Cardiac. 30 tablet 2    folic acid 1 MG Oral Tab Take 1 tablet (1 mg total) by mouth daily. 30 tablet 0    atorvastatin 10 MG Oral Tab Take 1 tablet (10 mg total) by mouth nightly. 30 tablet 3    OLANZapine 5 MG Oral Tab Take 1 tablet (5 mg total) by mouth nightly as needed (agitation, aggression, insomnia). 30 tablet 1    OLANZapine 2.5 MG Oral Tab Take 1 tablet (2.5 mg total) by mouth 2 (two) times daily as needed (agitation, psychosis). 30 tablet 1    aspirin 325 MG Oral Tab Take 1 tablet (325 mg total) by mouth daily.      ferrous sulfate 325 (65 FE) MG Oral Tab EC Take 1 tablet (325 mg total) by mouth daily with breakfast.      losartan 100 MG Oral Tab Take 1 tablet (100 mg total) by mouth daily.      desmopressin 0.1 MG Oral Tab TAKE 1 TABLET(0.1 MG) BY MOUTH EVERY NIGHT 90 tablet 1    Multiple Vitamin (MULTI-VITAMIN) Oral Tab Take 1 tablet by mouth daily.      ESCITALOPRAM 20 MG Oral Tab TAKE 1 TABLET(20 MG) BY MOUTH DAILY (Patient taking differently: Take 1 tablet (20 mg total) by mouth at bedtime.) 90 tablet 1    acetaminophen 325 MG Oral Tab Take 2 tablets (650 mg total) by mouth every 6 (six) hours as needed.      CRANBERRY EXTRACT OR Take 4,200 mg by mouth 3 (three) times daily.      Omega-3 Fatty Acids (FISH OIL) 1200 MG Oral Cap Take 1,200 mg by mouth daily.         Review of Systems: ROS per son at bedside.  A comprehensive 10 point review of systems was completed.    Pertinent positives and negatives noted in the HPI.    Objective:   Physical Exam:    /75 (BP Location: Right arm)   Pulse 84   Temp 97.1 °F (36.2 °C) (Oral)   Resp 18   Wt  136 lb 11 oz (62 kg)   SpO2 92%   BMI 23.46 kg/m²   General: confused, rousable but somnoelnt  Respiratory: No rhonchi, no wheezes  Cardiovascular: S1, S2. Regular rate and rhythm  Abdomen: Soft, NT/ND, +BS  Neuro: No new focal deficits  Extremities: trace LE  edema      Results:    Labs:      Labs Last 24 Hours:    Recent Labs   Lab 01/09/24  0657   RBC 3.97   HGB 11.9*   HCT 35.2   MCV 88.7   MCH 30.0   MCHC 33.8   RDW 14.1   NEPRELIM 3.57   WBC 6.5   .0       Recent Labs   Lab 01/09/24  0657   GLU 90   BUN 34*   CREATSERUM 1.00   EGFRCR 55*   CA 9.5   ALB 3.4   *   K 4.2   CL 98   CO2 26.0   ALKPHO 84   AST 17   ALT 18   BILT 0.6   TP 6.8       Lab Results   Component Value Date    INR 1.31 (H) 10/08/2023    INR 1.17 (H) 09/26/2023    INR 1.58 (H) 09/25/2023       Recent Labs   Lab 01/09/24  0657   TROPHS 19       Recent Labs   Lab 01/09/24  0657   PBNP 2,609*       No results for input(s): \"PCT\" in the last 168 hours.    Imaging: Imaging data reviewed in Epic.    Assessment & Plan:      #probable acute on chronic diastolic CHF exacerbation; iv lasix; cards to see  #suspected dementia with delirium and sundowning; psych consult given ongoing delirium and agitation despite increasing dosing of seroquel; also a family request for consult  #hyponatremia; 2/2 chf?  Contribution from ssri? Desmopressin as well. Monitor response to diuretics; cautiously resume desmopressin until renal can evaluate (son says she is on desmopressin for bladder urgency, not Na correction);  cautious resume of SSRI;  if Na not correcting with diuresis, will need to re-visit SSRI; defer desmopressin to renal; may need to also be stopped  #Hx breast cancer  #Hx stroke  #Hx PUD; s/p epi injection, cautery, endoclips 10/9/23; resume ppi;   #Hx hip fx s/p repair    Will do med rec once review complete.    Quality:  DVT prophylaxis:  SCDs, heparin cautiously; if any melena, will need to be stopped  Code Status: full  Sal:  NO  Sal Duration (in days): N/A  Central line: NO  Estimated discharge date: 2-3 days    Plan of care discussed with patient and ER MD Casey Ornelas MD    Supplementary Documentation:

## 2024-01-09 NOTE — CONSULTS
[facility]  Report of Consultation    Fe Sal Patient Status:  Observation    1935 MRN [id number]   Location [facility] 2NE-A Attending Tom Choudhury MD   Hosp Day # 0 PCP Ankit Singh MD       Assessment / Plan:    1) Hyponatremia- multifactorial; due to modest solute intake + desmopressin effect (x 1 yr for nocturia / frequency). Discussed with son- given pt's overall decline / non-ambulatory state + edema, will dc DDAVP for now.     2) HFpEF- echo noted; agree with IV loop diuretics    3) Recent UGIB s/p epi injection + cautery / endoclips on PPI    4) Recent hip fx s/p repair    5) Progressive dementia with severe sundowning      Reason for Consultation:  Hyponatremia    History of Present Illness:  Fe Sal is a a(n) 88 year old female. Dictation to follow    History:  Past Medical History:   Diagnosis Date    Anxiety     Arthritis     knees and shoulders    Breast CA (HCC)     right breast cancer with mastectomy.    Cancer (HCC)     right breast ca    Cystitis, interstitial     history of----    Flatulence/gas pain/belching     Frequent UTI     GI bleed     H/O mammogram 2011    left breast    H/O mammogram 2015    benign    H/O pregnancy 1967,1969        HIGH BLOOD PRESSURE     HIGH CHOLESTEROL     History of endoscopy 2013    Dr. Mesa    History of stomach ulcers     Loss of appetite     Nausea     Osteoarthritis     Other and unspecified hyperlipidemia     Other and unspecified personal history of malignant neoplasm     dr. luann mccall, [facility]  breast cancer right    Pap smear for cervical cancer screening 2003    wnl    Renal cyst, right 07/15/2015    Refer to Dr. Shaffer/urology.    Rheumatic fever     Stroke (HCC)     Unspecified essential hypertension     Visual impairment      Past Surgical History:   Procedure Laterality Date    COLONOSCOPY   Dr. Perez, repeat in five years, family history of colon cancer,  Diverticulosis    COLONOSCOPY  08/21/2013    Dr. Maciej Mesa    HYSTERECTOMY  1985    partial hystero.    MASTECTOMY RIGHT Right 2001    right breast cancer with mastectomy.    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    SPECIAL SERVICE OR REPORT  2001    mastectomy right    UNSPECIFIED DIAGNOSTIC PROCE  2005    cystoscopy     Family History   Problem Relation Age of Onset    Heart Disorder Father         chf    Cancer Mother         colon    Stroke Mother     Colon Cancer Mother     Breast Cancer Self 65    Hypertension Brother      Denies family history of kidney disease.    reports that she has never smoked. She has never been exposed to tobacco smoke. She has never used smokeless tobacco. She reports that she does not drink alcohol and does not use drugs.    Allergies:  Allergies   Allergen Reactions    Bactrim [Sulfamethoxazole W/Trimethoprim] NAUSEA ONLY    Nitrofurantoin NAUSEA ONLY       Medications:    Current Facility-Administered Medications:     losartan (Cozaar) tab 100 mg, 100 mg, Oral, Daily    metoprolol tartrate (Lopressor) partial tab 12.5 mg, 12.5 mg, Oral, TID Beta Blocker/Cardiac    labetalol (Trandate) 5 mg/mL injection 10 mg, 10 mg, Intravenous, Q6H PRN    acetaminophen (Tylenol Extra Strength) tab 500 mg, 500 mg, Oral, Q4H PRN    melatonin tab 3 mg, 3 mg, Oral, Nightly PRN    polyethylene glycol (PEG 3350) (Miralax) 17 g oral packet 17 g, 17 g, Oral, Daily PRN    sennosides (Senokot) tab 17.2 mg, 17.2 mg, Oral, Nightly PRN    bisacodyl (Dulcolax) 10 MG rectal suppository 10 mg, 10 mg, Rectal, Daily PRN    ondansetron (Zofran) 4 MG/2ML injection 8 mg, 8 mg, Intravenous, Q6H PRN    benzonatate (Tessalon) cap 200 mg, 200 mg, Oral, TID PRN    glycerin-hypromellose- (Artifical Tears) 0.2-0.2-1 % ophthalmic solution 1 drop, 1 drop, Both Eyes, QID PRN    sodium chloride (Saline Mist) 0.65 % nasal solution 1 spray, 1 spray, Each Nare, Q3H PRN    [START ON 1/10/2024] furosemide (Lasix) 10 mg/mL  injection 40 mg, 40 mg, Intravenous, Daily    [START ON 1/10/2024] heparin (Porcine) 5000 UNIT/ML injection 5,000 Units, 5,000 Units, Subcutaneous, 2 times per day    atorvastatin (Lipitor) tab 10 mg, 10 mg, Oral, Nightly    desmopressin (Ddavp) tab 0.1 mg, 0.1 mg, Oral, Nightly    escitalopram (Lexapro) tab 20 mg, 20 mg, Oral, Nightly    folic acid (Folvite) tab 1 mg, 1 mg, Oral, Daily    [START ON 1/10/2024] ferrous sulfate DR tab 325 mg, 325 mg, Oral, Daily with breakfast    methenamine (Hiprex) tab 1 g, 1 g, Oral, BID    [START ON 1/10/2024] pantoprazole (Protonix) DR tab 20 mg, 20 mg, Oral, QAM AC    OLANZapine (ZyPREXA) tab 5 mg, 5 mg, Oral, Nightly **AND** OLANZapine (ZyPREXA) tab 2.5 mg, 2.5 mg, Oral, Nightly PRN    OLANZapine (ZyPREXA) tab 2.5 mg, 2.5 mg, Oral, BID PRN **OR** OLANZapine (Zyprexa) 2.5238 mg in sterile water for injection (PF) IM injection, 2.5238 mg, Intramuscular, BID PRN  No current outpatient medications on file.       Review of Systems:  Please see HPI for pertinent positives. 10 point review of systems otherwise reviewed and negative.     Physical Exam:  BP (!) 171/128 (BP Location: Left arm)   Pulse 91   Temp 98.1 °F (36.7 °C) (Axillary)   Resp 16   Wt 136 lb 11 oz (62 kg)   SpO2 92%   BMI 23.46 kg/m²   Temp (24hrs), Av °F (36.7 °C), Min:97.1 °F (36.2 °C), Max:98.4 °F (36.9 °C)       Intake/Output Summary (Last 24 hours) at 2024 1647  Last data filed at 2024 1228  Gross per 24 hour   Intake --   Output 2450 ml   Net -2450 ml     Wt Readings from Last 3 Encounters:   24 136 lb 11 oz (62 kg)   10/10/23 136 lb 3.9 oz (61.8 kg)   23 126 lb (57.2 kg)     General: awake confused but able to answer simple questions  HEENT: No scleral icterus, MMM  Neck: Supple, no JOHNATHAN or thyromegaly  Cardiac: Regular rate and rhythm, S1, S2 normal, no murmur, rub, or gallop  Lungs: Decreased breath sounds at the bases bilaterally.   Abdomen: Soft, non-tender. + bowel sounds, no  palpable organomegaly  Extremities: Without clubbing, cyanosis; 1+ LE edema  Neurologic: Cranial nerves grossly intact, moving all extremities  Skin: Warm and dry, no rashes      Laboratory Data:  Lab Results   Component Value Date    WBC 6.5 01/09/2024    HGB 11.9 01/09/2024    HCT 35.2 01/09/2024    .0 01/09/2024    CREATSERUM 1.00 01/09/2024    BUN 34 01/09/2024     01/09/2024    K 4.2 01/09/2024    CL 98 01/09/2024    CO2 26.0 01/09/2024    GLU 90 01/09/2024    CA 9.5 01/09/2024    ALB 3.4 01/09/2024    ALKPHO 84 01/09/2024    BILT 0.6 01/09/2024    TP 6.8 01/09/2024    AST 17 01/09/2024    ALT 18 01/09/2024       Imaging:  All imaging studies reviewed.      Thank you for allowing me to participate in the care of your patient.    Brigido King MD  1/9/2024  4:47 PM

## 2024-01-09 NOTE — HISTORICAL OFFICE NOTE
[facility]ility [facility]  [address] [place] [address], 4th floor, [place] [address]  [phone]      Fe Sal  Progress Note  Demographics:  Name: Fe Sal YOB: 1935  Age: 88, Female Medical Record No: [id number]  Visited Date/Time: 09/22/2023 03:50 PM    Chief Complaints  3 month follow up  History of Present Illness  Patient is a follow-up visit.  She has no complaints.  Blood pressure readings are brought in.  They seem to be highest in the morning with systolic blood pressures occasionally in the 200 range.  Otherwise, they are ranging between 150 and 160.  Cardiac risk factors Never smoked  Past Medical History  1.Dysuria  2.Cystitis  3.Other insomnia  4.Balance problem  5.Essential hypertension  6.High cholesterol  7.H/O: stroke with residual effects  8.Primary hyperparathyroidism  9.Memory changes  10.CKD (chronic kidney disease) stage 3, GFR 30-59 ml/min  11.Anxiety and depression  12.Chronic right shoulder pain  13.Chronic fatigue  14.H/O right mastectomy  15.Fatty infiltration of liver  16.Shoulder arthritis  17.Anxiety  18.Nephrolithiasis  Family History  1. Brother - Hypertension (HTN), primary  Social History  Smoking status Never smoked  Tobacco usage - No (Non-smoker (finding))  Review of systems  Constitutional No history of Weight Loss  Eyes No history of Blurry vision  ENT No history of Sorethroat  Gastrointestinal No history of Abdominal pain  Cardiovascular No history of Chest pain, RIVERA, Palpitations, Syncope, PND, Orthopnea, Edema and Claudication  Genitourinary No history of Dysuria  Musculoskeletal No history of Arthralgias  Respiratory No history of SOB  Neurological No history of Migraines  Endocrine No history of Excess thirst  Psychiatric No history of Depression  Hem/Lymphatic No history of Easy bruising  Allergy Immunology No history of Allergic Rhinitis  Integumentary No history of Pruritis  Physical Examination  Vitals Right Arm Sitting   / 84 mmHg, Pulse rate 70 bpm, Height in 5' 1\", BMI: 23.8, Weight in 126 lbs (or) 57.15 kgs and BSA : 1.58 cc/m²  General Appearance No Acute Distress  Cardiovascular   EKG/Other abnormalities  HEENT: EOMI, mucosa moist  Lungs: clear  CV: RRR  Abd: soft  Ext: no edema  Neuro: A&O  Allergies  1.Nitrofurantoin(Reaction:, Severity:Mild)  2.Sulfamethoxazole W/Trimethoprim(Reaction:, Severity:Mild)  Medications (Info obtained by: Verbal)  1.aspirin 325 MG Oral Tab, Take 1 tablet (325 mg total) by mouth daily.  2.cranberry 400 mg capsule, Take 1 capsule orally once a day.  3.desmopressin 0.1 MG Oral Tab, Take 1 tablet (0.1 mg total) by mouth nightly.  4.escitalopram (LEXAPRO) 20 MG Oral Tab, Take 1 tablet (20 mg total) by mouth daily.  5.hydrALAZINE 10 mg tablet, Take 1 tablet orally 3 times a day.  6.hydroCHLOROthiazide 12.5 mg tablet, Take 1 tablet orally every other day  7.irbesartan 150 mg tablet, Take 1 tablet orally 2 times a day.  8.metoprolol tartrate 25 MG Oral Tab, Take 1 tablet (25 mg total) by mouth 2 (two) times daily.  9.multivitamin tablet, Take 1 tablet orally once a day.  10.Omega-3 Fatty Acids (FISH OIL) 1200 MG Oral Cap, Take 1,200 mg by mouth daily.  11.pravastatin 40 MG Oral Tab, Take 1 tablet (40 mg total) by mouth nightly.  12.traZODone 50 MG Oral Tab, Take 1 tablet (50 mg total) by mouth nightly as needed for Sleep.  13.hydroCHLOROthiazide 12.5 mg tablet, Take 1 tablet orally once a day.  Impression  1.Essential hypertension  2.High cholesterol  3.H/O: stroke with residual effects  4.CKD (chronic kidney disease) stage 3, GFR 30-59 ml/min  5.Chronic fatigue  6.Fatty infiltration of liver  Assessment & Plan  Patient has hypertension.  Pressures are better yet clearly not where we need him to be.  Today, have asked her to take her hydrochlorothiazide daily.  We will increase her hydralazine from 10 to 20 mg 3 times a day.  I am going to change her metoprolol to carvedilol at 12.5 mg twice a day.  She  will take her carvedilol at bedtime.    Patient will have a virtual visit with one of our APN's in the next 2 to 3 weeks.  I will see her in about 3 to 4 months.  Medications Ordered  1.carvediloL 12.5 mg tablet, Take 1 tablet orally 2 times a day.  2.hydrALAZINE 10 mg tablet, Take 2 tablets orally 3 times a day.  Future appointments  1.Referral Visit - Gena Joseph ([email or web address]) : (Today)  2.Follow up visit - Bebeto Mehta (3 Months)  3.Follow up visit - Daniela Ayala (3 Weeks)  Miscellaneous  1.Reviewed trans thoracic echocardiogram with the patient.  Nurses documentation  Triage - Nursing Doc:PW  Upcoming surgeries: NONE  Use of assistive devices(s): NONE  Triage & medication list reviewed by: PW  Refills completed: NONE     Patient instructions  Stop metoprolol  Start carvedilol 12.5 mg twice daily  Take hydrochlorothiazide daily  Increase hydralazine to 20 mg three times daily  Virtual visit with Daniela Ayala in 2-3 weeks  Follow up with Dr. Mehta in 3-4 months  Lab Details  BASIC METABOLIC PANEL (8)  07/12/2023 05:59:57 PM  GLUCOSE 85 70-99 mg/dL  F  SODIUM 136 136-145 mmol/L  F  POTASSIUM 3.8 3.5-5.1 mmol/L  F  CHLORIDE 107  mmol/L  F  CO2 31.0 21.0-32.0 mmol/L  F  ANION GAP <0 0-18 mmol/L L F  BUN 38 7-18 mg/dL H F  CREATININE 1.16 0.55-1.02 mg/dL H F  CALCIUM 10.2 8.5-10.1 mg/dL H F  OSMOLALITY CALCULATED 290 275-295 mOsm/kg  F  E GFR CR 46 >=60 mL/min/1.73m2 L F  FASTING PATIENT BMP ANSWER No   F  Diagnostics Details  Trans Thoracic Echocardiogram 07/13/2023  1.The study quality is good.    2.The left ventricle is moderately enlarged. Global left ventricular systolic function borderline. The left ventricular ejection fraction is 50%. The left ventricle diastolic function is impaired (Grade II) with an elevated left atrial pressure. Mild concentric left ventricular hypertrophy is present. No regional wall motion abnormalities were identifed.    3.The left atrial diameter is  mildly increased. Left atrial diameter is 4.1 cms.    4.Moderate (2+) mitral regurgitation.    5.Mild aortic regurgitation.    6.Mild tricuspid regurgitation.    7.The estimated pulmonary artery systolic pressure is 40 mmHg assuming a right atrial pressure of 3 mmHg.    CPOE Orders carried out by: Bebeto Mehta MD and Chen Urrutia  Care Providers: Bebeto Mehta MD, Chen Urrutia and Teodoro Hernandez  Electronically Authenticated by  Bebeto Mehta MD  09/22/2023 04:17:02 PM  Disclaimer: Components of this note were documented using voice recognition system and are subject to errors not corrected at proofreading. Contact the author of this note for any clarifications.

## 2024-01-09 NOTE — ED PROVIDER NOTES
Patient Seen in: [facility] Emergency Department      History   No chief complaint on file.    Stated Complaint: Fall    Subjective:   HPI    88-year-old white female presents to the emergency room today from the nursing home after a fall.  Patient apparently was sleeping in a chair and fell out of the chair.  According to transfer paperwork it was a witnessed fall.  Patient hit her head off the ground.  Now complains of head pain.  Has small bruise over left eye.  Patient denies neck pain chest pain or shortness of breath.  Patient however is audibly wheezing.  Patient denies other injury.  No loss of consciousness was reported from the fall by the paperwork that was sent in.  Patient has mild dementia but is able to communicate and answer some questions.    Objective:   Past Medical History:   Diagnosis Date    Anxiety     Arthritis     knees and shoulders    Breast CA ([facility])     right breast cancer with mastectomy.    Cancer (HCC)     right breast ca    Cystitis, interstitial     history of----    Flatulence/gas pain/belching     Frequent UTI     GI bleed     H/O mammogram 2011    left breast    H/O mammogram 2015    benign    H/O pregnancy 1967,1969        HIGH BLOOD PRESSURE     HIGH CHOLESTEROL     History of endoscopy 2013    Dr. Mesa    Loss of appetite     Nausea     Other and unspecified hyperlipidemia     Other and unspecified personal history of malignant neoplasm     dr. luann mccall, [facility]  breast cancer right    Pap smear for cervical cancer screening 2003    [facility]l    Renal cyst, right 07/15/2015    Refer to Dr. Shaffer/urology.    Rheumatic fever     Stroke (HCC)     Unspecified essential hypertension               Past Surgical History:   Procedure Laterality Date    COLONOSCOPY   Dr. Perez, repeat in five years, family history of colon cancer, Diverticulosis    COLONOSCOPY  2013    Dr. Maciej Mesa    HYSTERECTOMY  1985     partial hystero.    MASTECTOMY RIGHT Right 2001    right breast cancer with mastectomy.    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    SPECIAL SERVICE OR REPORT  2001    mastectomy right    UNSPECIFIED DIAGNOSTIC PROCE  2005    cystoscopy                Social History     Socioeconomic History    Marital status:    Tobacco Use    Smoking status: Never     Passive exposure: Never    Smokeless tobacco: Never   Vaping Use    Vaping Use: Never used   Substance and Sexual Activity    Alcohol use: No    Drug use: No    Sexual activity: Never   Other Topics Concern    Caffeine Concern Yes     Comment: tea     Exercise No   Social History Narrative    Lives with daughter.     Social Determinants of Health     Food Insecurity: No Food Insecurity (10/5/2023)    Food Insecurity     Food Insecurity: Never true   Transportation Needs: No Transportation Needs (10/5/2023)    Transportation Needs     Lack of Transportation: No   Housing Stability: Low Risk  (10/5/2023)    Housing Stability     Housing Instability: No              Review of Systems    Positive for stated complaint: Fall  Other systems are as noted in HPI.  Constitutional and vital signs reviewed.      All other systems reviewed and negative except as noted above.    Physical Exam     ED Triage Vitals [01/09/24 0627]   BP (!) 194/102   Pulse 80   Resp 16   Temp 98.2 °F (36.8 °C)   Temp src Temporal   SpO2 99 %   O2 Device None (Room air)       Current:BP (!) 175/100   Pulse 92   Temp 98.1 °F (36.7 °C) (Temporal)   Resp 20   Wt 62 kg   SpO2 99%   BMI 23.46 kg/m²         Physical Exam    Elderly white female who is sitting on the gurney, she is awake and alert and appears in no acute discomfort or distress.    Vital signs show she is a hypertensive otherwise vital signs are stable she is afebrile    Head is normocephalic with a small bruise noted over the left lateral eyebrow.  No periorbital step-off deformities noted.  Conjunctiva is clear.  Sclerae  anicteric.  Neck is supple without cervical spine tenderness.    Lungs are diminished with wheezing to auscultation bilaterally.  Heart is regular rate and rhythm without murmur gallop or rub.  No chest wall tenderness is noted.    Abdomen is soft nondistended nontender to deep palpation there is no rebound or guarding noted no hepatosplenomegaly is noted.  No masses are noted.  No hernias are palpated.    Extremity exam reveals no clubbing cyanosis or edema.  Patient has good radial pulses noted bilaterally in the upper extremities .  Patient has no upper or lower extremity tenderness.  Patient moves all 4 extremities with some limitation range of motion of left shoulder due to previous surgery.    There are no rashes noted on skin exam.      ED Course     Labs Reviewed   COMP METABOLIC PANEL (14) - Abnormal; Notable for the following components:       Result Value    Sodium 131 (*)     BUN 34 (*)     eGFR-Cr 55 (*)     All other components within normal limits   PRO BETA NATRIURETIC PEPTIDE - Abnormal; Notable for the following components:    Pro-Beta Natriuretic Peptide 2,609 (*)     All other components within normal limits   CBC W/ DIFFERENTIAL - Abnormal; Notable for the following components:    HGB 11.9 (*)     Monocyte Absolute 1.10 (*)     All other components within normal limits   TROPONIN I HIGH SENSITIVITY - Normal   SARS-COV-2/FLU A AND B/RSV BY PCR ([employer]) - Normal    Narrative:     This test is intended for the qualitative detection and differentiation of SARS-CoV-2, influenza A, influenza B, and respiratory syncytial virus (RSV) viral RNA in nasopharyngeal or nares swabs from individuals suspected of respiratory viral infection consistent with COVID-19 by their healthcare provider. Signs and symptoms of respiratory viral infection due to SARS-CoV-2, influenza, and RSV can be similar.    Test performed using the Xpert Xpress SARS-CoV-2/FLU/RSV (real time RT-PCR)  assay on the [employer]  instrument, "[employer] [place] [address].   This test is being used under the Food and Drug Administration's Emergency Use Authorization.    The authorized Fact Sheet for Healthcare Providers for this assay is available upon request from the laboratory.   CBC WITH DIFFERENTIAL WITH PLATELET    Narrative:     The following orders were created for panel order CBC With Differential With Platelet.  Procedure                               Abnormality         Status                     ---------                               -----------         ------                     CBC W/ DIFFERENTIAL[[id number]]          Abnormal            Final result                 Please view results for these tests on the individual orders.     EKG    Rate, intervals and axes as noted on EKG Report.  Rate: 83 bpm  Rhythm: Sinus Rhythm  Reading: Sinus rhythm with first-degree AV block.  No acute ischemic changes are noted.  Some voltage criteria for LVH.  Agree with computer report for rate axes and intervals.                 CT scan of the head reveals:    FINDINGS:  Stable mild-to-moderate global brain parenchymal volume loss without overt hydrocephalus.  There is no midline shift or mass-effect.  The basal cisterns are patent.  The gray-white matter differentiation is intact.  Partially empty sella.     There is no acute intracranial hemorrhage or extra-axial fluid collection.  No evidence of acute territorial infarction.  Stable moderate chronic microvascular ischemic changes in the cerebral white matter.  Stable small old bilateral cerebellar infarcts   noted.     Right frontal scalp hematoma.  There is no evident fracture.  Trace ethmoid mucosal thickening.  The mastoid air cells are unremarkable.                     Impression  CONCLUSION:       1. No acute intracranial abnormality identified.  Right frontal scalp hematoma.     2. Stable chronic ischemic changes as above. If there is clinical concern for acute ischemia/infarction, an MRI  of the brain would be recommended for further evaluation.            Chest X-ray reveals:    Impression  CONCLUSION:       Stable cardiac and mediastinal contours.  Diffuse interstitial thickening most in keeping with moderate interstitial pulmonary edema.  No discrete airspace consolidation.  No significant pleural effusion or appreciable pneumothorax.     Osteoarthritis of the shoulders and spine.  No acute osseous findings.       MDM      This is an 88-year-old female who fell out of her chair and hit her head.  Differential for this includes just contusion of forehead versus intracranial injury.  Also she has some wheezing noted on exam could be congestive heart failure, viral syndrome or pneumonia.  Patient will have laboratory studies sent here will get appropriate imaging studies as well.  Patient will be monitored here.    Patient had an IV established in the emergency room and laboratory workup revealed an EKG without ischemic changes.  Troponin is negative.  CBC is generally unremarkable.  Chemistries that are also unremarkable except for slightly low sodium 131.  proBNP is elevated 2609.  Viral swabs are negative.  Chest x-ray which I reviewed myself is consistent with heart failure.  Patient was given some IV Lasix here in the emergency room.  I spoke to her cardiologist and we discussed the case.  Patient be admitted for treatment of her heart failure.  Also reviewed her CT scan of the head which showed no intracranial injury she appears to suffered only minor facial contusions after the fall.  [facility] primarily with cardiology skeletons.  Patient be admitted to medical floor for further workup treatment evaluation.  Admission disposition: 1/9/2024  9:42 AM                                        Medical Decision Making      Disposition and Plan     Clinical Impression:  1. Fall, initial encounter    2. Contusion of forehead, initial encounter     3. Acute on chronic congestive heart failure, unspecified heart failure type (HCC)         Disposition:  Admit  1/9/2024  9:42 am    Follow-up:  No follow-up provider specified.        Medications Prescribed:  Current Discharge Medication List                            Hospital Problems       Present on Admission  Date Reviewed: 12/29/2023            ICD-10-CM Noted POA    * (Principal) Fall, initial encounter W19.XXXA 1/9/2024 Unknown

## 2024-01-10 PROBLEM — E22.2 SIADH (SYNDROME OF INAPPROPRIATE ADH PRODUCTION) (HCC): Status: ACTIVE | Noted: 2024-01-10

## 2024-01-10 LAB
ANION GAP SERPL CALC-SCNC: 8 MMOL/L (ref 0–18)
BUN BLD-MCNC: 19 MG/DL (ref 9–23)
CALCIUM BLD-MCNC: 10.2 MG/DL (ref 8.5–10.1)
CHLORIDE SERPL-SCNC: 100 MMOL/L (ref 98–112)
CO2 SERPL-SCNC: 27 MMOL/L (ref 21–32)
CREAT BLD-MCNC: 0.77 MG/DL
EGFRCR SERPLBLD CKD-EPI 2021: 75 ML/MIN/1.73M2 (ref 60–?)
ERYTHROCYTE [DISTWIDTH] IN BLOOD BY AUTOMATED COUNT: 13.9 %
GLUCOSE BLD-MCNC: 96 MG/DL (ref 70–99)
HCT VFR BLD AUTO: 39.7 %
HGB BLD-MCNC: 13.6 G/DL
MAGNESIUM SERPL-MCNC: 2.2 MG/DL (ref 1.6–2.6)
MCH RBC QN AUTO: 29.8 PG (ref 26–34)
MCHC RBC AUTO-ENTMCNC: 34.3 G/DL (ref 31–37)
MCV RBC AUTO: 86.9 FL
OSMOLALITY SERPL CALC.SUM OF ELEC: 282 MOSM/KG (ref 275–295)
PHOSPHATE SERPL-MCNC: 2.6 MG/DL (ref 2.5–4.9)
PLATELET # BLD AUTO: 274 10(3)UL (ref 150–450)
POTASSIUM SERPL-SCNC: 3.5 MMOL/L (ref 3.5–5.1)
RBC # BLD AUTO: 4.57 X10(6)UL
SODIUM SERPL-SCNC: 135 MMOL/L (ref 136–145)
WBC # BLD AUTO: 9.6 X10(3) UL (ref 4–11)

## 2024-01-10 PROCEDURE — 99233 SBSQ HOSP IP/OBS HIGH 50: CPT | Performed by: INTERNAL MEDICINE

## 2024-01-10 PROCEDURE — 99232 SBSQ HOSP IP/OBS MODERATE 35: CPT

## 2024-01-10 PROCEDURE — 99232 SBSQ HOSP IP/OBS MODERATE 35: CPT | Performed by: STUDENT IN AN ORGANIZED HEALTH CARE EDUCATION/TRAINING PROGRAM

## 2024-01-10 RX ORDER — OLANZAPINE 10 MG/1
10 TABLET ORAL NIGHTLY
Status: DISCONTINUED | OUTPATIENT
Start: 2024-01-10 | End: 2024-01-11

## 2024-01-10 RX ORDER — OLANZAPINE 5 MG/1
5 TABLET ORAL 2 TIMES DAILY PRN
Status: DISCONTINUED | OUTPATIENT
Start: 2024-01-10 | End: 2024-01-15

## 2024-01-10 RX ORDER — LABETALOL HYDROCHLORIDE 5 MG/ML
10 INJECTION, SOLUTION INTRAVENOUS EVERY 4 HOURS PRN
Status: DISCONTINUED | OUTPATIENT
Start: 2024-01-10 | End: 2024-01-15

## 2024-01-10 RX ORDER — FUROSEMIDE 10 MG/ML
20 INJECTION INTRAMUSCULAR; INTRAVENOUS ONCE
Status: DISCONTINUED | OUTPATIENT
Start: 2024-01-10 | End: 2024-01-11

## 2024-01-10 RX ORDER — OLANZAPINE 5 MG/1
5 TABLET ORAL NIGHTLY PRN
Status: DISCONTINUED | OUTPATIENT
Start: 2024-01-10 | End: 2024-01-11

## 2024-01-10 RX ORDER — POTASSIUM CHLORIDE 20 MEQ/1
40 TABLET, EXTENDED RELEASE ORAL ONCE
Status: COMPLETED | OUTPATIENT
Start: 2024-01-10 | End: 2024-01-10

## 2024-01-10 NOTE — CM/SW NOTE
01/10/24 1200   CM/SW Referral Data   Referral Source Social Work (self-referral)   Reason for Referral Discharge planning   Informant Daughter;Clinical Staff Member;EMR       Pt admitted to [facility] from [facility] long term care there.    Pt admitted following a fall out of her chair. She has been more confused/sundowning per family.  Pt is familiar to sw from previous admission.    Sw met with pt and daughter Candie.  Candie and I spoke outside pt's room.  Pt states plan is for pt to  return to [facility]. Pt has been having increased confusion issues at night. Daughter states she would agree to a psychiatric admission if needed in order to get her medications adjusted to help with her confusion/agitation at night.    Psych has been consulted and is following.    Updates sent to [facility] via [employer].    They are able to accept her back.    Elo Colmenares LCSW  /Discharge Planner

## 2024-01-10 NOTE — PROGRESS NOTES
[facility]  Nephrology Progress Note    Fe Sal Attending:  Oseas Lovett DO       Assessment and Plan:    1) Hyponatremia- multifactorial; due to modest solute intake + desmopressin effect (x 1 yr for nocturia / frequency). Given pt's overall decline / non-ambulatory state + edema, will dc DDAVP permanently      2) HFpEF- echo noted; continue loop diuretics as per cards     3) Recent UGIB s/p epi injection + cautery / endoclips on PPI     4) Recent hip fx s/p repair     5) Progressive dementia with severe sundowning    D/W daughter at bedside.       Subjective:  Awake up in chair answering simple questions     Physical Exam:   BP (!) 181/99 (BP Location: Left arm)   Pulse 77   Temp 98.2 °F (36.8 °C) (Axillary)   Resp 22   Wt 136 lb 11 oz (62 kg)   SpO2 96%   BMI 23.46 kg/m²   Temp (24hrs), Av.9 °F (36.6 °C), Min:96 °F (35.6 °C), Max:98.9 °F (37.2 °C)       Intake/Output Summary (Last 24 hours) at 1/10/2024 0929  Last data filed at 1/10/2024 0824  Gross per 24 hour   Intake 150 ml   Output 4000 ml   Net -3850 ml     Wt Readings from Last 3 Encounters:   24 136 lb 11 oz (62 kg)   10/10/23 136 lb 3.9 oz (61.8 kg)   23 126 lb (57.2 kg)     General: awake   HEENT: No scleral icterus, MMM  Neck: Supple, no JOHNATHAN or thyromegaly  Cardiac: Regular rate and rhythm, S1, S2 normal, no murmur or tub  Lungs: Decreased BS at bases bilaterally   Abdomen: Soft, non-tender. + bowel sounds, no palpable organomegaly  Extremities: Without clubbing, cyanosis; trace LE edema  Neurologic: Cranial nerves grossly intact, moving all extremities  Skin: Warm and dry, no rashes       Labs:   Lab Results   Component Value Date    WBC 9.6 01/10/2024    HGB 13.6 01/10/2024    HCT 39.7 01/10/2024    .0 01/10/2024    CREATSERUM 0.77 01/10/2024    BUN 19 01/10/2024     01/10/2024    K 3.5 01/10/2024     01/10/2024    CO2 27.0 01/10/2024    GLU 96 01/10/2024    CA 10.2 01/10/2024    MG 2.2 01/10/2024     PHOS 2.6 01/10/2024       Imaging:  All imaging studies reviewed.    Meds:   Current Facility-Administered Medications   Medication Dose Route Frequency    labetalol (Trandate) 5 mg/mL injection 10 mg  10 mg Intravenous Q4H PRN    losartan (Cozaar) tab 100 mg  100 mg Oral Daily    metoprolol tartrate (Lopressor) partial tab 12.5 mg  12.5 mg Oral TID Beta Blocker/Cardiac    acetaminophen (Tylenol Extra Strength) tab 500 mg  500 mg Oral Q4H PRN    melatonin tab 3 mg  3 mg Oral Nightly PRN    polyethylene glycol (PEG 3350) (Miralax) 17 g oral packet 17 g  17 g Oral Daily PRN    sennosides (Senokot) tab 17.2 mg  17.2 mg Oral Nightly PRN    bisacodyl (Dulcolax) 10 MG rectal suppository 10 mg  10 mg Rectal Daily PRN    ondansetron (Zofran) 4 MG/2ML injection 8 mg  8 mg Intravenous Q6H PRN    benzonatate (Tessalon) cap 200 mg  200 mg Oral TID PRN    glycerin-hypromellose- (Artifical Tears) 0.2-0.2-1 % ophthalmic solution 1 drop  1 drop Both Eyes QID PRN    sodium chloride (Saline Mist) 0.65 % nasal solution 1 spray  1 spray Each Nare Q3H PRN    furosemide (Lasix) 10 mg/mL injection 40 mg  40 mg Intravenous Daily    heparin (Porcine) 5000 UNIT/ML injection 5,000 Units  5,000 Units Subcutaneous 2 times per day    atorvastatin (Lipitor) tab 10 mg  10 mg Oral Nightly    escitalopram (Lexapro) tab 20 mg  20 mg Oral Nightly    folic acid (Folvite) tab 1 mg  1 mg Oral Daily    ferrous sulfate DR tab 325 mg  325 mg Oral Daily with breakfast    methenamine (Hiprex) tab 1 g  1 g Oral BID    pantoprazole (Protonix) DR tab 20 mg  20 mg Oral QAM AC    OLANZapine (ZyPREXA) tab 5 mg  5 mg Oral Nightly    And    OLANZapine (ZyPREXA) tab 2.5 mg  2.5 mg Oral Nightly PRN    OLANZapine (ZyPREXA) tab 2.5 mg  2.5 mg Oral BID PRN    Or    OLANZapine (Zyprexa) 2.5238 mg in sterile water for injection (PF) IM injection  2.5238 mg Intramuscular BID PRN         Questions/concerns were discussed with patient and/or family by  bedside.          Brigido iKng MD  1/10/2024  9:29 AM

## 2024-01-10 NOTE — PROGRESS NOTES
[facility]     Hospitalist Progress Note     Fe Sal Patient Status:  Observation    1935 MRN [id number]   [facility] 2NE-A Attending Oseas Lovett,    Hosp Day # 0 PCP Ankit Singh MD     Chief Complaint: Delirium    Subjective:     Patient resting in chair.  Daughter at bedside.  Patient continues to have delirium    Objective:    Review of Systems:   A comprehensive review of systems was completed; pertinent positive and negatives stated in subjective.    Vital signs:  Temp:  [96 °F (35.6 °C)-98.9 °F (37.2 °C)] 98.2 °F (36.8 °C)  Pulse:  [] 77  Resp:  [16-25] 22  BP: (139-193)/() 181/99  SpO2:  [88 %-96 %] 96 %    Physical Exam:    General: No acute distress, confused  Respiratory: no wheezes, no rhonchi  Cardiovascular: S1, S2, regular rate and rhythm  Abdomen: Soft, Non-tender, non-distended, positive bowel sounds  Neuro: No new focal deficits.   Extremities: Trace lower extremity edema      Diagnostic Data:    Labs:  Recent Labs   Lab 24  0657 01/10/24  0708   WBC 6.5 9.6   HGB 11.9* 13.6   MCV 88.7 86.9   .0 274.0       Recent Labs   Lab 24  0657 01/10/24  0708   GLU 90 96   BUN 34* 19   CREATSERUM 1.00 0.77   CA 9.5 10.2*   ALB 3.4  --    * 135*   K 4.2 3.5   CL 98 100   CO2 26.0 27.0   ALKPHO 84  --    AST 17  --    ALT 18  --    BILT 0.6  --    TP 6.8  --        Estimated Creatinine Clearance: 45.3 mL/min (based on SCr of 0.77 mg/dL).    Recent Labs   Lab 24  0657   TROPHS 19       No results for input(s): \"PTP\", \"INR\" in the last 168 hours.               Microbiology    No results found for this visit on 24.      Imaging: Reviewed in Epic.    Medications:    furosemide  20 mg Intravenous Once    potassium chloride  40 mEq Oral Once    metoprolol tartrate  25 mg Oral TID Beta Blocker/Cardiac    metoprolol tartrate  12.5 mg Oral Once    losartan  100 mg Oral Daily    heparin  5,000 Units Subcutaneous 2 times per day     atorvastatin  10 mg Oral Nightly    escitalopram  20 mg Oral Nightly    folic acid  1 mg Oral Daily    ferrous sulfate  325 mg Oral Daily with breakfast    methenamine  1 g Oral BID    pantoprazole  20 mg Oral QAM AC    OLANZapine  5 mg Oral Nightly       Assessment & Plan:      #Acute on chronic HFpEF  -IV Lasix  -Cardiology following    #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  -Ongoing agitation despite increasing dose of Seroquel  -Psych consulted    #Hyponatremia, improved  -DDAVP discontinued permanently by nephrology  -Nephrology following    #Hypertension  -Losartan, metoprolol    #History of breast cancer    #History of stroke    #History of PUD  -S/p epi injection, cautery, endoclips on 10/9/2023  -PPI    #History of hip fracture s/p repair      Oseas Lovett DO    Supplementary Documentation:     Quality:  DVT Mechanical Prophylaxis:   SCDs,    DVT Pharmacologic Prophylaxis   Medication    heparin (Porcine) 5000 UNIT/ML injection 5,000 Units                Code Status: DNAR/Selective Treatment  Sal: External urinary catheter in place  Sal Duration (in days):   Central line:    LB: 1/11/2024    Discharge is dependent on: Clinical improvement  At this point Ms. Sal is expected to be discharge to: TBD    The 21st Century Cures Act makes medical notes like these available to patients in the interest of transparency. Please be advised this is a medical document. Medical documents are intended to carry relevant information, facts as evident, and the clinical opinion of the practitioner. The medical note is intended as peer to peer communication and may appear blunt or direct. It is written in medical language and may contain abbreviations or verbiage that are unfamiliar.

## 2024-01-10 NOTE — PROGRESS NOTES
Progress Note  Fe Sal Patient Status:  Observation    1935 MRN [id number]   Location [facility] 2NE-A Attending Oseas Lovett, DO   Hosp Day # 0 PCP Ankit Singh MD     Subjective:  Patient confused overnight, , trying to get out of bed, requiring sitter at the bedside.  Sitting up in a chair this morning, confused, denies any chest pain or SOB, reports mild forehead pain d/t fall.      Objective:  BP (!) 181/99 (BP Location: Left arm)   Pulse 77   Temp 98.2 °F (36.8 °C) (Axillary)   Resp 22   Wt 136 lb 11 oz (62 kg)   SpO2 96%   BMI 23.46 kg/m²     Telemetry: SR, HR 70s, PVCs       Intake/Output:    Intake/Output Summary (Last 24 hours) at 1/10/2024 0849  Last data filed at 1/10/2024 0824  Gross per 24 hour   Intake 150 ml   Output 4700 ml   Net -4550 ml       Last 3 Weights   24 0627 136 lb 11 oz (62 kg)   10/10/23 0000 136 lb 3.9 oz (61.8 kg)   10/05/23 2243 127 lb 8 oz (57.8 kg)   10/05/23 1622 130 lb (59 kg)   23 0221 126 lb (57.2 kg)       Labs:  Recent Labs   Lab 24  0657 01/10/24  0708   GLU 90 96   BUN 34* 19   CREATSERUM 1.00 0.77   EGFRCR 55* 75   CA 9.5 10.2*   * 135*   K 4.2 3.5   CL 98 100   CO2 26.0 27.0     Recent Labs   Lab 24  0657 01/10/24  0708   RBC 3.97 4.57   HGB 11.9* 13.6   HCT 35.2 39.7   MCV 88.7 86.9   MCH 30.0 29.8   MCHC 33.8 34.3   RDW 14.1 13.9   NEPRELIM 3.57  --    WBC 6.5 9.6   .0 274.0         Recent Labs   Lab 24  0657   TROPHS 19       Review of Systems   Cardiovascular:  Positive for leg swelling.   Respiratory: Negative.     Skin:  Positive for color change.        Bruise to right forehead        Physical Exam:    Gen: confused,oriented to name, NAD  Heent: pupils equal, reactive. Mucous membranes moist.   Neck: no jvd  Cardiac: regular rate and rhythm, normal S1,S2, soft systolic murmur, no gallop or rub   Lungs: CTA  Abd: soft, NT/ND +bs  Ext: trace of lower extremities edema  Skin: Warm, dry,  bruising to right forehead   Neuro: No focal deficits, confused       Medications:     losartan  100 mg Oral Daily    metoprolol tartrate  12.5 mg Oral TID Beta Blocker/Cardiac    furosemide  40 mg Intravenous Daily    heparin  5,000 Units Subcutaneous 2 times per day    atorvastatin  10 mg Oral Nightly    escitalopram  20 mg Oral Nightly    folic acid  1 mg Oral Daily    ferrous sulfate  325 mg Oral Daily with breakfast    methenamine  1 g Oral BID    pantoprazole  20 mg Oral QAM AC    OLANZapine  5 mg Oral Nightly       Assessment:  Witnessed fall at the  nursing facility with head contusion   CT head showed no acute intracranial abnormalities, right frontal sclap hematoma   EKG showed SR with 1 st degree AV block, no acute ST changes   CXR diffuse interstitial pulmonary edema     Acute diastolic heart failure  proBNP 2,609   Echo 10/6/23 LVEF 60-65%, no rwma, mild-mod aortic regurgitation, mild MR, mod TR, PASP 50 mm Hg  Diuresis with IV Lasix, net fluid off 4.5L, kidney function stable, crt 0.77 today  Hyponatremia,  on admit  Was on DDAVP PTA d/t nocturia/frequency- now dc per nephrology    Dementia with sundowning   Hx of CVA  Hx of breast CA  Hx of PUD s/p epi injection, cautery, endoclips 10/9/23, on PPI  Recent hip fx s/p repair   HTN, blood pressure elevated 180/90, agitation probably contributing    On Losartan 100 mg, metoprolol tartrate 12.5 mg tid-will up titrate.       Plan:  Still volume positive- continue IV diuresis with lasix, kidney function stable today, will plan to transition to PO tomorrow.    Nephrology following plan to dc DDAVP permanently.   Continue metoprolol, losartan, statin.    Confusion with delirium and sundowning, high fall risk, sitter at the bedside-psych consulted.          Plan of care discussed with patient, RN.    Tiffanie Anglin, APRN  1/10/2024  8:49 AM  [phone]        Patient seen and examined independently.  Note reviewed and labs reviewed. Agree with above  assessment and plan.  88-year-old female who presented with a witnessed fall at her nursing facility and was noted to have acute diastolic heart failure.  She has been diuresed with IV Lasix and is net -4.5 L.  Renal function remained stable.  DDAVP was discontinued given hyponatremia.  At this time, patient is clinically approaching euvolemia.  No further IV loop diuretic after today.  Recheck metabolic panel in morning.  Will continue to follow.        Destin Lovett DO  Cardiologist  [facility]  1/10/2024 12:32 PM      Note to the patient: The 21st Century Cures Act makes medical notes like these available to patients in the interest of transparency. However, be advised that this is a medical document. It is intended as peer to peer communication. It is written in medical language and may contain abbreviations or verbiage that are unfamiliar. It may appear blunt or direct. Medical documents are intended to carry relevant information, facts as evident, and clinical opinion of the practitioner.     Disclaimer: Components of this note were documented using voice recognition system and are subject to errors not corrected at proofreading. Contact the author of this note for any clarifications.

## 2024-01-10 NOTE — PROGRESS NOTES
[facility]  Report of Psychiatric Progress Note    Fe Sal Patient Status:  Observation    1935 MRN [id number]   [facility] 2NE-A Attending Tom Choudhury MD   Hosp Day # 0 PCP Ankit Singh MD     Date of Admission: 2024  Date of Service: 1/10/2024  Reason for Consultation: delirium with history of major neurocognitive disorder    Impression: 88-year-old female presents with noted delirium with noted behavioral disturbances and a history of dementia.    Primary Psychiatric Diagnosis:  Acute encephalopathy  -Etiologies to include: underlying dementia,  electrolyte imbalance (Na, K, Mg, Phos), CHF exacerbation      Major neurocognitive disorder with behavioral disturbance     Depression, unspecified     Anxiety, unspecified    Personality Traits:  Deferred     Pertinent Medical Diagnoses:  Hyponatremia, CHF, and PUD    Recommendations:  Discontinue Seroquel.  Increase Zyprexa to 10 mg nightly with additional 5 mg PRN available if patient is not asleep after 45 minutes-1 hour.   Increase Zyprexa to 5 mg PO/IV twice daily as needed for anxiety/agitation.   Continue Lexapro 20 mg for depression and anxiety  Delirium protocol:  Continue non-pharm delirium care- orientation, cognitive stimulation, family visits, lights on and blinds open in daytime and quiet and dark at night and PT when appropriate.  Avoid benzos and opiates if possible since they can increase symptoms of delirium.  EKG completed on 2024.  ms.  UA ordered due to to patient's history of frequent urinary tract infections.  Please document how many hours patient sleeps and quality.   If continue to unable to get patient's anxiety, insomnia, restlessness, irritability, and impulsive behaviors under control then it is possible that patient will be in need of inpatient psychiatric treatment to establish appropriate medications regimen is achieved.     MARISSA Ann NP-C    History of Present Illness:  Patient  presented to [facility] on 1/9/2024 after she was brought in by EMS from [facility] from home provide cardiomyopathy on the floor.  Staff witnessed the fall.  While in the ER patient was noted to have audible wheezing and an elevated proBNP.  She was also noted to be mildly hyponatremic at 131.  Chest x-ray noted that imaging was consistent with heart failure.  Patient was given IV Lasix and ER MD spoke to hospitalist and cardiology and patient was admitted for further treatment.     Patient was previously seen by our psychiatric services when she was hospitalized in October 2023 for similar presentation.  Patient has a reported history of dementia.  She has a history of developing delirium especially secondary to UTIs.  He reports that since she was discharged from the hospital in October 2023 that she has now become a resident of [facility].  Is reported that [facility] notes that patient is not sleeping at night and is impulsive.  She is hard to redirect.  Patient impulsive behavior causes her to be at risk for falls.  He notes that patient has a previous history of having trouble sleeping at night.  Reports that this was an issue when she was living at home as well but when she would receive trazodone that this was efficient and not.  Does note that she has a history of rumination.  Son does report that he feels like patient is depressed.  Son indicates that [facility] felt she might be hallucinating but was unsure on the details. Currently is prescribed Lexapro.  Reports recently prescribed Restoril, Xanax, and Seroquel with no noted effect. Son does not report that patient was benefiting from those either. She is currently prescribed Lexapro.    Patient was noted to be sleeping when APRN first entered room. Patient had received PRN medications due to being restless and impulsive. Had just received PRN IM dose of Haldol 5 mg around 1:15 PM. She was holding her son's hand that was at  bedside. Son stepped in the hallway to speak with APRN. Patient was sleeping still at first but then increased restlessness was noted when she realized that son was no longer present. APRN and son re-entered room. Patient denies feeling depressed or anxious. Denies any auditory or visual hallucinations. She endorses she is in the hospital due to a fall but reports year is 2023. She indicates that she wants her son to hold her hand so that she \"feels comforted\".     Discussion with son that when patient was hospitalized in October 2023 that she was started on Olanzapine (discharged with it being PRN) that seemed to aid in clearing up delirium and also aided in sleep. Discussed retrial of this medications to see if this is effective here as well. Discussed that medication could not result in results that we are attempting to achieve such as decreased agitation, restlessness, and sleep. It is possible that medication can also result in excessive sedation. Son is understanding and in agreement to trial of Zyprexa. PRN dosing will be available as well. Spoke with Dr. Scott, who is in agreement in ordering UA.    Interval History  1/10/2024 - Patient was reported to only get 1 hour of sleep last night. She only received the 5 mg scheduled dosing and does not appear she got the second 2.5 mg dose if she was not asleep in an hour. She was restless throughout the night. Sitter was at bedside due to impulsive behaviors and patient being a high fall risk. Patient has been noted to be restless and agitated throughout the day time which is a new pattern. She typically does sundown in the evening and [facility] has had issues with patient sleeping at night. Her impulsive, agitated, restless behaviors has resulted in patient not following redirection that then results in frequent falls. Discussed some medications previously tried at [facility]. Daughter indicated that patient has been on Trazodone, Restoril, Remeron, Xanax, and  Seroquel. She is unsure if they have prescribed patient Risperidone but reports she did not write it down. Discussed that per chart review it appears that Zyprexa did previously work for patient. Daughter indicates she does not recall this. Discussed that it is possible that patient requires higher dosing. Daughter is in agreement to increase dosing of Zyprexa prior to trial of Risperidone. She was educated on black box warning of Zyprexa and also other side effects. She is in agreement with adjustment. She also inquired about inpatient psychiatric hospitalizations. Patient would have to be medically cleared prior to inpatient psychiatric hospitalization but it is a possibility that since she is the patient's POA that if medication regimen is unable to be established while she is hospitalized at [facility] that it would be beneficial and appropriate for patient to go to inpatient psychiatric hospital for continued medication adjustments. Daughter verbalized understanding. Will also give PRN dosing of IM Zyprexa at this time as well since patient is presenting as anxious, agitated, restless, and impulsive at this time.     Past Psychiatric History:  History of anxiety and depression. No history of inpatient or outpatient psychiatric hospitalizations.      Substance Use History:  Denies     Psych Family History:  Denies     Social and Developmental History:   Patient had previously been living by herself in [place] until December 2022.  She was found by family members after she had been in a bathtub for over 24 hours.  Once hospitalized it was determined that she had a small stroke. Patient then moved in with her daughter. She is now residing at [facility]. She is . 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    Past Medical History:   Diagnosis Date    Anxiety     Arthritis     knees and shoulders    Breast CA ([facility]) 2001    right breast cancer with mastectomy.    Cancer ([facility]) 2001    right breast  ca    Cystitis, interstitial     history of----    Flatulence/gas pain/belching     Frequent UTI     GI bleed     H/O mammogram 2011    left breast    H/O mammogram 2015    benign    H/O pregnancy 1967,1969        HIGH BLOOD PRESSURE     HIGH CHOLESTEROL     History of endoscopy 2013    Dr. Mesa    History of stomach ulcers     Loss of appetite     Nausea     Osteoarthritis     Other and unspecified hyperlipidemia     Other and unspecified personal history of malignant neoplasm     dr. luann mccall, ill  breast cancer right    Pap smear for cervical cancer screening 2003    [facility]    Renal cyst, right 07/15/2015    Refer to Dr. Shaffer/urology.    Rheumatic fever     Stroke (HCC)     Unspecified essential hypertension     Visual impairment      Past Surgical History:   Procedure Laterality Date    COLONOSCOPY   Dr. Perez, repeat in five years, family history of colon cancer, Diverticulosis    COLONOSCOPY  2013    Dr. Maciej Mesa    HYSTERECTOMY  1985    partial hystero.    MASTECTOMY RIGHT Right     right breast cancer with mastectomy.    NEEDLE BIOPSY RIGHT Right     DCIS    SPECIAL SERVICE OR REPORT      mastectomy right    UNSPECIFIED DIAGNOSTIC PROCE  2005    cystoscopy     Family History   Problem Relation Age of Onset    Heart Disorder Father         chf    Cancer Mother         colon    Stroke Mother     Colon Cancer Mother     Breast Cancer Self 65    Hypertension Brother       reports that she has never smoked. She has never been exposed to tobacco smoke. She has never used smokeless tobacco. She reports that she does not drink alcohol and does not use drugs.    Allergies:  Allergies   Allergen Reactions    Bactrim [Sulfamethoxazole W/Trimethoprim] NAUSEA ONLY    Nitrofurantoin NAUSEA ONLY     Medications:  Current Facility-Administered Medications:     labetalol (Trandate) 5 mg/mL injection 10 mg, 10 mg, Intravenous, Q4H PRN     furosemide (Lasix) 10 mg/mL injection 20 mg, 20 mg, Intravenous, Once    potassium chloride (K-Dur) tab 40 mEq, 40 mEq, Oral, Once    metoprolol tartrate (Lopressor) tab 25 mg, 25 mg, Oral, TID Beta Blocker/Cardiac    metoprolol tartrate (Lopressor) partial tab 12.5 mg, 12.5 mg, Oral, Once    losartan (Cozaar) tab 100 mg, 100 mg, Oral, Daily    acetaminophen (Tylenol Extra Strength) tab 500 mg, 500 mg, Oral, Q4H PRN    melatonin tab 3 mg, 3 mg, Oral, Nightly PRN    polyethylene glycol (PEG 3350) (Miralax) 17 g oral packet 17 g, 17 g, Oral, Daily PRN    sennosides (Senokot) tab 17.2 mg, 17.2 mg, Oral, Nightly PRN    bisacodyl (Dulcolax) 10 MG rectal suppository 10 mg, 10 mg, Rectal, Daily PRN    ondansetron (Zofran) 4 MG/2ML injection 8 mg, 8 mg, Intravenous, Q6H PRN    benzonatate (Tessalon) cap 200 mg, 200 mg, Oral, TID PRN    glycerin-hypromellose- (Artifical Tears) 0.2-0.2-1 % ophthalmic solution 1 drop, 1 drop, Both Eyes, QID PRN    sodium chloride (Saline Mist) 0.65 % nasal solution 1 spray, 1 spray, Each Nare, Q3H PRN    heparin (Porcine) 5000 UNIT/ML injection 5,000 Units, 5,000 Units, Subcutaneous, 2 times per day    atorvastatin (Lipitor) tab 10 mg, 10 mg, Oral, Nightly    escitalopram (Lexapro) tab 20 mg, 20 mg, Oral, Nightly    folic acid (Folvite) tab 1 mg, 1 mg, Oral, Daily    ferrous sulfate DR tab 325 mg, 325 mg, Oral, Daily with breakfast    methenamine (Hiprex) tab 1 g, 1 g, Oral, BID    pantoprazole (Protonix) DR tab 20 mg, 20 mg, Oral, QAM AC    OLANZapine (ZyPREXA) tab 5 mg, 5 mg, Oral, Nightly **AND** OLANZapine (ZyPREXA) tab 2.5 mg, 2.5 mg, Oral, Nightly PRN    OLANZapine (ZyPREXA) tab 2.5 mg, 2.5 mg, Oral, BID PRN **OR** OLANZapine (Zyprexa) 2.5238 mg in sterile water for injection (PF) IM injection, 2.5238 mg, Intramuscular, BID PRN    Review of Systems   Unable to perform ROS: Dementia     Psychiatry Review Systems: Possibly having hallucinations at [facility] per son. Not sleeping at  night.     Mental Status Exam:   Risk Assessment  Suicidal ideation: no suicidal ideation  Homicidal ideation: None noted    Appearance: disheveled  Behavior: calm and cooperative while son was sitting with her and holding her hand but noted to become restless when he left to speak with APRAISSATOU  Attitude: cooperative  Gait: Not observed    Speech: soft and quiet    Mood: anxious  Affect: Congruent    Thought process: logical  Thought content: ruminations  Perceptions: no hallucinations  Associations: Unable to assess    Orientation: Alert and oriented x 3  Attention and Concentration:   fair  Memory:  intact recent  Language: Unable to assess  Fund of Knowledge: Unable to assess    Insight: Limited   Judgment: Limited     Objective    Vitals:    01/10/24 0824   BP:    Pulse: 77   Resp:    Temp:      Laboratory Data:  Lab Results   Component Value Date    WBC 9.6 01/10/2024    HGB 13.6 01/10/2024    HCT 39.7 01/10/2024    .0 01/10/2024    CREATSERUM 0.77 01/10/2024    BUN 19 01/10/2024     01/10/2024    K 3.5 01/10/2024     01/10/2024    CO2 27.0 01/10/2024    GLU 96 01/10/2024    CA 10.2 01/10/2024    MG 2.2 01/10/2024    PHOS 2.6 01/10/2024       STUDIES:    Hyaley MARTINC

## 2024-01-10 NOTE — PLAN OF CARE
Pt is received from ED around 1300. Pt got oriented to the unit and room. A&O x 1, oriented to self, disoriented to the place, situation and time, this is her baseline. Pt denies a chest pain and dizziness. Lungs are diminished, RA. NSR on tele. Last BM 1/24, bowel sounds active x 4, abdomen is soft and non-tender. Skin check is done with Marta PCT, skin is intact. Consulted psych and renal. Pt gets agitated and try to get out of the bed, bed alarm is on, family is on bedside. All needs are met. Pt and family is updated with plan of care.       Problem: Patient/Family Goals  Goal: Patient/Family Long Term Goal  Description: Patient's Long Term Goal: Stay out of the hospital    Interventions:  - Follow up with PCP  -Take medication as prescribed  -Healthy lifestyle  - See additional Care Plan goals for specific interventions  Outcome: Progressing  Goal: Patient/Family Short Term Goal  Description: Patient's Short Term Goal: Feel better    Interventions:   - Hourly rounding  -Pain assessment  -[place] patient  - See additional Care Plan goals for specific interventions  Outcome: Progressing

## 2024-01-10 NOTE — PHYSICAL THERAPY NOTE
Order received for PT eval and chart reviewed. Attempted to see Pt this afternoon, however, Pt occupied with patient care for > 15 minutes. PT will continue to follow.

## 2024-01-10 NOTE — PLAN OF CARE
Pt is received around 0730. A&O x 1, oriented to self, disoriented to place, time and situation. Pt denies a chest pain, SOB and dizziness. Lungs are clear, RA. NSR on tele. Last BM 1/9/24, bowel sounds active x 4, abdomen is soft and non-tender, incontinent of B&B. Pt has a hematoma on her right side of head and bruising on her right eye from fall. All needs are met, chair alarm is on, sitter and family is on a bedside. Pt and family are updated with plan of care.      Problem: Patient/Family Goals  Goal: Patient/Family Long Term Goal  Description: Patient's Long Term Goal: Stay out of the hospital    Interventions:  - Follow up with PCP  -Take medication as prescribed  -Healthy lifestyle  - See additional Care Plan goals for specific interventions  Outcome: Progressing  Goal: Patient/Family Short Term Goal  Description: Patient's Short Term Goal: Feel better    Interventions:   - Hourly rounding  -Pain assessment  -[place] patient  - See additional Care Plan goals for specific interventions  Outcome: Progressing

## 2024-01-11 PROBLEM — F03.90 DELIRIUM WITH DEMENTIA (HCC): Status: ACTIVE | Noted: 2024-01-11

## 2024-01-11 PROBLEM — R41.0 DELIRIUM WITH DEMENTIA: Status: ACTIVE | Noted: 2024-01-11

## 2024-01-11 PROBLEM — F05 DELIRIUM WITH DEMENTIA (HCC): Status: ACTIVE | Noted: 2024-01-11

## 2024-01-11 LAB
ANION GAP SERPL CALC-SCNC: 6 MMOL/L (ref 0–18)
BUN BLD-MCNC: 34 MG/DL (ref 9–23)
CALCIUM BLD-MCNC: 10.8 MG/DL (ref 8.5–10.1)
CHLORIDE SERPL-SCNC: 103 MMOL/L (ref 98–112)
CO2 SERPL-SCNC: 25 MMOL/L (ref 21–32)
CREAT BLD-MCNC: 1.12 MG/DL
EGFRCR SERPLBLD CKD-EPI 2021: 48 ML/MIN/1.73M2 (ref 60–?)
ERYTHROCYTE [DISTWIDTH] IN BLOOD BY AUTOMATED COUNT: 14.2 %
GLUCOSE BLD-MCNC: 95 MG/DL (ref 70–99)
HCT VFR BLD AUTO: 42.9 %
HGB BLD-MCNC: 14.4 G/DL
MCH RBC QN AUTO: 29.6 PG (ref 26–34)
MCHC RBC AUTO-ENTMCNC: 33.6 G/DL (ref 31–37)
MCV RBC AUTO: 88.1 FL
OSMOLALITY SERPL CALC.SUM OF ELEC: 285 MOSM/KG (ref 275–295)
PLATELET # BLD AUTO: 273 10(3)UL (ref 150–450)
POTASSIUM SERPL-SCNC: 4.5 MMOL/L (ref 3.5–5.1)
RBC # BLD AUTO: 4.87 X10(6)UL
SODIUM SERPL-SCNC: 134 MMOL/L (ref 136–145)
WBC # BLD AUTO: 9.1 X10(3) UL (ref 4–11)

## 2024-01-11 PROCEDURE — 99232 SBSQ HOSP IP/OBS MODERATE 35: CPT

## 2024-01-11 PROCEDURE — 99233 SBSQ HOSP IP/OBS HIGH 50: CPT | Performed by: INTERNAL MEDICINE

## 2024-01-11 PROCEDURE — 99232 SBSQ HOSP IP/OBS MODERATE 35: CPT | Performed by: STUDENT IN AN ORGANIZED HEALTH CARE EDUCATION/TRAINING PROGRAM

## 2024-01-11 RX ORDER — RISPERIDONE 1 MG/1
1 TABLET ORAL NIGHTLY PRN
Status: DISCONTINUED | OUTPATIENT
Start: 2024-01-11 | End: 2024-01-12

## 2024-01-11 RX ORDER — AMLODIPINE BESYLATE 5 MG/1
5 TABLET ORAL DAILY
Status: DISCONTINUED | OUTPATIENT
Start: 2024-01-11 | End: 2024-01-12

## 2024-01-11 RX ORDER — RISPERIDONE 1 MG/1
1 TABLET ORAL NIGHTLY
Status: DISCONTINUED | OUTPATIENT
Start: 2024-01-11 | End: 2024-01-12

## 2024-01-11 NOTE — PLAN OF CARE
Patient alert and oriented x2-3. Pleasant during conversation. Due Zyprexia 10mg given. Was dosing on and off. Quiet.  Resp. regular  and unlabored. On RA. Sinus arrhythmia w/ first degree AVB in tele. Bruising to R forehead,  law. periorbital areas as well as law arms. Sitter at BS.   2220 Nurse was called from another pt's room, since pt. started to wake. Additional Zyprexia 5mg given. Pt. still dosing on and off.  1/11 0200 Sleeping.  0400 Awake and restless. Sitter at BS. Repositioned several times. Tylenol for L shoulder discomfort.  1/11 0615 Asleep. Sitter at bedside.      Problem: Patient/Family Goals  Goal: Patient/Family Long Term Goal  Description: Patient's Long Term Goal: Stay out of the hospital    Interventions:  - Follow up with PCP  -Take medication as prescribed  -Healthy lifestyle  - See additional Care Plan goals for specific interventions  Outcome: Progressing  Goal: Patient/Family Short Term Goal  Description: Patient's Short Term Goal: Feel better    Interventions:   - Hourly rounding  -Pain assessment  -[place] patient  - See additional Care Plan goals for specific interventions  Outcome: Progressing     Problem: SAFETY ADULT - FALL  Goal: Free from fall injury  Description: INTERVENTIONS:  - Assess pt frequently for physical needs  - Identify cognitive and physical deficits and behaviors that affect risk of falls.  - [place] fall precautions as indicated by assessment.  - Educate pt/family on patient safety including physical limitations  - Instruct pt to call for assistance with activity based on assessment  - Modify environment to reduce risk of injury  - Provide assistive devices as appropriate  - Consider OT/PT consult to assist with strengthening/mobility  - Encourage toileting schedule  Outcome: Progressing

## 2024-01-11 NOTE — PROGRESS NOTES
[facility]  Report of Psychiatric Progress Note    Fe Sal Patient Status:  Observation    1935 MRN [id number]   Location [facility] 2NE-A Attending Tom Choudhury MD   Hosp Day # 0 PCP Ankit Singh MD     Date of Admission: 2024  Date of Service: 2024  Reason for Consultation: delirium with history of major neurocognitive disorder    Impression: 88-year-old female presents with noted delirium with noted behavioral disturbances and a history of dementia.    Primary Psychiatric Diagnosis:  Acute encephalopathy  -Etiologies to include: underlying dementia,  electrolyte imbalance (Na, K, Mg, Phos), CHF exacerbation      Major neurocognitive disorder with behavioral disturbance     Depression, unspecified     Anxiety, unspecified    Personality Traits:  Deferred     Pertinent Medical Diagnoses:  Hyponatremia, CHF, and PUD    Recommendations:  Discontinue Seroquel.  Discontinue scheduled Zyprexa. Does not seem to be efficient. (Received 15 mg last night and still was restless and barely slept).  Start on Risperidone 1 mg nightly with additional 0.5 mg available if patient is not asleep/drowsy within 1 hour. It is possible that patient might need some scheduled dosing during day as well.    Continue Zyprexa to 5 mg PO/IV twice daily as needed for anxiety/agitation.   Continue Lexapro 20 mg for depression and anxiety  Delirium protocol:  Continue non-pharm delirium care- orientation, cognitive stimulation, family visits, lights on and blinds open in daytime and quiet and dark at night and PT when appropriate.  Avoid benzos and opiates if possible since they can increase symptoms of delirium.  EKG completed on 2024.  ms.  UA ordered due to to patient's history of frequent urinary tract infections.  Please document how many hours patient sleeps and quality.   If continue to unable to get patient's anxiety, insomnia, restlessness, irritability, and impulsive behaviors under control  then it is possible that patient will be in need of inpatient psychiatric treatment to establish appropriate medications regimen is achieved.     MARISSA Ann NP-C    History of Present Illness:  Patient presented to [facility] on 1/9/2024 after she was brought in by EMS from [facility] from home provide cardiomyopathy on the floor.  Staff witnessed the fall.  While in the ER patient was noted to have audible wheezing and an elevated proBNP.  She was also noted to be mildly hyponatremic at 131.  Chest x-ray noted that imaging was consistent with heart failure.  Patient was given IV Lasix and ER MD spoke to hospitalist and cardiology and patient was admitted for further treatment.     Patient was previously seen by our psychiatric services when she was hospitalized in October 2023 for similar presentation.  Patient has a reported history of dementia.  She has a history of developing delirium especially secondary to UTIs.  He reports that since she was discharged from the hospital in October 2023 that she has now become a resident of [facility].  Is reported that [facility] notes that patient is not sleeping at night and is impulsive.  She is hard to redirect.  Patient impulsive behavior causes her to be at risk for falls.  He notes that patient has a previous history of having trouble sleeping at night.  Reports that this was an issue when she was living at home as well but when she would receive trazodone that this was efficient and not.  Does note that she has a history of rumination.  Son does report that he feels like patient is depressed.  Son indicates that [facility] felt she might be hallucinating but was unsure on the details. Currently is prescribed Lexapro.  Reports recently prescribed Restoril, Xanax, and Seroquel with no noted effect. Son does not report that patient was benefiting from those either. She is currently prescribed Lexapro.    Patient was noted to be sleeping  when APRN first entered room. Patient had received PRN medications due to being restless and impulsive. Had just received PRN IM dose of Haldol 5 mg around 1:15 PM. She was holding her son's hand that was at bedside. Son stepped in the hallway to speak with APRN. Patient was sleeping still at first but then increased restlessness was noted when she realized that son was no longer present. APRN and son re-entered room. Patient denies feeling depressed or anxious. Denies any auditory or visual hallucinations. She endorses she is in the hospital due to a fall but reports year is 2023. She indicates that she wants her son to hold her hand so that she \"feels comforted\".     Discussion with son that when patient was hospitalized in October 2023 that she was started on Olanzapine (discharged with it being PRN) that seemed to aid in clearing up delirium and also aided in sleep. Discussed retrial of this medications to see if this is effective here as well. Discussed that medication could not result in results that we are attempting to achieve such as decreased agitation, restlessness, and sleep. It is possible that medication can also result in excessive sedation. Son is understanding and in agreement to trial of Zyprexa. PRN dosing will be available as well. Spoke with Dr. Scott, who is in agreement in ordering UA.    Interval History  1/10/2024 - Patient was reported to only get 1 hour of sleep last night. She only received the 5 mg scheduled dosing and does not appear she got the second 2.5 mg dose if she was not asleep in an hour. She was restless throughout the night. Sitter was at bedside due to impulsive behaviors and patient being a high fall risk. Patient has been noted to be restless and agitated throughout the day time which is a new pattern. She typically does sundown in the evening and [facility] has had issues with patient sleeping at night. Her impulsive, agitated, restless behaviors has resulted in patient  not following redirection that then results in frequent falls. Discussed some medications previously tried at [facility]. Daughter indicated that patient has been on Trazodone, Restoril, Remeron, Xanax, and Seroquel. She is unsure if they have prescribed patient Risperidone but reports she did not write it down. Discussed that per chart review it appears that Zyprexa did previously work for patient. Daughter indicates she does not recall this. Discussed that it is possible that patient requires higher dosing. Daughter is in agreement to increase dosing of Zyprexa prior to trial of Risperidone. She was educated on black box warning of Zyprexa and also other side effects. She is in agreement with adjustment. She also inquired about inpatient psychiatric hospitalizations. Patient would have to be medically cleared prior to inpatient psychiatric hospitalization but it is a possibility that since she is the patient's POA that if medication regimen is unable to be established while she is hospitalized at [facility] that it would be beneficial and appropriate for patient to go to inpatient psychiatric hospital for continued medication adjustments. Daughter verbalized understanding. Will also give PRN dosing of IM Zyprexa at this time as well since patient is presenting as anxious, agitated, restless, and impulsive at this time.     1/11/2024 - Received PRN dosing of IM Zyprexa yesterday afternoon and per RN, patient did calm a little but still presented as agitated and restless. She received scheduled dose of Zyprexa 10 mg at 20:54 and was noted to be dozing on and off but needed additional dose of Zyprexa 5 mg at 22:20. Per chart review, patient was noted to be sleeping around 2 AM till about 4 AM. Woke up again restless and agitated around 4 AM. Fell asleep briefly again around 6 AM. Patient has continued to be restless and impulsive this morning. RN gave PRN dose of PO Zyprexa at 9:25 AM. Spoke with patient's son. Discussed  switching patient over to Risperidone from Olanzapine to see if this would allow patient to achieve sleep. Also discussed that patient might require dosing during the day as well. Continued discussion on possible need of inpatient psychiatric hospitalization for stabilization. Patient is unfortunately out of network at [facility]. Discussed that she might be in network with [facility]. Discussed his concern that [facility] will not let patient return. Will continue to try to adjusting dosing based on response.     Past Psychiatric History:  History of anxiety and depression. No history of inpatient or outpatient psychiatric hospitalizations.      Substance Use History:  Denies     Psych Family History:  Denies     Social and Developmental History:   Patient had previously been living by herself in [place] until 2022.  She was found by family members after she had been in a bathtub for over 24 hours.  Once hospitalized it was determined that she had a small stroke. Patient then moved in with her daughter. She is now residing at [facility]. She is . 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    Past Medical History:   Diagnosis Date    Anxiety     Arthritis     knees and shoulders    Breast CA ([facility])     right breast cancer with mastectomy.    Cancer ([facility]) 2001    right breast ca    Cystitis, interstitial     history of----    Flatulence/gas pain/belching     Frequent UTI     GI bleed     H/O mammogram 2011    left breast    H/O mammogram 2015    benign    H/O pregnancy 1967,1969        HIGH BLOOD PRESSURE     HIGH CHOLESTEROL     History of endoscopy 2013    Dr. Mesa    History of stomach ulcers     Loss of appetite     Nausea     Osteoarthritis     Other and unspecified hyperlipidemia     Other and unspecified personal history of malignant neoplasm     dr. luann mccall, [facility]  breast cancer right    Pap smear for cervical cancer screening  09/18/2003    wnl    Renal cyst, right 07/15/2015    Refer to Dr. Shaffer/urology.    Rheumatic fever     Stroke (HCC)     Unspecified essential hypertension     Visual impairment      Past Surgical History:   Procedure Laterality Date    COLONOSCOPY  2/02 4/08 2008 Dr. Perez, repeat in five years, family history of colon cancer, Diverticulosis    COLONOSCOPY  08/21/2013    Dr. Maciej Mesa    HYSTERECTOMY  1985    partial hystero.    MASTECTOMY RIGHT Right 2001    right breast cancer with mastectomy.    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    SPECIAL SERVICE OR REPORT  2001    mastectomy right    UNSPECIFIED DIAGNOSTIC PROCE  2005    cystoscopy     Family History   Problem Relation Age of Onset    Heart Disorder Father         chf    Cancer Mother         colon    Stroke Mother     Colon Cancer Mother     Breast Cancer Self 65    Hypertension Brother       reports that she has never smoked. She has never been exposed to tobacco smoke. She has never used smokeless tobacco. She reports that she does not drink alcohol and does not use drugs.    Allergies:  Allergies   Allergen Reactions    Bactrim [Sulfamethoxazole W/Trimethoprim] NAUSEA ONLY    Nitrofurantoin NAUSEA ONLY     Medications:  Current Facility-Administered Medications:     labetalol (Trandate) 5 mg/mL injection 10 mg, 10 mg, Intravenous, Q4H PRN    furosemide (Lasix) 10 mg/mL injection 20 mg, 20 mg, Intravenous, Once    metoprolol tartrate (Lopressor) tab 25 mg, 25 mg, Oral, TID Beta Blocker/Cardiac    OLANZapine (ZyPREXA) tab 10 mg, 10 mg, Oral, Nightly **AND** OLANZapine (ZyPREXA) tab 5 mg, 5 mg, Oral, Nightly PRN    OLANZapine (ZyPREXA) tab 5 mg, 5 mg, Oral, BID PRN **OR** OLANZapine (Zyprexa) 5 mg in sterile water for injection (PF) IM injection, 5 mg, Intramuscular, BID PRN    losartan (Cozaar) tab 100 mg, 100 mg, Oral, Daily    acetaminophen (Tylenol Extra Strength) tab 500 mg, 500 mg, Oral, Q4H PRN    melatonin tab 3 mg, 3 mg, Oral, Nightly  PRN    polyethylene glycol (PEG 3350) (Miralax) 17 g oral packet 17 g, 17 g, Oral, Daily PRN    sennosides (Senokot) tab 17.2 mg, 17.2 mg, Oral, Nightly PRN    bisacodyl (Dulcolax) 10 MG rectal suppository 10 mg, 10 mg, Rectal, Daily PRN    ondansetron (Zofran) 4 MG/2ML injection 8 mg, 8 mg, Intravenous, Q6H PRN    benzonatate (Tessalon) cap 200 mg, 200 mg, Oral, TID PRN    glycerin-hypromellose- (Artifical Tears) 0.2-0.2-1 % ophthalmic solution 1 drop, 1 drop, Both Eyes, QID PRN    sodium chloride (Saline Mist) 0.65 % nasal solution 1 spray, 1 spray, Each Nare, Q3H PRN    heparin (Porcine) 5000 UNIT/ML injection 5,000 Units, 5,000 Units, Subcutaneous, 2 times per day    atorvastatin (Lipitor) tab 10 mg, 10 mg, Oral, Nightly    escitalopram (Lexapro) tab 20 mg, 20 mg, Oral, Nightly    folic acid (Folvite) tab 1 mg, 1 mg, Oral, Daily    ferrous sulfate DR tab 325 mg, 325 mg, Oral, Daily with breakfast    methenamine (Hiprex) tab 1 g, 1 g, Oral, BID    pantoprazole (Protonix) DR tab 20 mg, 20 mg, Oral, QAM AC    Review of Systems   Unable to perform ROS: Dementia     Psychiatry Review Systems: Possibly having hallucinations at [facility] per son. Not sleeping at night.     Mental Status Exam:   Risk Assessment  Suicidal ideation: no suicidal ideation  Homicidal ideation: None noted    Appearance: disheveled  Behavior: calm and cooperative while son was sitting with her and holding her hand but noted to become restless when he left to speak with APRN  Attitude: cooperative  Gait: Not observed    Speech: soft and quiet    Mood: anxious  Affect: Congruent    Thought process: logical  Thought content: ruminations  Perceptions: no hallucinations  Associations: Unable to assess    Orientation: Alert and oriented x 3  Attention and Concentration:   fair  Memory:  intact recent  Language: Unable to assess  Fund of Knowledge: Unable to assess    Insight: Limited   Judgment: Limited     Objective    Vitals:    01/11/24  0435   BP: (!) 161/85   Pulse: 74   Resp: 24   Temp: 98.3 °F (36.8 °C)     Laboratory Data:         STUDIES:    Hayley MARTINC

## 2024-01-11 NOTE — PLAN OF CARE
Shift Note:  Assumed care of patient. Patient alert and oriented x2? Speaks intermittently.   Son at the bedside, aware of plan of care.   Still very restless, only slept intermittently at night 2 hours at a time.   Patient on room air, denies difficulty breathing, lung sounds diminished with bilateral crackles to bases.   Denies any cardiac symptoms, NSR with HR in 80s on tele. Denies pain at this time.   Incontinent of bowel and bladder, last BM 1/11.   Able to sit edge of bed, very poor safety awareness, high fall risk, sitter at the bedside, call light within reach, tolerating care well.     POC:  - Medication adjustment   - DC back to [facility]?

## 2024-01-11 NOTE — PROGRESS NOTES
Progress Note  Fe Sal Patient Status:  Observation    1935 MRN [id number]   Location [facility] 2NE-A Attending Oseas Lovett,    Hosp Day # 0 PCP Ankit Singh MD     Subjective:  Patient is sleeping    Objective:  BP (!) 161/85 (BP Location: Left arm)   Pulse 75   Temp 97.5 °F (36.4 °C) (Axillary)   Resp 26   Wt 136 lb 11 oz (62 kg)   SpO2 94%   BMI 23.46 kg/m²     Telemetry: SR      Intake/Output: -4600        Last 3 Weights   24 0627 136 lb 11 oz (62 kg)   10/10/23 0000 136 lb 3.9 oz (61.8 kg)   10/05/23 2243 127 lb 8 oz (57.8 kg)   10/05/23 1622 130 lb (59 kg)   23 0221 126 lb (57.2 kg)       Labs:  Recent Labs   Lab 24  0657 01/10/24  0708 24  0710   GLU 90 96 95   BUN 34* 19 34*   CREATSERUM 1.00 0.77 1.12*   EGFRCR 55* 75 48*   CA 9.5 10.2* 10.8*   ALB 3.4  --   --    * 135* 134*   K 4.2 3.5 4.5   CL 98 100 103   CO2 26.0 27.0 25.0   ALKPHO 84  --   --    AST 17  --   --    ALT 18  --   --    BILT 0.6  --   --    TP 6.8  --   --      Recent Labs   Lab 24  0657 01/10/24  0708 24  0710   RBC 3.97 4.57 4.87   HGB 11.9* 13.6 14.4   HCT 35.2 39.7 42.9   MCV 88.7 86.9 88.1   MCH 30.0 29.8 29.6   MCHC 33.8 34.3 33.6   RDW 14.1 13.9 14.2   NEPRELIM 3.57  --   --    WBC 6.5 9.6 9.1   .0 274.0 273.0         Recent Labs   Lab 24  0657   TROPHS 19     Lab Results   Component Value Date    INR 1.31 (H) 10/08/2023    INR 1.17 (H) 2023    INR 1.58 (H) 2023       Diagnostics:     Review of Systems   Unable to perform ROS: Dementia       Physical Exam:    Gen: sleeping  Heent:left orbital bruising  Cardiac: Regular rate and rhythm, normal S1,S2  Lungs: diminished  Abd: Soft, non tender, non distended  Ext: No edema  Skin: Warm, dry          Medications:     amLODIPine  5 mg Oral Daily    metoprolol tartrate  25 mg Oral TID Beta Blocker/Cardiac    OLANZapine  10 mg Oral Nightly    losartan  100 mg Oral Daily    heparin  5,000 Units  Subcutaneous 2 times per day    atorvastatin  10 mg Oral Nightly    escitalopram  20 mg Oral Nightly    folic acid  1 mg Oral Daily    ferrous sulfate  325 mg Oral Daily with breakfast    methenamine  1 g Oral BID    pantoprazole  20 mg Oral QAM AC             Assessment:  Witnessed fall at the  nursing facility with head contusion   CT head showed no acute intracranial abnormalities, right frontal sclap hematoma   EKG showed SR with 1 st degree AV block, no acute ST changes   CXR diffuse interstitial pulmonary edema     Troponin negative x 2  Acute diastolic heart failure  proBNP 2,609   Echo 10/6/23 LVEF 60-65%, no rwma, mild-mod aortic regurgitation, mild MR, mod TR, PASP 50 mm Hg  Diuresis with IV Lasix, net fluid off 4.6L, no weights. kidney function stable, crt 1.12 today  Hyponatremia,  on admit  Was on DDAVP PTA d/t nocturia/frequency- now dc'd permanently per nephrology    Dementia with sundowning   Hx of CVA  Hx of breast CA  Hx of PUD s/p epi injection, cautery, endoclips 10/9/23, on PPI  Recent hip fx s/p repair   HTN, 161/85  On Losartan 100 mg, metoprolol tartrate 12.5 mg tid-will up titrate.   Amolidpine added per renal today    Plan:  No lasix due to increased creatinine  Continue ARB, BB, staitn . Amlodipine added per renal toady    Plan of care discussed with patient, RN.    CHRISTY Campos  1/11/2024  10:11 AM    Patient seen and examined    Pt sleeping soundly; BP now normal. Suspect HTN issues related to agitation.    Lungs:clear, CV:RRR, Abd: soft, Ext: no edema    Continue titration of BP meds as need.  Volume managed by renal.    D/W pt's son at bedside    Bebeto Mehta MD FACC  L2

## 2024-01-11 NOTE — PROGRESS NOTES
[facility]     Hospitalist Progress Note     Fe Sal Patient Status:  Observation    1935 MRN [id number]   [facility] 2NE-A Attending Oseas Lovett,    Hosp Day # 0 PCP Ankit Singh MD     Chief Complaint: Delirium    Subjective:     Patient sleeping in bed.  Son at bedside.  Patient's son reports patient was unable to sleep last night but is sleeping this morning after very long time    Objective:    Review of Systems:   A comprehensive review of systems was completed; pertinent positive and negatives stated in subjective.    Vital signs:  Temp:  [97.4 °F (36.3 °C)-98.3 °F (36.8 °C)] 97.4 °F (36.3 °C)  Pulse:  [64-91] 81  Resp:  [20-26] 24  BP: (136-182)/() 136/84  SpO2:  [79 %-95 %] 94 %    Physical Exam:    General: No acute distress  Respiratory: no wheezes, no rhonchi  Cardiovascular: S1, S2, regular rate and rhythm  Abdomen: Soft, Non-tender, non-distended, positive bowel sounds  Neuro: No new focal deficits.   Extremities: no edema      Diagnostic Data:    Labs:  Recent Labs   Lab 24  0657 01/10/24  0708 24  0710   WBC 6.5 9.6 9.1   HGB 11.9* 13.6 14.4   MCV 88.7 86.9 88.1   .0 274.0 273.0       Recent Labs   Lab 24  0657 01/10/24  0708 24  0710   GLU 90 96 95   BUN 34* 19 34*   CREATSERUM 1.00 0.77 1.12*   CA 9.5 10.2* 10.8*   ALB 3.4  --   --    * 135* 134*   K 4.2 3.5 4.5   CL 98 100 103   CO2 26.0 27.0 25.0   ALKPHO 84  --   --    AST 17  --   --    ALT 18  --   --    BILT 0.6  --   --    TP 6.8  --   --        Estimated Creatinine Clearance: 31.1 mL/min (A) (based on SCr of 1.12 mg/dL (H)).    Recent Labs   Lab 24  0657   TROPHS 19       No results for input(s): \"PTP\", \"INR\" in the last 168 hours.               Microbiology    No results found for this visit on 24.      Imaging: Reviewed in Epic.    Medications:    amLODIPine  5 mg Oral Daily    risperiDONE  1 mg Oral Nightly    metoprolol tartrate  25 mg Oral TID Beta  Blocker/Cardiac    losartan  100 mg Oral Daily    heparin  5,000 Units Subcutaneous 2 times per day    atorvastatin  10 mg Oral Nightly    escitalopram  20 mg Oral Nightly    folic acid  1 mg Oral Daily    ferrous sulfate  325 mg Oral Daily with breakfast    methenamine  1 g Oral BID    pantoprazole  20 mg Oral QAM AC       Assessment & Plan:      #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  -Zyprexa and Seroquel discontinued  -Risperidone  -Psych following    #Acute on chronic HFpEF  -Echo reviewed  -IV Lasix discontinued per nephrology  -Cardiology following    #Hyponatremia, improving   -DDAVP discontinued permanently by nephrology  -Nephrology following    #Hypertension  -Losartan, metoprolol  -Amlodipine     #History of breast cancer     #History of stroke     #History of PUD  -S/p epi injection, cautery, endoclips on 10/9/2023  -PPI     #History of hip fracture s/p repair      Oseas Lovett DO    Supplementary Documentation:     Quality:  DVT Mechanical Prophylaxis:   SCDs,    DVT Pharmacologic Prophylaxis   Medication    heparin (Porcine) 5000 UNIT/ML injection 5,000 Units                Code Status: DNAR/Selective Treatment  Sal: External urinary catheter in place  Sal Duration (in days):   Central line:    LB: 1/11/2024    Discharge is dependent on: Clinical improvement  At this point Ms. Sal is expected to be discharge to: Home    The 21st Century Cures Act makes medical notes like these available to patients in the interest of transparency. Please be advised this is a medical document. Medical documents are intended to carry relevant information, facts as evident, and the clinical opinion of the practitioner. The medical note is intended as peer to peer communication and may appear blunt or direct. It is written in medical language and may contain abbreviations or verbiage that are unfamiliar.

## 2024-01-11 NOTE — PHYSICAL THERAPY NOTE
Order received for PT eval and chart reviewed. Attempted to see Pt this afternoon, however, RN reports Pt lethargic and unable to actively participate in skilled therapy eval. PT will continue to follow.

## 2024-01-11 NOTE — PLAN OF CARE
Problem: SAFETY ADULT - FALL  Goal: Free from fall injury  Description: INTERVENTIONS:  - Assess pt frequently for physical needs  - Identify cognitive and physical deficits and behaviors that affect risk of falls.  - [place] fall precautions as indicated by assessment.  - Educate pt/family on patient safety including physical limitations  - Instruct pt to call for assistance with activity based on assessment  - Modify environment to reduce risk of injury  - Provide assistive devices as appropriate  - Consider OT/PT consult to assist with strengthening/mobility  - Encourage toileting schedule  Outcome: Progressing  Received pt in bed sleeping. Support person (son) at bedside; sitter at bedside. Per family member pt was \"up all night last night. Glad she is finally resting now\". Fall precautions continued. Last /84, HR 86.   Will continue to monitor. Labs and meds as ordered. Continue sitter as ordered. Fall precautions.

## 2024-01-11 NOTE — PROGRESS NOTES
[facility]  Nephrology Progress Note    Fe Sal Attending:  Oseas Lovett DO       Assessment and Plan:    1) Hyponatremia- multifactorial; due to modest solute intake + desmopressin effect (x 1 yr for nocturia / frequency). Given pt's overall decline / non-ambulatory state + edema, will dc DDAVP permanently      2) HFpEF- echo noted; diuresed well- dc lasix given azotemia     3) Recent UGIB s/p epi injection + cautery / endoclips on PPI     4) Recent hip fx s/p repair     5) Progressive dementia with severe sundowning- zyprexa / lexapro per psych    6) HTN- no evidence of a secondary etiology; add amlodipine to usual losartan / metoprolol     D/W son at bedside.       Subjective:  Able to answer questions more accurately, no c/o     Physical Exam:   BP (!) 161/85 (BP Location: Left arm)   Pulse 75   Temp 97.5 °F (36.4 °C) (Axillary)   Resp 26   Wt 136 lb 11 oz (62 kg)   SpO2 94%   BMI 23.46 kg/m²   Temp (24hrs), Av.9 °F (36.6 °C), Min:97.5 °F (36.4 °C), Max:98.3 °F (36.8 °C)       Intake/Output Summary (Last 24 hours) at 2024 0926  Last data filed at 2024 0834  Gross per 24 hour   Intake 650 ml   Output 700 ml   Net -50 ml     Wt Readings from Last 3 Encounters:   24 136 lb 11 oz (62 kg)   10/10/23 136 lb 3.9 oz (61.8 kg)   23 126 lb (57.2 kg)     General: awake   HEENT: No scleral icterus, MMM  Neck: Supple, no JOHNATHAN or thyromegaly  Cardiac: Regular rate and rhythm, S1, S2 normal, no murmur or tub  Lungs: Decreased BS at bases bilaterally   Abdomen: Soft, non-tender. + bowel sounds, no palpable organomegaly  Extremities: Without clubbing, cyanosis; trace LE edema  Neurologic: Cranial nerves grossly intact, moving all extremities  Skin: Warm and dry, no rashes       Labs:   Lab Results   Component Value Date    WBC 9.1 2024    HGB 14.4 2024    HCT 42.9 2024    .0 2024    CREATSERUM 1.12 2024    BUN 34 2024     2024    K  4.5 01/11/2024     01/11/2024    CO2 25.0 01/11/2024    GLU 95 01/11/2024    CA 10.8 01/11/2024       Imaging:  All imaging studies reviewed.    Meds:           Questions/concerns were discussed with patient and/or family by bedside.          Brigido King MD  1/11/2024  9:26 AM

## 2024-01-11 NOTE — OCCUPATIONAL THERAPY NOTE
Order received for OT eval and chart reviewed. Attempted to see Pt this afternoon, however, RN reports Pt lethargic and unable to actively participate in skilled therapy eval. OT will continue to follow.

## 2024-01-12 LAB
ANION GAP SERPL CALC-SCNC: 5 MMOL/L (ref 0–18)
BUN BLD-MCNC: 60 MG/DL (ref 9–23)
CALCIUM BLD-MCNC: 10 MG/DL (ref 8.5–10.1)
CHLORIDE SERPL-SCNC: 105 MMOL/L (ref 98–112)
CO2 SERPL-SCNC: 25 MMOL/L (ref 21–32)
CREAT BLD-MCNC: 2.06 MG/DL
EGFRCR SERPLBLD CKD-EPI 2021: 23 ML/MIN/1.73M2 (ref 60–?)
GLUCOSE BLD-MCNC: 90 MG/DL (ref 70–99)
OSMOLALITY SERPL CALC.SUM OF ELEC: 296 MOSM/KG (ref 275–295)
POTASSIUM SERPL-SCNC: 4.8 MMOL/L (ref 3.5–5.1)
SODIUM SERPL-SCNC: 135 MMOL/L (ref 136–145)

## 2024-01-12 PROCEDURE — 99233 SBSQ HOSP IP/OBS HIGH 50: CPT | Performed by: INTERNAL MEDICINE

## 2024-01-12 PROCEDURE — 99233 SBSQ HOSP IP/OBS HIGH 50: CPT | Performed by: PHYSICIAN ASSISTANT

## 2024-01-12 PROCEDURE — 99232 SBSQ HOSP IP/OBS MODERATE 35: CPT | Performed by: STUDENT IN AN ORGANIZED HEALTH CARE EDUCATION/TRAINING PROGRAM

## 2024-01-12 RX ORDER — SODIUM CHLORIDE 9 MG/ML
INJECTION, SOLUTION INTRAVENOUS CONTINUOUS
Status: DISCONTINUED | OUTPATIENT
Start: 2024-01-12 | End: 2024-01-13

## 2024-01-12 RX ORDER — RISPERIDONE 0.5 MG/1
0.5 TABLET ORAL NIGHTLY PRN
Status: DISCONTINUED | OUTPATIENT
Start: 2024-01-12 | End: 2024-01-13

## 2024-01-12 RX ORDER — RISPERIDONE 1 MG/1
1 TABLET ORAL NIGHTLY
Status: DISCONTINUED | OUTPATIENT
Start: 2024-01-12 | End: 2024-01-13

## 2024-01-12 NOTE — PROGRESS NOTES
[facility]  Report of Psychiatric Progress Note    Fe Sal Patient Status:  Observation    1935 MRN [id number]   Location [facility] 2NE-A Attending Tom Choudhury MD   Hosp Day # 0 PCP Ankit Singh MD     Date of Admission: 2024  Date of Service: 2024  Reason for Consultation: delirium with history of major neurocognitive disorder    Impression: 88-year-old female presents with delirium with behavioral disturbances and a history of dementia.    Primary Psychiatric Diagnosis:  Acute encephalopathy  -Etiologies to include: underlying dementia,  electrolyte imbalance (Na, K, Mg, Phos), CHF exacerbation      Major neurocognitive disorder with behavioral disturbance     Depression, unspecified     Anxiety, unspecified    Personality Traits:  Deferred     Pertinent Medical Diagnoses:  Hyponatremia, CHF, and PUD    Recommendations:  Discontinue Seroquel.  Scheduled Zyprexa was discontinued. Did not seem to be effective: patient received 15 mg  night and still was restless and barely slept.  Continue risperidone 1 mg nightly with additional 0.5 mg available if patient is not asleep/drowsy within 1 hour. It is possible that patient might need some scheduled dosing during day as well.  Continue Zyprexa to 5 mg PO/IV twice daily as needed for anxiety/agitation.   Continue Lexapro 20 mg for depression and anxiety  Delirium protocol:  Continue non-pharm delirium care- orientation, cognitive stimulation, family visits, lights on and blinds open in daytime and quiet and dark at night and PT when appropriate.  Avoid benzos and opiates if possible since they can increase symptoms of delirium.  EKG completed on 2024.  ms.  UA ordered due to to patient's history of frequent urinary tract infections.  Please document how many hours patient sleeps and quality of the sleep.   If unable to get patient's anxiety, insomnia, restlessness, irritability, and impulsive behaviors under control then  it is possible patient will require inpatient psychiatric treatment to establish appropriate medication regimen.     Marina Anne PA-C, CAQ-Psych    History of Present Illness:  Per Hayley Mazariegos NP: \"Patient presented to [facility] on 1/9/2024 after she was brought in by EMS from [facility] from home provide cardiomyopathy on the floor.  Staff witnessed the fall.  While in the ER patient was noted to have audible wheezing and an elevated proBNP.  She was also noted to be mildly hyponatremic at 131.  Chest x-ray noted that imaging was consistent with heart failure.  Patient was given IV Lasix and ER MD spoke to hospitalist and cardiology and patient was admitted for further treatment.     Patient was previously seen by our psychiatric services when she was hospitalized in October 2023 for similar presentation.  Patient has a reported history of dementia.  She has a history of developing delirium especially secondary to UTIs.  He reports that since she was discharged from the hospital in October 2023 that she has now become a resident of [facility].  Is reported that [facility] notes that patient is not sleeping at night and is impulsive.  She is hard to redirect.  Patient impulsive behavior causes her to be at risk for falls.  He notes that patient has a previous history of having trouble sleeping at night.  Reports that this was an issue when she was living at home as well but when she would receive trazodone that this was efficient and not.  Does note that she has a history of rumination.  Son does report that he feels like patient is depressed.  Son indicates that [facility] felt she might be hallucinating but was unsure on the details. Currently is prescribed Lexapro.  Reports recently prescribed Restoril, Xanax, and Seroquel with no noted effect. Son does not report that patient was benefiting from those either. She is currently prescribed Lexapro.    Patient was noted to be sleeping  when APRN first entered room. Patient had received PRN medications due to being restless and impulsive. Had just received PRN IM dose of Haldol 5 mg around 1:15 PM. She was holding her son's hand that was at bedside. Son stepped in the hallway to speak with APRN. Patient was sleeping still at first but then increased restlessness was noted when she realized that son was no longer present. APRN and son re-entered room. Patient denies feeling depressed or anxious. Denies any auditory or visual hallucinations. She endorses she is in the hospital due to a fall but reports year is 2023. She indicates that she wants her son to hold her hand so that she \"feels comforted\".     Discussion with son that when patient was hospitalized in October 2023 that she was started on Olanzapine (discharged with it being PRN) that seemed to aid in clearing up delirium and also aided in sleep. Discussed retrial of this medications to see if this is effective here as well. Discussed that medication could not result in results that we are attempting to achieve such as decreased agitation, restlessness, and sleep. It is possible that medication can also result in excessive sedation. Son is understanding and in agreement to trial of Zyprexa. PRN dosing will be available as well. Spoke with Dr. Scott, who is in agreement in ordering UA.\"    Interval History  1/10/2024 - Per Hayley Mazariegos NP: \"Patient was reported to only get 1 hour of sleep last night. She only received the 5 mg scheduled dosing and does not appear she got the second 2.5 mg dose if she was not asleep in an hour. She was restless throughout the night. Sitter was at bedside due to impulsive behaviors and patient being a high fall risk. Patient has been noted to be restless and agitated throughout the day time which is a new pattern. She typically does sundown in the evening and [facility] has had issues with patient sleeping at night. Her impulsive, agitated, restless  behaviors has resulted in patient not following redirection that then results in frequent falls. Discussed some medications previously tried at [facility]. Daughter indicated that patient has been on Trazodone, Restoril, Remeron, Xanax, and Seroquel. She is unsure if they have prescribed patient Risperidone but reports she did not write it down. Discussed that per chart review it appears that Zyprexa did previously work for patient. Daughter indicates she does not recall this. Discussed that it is possible that patient requires higher dosing. Daughter is in agreement to increase dosing of Zyprexa prior to trial of Risperidone. She was educated on black box warning of Zyprexa and also other side effects. She is in agreement with adjustment. She also inquired about inpatient psychiatric hospitalizations. Patient would have to be medically cleared prior to inpatient psychiatric hospitalization but it is a possibility that since she is the patient's POA that if medication regimen is unable to be established while she is hospitalized at [facility] that it would be beneficial and appropriate for patient to go to inpatient psychiatric hospital for continued medication adjustments. Daughter verbalized understanding. Will also give PRN dosing of IM Zyprexa at this time as well since patient is presenting as anxious, agitated, restless, and impulsive at this time.\"     1/11/2024 - Per Hayley Mazariegos NP: \"Received PRN dosing of IM Zyprexa yesterday afternoon and per RN, patient did calm a little but still presented as agitated and restless. She received scheduled dose of Zyprexa 10 mg at 20:54 and was noted to be dozing on and off but needed additional dose of Zyprexa 5 mg at 22:20. Per chart review, patient was noted to be sleeping around 2 AM till about 4 AM. Woke up again restless and agitated around 4 AM. Fell asleep briefly again around 6 AM. Patient has continued to be restless and impulsive this morning. RN gave PRN dose of  PO Zyprexa at 9:25 AM. Spoke with patient's son. Discussed switching patient over to Risperidone from Olanzapine to see if this would allow patient to achieve sleep. Also discussed that patient might require dosing during the day as well. Continued discussion on possible need of inpatient psychiatric hospitalization for stabilization. Patient is unfortunately out of network at [facility]. Discussed that she might be in network with [facility]. Discussed his concern that [facility] will not let patient return. Will continue to try to adjusting dosing based on response.\"    1/12/2024 - Patient sleepy and lethargic today.  Daughter at bedside provides collateral.  Patient slept better last night, only used the scheduled risperidone and did not need the additional 0.5mg.  She slept until 1:00AM, work briefly, then returned to sleep until morning.  Per RN, she was less restless.  Daughter has concerns regarding multiple medication trials within the past month at the nursing home with little efficacy.  She has concerns about transferring patient to psych unit due to the lack of family contact and unfamiliarity of environment, and prefers patient to be managed here then discharged back to facility.    Past Psychiatric History:  History of anxiety and depression. No history of inpatient or outpatient psychiatric hospitalizations.      Substance Use History:  Denies     Psych Family History:  Denies     Social and Developmental History:   Patient had previously been living by herself in [place] until December 2022.  She was found by family members after she had been in a bathtub for over 24 hours.  Once hospitalized it was determined that she had a small stroke. Patient then moved in with her daughter. She is now residing at [facility]. She is . 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    Past Medical History:   Diagnosis Date    Anxiety     Arthritis     knees and shoulders    Breast CA (HCC)  2001    right breast cancer with mastectomy.    Cancer (HCC) 2001    right breast ca    Cystitis, interstitial     history of----    Flatulence/gas pain/belching     Frequent UTI     GI bleed     H/O mammogram 2011    left breast    H/O mammogram 2015    benign    H/O pregnancy 1967,1969        HIGH BLOOD PRESSURE     HIGH CHOLESTEROL     History of endoscopy 2013    Dr. Mesa    History of stomach ulcers     Loss of appetite     Nausea     Osteoarthritis     Other and unspecified hyperlipidemia     Other and unspecified personal history of malignant neoplasm     dr. luann mccall, ill  breast cancer right    Pap smear for cervical cancer screening 2003    [facility]    Renal cyst, right 07/15/2015    Refer to Dr. Shaffer/urology.    Rheumatic fever     Stroke (HCC)     Unspecified essential hypertension     Visual impairment      Past Surgical History:   Procedure Laterality Date    COLONOSCOPY   Dr. Perez, repeat in five years, family history of colon cancer, Diverticulosis    COLONOSCOPY  2013    Dr. Maciej Mesa    HYSTERECTOMY  1985    partial hystero.    MASTECTOMY RIGHT Right     right breast cancer with mastectomy.    NEEDLE BIOPSY RIGHT Right     DCIS    SPECIAL SERVICE OR REPORT  2001    mastectomy right    UNSPECIFIED DIAGNOSTIC PROCE  2005    cystoscopy     Family History   Problem Relation Age of Onset    Heart Disorder Father         chf    Cancer Mother         colon    Stroke Mother     Colon Cancer Mother     Breast Cancer Self 65    Hypertension Brother       reports that she has never smoked. She has never been exposed to tobacco smoke. She has never used smokeless tobacco. She reports that she does not drink alcohol and does not use drugs.    Allergies:  Allergies   Allergen Reactions    Bactrim [Sulfamethoxazole W/Trimethoprim] NAUSEA ONLY    Nitrofurantoin NAUSEA ONLY     Medications:  Current Facility-Administered Medications:      amLODIPine (Norvasc) tab 5 mg, 5 mg, Oral, Daily    risperiDONE (RisperDAL) tab 1 mg, 1 mg, Oral, Nightly **AND** risperiDONE (RisperDAL) tab 1 mg, 1 mg, Oral, Nightly PRN    labetalol (Trandate) 5 mg/mL injection 10 mg, 10 mg, Intravenous, Q4H PRN    metoprolol tartrate (Lopressor) tab 25 mg, 25 mg, Oral, TID Beta Blocker/Cardiac    OLANZapine (ZyPREXA) tab 5 mg, 5 mg, Oral, BID PRN **OR** OLANZapine (Zyprexa) 5 mg in sterile water for injection (PF) IM injection, 5 mg, Intramuscular, BID PRN    losartan (Cozaar) tab 100 mg, 100 mg, Oral, Daily    acetaminophen (Tylenol Extra Strength) tab 500 mg, 500 mg, Oral, Q4H PRN    melatonin tab 3 mg, 3 mg, Oral, Nightly PRN    polyethylene glycol (PEG 3350) (Miralax) 17 g oral packet 17 g, 17 g, Oral, Daily PRN    sennosides (Senokot) tab 17.2 mg, 17.2 mg, Oral, Nightly PRN    bisacodyl (Dulcolax) 10 MG rectal suppository 10 mg, 10 mg, Rectal, Daily PRN    ondansetron (Zofran) 4 MG/2ML injection 8 mg, 8 mg, Intravenous, Q6H PRN    benzonatate (Tessalon) cap 200 mg, 200 mg, Oral, TID PRN    glycerin-hypromellose- (Artifical Tears) 0.2-0.2-1 % ophthalmic solution 1 drop, 1 drop, Both Eyes, QID PRN    sodium chloride (Saline Mist) 0.65 % nasal solution 1 spray, 1 spray, Each Nare, Q3H PRN    heparin (Porcine) 5000 UNIT/ML injection 5,000 Units, 5,000 Units, Subcutaneous, 2 times per day    atorvastatin (Lipitor) tab 10 mg, 10 mg, Oral, Nightly    escitalopram (Lexapro) tab 20 mg, 20 mg, Oral, Nightly    folic acid (Folvite) tab 1 mg, 1 mg, Oral, Daily    ferrous sulfate DR tab 325 mg, 325 mg, Oral, Daily with breakfast    methenamine (Hiprex) tab 1 g, 1 g, Oral, BID    pantoprazole (Protonix) DR tab 20 mg, 20 mg, Oral, QAM AC    Review of Systems   Unable to perform ROS: Dementia     Psychiatry Review Systems: Possibly having hallucinations at [facility] per son. Not sleeping at night.     Mental Status Exam:   Risk Assessment  Suicidal ideation: no suicidal  ideation  Homicidal ideation: None noted    Appearance: disheveled, bruising about the face  Behavior: lethargic, sleepy  Attitude: cooperative, did whisper responses when spoken to  Gait: Not observed    Speech: soft and quiet    Mood:  sleepy  Affect: Congruent    Thought process: unable to assess  Thought content:  unable to assess  Perceptions: no hallucinations  Associations: Unable to assess    Orientation:  sleepy  Attention and Concentration:   poor, lethargic  Memory:  intact recent  Language: Unable to assess  Fund of Knowledge: Unable to assess    Insight: Limited   Judgment: Limited     Objective    Vitals:    01/12/24 0745   BP: 98/65   Pulse: 88   Resp: 18   Temp: 97.7 °F (36.5 °C)     Laboratory Data:         STUDIES:

## 2024-01-12 NOTE — PHYSICAL THERAPY NOTE
PHYSICAL THERAPY EVALUATION - INPATIENT     Room Number: 2609/2609-A  Evaluation Date: 1/12/2024  Type of Evaluation: Initial  Physician Order: PT Eval and Treat    Presenting Problem: s/p fall, CHF  Co-Morbidities : SIADH, Acute on chronic congestive heart failure, major neurocognitive disorder, depression, paroxysmal a. fib, h/o stroke, CKD, R mastectomy, BUE shoulder arthritis  Reason for Therapy: Mobility Dysfunction and Discharge Planning    History related to current admission: Patient is a 88 year old female admitted on 1/9/2024 from SNF for fall.  Pt diagnosed with CHF, hyponatremia, and dementia with delirium.      ASSESSMENT   In this PT evaluation, the patient presents with the following impairments lethargy, limited endurance, poor trunk control, strength deficits, sitting balance impairments, hypotension, and decreased activity tolerance.  These impairments and comorbidities manifest themselves as functional limitations in independent bed mobility, transfers, and gait.  The patient is below baseline and would benefit from skilled inpatient PT to address the above deficits to assist patient in returning to prior to level of function.   Functional outcome measures completed include [facility].  The AM-PAC '6-Clicks' Inpatient Basic Mobility Short Form was completed and this patient is demonstrating a Approx Degree of Impairment: 100%  degree of impairment in mobility. Research supports that patients with this level of impairment may benefit from [facility].  DISCHARGE RECOMMENDATIONS  PT Discharge Recommendations: Sub-acute rehabilitation    PLAN  PT Treatment Plan: Bed mobility;Endurance;Energy conservation;Family education;Balance training;Transfer training;Range of motion;Strengthening;Gait training  Rehab Potential : Fair  Frequency (Obs): 3-5x/week  Number of Visits to Meet Established Goals: 5      CURRENT GOALS    Goal #1 Patient is able to demonstrate supine - sit EOB @ level: moderate assistance      Goal #2 Patient is able to demonstrate transfers Sit to/from Stand at assistance level: maximum assistance     Goal #3 Patient is able to sit at EOB for 3 min with MIN assist     Goal #4    Goal #5    Goal #6    Goal Comments: Goals established on 2024    HOME SITUATION  Type of Home: Skilled nursing facility   Home Layout: One level                Lives With: Staff 24 hours  Drives: No          Prior Level of [place]: Pt resides at [facility]. Pts mobility level fluctuates due to dementia. On a good day Pt is able to ambulate 35ft with RW and assist. Often pt is transfer level with assist.   Pts daughter (Candie who is a PT) reports goals for patient would be to feed herself and transfer to commode/chair.     SUBJECTIVE  Pt denies pain or dizziness.   Pt is lethargic and unable to maintain eyes open.       OBJECTIVE  Precautions: Bed/chair alarm  Fall Risk: High fall risk    WEIGHT BEARING RESTRICTION  Weight Bearing Restriction: None                PAIN ASSESSMENT  Ratin          COGNITION  Memory:  decreased long term memory and decreased short term memory  Following Commands:  follows one step commands with increased time and follows one step commands with repetition  Initiation: hand over hand to initiate tasks  Motor Planning: impaired  Safety Judgement:  decreased awareness of need for assistance and decreased awareness of need for safety    RANGE OF MOTION AND STRENGTH ASSESSMENT  Upper extremity ROM and strength are within functional limits     Lower extremity ROM is within functional limits     Lower extremity strength is within functional limits except for the following:    Right Knee extension  3/5  Left Knee extension  2/5      BALANCE  Static Sitting: Poor -  Dynamic Sitting: Poor -  Static Standing: Not tested  Dynamic Standing: Not tested    ADDITIONAL TESTS                                    ACTIVITY TOLERANCE      BP supine 1: 85/55  BP supine 2 post there-ex 89/60  BP sitting EOB  83/69  BP sitting post there-ex 92/68  BP supine: 90/68                   O2 WALK  Oxygen Therapy  SPO2% on Oxygen at Rest: 98  At rest oxygen flow (liters per minute): 3    NEUROLOGICAL FINDINGS                        AM-PAC '6-Clicks' INPATIENT SHORT FORM - BASIC MOBILITY  How much difficulty does the patient currently have...  Patient Difficulty: Turning over in bed (including adjusting bedclothes, sheets and blankets)?: Unable   Patient Difficulty: Sitting down on and standing up from a chair with arms (e.g., wheelchair, bedside commode, etc.): Unable   Patient Difficulty: Moving from lying on back to sitting on the side of the bed?: Unable   How much help from another person does the patient currently need...   Help from Another: Moving to and from a bed to a chair (including a wheelchair)?: Total   Help from Another: Need to walk in hospital room?: Total   Help from Another: Climbing 3-5 steps with a railing?: Total       AM-PAC Score:  Raw Score: 6   Approx Degree of Impairment: 100%   Standardized Score (AM-PAC Scale): 23.55   CMS Modifier (G-Code): CN    FUNCTIONAL ABILITY STATUS  Gait Assessment   Functional Mobility/Gait Assessment  Gait Assistance: Not tested    Skilled Therapy Provided     Bed Mobility:  Rolling: MAX x   Supine to sit: MAX x 2   Sit to supine: MAX x 2     Transfer Mobility:  Sit to stand: NT   Stand to sit: NT  Gait = NT    Therapeutic Act:  Supine AAROM perform in effort to elevate BP. Pt tolerated fair.   Requires constant VC for participation.   MAX assist for supine-sit with initiation and support of LE/trunk. No active trunk initation noted until Pt EOB.   Pt requires MOD/MAX assist for static sitting balance.   Pt demonstrates R lateral lean and retropulsion.   Verbal, visual, tactile, and weightshifting cues/facilitation provided to achieve midline.   Tolerated 6 min EOB.   Pt participated in there-ex ~ 75% of the time with constant cuing.   Sit-supine with MAX assist for  LE/trunk initiation and support.     Therapist's Comments: Pt lethargic but opening eyes and responsive with cuing. Following most simple commands with increased time.     Exercise/Education Provided:  Bed mobility  Energy conservation  Posture  ROM  Strengthening  Lower therapeutic exercise:  Ankle pumps  Glut sets  Heel slides  LAQ  Transfer training    Patient End of Session: Up in chair;Needs met;Call light within reach;RN aware of session/findings;All patient questions and concerns addressed;Alarm set;Family present      Patient Evaluation Complexity Level:  History High - 3 or more personal factors and/or co-morbidities   Examination of body systems Moderate - addressing a total of 3 or more elements   Clinical Presentation Moderate - Evolving   Clinical Decision Making Moderate - Evolving       PT Session Time: 35 minutes  Therapeutic Activity: 12 minutes  Therapeutic Exercise: 8 minutes

## 2024-01-12 NOTE — CM/SW NOTE
CM attached clinical updates to SNF referral in aidin system.  CM messaged [facility] admissions for update regarding insurance authorization for return, awaiting response.   Patient discharge is pending medical clearance.     Francoise Espino RN Case Manager [id number]

## 2024-01-12 NOTE — PROGRESS NOTES
[facility]  Nephrology Progress Note    Fe Sal Attending:  Oseas Lovett DO       Assessment and Plan:    1) Hyponatremia- multifactorial; due to modest solute intake + desmopressin effect (x 1 yr for nocturia / frequency). Given pt's overall decline / non-ambulatory state + edema -> dc DDAVP permanently     2) VIJAY- due to prerenal azotemia (diuretics); minimal PVR / meds benign. Agree with IVF / hold ARB     3) HFpEF- echo noted; diuresed well / euvolemic. Diuretics DC'ed     4) Recent UGIB s/p epi injection + cautery / endoclips on PPI     5) Recent hip fx s/p repair     6) Progressive dementia with severe sundowning- zyprexa / lexapro per psych      Subjective:  Able to answer questions more accurately, no c/o     Physical Exam:   BP 98/65 (BP Location: Left arm)   Pulse 88   Temp 97.7 °F (36.5 °C) (Oral)   Resp 18   Wt 129 lb (58.5 kg)   SpO2 98%   BMI 22.14 kg/m²   Temp (24hrs), Av.4 °F (36.9 °C), Min:97.4 °F (36.3 °C), Max:99.5 °F (37.5 °C)       Intake/Output Summary (Last 24 hours) at 2024 0848  Last data filed at 2024 0355  Gross per 24 hour   Intake 0 ml   Output 150 ml   Net -150 ml     Wt Readings from Last 3 Encounters:   24 129 lb (58.5 kg)   10/10/23 136 lb 3.9 oz (61.8 kg)   23 126 lb (57.2 kg)     General: awake   HEENT: No scleral icterus, MMM  Neck: Supple, no JOHNATHAN or thyromegaly  Cardiac: Regular rate and rhythm, S1, S2 normal, no murmur or tub  Lungs: Decreased BS at bases bilaterally   Abdomen: Soft, non-tender. + bowel sounds, no palpable organomegaly  Extremities: Without clubbing, cyanosis; trace LE edema  Neurologic: Cranial nerves grossly intact, moving all extremities  Skin: Warm and dry, no rashes       Labs:          Imaging:  All imaging studies reviewed.    Meds:           Questions/concerns were discussed with patient and/or family by bedside.          Brigido King MD  2024  848 AM

## 2024-01-12 NOTE — PLAN OF CARE
Assumed care for pt at 23:30 on 1/11    A&Oxself. Asleep until around 1 am, calmed pt down and gave risperidone. Cleaned, changed linen, offered food and drink. Pt declined. VSS on 3L when sleeping, sleeps with mouth open. NSR on tele. Incontinent of B&B, briefed with purewick. Bruises on eyes (from fall prompting admission).     Plan: Need to transfer to [facility] to establish new medication regimen so pt can go back to [facility].    Sitter at bedside. Call light in reach. Sitter attending to pt needs, able to make additional needs known.

## 2024-01-12 NOTE — OCCUPATIONAL THERAPY NOTE
OCCUPATIONAL THERAPY EVALUATION - INPATIENT     Room Number: 2609/2609-A  Evaluation Date: 1/12/2024  Type of Evaluation: Initial  Presenting Problem: fall    Physician Order: IP Consult to Occupational Therapy  Reason for Therapy: ADL/IADL Dysfunction and Discharge Planning    History: Patient is a 88 year old female admitted on 1/9/2024 with Presenting Problem: fall. Co-Morbidities : SIADH, Acute on chronic congestive heart failure, major neurocognitive disorder, depression, paroxysmal a. fib, h/o stroke, CKD, R mastectomy, BUE shoulder arthritis      ASSESSMENT   Patient presents with the following performance deficits: cognition, activity tolerance, balance, strength. These deficits impact the patient’s ability to participate in ADL, transfers, instrumental activities of daily living, rest and sleep, leisure and social participation.     The patient is functioning below her previous functional level and would benefit from skilled inpatient OT to address the above deficits, maximizing patient’s ability to return safely to her prior level of function.    The AM-PAC ' '6-Clicks' Inpatient Daily Activity Short Form was completed and this patient is demonstrating an Approx Degree of Impairment: 59.67% in activities of daily living. Research supports that patients with this level of impairment often benefit from LACHELLE.       OT Discharge Recommendations: Sub-acute rehabilitation  OT Device Recommendations: TBD    WEIGHT BEARING RESTRICTION  Weight Bearing Restriction: None                Recommendations for nursing staff:   Transfers: total lift for OOB transfers  Toileting location: bed level at this time.     EVALUATION SESSION:  Patient Start of Session: semi-supine in bed with dtr present throughout session.  FUNCTIONAL TRANSFER ASSESSMENT  Sit to Stand: Edge of Bed  Edge of Bed: Not Tested    BED MOBILITY  Supine to Sit : Maximum Assist  Sit to Supine (OT): Maximum Assist  Scooting: MAX A    BALANCE  ASSESSMENT  Static Sitting: Moderate Assist  Sitting Bilateral: Moderate Assist  Static Standing: Not Tested    FUNCTIONAL ADL ASSESSMENT  LB Dressing Seated: Not Tested      ACTIVITY TOLERANCE: sitting EOB approx 14 minutes. Pt required 1 person assist for balance/trunk control while sitting EOB.            BP: (!) 83/69  BP Location: Left arm  BP Method: Automatic  Patient Position: Lying    O2 SATURATIONS  Oxygen Therapy  SPO2% on Oxygen at Rest: 98  At rest oxygen flow (liters per minute): 3    COGNITION  Arousal/Alertness:  lethargic    Upper Extremity   ROM: within functional limits, except limited ROM at shoulder flexion  Strength: within functional limits except for the following;  Right Shoulder flexion  2-/5  Left Shoulder flexion  2-/5  Right Elbow flexion  3-/5  Left Elbow flexion  3-/5  Right   3+/5  Left   3+/5  Coordination  Gross motor: impaired  Fine motor: impaired  Sensation: Light touch:  to be further tested    EDUCATION PROVIDED  Patient : Role of Occupational Therapy; Plan of Care; Discharge Recommendations; Functional Transfer Techniques; Posture/Positioning  Patient's Response to Education: Requires Further Education  Family/Caregiver: Plan of Care; Discharge Recommendations; Role of Occupational Therapy  Family/Caregiver's Response to Education: Verbalized Understanding    Equipment used: none at this time  Demonstrates functional use, Would benefit from additional trial as appropriate     Therapist comments: Pt lethargic throughout session. Pt required hand over hand assist at times to position hands on bed rail to help stabilize trunk while seated EOB. Pt challenged in postural stability tasks, requiring MOD A to maintain upright posture    Patient End of Session: In bed;Call light within reach;Needs met;With  staff;RN aware of session/findings;All patient questions and concerns addressed;Alarm set;Family present    OCCUPATIONAL PROFILE    HOME SITUATION  [facility]  Home Layout: One level  Lives With: Staff 24 hours                     Drives: No       Prior Level of Function: Pt lives in LTC at [facility]. Pt typically able to stand pivot transfer to toilet. Able to ambulate 35 feet on good days. Assist with ADLs. Pt able to feed self.     SUBJECTIVE   \"Thank you.\"    PAIN ASSESSMENT  Rating: Unable to rate  Location: did not report       OBJECTIVE  Precautions: Bed/chair alarm  Fall Risk: High fall risk      ASSESSMENTS    AM-PAC ‘6-Clicks’ Inpatient Daily Activity Short Form  -   Putting on and taking off regular lower body clothing?: A Lot  -   Bathing (including washing, rinsing, drying)?: A Lot  -   Toileting, which includes using toilet, bedpan or urinal? : A Lot  -   Putting on and taking off regular upper body clothing?: A Lot  -   Taking care of personal grooming such as brushing teeth?: A Little  -   Eating meals?: A Little    AM-PAC Score:  Score: 14  Approx Degree of Impairment: 59.67%  Standardized Score (AM-PAC Scale): 33.39    ADDITIONAL TESTS     NEUROLOGICAL FINDINGS      COGNITION ASSESSMENTS       PLAN  OT Treatment Plan: Balance activities;Energy conservation/work simplification techniques;ADL training;IADL training;Functional transfer training;UE strengthening/ROM;Endurance training;Patient/Family training;Patient/Family education;Equipment eval/education;Compensatory technique education;Cognitive reorientation;Continued evaluation  Rehab Potential : Good  Frequency: 3x/week  Number of Visits to Meet Established Goals: 9    ADL Goals   Patient will perform grooming: with min assist  Patient will perform upper body dressing:  with min assist  Patient will perform lower body dressing:  with min assist  Patient will perform toileting: with min assist    Functional Transfer Goals  Patient will transfer from supine to sit:  with supervision  Patient will transfer from sit to stand:  with supervision  Patient will transfer  to bedside commode:  with supervision  Patient will transfer to toilet:  with supervision    UE Exercise Program Goal  Patient will be minimum assistance with bilateral AROM HEP (home exercise program).    Additional Goals  Pt will tolerate standing for 3 minutes utilizing AD as needed in preparation for perform stand pivot transfers to toilet/commode.       Patient Evaluation Complexity Level:   Occupational Profile/Medical History LOW - Brief history including review of medical or therapy records    Specific performance deficits impacting engagement in ADL/IADL MODERATE  3 - 5 performance deficits   Client Assessment/Performance Deficits MODERATE - Comorbidities and min to mod modifications of tasks    Clinical Decision Making LOW - Analysis of occupational profile, problem-focused assessments, limited treatment options    Overall Complexity LOW     OT Session Time: 19 minutes  Therapeutic Activity: 15 minutes

## 2024-01-12 NOTE — PROGRESS NOTES
Progress Note  Fe Sal Patient Status:  Observation    1935 MRN [id number]   Location [facility] 2NE-A Attending Oseas Lovett, DO   Hosp Day # 0 PCP Ankit Singh MD     Subjective:  Patient opens eyes to speech but is drowsy this morning. Daughter at bedside states she had a better night with more sleep.     Objective:  BP 98/65 (BP Location: Left arm)   Pulse 88   Temp 97.7 °F (36.5 °C) (Oral)   Resp 18   Wt 129 lb (58.5 kg)   SpO2 98%   BMI 22.14 kg/m²     Telemetry: NSR w/1st AVB, rare PVC    Intake/Output:    Intake/Output Summary (Last 24 hours) at 2024 0855  Last data filed at 2024 0355  Gross per 24 hour   Intake 0 ml   Output 150 ml   Net -150 ml       Last 3 Weights   24 0355 129 lb (58.5 kg)   24 1657 134 lb 0.6 oz (60.8 kg)   24 0627 136 lb 11 oz (62 kg)   10/10/23 0000 136 lb 3.9 oz (61.8 kg)   10/05/23 2243 127 lb 8 oz (57.8 kg)   10/05/23 1622 130 lb (59 kg)   23 0221 126 lb (57.2 kg)       Labs:  Recent Labs   Lab 24  0657 01/10/24  0708 24  0710   GLU 90 96 95   BUN 34* 19 34*   CREATSERUM 1.00 0.77 1.12*   EGFRCR 55* 75 48*   CA 9.5 10.2* 10.8*   * 135* 134*   K 4.2 3.5 4.5   CL 98 100 103   CO2 26.0 27.0 25.0     Recent Labs   Lab 24  0657 01/10/24  0708 24  0710   RBC 3.97 4.57 4.87   HGB 11.9* 13.6 14.4   HCT 35.2 39.7 42.9   MCV 88.7 86.9 88.1   MCH 30.0 29.8 29.6   MCHC 33.8 34.3 33.6   RDW 14.1 13.9 14.2   NEPRELIM 3.57  --   --    WBC 6.5 9.6 9.1   .0 274.0 273.0         Recent Labs   Lab 24  0657   TROPHS 19       Diagnostics:  No results found.   Review of Systems   Reason unable to perform ROS: drowsiness.       Physical Exam:  Gen: drowsy, confused, NAD  Heent: pupils equal, reactive. Mucous membranes dry; R scalp hematoma, bilateral orbital ecchymosis  Neck: no jvd  Cardiac: regular rate and rhythm, normal S1,S2, no murmur, clicks, rub or gallop  Lungs: Diminished effort  Abd: soft, NT/ND  +bs  Ext: no edema  Skin: Warm, dry, ecchymotic, R scalp hematoma  Neuro: No focal deficits      Medications:     amLODIPine  5 mg Oral Daily    risperiDONE  1 mg Oral Nightly    metoprolol tartrate  25 mg Oral TID Beta Blocker/Cardiac    losartan  100 mg Oral Daily    heparin  5,000 Units Subcutaneous 2 times per day    atorvastatin  10 mg Oral Nightly    escitalopram  20 mg Oral Nightly    folic acid  1 mg Oral Daily    ferrous sulfate  325 mg Oral Daily with breakfast    methenamine  1 g Oral BID    pantoprazole  20 mg Oral QAM AC       Assessment:  Witnessed Fall at Nursing Home with R frontal scalp contusion  Does not appear to be syncopal event; pt had been in her chair at NH, and fell from chair. No loss of consciousness noted from NH staff  CT Brain: No acute intracranial abnormality; R scalp hematoma  ECG NSR with 1st AVB  Acute on Chronic HFpEF  CXR with mod pulm edema, no pleural effusion on admit  TTE 10/2023 LVEF 60-65%, mild-mod AR, mild MR, mod TR, PASP 50mmHg  proBNP 2609 on admit  Diuresed with IV Lasix: net approx -4.7L since admit; wt down 7 lbs since admit  Diuretics now discontinued with rise of Cr   Appears compensated on exam  Hypertension  Initially hypertensive on admission, now BP well controlled  On losartan, metoprolol, and amlodipine started yesterday  Hyponatremia  Na 131 on admit - improved, 135 today  Historically on desmopressin for bladder urgency - now discontinued d/t hyponatremia  Hx Dementia with sundowning, delirium  Psych on consult  Risperdone/lexapro  VIJAY  BUN/Cr increased today, 60/2.06  Diuretics discontinued on 1/10  Nephrology following  Hyperlipidemia - on statin  Hx CVA  Hx PUD s/p epi injection, cautery, endoclips 10/9/23  On PPI  Hx Hip Fx s/p repair    Plan:  Diuretics discontinued on 1/10. Further volume management per nephrology  Patient's daughter concerned about possible orthostasis with her BP lower than usual. Will check orthostatic BP today.   Cardiology will  sign off at this time. Please call with any questions or concerns.     Plan of care discussed with patient, RN.    Jolanta Beasley, APRN  1/12/2024  8:55 AM  [phone] [facility]  [phone] [facility]        Patient seen and examined independently.  Note reviewed and labs reviewed. Agree with above assessment and plan.  88-year-old female who presented with acute on chronic HFpEF as well as a witnessed fall at her facility.  Patient has been diuresed with IV Lasix, however today it appears as though she may have been over diuresed.  Discontinue diuretics for now.  Hold ARB.  Okay with gentle IV fluid hydration.  Check orthostatic vitals.  Recheck BMP tomorrow.  Resumption of diuretics per nephrology.  Cardiology will follow peripherally.  Please do not hesitate contact if any questions or concerns arise.        Destin Lvoett DO  Cardiologist  [facility] [place]  1/12/2024 1:21 PM      Note to the patient: The 21st Century Cures Act makes medical notes like these available to patients in the interest of transparency. However, be advised that this is a medical document. It is intended as peer to peer communication. It is written in medical language and may contain abbreviations or verbiage that are unfamiliar. It may appear blunt or direct. Medical documents are intended to carry relevant information, facts as evident, and clinical opinion of the practitioner.     Disclaimer: Components of this note were documented using voice recognition system and are subject to errors not corrected at proofreading. Contact the author of this note for any clarifications.

## 2024-01-12 NOTE — PLAN OF CARE
Shift Note:  Assumed care of patient. Patient lethargic this morning, oriented to self, will open eyes intermittently to speech.    Daughter at the bedside, aware of plan of care.  Patient on room air, denies difficulty breathing, lung sounds diminished with bilateral crackles to bases.   Denies any cardiac symptoms, NSR and 1st degree block, HR in 80s on tele.   Denies pain at this time. Incontinent of bowel and bladder, last BM 1/11.   Able to sit edge of bed, very poor safety awareness, high fall risk, sitter at the bedside, call light within reach, tolerating care well.      POC:  - Medication adjustment   - DC back to [facility]? Or transfer to [facility]   - Monitor BP

## 2024-01-12 NOTE — PROGRESS NOTES
[facility]     Hospitalist Progress Note     Fe Sal Patient Status:  Observation    1935 MRN [id number]   [facility] 2NE-A Attending Oseas Lovett,    Hosp Day # 0 PCP Ankit Singh MD     Chief Complaint: Delirium    Subjective:     Patient sleeping in bed. Daughter at bedside. Patient's daughter reports patient had a better night and was able to sleep     Objective:    Review of Systems:   A comprehensive review of systems was completed; pertinent positive and negatives stated in subjective.    Vital signs:  Temp:  [97.7 °F (36.5 °C)-99.5 °F (37.5 °C)] 97.7 °F (36.5 °C)  Pulse:  [74-98] 89  Resp:  [18-22] 19  BP: ()/(55-84) 97/62  SpO2:  [92 %-100 %] 96 %    Physical Exam:    General: No acute distress  Respiratory: no wheezes, no rhonchi  Cardiovascular: S1, S2, regular rate and rhythm  Abdomen: Soft, Non-tender, non-distended, positive bowel sounds  Neuro: No new focal deficits.   Extremities: no edema      Diagnostic Data:    Labs:  Recent Labs   Lab 24  0657 01/10/24  0708 24  0710   WBC 6.5 9.6 9.1   HGB 11.9* 13.6 14.4   MCV 88.7 86.9 88.1   .0 274.0 273.0       Recent Labs   Lab 24  0657 01/10/24  0708 24  0710 24  0739   GLU 90 96 95 90   BUN 34* 19 34* 60*   CREATSERUM 1.00 0.77 1.12* 2.06*   CA 9.5 10.2* 10.8* 10.0   ALB 3.4  --   --   --    * 135* 134* 135*   K 4.2 3.5 4.5 4.8   CL 98 100 103 105   CO2 26.0 27.0 25.0 25.0   ALKPHO 84  --   --   --    AST 17  --   --   --    ALT 18  --   --   --    BILT 0.6  --   --   --    TP 6.8  --   --   --        Estimated Creatinine Clearance: 16.9 mL/min (A) (based on SCr of 2.06 mg/dL (H)).    Recent Labs   Lab 24  0657   TROPHS 19       No results for input(s): \"PTP\", \"INR\" in the last 168 hours.               Microbiology    No results found for this visit on 24.      Imaging: Reviewed in Epic.    Medications:    risperiDONE  1 mg Oral Nightly    metoprolol tartrate  25  mg Oral TID Beta Blocker/Cardiac    [Held by provider] losartan  100 mg Oral Daily    heparin  5,000 Units Subcutaneous 2 times per day    atorvastatin  10 mg Oral Nightly    escitalopram  20 mg Oral Nightly    folic acid  1 mg Oral Daily    ferrous sulfate  325 mg Oral Daily with breakfast    methenamine  1 g Oral BID    pantoprazole  20 mg Oral QAM AC       Assessment & Plan:      #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  -Zyprexa and Seroquel discontinued  -Risperidone  -Psych following    #VIJAY  -IVF  -Losartan on hold     #Hyponatremia, improving   -DDAVP discontinued permanently by nephrology  -Nephrology following    #Acute on chronic HFpEF, resolved   -Echo reviewed  -IV Lasix discontinued per nephrology  -Cardiology following    #Hypertension  -Losartan on hold  -Metoprolol  -Amlodipine     #History of breast cancer     #History of stroke     #History of PUD  -S/p epi injection, cautery, endoclips on 10/9/2023  -PPI     #History of hip fracture s/p repair      Oseas Lvoett DO    Supplementary Documentation:     Quality:  DVT Mechanical Prophylaxis:   SCDs,    DVT Pharmacologic Prophylaxis   Medication    heparin (Porcine) 5000 UNIT/ML injection 5,000 Units                Code Status: DNAR/Selective Treatment  Sal: External urinary catheter in place  Sal Duration (in days):   Central line:    LB: 1/13/2024    Discharge is dependent on: Clinical improvement  At this point Ms. Sal is expected to be discharge to: Home    The 21st Century Cures Act makes medical notes like these available to patients in the interest of transparency. Please be advised this is a medical document. Medical documents are intended to carry relevant information, facts as evident, and the clinical opinion of the practitioner. The medical note is intended as peer to peer communication and may appear blunt or direct. It is written in medical language and may contain abbreviations or verbiage that are unfamiliar.

## 2024-01-13 ENCOUNTER — APPOINTMENT (OUTPATIENT)
Dept: MRI IMAGING | Facility: HOSPITAL | Age: 89
End: 2024-01-13
Attending: STUDENT IN AN ORGANIZED HEALTH CARE EDUCATION/TRAINING PROGRAM
Payer: MEDICARE

## 2024-01-13 LAB
ANION GAP SERPL CALC-SCNC: 3 MMOL/L (ref 0–18)
BUN BLD-MCNC: 78 MG/DL (ref 9–23)
CALCIUM BLD-MCNC: 9.6 MG/DL (ref 8.5–10.1)
CHLORIDE SERPL-SCNC: 108 MMOL/L (ref 98–112)
CO2 SERPL-SCNC: 24 MMOL/L (ref 21–32)
CREAT BLD-MCNC: 1.81 MG/DL
EGFRCR SERPLBLD CKD-EPI 2021: 27 ML/MIN/1.73M2 (ref 60–?)
GLUCOSE BLD-MCNC: 101 MG/DL (ref 70–99)
OSMOLALITY SERPL CALC.SUM OF ELEC: 303 MOSM/KG (ref 275–295)
POTASSIUM SERPL-SCNC: 4.8 MMOL/L (ref 3.5–5.1)
SODIUM SERPL-SCNC: 135 MMOL/L (ref 136–145)

## 2024-01-13 PROCEDURE — 99232 SBSQ HOSP IP/OBS MODERATE 35: CPT

## 2024-01-13 PROCEDURE — 70551 MRI BRAIN STEM W/O DYE: CPT | Performed by: STUDENT IN AN ORGANIZED HEALTH CARE EDUCATION/TRAINING PROGRAM

## 2024-01-13 PROCEDURE — 99233 SBSQ HOSP IP/OBS HIGH 50: CPT | Performed by: INTERNAL MEDICINE

## 2024-01-13 PROCEDURE — 99232 SBSQ HOSP IP/OBS MODERATE 35: CPT | Performed by: STUDENT IN AN ORGANIZED HEALTH CARE EDUCATION/TRAINING PROGRAM

## 2024-01-13 RX ORDER — RISPERIDONE 0.5 MG/1
0.5 TABLET ORAL NIGHTLY
Status: DISCONTINUED | OUTPATIENT
Start: 2024-01-13 | End: 2024-01-15

## 2024-01-13 RX ORDER — RISPERIDONE 0.25 MG/1
0.25 TABLET ORAL NIGHTLY PRN
Status: DISCONTINUED | OUTPATIENT
Start: 2024-01-13 | End: 2024-01-15

## 2024-01-13 RX ORDER — SODIUM CHLORIDE 9 MG/ML
INJECTION, SOLUTION INTRAVENOUS CONTINUOUS
Status: DISCONTINUED | OUTPATIENT
Start: 2024-01-13 | End: 2024-01-14

## 2024-01-13 NOTE — PROGRESS NOTES
[facility]     Hospitalist Progress Note     Fe Sal Patient Status:  Observation    1935 MRN [id number]   [facility] 2NE-A Attending Oseas Lovett,    Hosp Day # 1 PCP Ankit Singh MD     Chief Complaint: Delirium    Subjective:     Patient remains asleep.  Daughter at bedside    Objective:    Review of Systems:   A comprehensive review of systems was completed; pertinent positive and negatives stated in subjective.    Vital signs:  Temp:  [97 °F (36.1 °C)-98.4 °F (36.9 °C)] 97.3 °F (36.3 °C)  Pulse:  [76-91] 78  Resp:  [18-19] 18  BP: ()/(62-89) 152/69  SpO2:  [94 %-100 %] 98 %    Physical Exam:    General: No acute distress  Respiratory: no wheezes, no rhonchi  Cardiovascular: S1, S2, regular rate and rhythm  Abdomen: Soft, Non-tender, non-distended, positive bowel sounds  Neuro: No new focal deficits.   Extremities: no edema      Diagnostic Data:    Labs:  Recent Labs   Lab 24  0657 01/10/24  0708 24  0710   WBC 6.5 9.6 9.1   HGB 11.9* 13.6 14.4   MCV 88.7 86.9 88.1   .0 274.0 273.0       Recent Labs   Lab 24  0657 01/10/24  0708 24  0710 24  0739 24  0608   GLU 90   < > 95 90 101*   BUN 34*   < > 34* 60* 78*   CREATSERUM 1.00   < > 1.12* 2.06* 1.81*   CA 9.5   < > 10.8* 10.0 9.6   ALB 3.4  --   --   --   --    *   < > 134* 135* 135*   K 4.2   < > 4.5 4.8 4.8   CL 98   < > 103 105 108   CO2 26.0   < > 25.0 25.0 24.0   ALKPHO 84  --   --   --   --    AST 17  --   --   --   --    ALT 18  --   --   --   --    BILT 0.6  --   --   --   --    TP 6.8  --   --   --   --     < > = values in this interval not displayed.       Estimated Creatinine Clearance: 16.8 mL/min (A) (based on SCr of 1.81 mg/dL (H)).    Recent Labs   Lab 24  0657   TROPHS 19       No results for input(s): \"PTP\", \"INR\" in the last 168 hours.               Microbiology    No results found for this visit on 24.      Imaging: Reviewed in  Epic.    Medications:    risperiDONE  1 mg Oral Nightly    metoprolol tartrate  25 mg Oral TID Beta Blocker/Cardiac    [Held by provider] losartan  100 mg Oral Daily    heparin  5,000 Units Subcutaneous 2 times per day    atorvastatin  10 mg Oral Nightly    escitalopram  20 mg Oral Nightly    folic acid  1 mg Oral Daily    ferrous sulfate  325 mg Oral Daily with breakfast    methenamine  1 g Oral BID    pantoprazole  20 mg Oral QAM AC       Assessment & Plan:      #Acute encephalopathy  -Likely medication induced   -Discontinue Melatonin as patient became lethargic after combining melatonin with psych meds   -Psych meds per Psych   -MRI brain ordered     #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  -Zyprexa and Seroquel discontinued  -Risperidone  -Psych following    #VIJAY, improving  -IVF  -Losartan on hold     #Hyponatremia, improving   -DDAVP discontinued permanently by nephrology  -Nephrology following    #Acute on chronic HFpEF, resolved   -Echo reviewed  -IV Lasix discontinued per nephrology  -Cardiology following    #Hypertension  -Losartan on hold  -Metoprolol  -Amlodipine     #History of breast cancer     #History of stroke     #History of PUD  -S/p epi injection, cautery, endoclips on 10/9/2023  -PPI     #History of hip fracture s/p repair      Oseas Lovett DO    Supplementary Documentation:     Quality:  DVT Mechanical Prophylaxis:   SCDs,    DVT Pharmacologic Prophylaxis   Medication    heparin (Porcine) 5000 UNIT/ML injection 5,000 Units                Code Status: DNAR/Selective Treatment  Sal: External urinary catheter in place  Sal Duration (in days):   Central line:    LB: 1/13/2024    Discharge is dependent on: Clinical improvement  At this point Ms. Sal is expected to be discharge to: Home    The 21st Century Cures Act makes medical notes like these available to patients in the interest of transparency. Please be advised this is a medical document. Medical documents are intended  to carry relevant information, facts as evident, and the clinical opinion of the practitioner. The medical note is intended as peer to peer communication and may appear blunt or direct. It is written in medical language and may contain abbreviations or verbiage that are unfamiliar.

## 2024-01-13 NOTE — PLAN OF CARE
Received patient at 0730. Pt lethargic, very drowsy this am. Was able to squeeze my hands but dis not open eyes. Psych paged to eval medications. Daughter is at bedside. Will check MRI brain today if pt tolerates. Checklist done per daughter.  Tele Rhythm SR with 1st AVB.  O2 saturation 96% On 1L NC, which is used for desatting during sleep. Breath sounds diminished.  Bed is locked and in low position. Call light and personal items within reach. No C/O chest pain or shortness of breath. Pt incontinent, purewick used. On IVF for 24 hrs. Unable to take PO medications yet due to mentation, will attempt as able if up awakens. Reviewed plan of care and daughter verbalizes understanding.       Problem: Patient/Family Goals  Goal: Patient/Family Long Term Goal  Description: Patient's Long Term Goal: Stay out of the hospital    Interventions:  - Follow up with PCP  -Take medication as prescribed  -Healthy lifestyle  - See additional Care Plan goals for specific interventions  Outcome: Progressing  Goal: Patient/Family Short Term Goal  Description: Patient's Short Term Goal: Feel better    Interventions:   - Hourly rounding  -Pain assessment  -[place] patient  - See additional Care Plan goals for specific interventions  Outcome: Progressing     Problem: SAFETY ADULT - FALL  Goal: Free from fall injury  Description: INTERVENTIONS:  - Assess pt frequently for physical needs  - Identify cognitive and physical deficits and behaviors that affect risk of falls.  - [place] fall precautions as indicated by assessment.  - Educate pt/family on patient safety including physical limitations  - Instruct pt to call for assistance with activity based on assessment  - Modify environment to reduce risk of injury  - Provide assistive devices as appropriate  - Consider OT/PT consult to assist with strengthening/mobility  - Encourage toileting schedule  Outcome: Progressing     Problem: CARDIOVASCULAR - ADULT  Goal: Maintains optimal cardiac  output and hemodynamic stability  Description: INTERVENTIONS:  - Monitor vital signs, rhythm, and trends  - Monitor for bleeding, hypotension and signs of decreased cardiac output  - Evaluate effectiveness of vasoactive medications to optimize hemodynamic stability  - Monitor arterial and/or venous puncture sites for bleeding and/or hematoma  - Assess quality of pulses, skin color and temperature  - Assess for signs of decreased coronary artery perfusion - ex. Angina  - Evaluate fluid balance, assess for edema, trend weights  Outcome: Progressing  Goal: Absence of cardiac arrhythmias or at baseline  Description: INTERVENTIONS:  - Continuous cardiac monitoring, monitor vital signs, obtain 12 lead EKG if indicated  - Evaluate effectiveness of antiarrhythmic and heart rate control medications as ordered  - Initiate emergency measures for life threatening arrhythmias  - Monitor electrolytes and administer replacement therapy as ordered  Outcome: Progressing     Problem: RESPIRATORY - ADULT  Goal: Achieves optimal ventilation and oxygenation  Description: INTERVENTIONS:  - Assess for changes in respiratory status  - Assess for changes in mentation and behavior  - Position to facilitate oxygenation and minimize respiratory effort  - Oxygen supplementation based on oxygen saturation or ABGs  - Provide Smoking Cessation handout, if applicable  - Encourage broncho-pulmonary hygiene including cough, deep breathe, Incentive Spirometry  - Assess the need for suctioning and perform as needed  - Assess and instruct to report SOB or any respiratory difficulty  - Respiratory Therapy support as indicated  - Manage/alleviate anxiety  - Monitor for signs/symptoms of CO2 retention  Outcome: Progressing     Problem: Impaired Functional Mobility  Goal: Achieve highest/safest level of mobility/gait  Description: Interventions:  - Assess patient's functional ability and stability  - Promote increasing activity/tolerance for mobility and  gait  - Educate and engage patient/family in tolerated activity level and precautions    Outcome: Progressing     Problem: Impaired Cognition  Goal: Patient will exhibit improved attention, thought processing and/or memory  Description: Interventions:    Outcome: Progressing     Problem: Impaired Communication  Goal: Patient will achieve maximal communication potential  Description: Interventions:    Outcome: Progressing

## 2024-01-13 NOTE — CM/SW NOTE
Informed by Matilde at [facility] that pt is a private pay LTC resident. Therapy notes sent to facility in [place] so that they can attempt to skill her when closer to discharge. Pt should return to SNF under private pay while facility attempts at auth. Per psych note from today, pt may need IP psych if unable to stabilize.    Await medical clearance for return to [facility].    BAHMAN CabreraN, RN-BC    [id number]

## 2024-01-13 NOTE — PROGRESS NOTES
[facility]     Nephrology Progress Note    Fe Sal Patient Status:  Inpatient    1935 MRN [id number]   Location [facility] 2NE-A Attending Oseas Lovett, DO   Hosp Day # 1 PCP Ankit Singh MD       SUBJECTIVE:  Had been very lethargic earlier but now somewhat responsive.  Family at bedside        Physical Exam:   /69 (BP Location: Left arm)   Pulse 78   Temp 97.3 °F (36.3 °C) (Axillary)   Resp 18   Wt 128 lb 8 oz (58.3 kg)   SpO2 98%   BMI 22.06 kg/m²   Temp (24hrs), Av.5 °F (36.4 °C), Min:97 °F (36.1 °C), Max:98.4 °F (36.9 °C)       Intake/Output Summary (Last 24 hours) at 2024 1245  Last data filed at 2024 0449  Gross per 24 hour   Intake 1356.25 ml   Output 300 ml   Net 1056.25 ml     Last 3 Weights   24 0827 128 lb 8 oz (58.3 kg)   24 0355 129 lb (58.5 kg)   24 1657 134 lb 0.6 oz (60.8 kg)   24 0627 136 lb 11 oz (62 kg)   24 1011 128 lb 8 oz (58.3 kg)   10/10/23 0000 136 lb 3.9 oz (61.8 kg)   10/05/23 2243 127 lb 8 oz (57.8 kg)   10/05/23 1622 130 lb (59 kg)   23 0221 126 lb (57.2 kg)   23 1558 120 lb (54.4 kg)     General: [place] in no apparent distress.  HEENT: No scleral icterus, MMM, ecchymosis noted over forehead and periorbital area  Neck: Supple, no JOHNATHAN or thyromegaly  Cardiac: Regular rate and rhythm, S1, S2 normal, no murmur or rub  Lungs: Clear without wheezes, rales, rhonchi.    Abdomen: Soft, non-tender. + bowel sounds, no palpable organomegaly  Extremities: Without clubbing, cyanosis or edema.  Neurologic: moving all extremities  Skin: Warm and dry, no rash        Labs:     Recent Labs   Lab 24  0657 01/10/24  0708 24  0710   WBC 6.5 9.6 9.1   HGB 11.9* 13.6 14.4   MCV 88.7 86.9 88.1   .0 274.0 273.0       Recent Labs   Lab 24  0657 01/10/24  0708 24  0710 24  0739 24  0608   * 135* 134* 135* 135*   K 4.2 3.5 4.5 4.8 4.8   CL 98 100 103 105 108   CO2 26.0 27.0 25.0  25.0 24.0   BUN 34* 19 34* 60* 78*   CREATSERUM 1.00 0.77 1.12* 2.06* 1.81*   CA 9.5 10.2* 10.8* 10.0 9.6   MG  --  2.2  --   --   --    PHOS  --  2.6  --   --   --    GLU 90 96 95 90 101*       Recent Labs   Lab 01/09/24  0657   ALT 18   AST 17   ALB 3.4       No results for input(s): \"PGLU\" in the last 168 hours.    Meds:   Current Facility-Administered Medications   Medication Dose Route Frequency    risperiDONE (RisperDAL) tab 1 mg  1 mg Oral Nightly    And    risperiDONE (RisperDAL) tab 0.5 mg  0.5 mg Oral Nightly PRN    labetalol (Trandate) 5 mg/mL injection 10 mg  10 mg Intravenous Q4H PRN    metoprolol tartrate (Lopressor) tab 25 mg  25 mg Oral TID Beta Blocker/Cardiac    OLANZapine (ZyPREXA) tab 5 mg  5 mg Oral BID PRN    Or    OLANZapine (Zyprexa) 5 mg in sterile water for injection (PF) IM injection  5 mg Intramuscular BID PRN    [Held by provider] losartan (Cozaar) tab 100 mg  100 mg Oral Daily    acetaminophen (Tylenol Extra Strength) tab 500 mg  500 mg Oral Q4H PRN    polyethylene glycol (PEG 3350) (Miralax) 17 g oral packet 17 g  17 g Oral Daily PRN    sennosides (Senokot) tab 17.2 mg  17.2 mg Oral Nightly PRN    bisacodyl (Dulcolax) 10 MG rectal suppository 10 mg  10 mg Rectal Daily PRN    ondansetron (Zofran) 4 MG/2ML injection 8 mg  8 mg Intravenous Q6H PRN    benzonatate (Tessalon) cap 200 mg  200 mg Oral TID PRN    glycerin-hypromellose- (Artifical Tears) 0.2-0.2-1 % ophthalmic solution 1 drop  1 drop Both Eyes QID PRN    sodium chloride (Saline Mist) 0.65 % nasal solution 1 spray  1 spray Each Nare Q3H PRN    heparin (Porcine) 5000 UNIT/ML injection 5,000 Units  5,000 Units Subcutaneous 2 times per day    atorvastatin (Lipitor) tab 10 mg  10 mg Oral Nightly    escitalopram (Lexapro) tab 20 mg  20 mg Oral Nightly    folic acid (Folvite) tab 1 mg  1 mg Oral Daily    ferrous sulfate DR tab 325 mg  325 mg Oral Daily with breakfast    methenamine (Hiprex) tab 1 g  1 g Oral BID    pantoprazole  (Protonix) DR tab 20 mg  20 mg Oral QAM AC         Impression/Plan:        1) Hyponatremia- multifactorial; due to modest solute intake + desmopressin effect (x 1 yr for nocturia / frequency). Given pt's overall decline / non-ambulatory state + edema -> dc DDAVP permanently.  Na has been stable     2) VIJAY- due to prerenal azotemia (diuretics); minimal PVR / meds benign. Agree with IVF ongoing/ hold ARB     3) HFpEF- echo noted; diuresed well / euvolemic. Diuretics DC'ed     4) Recent UGIB s/p epi injection + cautery / endoclips on PPI     5) Recent hip fx s/p repair     6) Progressive dementia with severe sundowning- zyprexa / lexapro per psych     Questions/concerns were discussed with patient and/or family by bedside.          Jamie Alonzo MD  1/13/2024  12:45 PM

## 2024-01-13 NOTE — PROGRESS NOTES
[facility]  Report of Psychiatric Progress Note    Fe Sal Patient Status:  Observation    1935 MRN [id number]   Formerly [facility] 2NE-A Attending Tom Choudhury MD   Hosp Day # 1 PCP Ankit Singh MD     Date of Admission: 2024  Date of Service: 2024  Reason for Consultation: delirium with history of major neurocognitive disorder    Impression: 88-year-old female presents with delirium with behavioral disturbances and a history of dementia.    Primary Psychiatric Diagnosis:  Acute encephalopathy  -Etiologies to include: underlying dementia,  electrolyte imbalance (Na, K, Mg, Phos), CHF exacerbation      Major neurocognitive disorder with behavioral disturbance     Depression, unspecified     Anxiety, unspecified    Personality Traits:  Deferred     Pertinent Medical Diagnoses:  Hyponatremia, CHF, and PUD    Recommendations:  Discontinue Seroquel.  Scheduled Zyprexa was discontinued. Did not seem to be effective: patient received 15 mg  night and still was restless and barely slept.  Decrease Risperidone to 0.5 mg nightly with additional 0.25 mg available if patient is not asleep/drowsy within 1 hour. It is possible that patient might need some scheduled dosing during day as well.  Continue Zyprexa to 5 mg PO/IV twice daily as needed for anxiety/agitation.   Continue Lexapro 20 mg for depression and anxiety  Delirium protocol:  Continue non-pharm delirium care- orientation, cognitive stimulation, family visits, lights on and blinds open in daytime and quiet and dark at night and PT when appropriate.  Avoid benzos and opiates if possible since they can increase symptoms of delirium.  EKG completed on 2024.  ms.  UA ordered due to to patient's history of frequent urinary tract infections.  Please document how many hours patient sleeps and quality of the sleep.   If unable to get patient's anxiety, insomnia, restlessness, irritability, and impulsive behaviors under control  then it is possible patient will require inpatient psychiatric treatment to establish appropriate medication regimen.     Hayley ANN NP-C    History of Present Illness:  Patient presented to [facility] on 1/9/2024 after she was brought in by EMS from [facility] from home provide cardiomyopathy on the floor.  Staff witnessed the fall.  While in the ER patient was noted to have audible wheezing and an elevated proBNP.  She was also noted to be mildly hyponatremic at 131.  Chest x-ray noted that imaging was consistent with heart failure.  Patient was given IV Lasix and ER MD spoke to hospitalist and cardiology and patient was admitted for further treatment.     Patient was previously seen by our psychiatric services when she was hospitalized in October 2023 for similar presentation.  Patient has a reported history of dementia.  She has a history of developing delirium especially secondary to UTIs.  He reports that since she was discharged from the hospital in October 2023 that she has now become a resident of [facility].  Is reported that [facility] notes that patient is not sleeping at night and is impulsive.  She is hard to redirect.  Patient impulsive behavior causes her to be at risk for falls.  He notes that patient has a previous history of having trouble sleeping at night.  Reports that this was an issue when she was living at home as well but when she would receive trazodone that this was efficient and not.  Does note that she has a history of rumination.  Son does report that he feels like patient is depressed.  Son indicates that [facility] felt she might be hallucinating but was unsure on the details. Currently is prescribed Lexapro.  Reports recently prescribed Restoril, Xanax, and Seroquel with no noted effect. Son does not report that patient was benefiting from those either. She is currently prescribed Lexapro.    Patient was noted to be sleeping when MARISSA first entered  room. Patient had received PRN medications due to being restless and impulsive. Had just received PRN IM dose of Haldol 5 mg around 1:15 PM. She was holding her son's hand that was at bedside. Son stepped in the hallway to speak with APRN. Patient was sleeping still at first but then increased restlessness was noted when she realized that son was no longer present. APRN and son re-entered room. Patient denies feeling depressed or anxious. Denies any auditory or visual hallucinations. She endorses she is in the hospital due to a fall but reports year is 2023. She indicates that she wants her son to hold her hand so that she \"feels comforted\".     Discussion with son that when patient was hospitalized in October 2023 that she was started on Olanzapine (discharged with it being PRN) that seemed to aid in clearing up delirium and also aided in sleep. Discussed retrial of this medications to see if this is effective here as well. Discussed that medication could not result in results that we are attempting to achieve such as decreased agitation, restlessness, and sleep. It is possible that medication can also result in excessive sedation. Son is understanding and in agreement to trial of Zyprexa. PRN dosing will be available as well. Spoke with Dr. Scott, who is in agreement in ordering UA.    Interval History  1/10/2024 - Patient was reported to only get 1 hour of sleep last night. She only received the 5 mg scheduled dosing and does not appear she got the second 2.5 mg dose if she was not asleep in an hour. She was restless throughout the night. Sitter was at bedside due to impulsive behaviors and patient being a high fall risk. Patient has been noted to be restless and agitated throughout the day time which is a new pattern. She typically does sundown in the evening and [facility] has had issues with patient sleeping at night. Her impulsive, agitated, restless behaviors has resulted in patient not following  redirection that then results in frequent falls. Discussed some medications previously tried at [facility]. Daughter indicated that patient has been on Trazodone, Restoril, Remeron, Xanax, and Seroquel. She is unsure if they have prescribed patient Risperidone but reports she did not write it down. Discussed that per chart review it appears that Zyprexa did previously work for patient. Daughter indicates she does not recall this. Discussed that it is possible that patient requires higher dosing. Daughter is in agreement to increase dosing of Zyprexa prior to trial of Risperidone. She was educated on black box warning of Zyprexa and also other side effects. She is in agreement with adjustment. She also inquired about inpatient psychiatric hospitalizations. Patient would have to be medically cleared prior to inpatient psychiatric hospitalization but it is a possibility that since she is the patient's POA that if medication regimen is unable to be established while she is hospitalized at [facility] that it would be beneficial and appropriate for patient to go to inpatient psychiatric hospital for continued medication adjustments. Daughter verbalized understanding. Will also give PRN dosing of IM Zyprexa at this time as well since patient is presenting as anxious, agitated, restless, and impulsive at this time.    1/11/2024 - Received PRN dosing of IM Zyprexa yesterday afternoon and per RN, patient did calm a little but still presented as agitated and restless. She received scheduled dose of Zyprexa 10 mg at 20:54 and was noted to be dozing on and off but needed additional dose of Zyprexa 5 mg at 22:20. Per chart review, patient was noted to be sleeping around 2 AM till about 4 AM. Woke up again restless and agitated around 4 AM. Fell asleep briefly again around 6 AM. Patient has continued to be restless and impulsive this morning. RN gave PRN dose of PO Zyprexa at 9:25 AM. Spoke with patient's son. Discussed switching  patient over to Risperidone from Olanzapine to see if this would allow patient to achieve sleep. Also discussed that patient might require dosing during the day as well. Continued discussion on possible need of inpatient psychiatric hospitalization for stabilization. Patient is unfortunately out of network at [facility]. Discussed that she might be in network with [facility]. Discussed his concern that [facility] will not let patient return. Will continue to try to adjusting dosing based on response.    1/12/2024 - Per Marina Anne PA-C: \"Patient sleepy and lethargic today.  Daughter at bedside provides collateral.  Patient slept better last night, only used the scheduled risperidone and did not need the additional 0.5mg.  She slept until 1:00AM, work briefly, then returned to sleep until morning.  Per RN, she was less restless.  Daughter has concerns regarding multiple medication trials within the past month at the nursing home with little efficacy.  She has concerns about transferring patient to psych unit due to the lack of family contact and unfamiliarity of environment, and prefers patient to be managed here then discharged back to facility.\"    1/13/2024 - Patient was able to sleep last night. She continues to present tired and lethargic this morning. Per RN and patient's daughter she is hard to arouse. She was taken for MRI of brain. Awaiting results. Discussed with patient's daughter about attempting to titrate down on Risperidone dose to see if this is the cause of excessive lethargy. She is in agreement. She does have concerns that the lethargy is multifactorial. Discussed that it is worth attempting to decrease Risperidone to see if this is the cause of excessive day time lethargy. Will continue to titrate medications as necessary.     Past Psychiatric History:  History of anxiety and depression. No history of inpatient or outpatient psychiatric hospitalizations.      Substance Use History:  Denies      Psych Family History:  Denies     Social and Developmental History:   Patient had previously been living by herself in [place] until 2022.  She was found by family members after she had been in a bathtub for over 24 hours.  Once hospitalized it was determined that she had a small stroke. Patient then moved in with her daughter. She is now residing at [facility]. She is . 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    Past Medical History:   Diagnosis Date    Anxiety     Arthritis     knees and shoulders    Breast CA (HCC)     right breast cancer with mastectomy.    Cancer (HCC) 2001    right breast ca    Cystitis, interstitial     history of----    Flatulence/gas pain/belching     Frequent UTI     GI bleed     H/O mammogram 2011    left breast    H/O mammogram 2015    benign    H/O pregnancy 1967,1969        HIGH BLOOD PRESSURE     HIGH CHOLESTEROL     History of endoscopy 2013    Dr. Mesa    History of stomach ulcers     Loss of appetite     Nausea     Osteoarthritis     Other and unspecified hyperlipidemia     Other and unspecified personal history of malignant neoplasm     dr. luann mccall, [facility]  breast cancer right    Pap smear for cervical cancer screening 2003    wnl    Renal cyst, right 07/15/2015    Refer to Dr. Shaffer/urology.    Rheumatic fever     Stroke ([facility])     Unspecified essential hypertension     Visual impairment      Past Surgical History:   Procedure Laterality Date    COLONOSCOPY   Dr. Perez, repeat in five years, family history of colon cancer, Diverticulosis    COLONOSCOPY  2013    Dr. Maciej Mesa    HYSTERECTOMY  1985    partial hystero.    MASTECTOMY RIGHT Right     right breast cancer with mastectomy.    NEEDLE BIOPSY RIGHT Right     DCIS    SPECIAL SERVICE OR REPORT  2001    mastectomy right    UNSPECIFIED DIAGNOSTIC PROCE  2005    cystoscopy     Family History   Problem  Relation Age of Onset    Heart Disorder Father         chf    Cancer Mother         colon    Stroke Mother     Colon Cancer Mother     Breast Cancer Self 65    Hypertension Brother       reports that she has never smoked. She has never been exposed to tobacco smoke. She has never used smokeless tobacco. She reports that she does not drink alcohol and does not use drugs.    Allergies:  Allergies   Allergen Reactions    Bactrim [Sulfamethoxazole W/Trimethoprim] NAUSEA ONLY    Nitrofurantoin NAUSEA ONLY     Medications:  Current Facility-Administered Medications:     sodium chloride 0.9% infusion, , Intravenous, Continuous    risperiDONE (RisperDAL) tab 1 mg, 1 mg, Oral, Nightly **AND** risperiDONE (RisperDAL) tab 0.5 mg, 0.5 mg, Oral, Nightly PRN    labetalol (Trandate) 5 mg/mL injection 10 mg, 10 mg, Intravenous, Q4H PRN    metoprolol tartrate (Lopressor) tab 25 mg, 25 mg, Oral, TID Beta Blocker/Cardiac    OLANZapine (ZyPREXA) tab 5 mg, 5 mg, Oral, BID PRN **OR** OLANZapine (Zyprexa) 5 mg in sterile water for injection (PF) IM injection, 5 mg, Intramuscular, BID PRN    [Held by provider] losartan (Cozaar) tab 100 mg, 100 mg, Oral, Daily    acetaminophen (Tylenol Extra Strength) tab 500 mg, 500 mg, Oral, Q4H PRN    melatonin tab 3 mg, 3 mg, Oral, Nightly PRN    polyethylene glycol (PEG 3350) (Miralax) 17 g oral packet 17 g, 17 g, Oral, Daily PRN    sennosides (Senokot) tab 17.2 mg, 17.2 mg, Oral, Nightly PRN    bisacodyl (Dulcolax) 10 MG rectal suppository 10 mg, 10 mg, Rectal, Daily PRN    ondansetron (Zofran) 4 MG/2ML injection 8 mg, 8 mg, Intravenous, Q6H PRN    benzonatate (Tessalon) cap 200 mg, 200 mg, Oral, TID PRN    glycerin-hypromellose- (Artifical Tears) 0.2-0.2-1 % ophthalmic solution 1 drop, 1 drop, Both Eyes, QID PRN    sodium chloride (Saline Mist) 0.65 % nasal solution 1 spray, 1 spray, Each Nare, Q3H PRN    heparin (Porcine) 5000 UNIT/ML injection 5,000 Units, 5,000 Units, Subcutaneous, 2 times  per day    atorvastatin (Lipitor) tab 10 mg, 10 mg, Oral, Nightly    escitalopram (Lexapro) tab 20 mg, 20 mg, Oral, Nightly    folic acid (Folvite) tab 1 mg, 1 mg, Oral, Daily    ferrous sulfate DR tab 325 mg, 325 mg, Oral, Daily with breakfast    methenamine (Hiprex) tab 1 g, 1 g, Oral, BID    pantoprazole (Protonix) DR tab 20 mg, 20 mg, Oral, QAM AC    Review of Systems   Unable to perform ROS: Dementia     Psychiatry Review Systems: Possibly having hallucinations at [facility] per son. Not sleeping at night.     Mental Status Exam:   Risk Assessment  Suicidal ideation: no suicidal ideation  Homicidal ideation: None noted    Appearance: disheveled, bruising about the face  Behavior: lethargic, sleepy  Attitude: cooperative, did whisper responses when spoken to  Gait: Not observed    Speech: soft and quiet    Mood:  sleepy  Affect: Congruent    Thought process: unable to assess  Thought content:  unable to assess  Perceptions: no hallucinations  Associations: Unable to assess    Orientation:  sleepy  Attention and Concentration:   poor, lethargic  Memory:  intact recent  Language: Unable to assess  Fund of Knowledge: Unable to assess    Insight: Limited   Judgment: Limited     Objective    Vitals:    01/13/24 0749   BP: 152/69   Pulse: 87   Resp: 18   Temp: 97.3 °F (36.3 °C)     Laboratory Data:  Lab Results   Component Value Date    CREATSERUM 1.81 01/13/2024    BUN 78 01/13/2024     01/13/2024    K 4.8 01/13/2024     01/13/2024    CO2 24.0 01/13/2024     01/13/2024    CA 9.6 01/13/2024         STUDIES:

## 2024-01-13 NOTE — PLAN OF CARE
Assumed care for pt at 19:30 on 1/12    A&Oxself. Denies pain. VSS on 1L. NSR on tele. Purewick in place. Heparin sub q for VTE prophylaxis. Prn melatonin given. Able to sleep. IVF infusing.     Plan: AM labs, monitor vitals.     Sitter at bedside, attending to pt needs,  able to make pt's needs known.

## 2024-01-14 LAB
ANION GAP SERPL CALC-SCNC: 3 MMOL/L (ref 0–18)
BUN BLD-MCNC: 58 MG/DL (ref 9–23)
CALCIUM BLD-MCNC: 9.6 MG/DL (ref 8.5–10.1)
CHLORIDE SERPL-SCNC: 111 MMOL/L (ref 98–112)
CO2 SERPL-SCNC: 25 MMOL/L (ref 21–32)
CREAT BLD-MCNC: 1.09 MG/DL
EGFRCR SERPLBLD CKD-EPI 2021: 49 ML/MIN/1.73M2 (ref 60–?)
GLUCOSE BLD-MCNC: 96 MG/DL (ref 70–99)
OSMOLALITY SERPL CALC.SUM OF ELEC: 304 MOSM/KG (ref 275–295)
POTASSIUM SERPL-SCNC: 4.8 MMOL/L (ref 3.5–5.1)
SODIUM SERPL-SCNC: 139 MMOL/L (ref 136–145)

## 2024-01-14 PROCEDURE — 99233 SBSQ HOSP IP/OBS HIGH 50: CPT | Performed by: INTERNAL MEDICINE

## 2024-01-14 PROCEDURE — 99232 SBSQ HOSP IP/OBS MODERATE 35: CPT | Performed by: STUDENT IN AN ORGANIZED HEALTH CARE EDUCATION/TRAINING PROGRAM

## 2024-01-14 PROCEDURE — 99232 SBSQ HOSP IP/OBS MODERATE 35: CPT

## 2024-01-14 NOTE — PLAN OF CARE
Received patient at 0730. Alert and Oriented x2 this am, slightly drowsy still but much improved since yesterday. Short term memory loss. With sitter for safety.  Ate breakfast with assist. Taking PO fluids, IVF have been DCd.  Tele Rhythm SR with 1st AVB.  O2 saturation 97% On 1L NC, weaned to room air and is 95-96%. Breath sounds diminished. Bed is locked and in low position. Call light and personal items within reach. Pt incontinent, purewick used. Pt up to chair with assist of 2/pivot, generalized weakness noted. Healing hematoma to forehead and bruising to face. POC reviewed with pt/family, pt will need reinforcement.     1100- Due to mentation improvement will trial off sitter. Plan to return to [facility] tomorrow.     Problem: Patient/Family Goals  Goal: Patient/Family Long Term Goal  Description: Patient's Long Term Goal: Stay out of the hospital    Interventions:  - Follow up with PCP  -Take medication as prescribed  -Healthy lifestyle  - See additional Care Plan goals for specific interventions  Outcome: Progressing  Goal: Patient/Family Short Term Goal  Description: Patient's Short Term Goal: Feel better    Interventions:   - Hourly rounding  -Pain assessment  -[place] patient  - See additional Care Plan goals for specific interventions  Outcome: Progressing     Problem: SAFETY ADULT - FALL  Goal: Free from fall injury  Description: INTERVENTIONS:  - Assess pt frequently for physical needs  - Identify cognitive and physical deficits and behaviors that affect risk of falls.  - [place] fall precautions as indicated by assessment.  - Educate pt/family on patient safety including physical limitations  - Instruct pt to call for assistance with activity based on assessment  - Modify environment to reduce risk of injury  - Provide assistive devices as appropriate  - Consider OT/PT consult to assist with strengthening/mobility  - Encourage toileting schedule  Outcome: Progressing     Problem: CARDIOVASCULAR -  ADULT  Goal: Maintains optimal cardiac output and hemodynamic stability  Description: INTERVENTIONS:  - Monitor vital signs, rhythm, and trends  - Monitor for bleeding, hypotension and signs of decreased cardiac output  - Evaluate effectiveness of vasoactive medications to optimize hemodynamic stability  - Monitor arterial and/or venous puncture sites for bleeding and/or hematoma  - Assess quality of pulses, skin color and temperature  - Assess for signs of decreased coronary artery perfusion - ex. Angina  - Evaluate fluid balance, assess for edema, trend weights  Outcome: Progressing  Goal: Absence of cardiac arrhythmias or at baseline  Description: INTERVENTIONS:  - Continuous cardiac monitoring, monitor vital signs, obtain 12 lead EKG if indicated  - Evaluate effectiveness of antiarrhythmic and heart rate control medications as ordered  - Initiate emergency measures for life threatening arrhythmias  - Monitor electrolytes and administer replacement therapy as ordered  Outcome: Progressing     Problem: RESPIRATORY - ADULT  Goal: Achieves optimal ventilation and oxygenation  Description: INTERVENTIONS:  - Assess for changes in respiratory status  - Assess for changes in mentation and behavior  - Position to facilitate oxygenation and minimize respiratory effort  - Oxygen supplementation based on oxygen saturation or ABGs  - Provide Smoking Cessation handout, if applicable  - Encourage broncho-pulmonary hygiene including cough, deep breathe, Incentive Spirometry  - Assess the need for suctioning and perform as needed  - Assess and instruct to report SOB or any respiratory difficulty  - Respiratory Therapy support as indicated  - Manage/alleviate anxiety  - Monitor for signs/symptoms of CO2 retention  Outcome: Progressing     Problem: Impaired Functional Mobility  Goal: Achieve highest/safest level of mobility/gait  Description: Interventions:  - Assess patient's functional ability and stability  - Promote increasing  activity/tolerance for mobility and gait  - Educate and engage patient/family in tolerated activity level and precautions    Outcome: Progressing     Problem: Impaired Cognition  Goal: Patient will exhibit improved attention, thought processing and/or memory  Description: Interventions:    Outcome: Progressing     Problem: Impaired Communication  Goal: Patient will achieve maximal communication potential  Description: Interventions:    Outcome: Progressing

## 2024-01-14 NOTE — PROGRESS NOTES
[facility]  Report of Psychiatric Progress Note    Fe aSl Patient Status:  Observation    1935 MRN [id number]   [facility] 2NE-A Attending Tom Choudhury MD   Hosp Day # 2 PCP Ankit Singh MD     Date of Admission: 2024  Date of Service: 2024  Reason for Consultation: delirium with history of major neurocognitive disorder    Impression: 88-year-old female presents with delirium with behavioral disturbances and a history of dementia.    Primary Psychiatric Diagnosis:  Acute encephalopathy  -Etiologies to include: underlying dementia,  electrolyte imbalance (Na, K, Mg, Phos), CHF exacerbation      Major neurocognitive disorder with behavioral disturbance     Depression, unspecified     Anxiety, unspecified    Personality Traits:  Deferred     Pertinent Medical Diagnoses:  Hyponatremia, CHF, and PUD    Recommendations:  Discontinue Seroquel.  Scheduled Zyprexa was discontinued. Did not seem to be effective: patient received 15 mg  night and still was restless and barely slept.  Continue Risperidone 0.5 mg nightly with additional 0.25 mg available if patient is not asleep/drowsy within 1 hour. It is possible that patient might need some scheduled dosing during day as well.  Continue Zyprexa to 5 mg PO/IV twice daily as needed for anxiety/agitation.   Continue Lexapro 20 mg for depression and anxiety  Delirium protocol:  Continue non-pharm delirium care- orientation, cognitive stimulation, family visits, lights on and blinds open in daytime and quiet and dark at night and PT when appropriate.  Avoid benzos and opiates if possible since they can increase symptoms of delirium.  EKG completed on 2024.  ms.  UA ordered due to to patient's history of frequent urinary tract infections.  Please document how many hours patient sleeps and quality of the sleep.   Likely discharge back to [facility] tomorrow.  Daughter provided contact information for PALLAVI Mendoza for Howard  Tyler ANN.     Hayley ANN NP-C    History of Present Illness:  Patient presented to [facility] on 1/9/2024 after she was brought in by EMS from [facility] from home provide cardiomyopathy on the floor.  Staff witnessed the fall.  While in the ER patient was noted to have audible wheezing and an elevated proBNP.  She was also noted to be mildly hyponatremic at 131.  Chest x-ray noted that imaging was consistent with heart failure.  Patient was given IV Lasix and ER MD spoke to hospitalist and cardiology and patient was admitted for further treatment.     Patient was previously seen by our psychiatric services when she was hospitalized in October 2023 for similar presentation.  Patient has a reported history of dementia.  She has a history of developing delirium especially secondary to UTIs.  He reports that since she was discharged from the hospital in October 2023 that she has now become a resident of [facility].  Is reported that [facility] notes that patient is not sleeping at night and is impulsive.  She is hard to redirect.  Patient impulsive behavior causes her to be at risk for falls.  He notes that patient has a previous history of having trouble sleeping at night.  Reports that this was an issue when she was living at home as well but when she would receive trazodone that this was efficient and not.  Does note that she has a history of rumination.  Son does report that he feels like patient is depressed.  Son indicates that [facility] felt she might be hallucinating but was unsure on the details. Currently is prescribed Lexapro.  Reports recently prescribed Restoril, Xanax, and Seroquel with no noted effect. Son does not report that patient was benefiting from those either. She is currently prescribed Lexapro.    Patient was noted to be sleeping when APRN first entered room. Patient had received PRN medications due to being restless and impulsive. Had just received PRN IM dose  of Haldol 5 mg around 1:15 PM. She was holding her son's hand that was at bedside. Son stepped in the hallway to speak with APRN. Patient was sleeping still at first but then increased restlessness was noted when she realized that son was no longer present. APRN and son re-entered room. Patient denies feeling depressed or anxious. Denies any auditory or visual hallucinations. She endorses she is in the hospital due to a fall but reports year is 2023. She indicates that she wants her son to hold her hand so that she \"feels comforted\".     Discussion with son that when patient was hospitalized in October 2023 that she was started on Olanzapine (discharged with it being PRN) that seemed to aid in clearing up delirium and also aided in sleep. Discussed retrial of this medications to see if this is effective here as well. Discussed that medication could not result in results that we are attempting to achieve such as decreased agitation, restlessness, and sleep. It is possible that medication can also result in excessive sedation. Son is understanding and in agreement to trial of Zyprexa. PRN dosing will be available as well. Spoke with Dr. Scott, who is in agreement in ordering UA.    Interval History  1/10/2024 - Patient was reported to only get 1 hour of sleep last night. She only received the 5 mg scheduled dosing and does not appear she got the second 2.5 mg dose if she was not asleep in an hour. She was restless throughout the night. Sitter was at bedside due to impulsive behaviors and patient being a high fall risk. Patient has been noted to be restless and agitated throughout the day time which is a new pattern. She typically does sundown in the evening and [facility] has had issues with patient sleeping at night. Her impulsive, agitated, restless behaviors has resulted in patient not following redirection that then results in frequent falls. Discussed some medications previously tried at [facility]. Daughter  indicated that patient has been on Trazodone, Restoril, Remeron, Xanax, and Seroquel. She is unsure if they have prescribed patient Risperidone but reports she did not write it down. Discussed that per chart review it appears that Zyprexa did previously work for patient. Daughter indicates she does not recall this. Discussed that it is possible that patient requires higher dosing. Daughter is in agreement to increase dosing of Zyprexa prior to trial of Risperidone. She was educated on black box warning of Zyprexa and also other side effects. She is in agreement with adjustment. She also inquired about inpatient psychiatric hospitalizations. Patient would have to be medically cleared prior to inpatient psychiatric hospitalization but it is a possibility that since she is the patient's POA that if medication regimen is unable to be established while she is hospitalized at [facility] that it would be beneficial and appropriate for patient to go to inpatient psychiatric hospital for continued medication adjustments. Daughter verbalized understanding. Will also give PRN dosing of IM Zyprexa at this time as well since patient is presenting as anxious, agitated, restless, and impulsive at this time.    1/11/2024 - Received PRN dosing of IM Zyprexa yesterday afternoon and per RN, patient did calm a little but still presented as agitated and restless. She received scheduled dose of Zyprexa 10 mg at 20:54 and was noted to be dozing on and off but needed additional dose of Zyprexa 5 mg at 22:20. Per chart review, patient was noted to be sleeping around 2 AM till about 4 AM. Woke up again restless and agitated around 4 AM. Fell asleep briefly again around 6 AM. Patient has continued to be restless and impulsive this morning. RN gave PRN dose of PO Zyprexa at 9:25 AM. Spoke with patient's son. Discussed switching patient over to Risperidone from Olanzapine to see if this would allow patient to achieve sleep. Also discussed that  patient might require dosing during the day as well. Continued discussion on possible need of inpatient psychiatric hospitalization for stabilization. Patient is unfortunately out of network at [facility]. Discussed that she might be in network with [facility]. Discussed his concern that [facility] will not let patient return. Will continue to try to adjusting dosing based on response.    1/12/2024 - Per Marina Anne PA-C: \"Patient sleepy and lethargic today.  Daughter at bedside provides collateral.  Patient slept better last night, only used the scheduled risperidone and did not need the additional 0.5mg.  She slept until 1:00AM, work briefly, then returned to sleep until morning.  Per RN, she was less restless.  Daughter has concerns regarding multiple medication trials within the past month at the nursing home with little efficacy.  She has concerns about transferring patient to psych unit due to the lack of family contact and unfamiliarity of environment, and prefers patient to be managed here then discharged back to facility.\"    1/13/2024 - Patient was able to sleep last night. She continues to present tired and lethargic this morning. Per RN and patient's daughter she is hard to arouse. She was taken for MRI of brain. Awaiting results. Discussed with patient's daughter about attempting to titrate down on Risperidone dose to see if this is the cause of excessive lethargy. She is in agreement. She does have concerns that the lethargy is multifactorial. Discussed that it is worth attempting to decrease Risperidone to see if this is the cause of excessive day time lethargy. Will continue to titrate medications as necessary.     1/14/24 - Slept about 7 hours last night with lowered dose of Risperidone. Patient was noted to have eyes closed this AM but quickly awoke when her name was spoken. She was noted to be calm and cooperative. Was noted to be confused and believed that she was at the [facility] and the year  was  but was accepting of reorientation. Spoke with daughter on the phone. Reports that patient called her this AM on the phone and seemed \"more herself\". Sitter at bedside indicated that patient ate majority of breakfast and has been appropriate. Will trial patient without sitter and continue current medication. Hopeful discharge back to [facility] tomorrow. Daughter provided with information for Howard [facility] for [facility].     Past Psychiatric History:  History of anxiety and depression. No history of inpatient or outpatient psychiatric hospitalizations.      Substance Use History:  Denies     Psych Family History:  Denies     Social and Developmental History:   Patient had previously been living by herself in [place] until 2022.  She was found by family members after she had been in a bathtub for over 24 hours.  Once hospitalized it was determined that she had a small stroke. Patient then moved in with her daughter. She is now residing at [facility]. She is . 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    Past Medical History:   Diagnosis Date    Anxiety     Arthritis     knees and shoulders    Breast CA ([facility])     right breast cancer with mastectomy.    Cancer ([facility])     right breast ca    Cystitis, interstitial     history of----    Flatulence/gas pain/belching     Frequent UTI     GI bleed     H/O mammogram 2011    left breast    H/O mammogram 2015    benign    H/O pregnancy 1967,1969        HIGH BLOOD PRESSURE     HIGH CHOLESTEROL     History of endoscopy 2013    Dr. Mesa    History of stomach ulcers     Loss of appetite     Nausea     Osteoarthritis     Other and unspecified hyperlipidemia     Other and unspecified personal history of malignant neoplasm     dr. luann mccall, [facility]  breast cancer right    Pap smear for cervical cancer screening 2003    wnl    Renal cyst, right 07/15/2015    Refer to   Pascial/urology.    Rheumatic fever     Stroke (HCC)     Unspecified essential hypertension     Visual impairment      Past Surgical History:   Procedure Laterality Date    COLONOSCOPY  2/02 4/08 2008 Dr. Perez, repeat in five years, family history of colon cancer, Diverticulosis    COLONOSCOPY  08/21/2013    Dr. Maciej Mesa    HYSTERECTOMY  1985    partial hystero.    MASTECTOMY RIGHT Right 2001    right breast cancer with mastectomy.    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    SPECIAL SERVICE OR REPORT  2001    mastectomy right    UNSPECIFIED DIAGNOSTIC PROCE  2005    cystoscopy     Family History   Problem Relation Age of Onset    Heart Disorder Father         chf    Cancer Mother         colon    Stroke Mother     Colon Cancer Mother     Breast Cancer Self 65    Hypertension Brother       reports that she has never smoked. She has never been exposed to tobacco smoke. She has never used smokeless tobacco. She reports that she does not drink alcohol and does not use drugs.    Allergies:  Allergies   Allergen Reactions    Bactrim [Sulfamethoxazole W/Trimethoprim] NAUSEA ONLY    Nitrofurantoin NAUSEA ONLY     Medications:  Current Facility-Administered Medications:     risperiDONE (RisperDAL) tab 0.5 mg, 0.5 mg, Oral, Nightly **AND** risperiDONE (RisperDAL) tab 0.25 mg, 0.25 mg, Oral, Nightly PRN    sodium chloride 0.9% infusion, , Intravenous, Continuous    labetalol (Trandate) 5 mg/mL injection 10 mg, 10 mg, Intravenous, Q4H PRN    metoprolol tartrate (Lopressor) tab 25 mg, 25 mg, Oral, TID Beta Blocker/Cardiac    OLANZapine (ZyPREXA) tab 5 mg, 5 mg, Oral, BID PRN **OR** OLANZapine (Zyprexa) 5 mg in sterile water for injection (PF) IM injection, 5 mg, Intramuscular, BID PRN    [Held by provider] losartan (Cozaar) tab 100 mg, 100 mg, Oral, Daily    acetaminophen (Tylenol Extra Strength) tab 500 mg, 500 mg, Oral, Q4H PRN    polyethylene glycol (PEG 3350) (Miralax) 17 g oral packet 17 g, 17 g, Oral, Daily PRN     sennosides (Senokot) tab 17.2 mg, 17.2 mg, Oral, Nightly PRN    bisacodyl (Dulcolax) 10 MG rectal suppository 10 mg, 10 mg, Rectal, Daily PRN    ondansetron (Zofran) 4 MG/2ML injection 8 mg, 8 mg, Intravenous, Q6H PRN    benzonatate (Tessalon) cap 200 mg, 200 mg, Oral, TID PRN    glycerin-hypromellose- (Artifical Tears) 0.2-0.2-1 % ophthalmic solution 1 drop, 1 drop, Both Eyes, QID PRN    sodium chloride (Saline Mist) 0.65 % nasal solution 1 spray, 1 spray, Each Nare, Q3H PRN    heparin (Porcine) 5000 UNIT/ML injection 5,000 Units, 5,000 Units, Subcutaneous, 2 times per day    atorvastatin (Lipitor) tab 10 mg, 10 mg, Oral, Nightly    escitalopram (Lexapro) tab 20 mg, 20 mg, Oral, Nightly    folic acid (Folvite) tab 1 mg, 1 mg, Oral, Daily    ferrous sulfate DR tab 325 mg, 325 mg, Oral, Daily with breakfast    methenamine (Hiprex) tab 1 g, 1 g, Oral, BID    pantoprazole (Protonix) DR tab 20 mg, 20 mg, Oral, QAM AC    Review of Systems   Unable to perform ROS: Dementia     Psychiatry Review Systems: Possibly having hallucinations at [facility] per son. Not sleeping at night.     Mental Status Exam:   Risk Assessment  Suicidal ideation: no suicidal ideation  Homicidal ideation: None noted    Appearance: disheveled, bruising about the face  Behavior: lethargic, sleepy  Attitude: cooperative, did whisper responses when spoken to  Gait: Not observed    Speech: soft and quiet    Mood:  sleepy  Affect: Congruent    Thought process: unable to assess  Thought content:  unable to assess  Perceptions: no hallucinations  Associations: Unable to assess    Orientation:  sleepy  Attention and Concentration:   poor, lethargic  Memory:  intact recent  Language: Unable to assess  Fund of Knowledge: Unable to assess    Insight: Limited   Judgment: Limited     Objective    Vitals:    01/14/24 0730   BP: 135/74   Pulse: 71   Resp: 18   Temp: 98 °F (36.7 °C)     Laboratory Data:           STUDIES:

## 2024-01-14 NOTE — PLAN OF CARE
Patient received sleeping but responsive and conversational when awake. Alert and oriented to self. Knows she is in the hospital, but not the name. Pt was able to take all her evening medication, IVF stopped. Pt says she is hungry and asked for food. Pt was given food and finished the whole sandwich and then fell asleep within one hour after scheduled dose of Risperdal..On 1L via NC. NSR with 1st deg AVB on tele. Incontinent of bowel and bladder, purewick in place. Sitter at bedside. No complaints of pain, shortness of breath or chest pain/discomfort. POC : DW, I&O's. Fall precautions in place. Call light within reach.    0630: Pt slept an average of 7 hrs on a 12 hr shift. Waking up periodically and starts talking, then will dose off and goes back to sleep.    Problem: Patient/Family Goals  Goal: Patient/Family Long Term Goal  Description: Patient's Long Term Goal: Stay out of the hospital    Interventions:  - Follow up with PCP  -Take medication as prescribed  -Healthy lifestyle  - See additional Care Plan goals for specific interventions  Outcome: Progressing  Goal: Patient/Family Short Term Goal  Description: Patient's Short Term Goal: Feel better    Interventions:   - Hourly rounding  -Pain assessment  -[place] patient  - See additional Care Plan goals for specific interventions  Outcome: Progressing     Problem: SAFETY ADULT - FALL  Goal: Free from fall injury  Description: INTERVENTIONS:  - Assess pt frequently for physical needs  - Identify cognitive and physical deficits and behaviors that affect risk of falls.  - [place] fall precautions as indicated by assessment.  - Educate pt/family on patient safety including physical limitations  - Instruct pt to call for assistance with activity based on assessment  - Modify environment to reduce risk of injury  - Provide assistive devices as appropriate  - Consider OT/PT consult to assist with strengthening/mobility  - Encourage toileting schedule  Outcome:  Progressing     Problem: CARDIOVASCULAR - ADULT  Goal: Maintains optimal cardiac output and hemodynamic stability  Description: INTERVENTIONS:  - Monitor vital signs, rhythm, and trends  - Monitor for bleeding, hypotension and signs of decreased cardiac output  - Evaluate effectiveness of vasoactive medications to optimize hemodynamic stability  - Monitor arterial and/or venous puncture sites for bleeding and/or hematoma  - Assess quality of pulses, skin color and temperature  - Assess for signs of decreased coronary artery perfusion - ex. Angina  - Evaluate fluid balance, assess for edema, trend weights  Outcome: Progressing  Goal: Absence of cardiac arrhythmias or at baseline  Description: INTERVENTIONS:  - Continuous cardiac monitoring, monitor vital signs, obtain 12 lead EKG if indicated  - Evaluate effectiveness of antiarrhythmic and heart rate control medications as ordered  - Initiate emergency measures for life threatening arrhythmias  - Monitor electrolytes and administer replacement therapy as ordered  Outcome: Progressing     Problem: RESPIRATORY - ADULT  Goal: Achieves optimal ventilation and oxygenation  Description: INTERVENTIONS:  - Assess for changes in respiratory status  - Assess for changes in mentation and behavior  - Position to facilitate oxygenation and minimize respiratory effort  - Oxygen supplementation based on oxygen saturation or ABGs  - Provide Smoking Cessation handout, if applicable  - Encourage broncho-pulmonary hygiene including cough, deep breathe, Incentive Spirometry  - Assess the need for suctioning and perform as needed  - Assess and instruct to report SOB or any respiratory difficulty  - Respiratory Therapy support as indicated  - Manage/alleviate anxiety  - Monitor for signs/symptoms of CO2 retention  Outcome: Progressing

## 2024-01-14 NOTE — PROGRESS NOTES
[facility]     Nephrology Progress Note    Fe Sal Patient Status:  Inpatient    1935 MRN [id number]   Location [facility] 2NE-A Attending Oseas Lovett,    Hosp Day # 2 PCP Ankit Singh MD       SUBJECTIVE:  Doing much better overall, taking po meals/liquids        Physical Exam:   /75 (BP Location: Right arm)   Pulse 68   Temp 98 °F (36.7 °C) (Oral)   Resp 18   Wt 134 lb 14.7 oz (61.2 kg)   SpO2 97%   BMI 25.49 kg/m²   Temp (24hrs), Av.9 °F (36.6 °C), Min:97.4 °F (36.3 °C), Max:98.2 °F (36.8 °C)       Intake/Output Summary (Last 24 hours) at 2024 1136  Last data filed at 2024 0905  Gross per 24 hour   Intake 1620 ml   Output 1500 ml   Net 120 ml     Last 3 Weights   24 0802 134 lb 14.7 oz (61.2 kg)   24 0827 128 lb 8 oz (58.3 kg)   24 0355 129 lb (58.5 kg)   24 1657 134 lb 0.6 oz (60.8 kg)   24 0627 136 lb 11 oz (62 kg)   24 1011 128 lb 8 oz (58.3 kg)   10/10/23 0000 136 lb 3.9 oz (61.8 kg)   10/05/23 2243 127 lb 8 oz (57.8 kg)   10/05/23 1622 130 lb (59 kg)   23 0221 126 lb (57.2 kg)   23 1558 120 lb (54.4 kg)     General: Arouses in no apparent distress.  HEENT: No scleral icterus, MMM, ecchymosis noted over forehead and periorbital area  Neck: Supple, no JOHNATHAN or thyromegaly  Cardiac: Regular rate and rhythm, S1, S2 normal, no murmur or rub  Lungs: Clear without wheezes, rales, rhonchi.    Abdomen: Soft, non-tender. + bowel sounds, no palpable organomegaly  Extremities: Without clubbing, cyanosis or edema.  Neurologic: moving all extremities  Skin: Warm and dry, no rash        Labs:     Recent Labs   Lab 24  0657 01/10/24  0708 24  0710   WBC 6.5 9.6 9.1   HGB 11.9* 13.6 14.4   MCV 88.7 86.9 88.1   .0 274.0 273.0       Recent Labs   Lab 01/10/24  0708 24  0710 24  0739 24  0608 24  0656   * 134* 135* 135* 139   K 3.5 4.5 4.8 4.8 4.8    103 105 108 111   CO2 27.0  25.0 25.0 24.0 25.0   BUN 19 34* 60* 78* 58*   CREATSERUM 0.77 1.12* 2.06* 1.81* 1.09*   CA 10.2* 10.8* 10.0 9.6 9.6   MG 2.2  --   --   --   --    PHOS 2.6  --   --   --   --    GLU 96 95 90 101* 96       Recent Labs   Lab 01/09/24  0657   ALT 18   AST 17   ALB 3.4       No results for input(s): \"PGLU\" in the last 168 hours.    Meds:           Impression/Plan:        1) Hyponatremia- multifactorial; due to modest solute intake + desmopressin effect (x 1 yr for nocturia / frequency). Given pt's overall decline / non-ambulatory state + edema -> dc DDAVP permanently.  Na has been stable and in normal range     2) VIJAY- due to prerenal azotemia (diuretics); minimal PVR / meds benign. Resolved and will stop IVF     3) HFpEF- echo noted; diuresed well / euvolemic. Diuretics DC'ed, consider low dose  on discharge     4) Recent UGIB s/p epi injection + cautery / endoclips on PPI     5) Recent hip fx s/p repair     6) Progressive dementia with severe sundowning- zyprexa / lexapro per psych    7) HTN- OK to  resume ARB     Questions/concerns were discussed with patient and/or family by bedside.        Jamie Alonzo MD  1/14/2024  11:36 AM

## 2024-01-14 NOTE — PROGRESS NOTES
[facility]     Hospitalist Progress Note     Fe Sal Patient Status:  Observation    1935 MRN [id number]   [facility] 2NE-A Attending Oseas Lovett,    Hosp Day # 2 PCP Ankit Singh MD     Chief Complaint: Delirium    Subjective:     Patient more alert this morning. Son at bedside     Objective:    Review of Systems:   A comprehensive review of systems was completed; pertinent positive and negatives stated in subjective.    Vital signs:  Temp:  [97.4 °F (36.3 °C)-98.2 °F (36.8 °C)] 98 °F (36.7 °C)  Pulse:  [67-87] 68  Resp:  [18-20] 18  BP: (126-153)/(68-86) 152/75  SpO2:  [93 %-100 %] 97 %    Physical Exam:    General: No acute distress  Respiratory: no wheezes, no rhonchi  Cardiovascular: S1, S2, regular rate and rhythm  Abdomen: Soft, Non-tender, non-distended, positive bowel sounds  Neuro: No new focal deficits.   Extremities: no edema      Diagnostic Data:    Labs:  Recent Labs   Lab 24  0657 01/10/24  0708 24  0710   WBC 6.5 9.6 9.1   HGB 11.9* 13.6 14.4   MCV 88.7 86.9 88.1   .0 274.0 273.0       Recent Labs   Lab 24  0657 01/10/24  0708 24  0739 24  0608 24  0656   GLU 90   < > 90 101* 96   BUN 34*   < > 60* 78* 58*   CREATSERUM 1.00   < > 2.06* 1.81* 1.09*   CA 9.5   < > 10.0 9.6 9.6   ALB 3.4  --   --   --   --    *   < > 135* 135* 139   K 4.2   < > 4.8 4.8 4.8   CL 98   < > 105 108 111   CO2 26.0   < > 25.0 24.0 25.0   ALKPHO 84  --   --   --   --    AST 17  --   --   --   --    ALT 18  --   --   --   --    BILT 0.6  --   --   --   --    TP 6.8  --   --   --   --     < > = values in this interval not displayed.       Estimated Creatinine Clearance: 28 mL/min (A) (based on SCr of 1.09 mg/dL (H)).    Recent Labs   Lab 24  0657   TROPHS 19       No results for input(s): \"PTP\", \"INR\" in the last 168 hours.               Microbiology    No results found for this visit on 24.      Imaging: Reviewed in  Epic.    Medications:    risperiDONE  0.5 mg Oral Nightly    metoprolol tartrate  25 mg Oral TID Beta Blocker/Cardiac    losartan  100 mg Oral Daily    heparin  5,000 Units Subcutaneous 2 times per day    atorvastatin  10 mg Oral Nightly    escitalopram  20 mg Oral Nightly    folic acid  1 mg Oral Daily    ferrous sulfate  325 mg Oral Daily with breakfast    methenamine  1 g Oral BID    pantoprazole  20 mg Oral QAM AC       Assessment & Plan:      #Acute encephalopathy, resolved   -Likely medication induced   -Melatonin discontinued as patient became lethargic after combining melatonin with psych meds   -Psych meds per Psych   -MRI brain reviewed      #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  -Zyprexa and Seroquel discontinued  -Risperidone dose decreased by Psych   -Psych following    #VIJAY, improving  -IVF discontinued per Nephro     #Hyponatremia, resolved    -DDAVP discontinued permanently by nephrology    #Acute on chronic HFpEF, resolved   -Echo reviewed  -IV Lasix discontinued per nephrology  -Consider low dose diuretic on discharge per nephro   -Cardiology following    #Hypertension  -Losartan on hold  -Metoprolol  -Amlodipine     #History of breast cancer     #History of stroke     #History of PUD  -S/p epi injection, cautery, endoclips on 10/9/2023  -PPI     #History of hip fracture s/p repair    Sitter discontinued today; likely DC to [facility] tomorrow       Oseas Lovett DO    Supplementary Documentation:     Quality:  DVT Mechanical Prophylaxis:   SCDs,    DVT Pharmacologic Prophylaxis   Medication    heparin (Porcine) 5000 UNIT/ML injection 5,000 Units                Code Status: DNAR/Selective Treatment  Sal: External urinary catheter in place  Sal Duration (in days):   Central line:    LB: 1/15/2024    Discharge is dependent on: Clinical improvement  At this point Ms. Sal is expected to be discharge to: Home    The 21st Century Cures Act makes medical notes like these available to  patients in the interest of transparency. Please be advised this is a medical document. Medical documents are intended to carry relevant information, facts as evident, and the clinical opinion of the practitioner. The medical note is intended as peer to peer communication and may appear blunt or direct. It is written in medical language and may contain abbreviations or verbiage that are unfamiliar.

## 2024-01-15 VITALS
RESPIRATION RATE: 18 BRPM | SYSTOLIC BLOOD PRESSURE: 116 MMHG | TEMPERATURE: 98 F | OXYGEN SATURATION: 97 % | DIASTOLIC BLOOD PRESSURE: 79 MMHG | HEART RATE: 73 BPM | WEIGHT: 133.63 LBS | BODY MASS INDEX: 25 KG/M2

## 2024-01-15 PROCEDURE — 99239 HOSP IP/OBS DSCHRG MGMT >30: CPT | Performed by: STUDENT IN AN ORGANIZED HEALTH CARE EDUCATION/TRAINING PROGRAM

## 2024-01-15 PROCEDURE — 99232 SBSQ HOSP IP/OBS MODERATE 35: CPT

## 2024-01-15 PROCEDURE — 99232 SBSQ HOSP IP/OBS MODERATE 35: CPT | Performed by: INTERNAL MEDICINE

## 2024-01-15 RX ORDER — ACETAMINOPHEN 500 MG
1000 TABLET ORAL EVERY 8 HOURS PRN
Status: DISCONTINUED | OUTPATIENT
Start: 2024-01-15 | End: 2024-01-15

## 2024-01-15 RX ORDER — RISPERIDONE 0.25 MG/1
TABLET ORAL
Qty: 90 TABLET | Refills: 0 | Status: SHIPPED | OUTPATIENT
Start: 2024-01-15

## 2024-01-15 NOTE — PAYOR COMM NOTE
--------------  ADMISSION REVIEW     Payor: [employer]  Subscriber #:  [id number]  Authorization Number: [id number]    ADMIT TO INPT STATUS 24  ADMIT TO OBSERVATION 24 Patient Seen in: [facility] Emergency Department    History   Stated Complaint: Fall    88-year-old white female presents to the emergency room today from the nursing home after a fall.  Patient apparently was sleeping in a chair and fell out of the chair.  According to transfer paperwork it was a witnessed fall.  Patient hit her head off the ground.  Now complains of head pain.  Has small bruise over left eye.  Patient denies neck pain chest pain or shortness of breath.  Patient however is audibly wheezing.  Patient denies other injury.  No loss of consciousness was reported from the fall by the paperwork that was sent in.  Patient has mild dementia but is able to communicate and answer some questions.    Objective:   Past Medical History:   Diagnosis Date    Anxiety     Arthritis     knees and shoulders    Breast CA (HCC)     right breast cancer with mastectomy.    Cancer (HCC)     right breast ca    Cystitis, interstitial     history of----    Flatulence/gas pain/belching     Frequent UTI     GI bleed     H/O mammogram 2011    left breast    H/O mammogram 2015    benign    H/O pregnancy 1967,1969        HIGH BLOOD PRESSURE     HIGH CHOLESTEROL     History of endoscopy 2013    Dr. Mesa    Loss of appetite     Nausea     Other and unspecified hyperlipidemia     Other and unspecified personal history of malignant neoplasm     dr. luann mccall, [facility]  breast cancer right    Pap smear for cervical cancer screening 2003    [facility]    Renal cyst, right 07/15/2015    Refer to Dr. Shaffer/urology.    Rheumatic fever     Stroke (HCC)     Unspecified essential hypertension      Past Surgical History:   Procedure Laterality Date    COLONOSCOPY   Dr. Perez, repeat in  five years, family history of colon cancer, Diverticulosis    COLONOSCOPY  08/21/2013    Dr. Maciej Mesa    HYSTERECTOMY  1985    partial hystero.    MASTECTOMY RIGHT Right 2001    right breast cancer with mastectomy.    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    SPECIAL SERVICE OR REPORT  2001    mastectomy right    UNSPECIFIED DIAGNOSTIC PROCE  2005    cystoscopy     Physical Exam     ED Triage Vitals [01/09/24 0627]   BP (!) 194/102   Pulse 80   Resp 16   Temp 98.2 °F (36.8 °C)   Temp src Temporal   SpO2 99 %   O2 Device None (Room air)     Current:BP (!) 175/100   Pulse 92   Temp 98.1 °F (36.7 °C) (Temporal)   Resp 20   Wt 62 kg   SpO2 99%   BMI 23.46 kg/m²     Physical Exam    Elderly white female who is sitting on the gurney, she is awake and alert and appears in no acute discomfort or distress.  Vital signs show she is a hypertensive otherwise vital signs are stable she is afebrile  Head is normocephalic with a small bruise noted over the left lateral eyebrow.  No periorbital step-off deformities noted.  Conjunctiva is clear.  Sclerae anicteric.  Neck is supple without cervical spine tenderness.  Lungs are diminished with wheezing to auscultation bilaterally.  Heart is regular rate and rhythm without murmur gallop or rub.  No chest wall tenderness is noted.  Abdomen is soft nondistended nontender to deep palpation there is no rebound or guarding noted no hepatosplenomegaly is noted.  No masses are noted.  No hernias are palpated.  Extremity exam reveals no clubbing cyanosis or edema.  Patient has good radial pulses noted bilaterally in the upper extremities .  Patient has no upper or lower extremity tenderness.  Patient moves all 4 extremities with some limitation range of motion of left shoulder due to previous surgery.  There are no rashes noted on skin exam.  Labs Reviewed   COMP METABOLIC PANEL (14) - Abnormal; Notable for the following components:       Result Value    Sodium 131 (*)     BUN 34  (*)     eGFR-Cr 55 (*)     All other components within normal limits   PRO BETA NATRIURETIC PEPTIDE - Abnormal; Notable for the following components:    Pro-Beta Natriuretic Peptide 2,609 (*)     All other components within normal limits   CBC W/ DIFFERENTIAL - Abnormal; Notable for the following components:    HGB 11.9 (*)     Monocyte Absolute 1.10 (*)     All other components within normal limits   TROPONIN I HIGH SENSITIVITY - Normal   SARS-COV-2/FLU A AND B/RSV BY PCR (GENEXPERT) - Normal     EKG 83  Sinus rhythm with first-degree AV block.  No acute ischemic changes are noted.  Some voltage criteria for LVH.  Agree with computer report for rate axes and intervals.     CT scan of the head reveals:     1. No acute intracranial abnormality identified.  Right frontal scalp hematoma.     2. Stable chronic ischemic changes as above. If there is clinical concern for acute ischemia/infarction, an MRI of the brain would be recommended for further evaluation.    Chest X-ray reveals:     Stable cardiac and mediastinal contours.  Diffuse interstitial thickening most in keeping with moderate interstitial pulmonary edema.  No discrete airspace consolidation.  No significant pleural effusion or appreciable pneumothorax.     Osteoarthritis of the shoulders and spine.  No acute osseous findings.      MDM      This is an 88-year-old female who fell out of her chair and hit her head.  Differential for this includes just contusion of forehead versus intracranial injury.  Also she has some wheezing noted on exam could be congestive heart failure, viral syndrome or pneumonia.  Patient will have laboratory studies sent here will get appropriate imaging studies as well.  Patient will be monitored here.    Patient had an IV established in the emergency room and laboratory workup revealed an EKG without ischemic changes.  Troponin is negative.  CBC is generally unremarkable.  Chemistries that are also unremarkable except for slightly  low sodium 131.  proBNP is elevated 2609.  Viral swabs are negative.  Chest x-ray which I reviewed myself is consistent with heart failure.  Patient was given some IV Lasix here in the emergency room.  I spoke to her cardiologist and we discussed the case.  Patient be admitted for treatment of her heart failure.  Also reviewed her CT scan of the head which showed no intracranial injury she appears to suffered only minor facial contusions after the fall.  [facility] with cardiology skeletons.  Patient be admitted to medical floor for further workup treatment evaluation.  Disposition and Plan     Clinical Impression:  1. Fall, initial encounter    2. Contusion of forehead, initial encounter    3. Acute on chronic congestive heart failure, unspecified heart failure type (HCC)         Signed by Dewey Barnett MD on 1/9/2024  9:48 AM           [facility]  History and Physical     Chief Complaint: delirium    Fe Sal is a 88 year old female with delirium.  Fell out of chair.  Son says she has been more confused and sundowning lately.  Also with some increased weight gain 20lbs last month.  CXR in ER with pulm edema changes.  Patient cannot give hx at this time.  No recent fevers, n/v, diarrhea, cough per son.    Objective:   Physical Exam:    /75 (BP Location: Right arm)   Pulse 84   Temp 97.1 °F (36.2 °C) (Oral)   Resp 18   Wt 136 lb 11 oz (62 kg)   SpO2 92%   BMI 23.46 kg/m²   General: confused, rousable but somnoelnt  Respiratory: No rhonchi, no wheezes  Cardiovascular: S1, S2. Regular rate and rhythm  Abdomen: Soft, NT/ND, +BS  Neuro: No new focal deficits  Extremities: trace LE  edema      Lab 01/09/24  0657   RBC 3.97   HGB 11.9*   HCT 35.2   MCV 88.7   MCH 30.0   MCHC 33.8   RDW 14.1   NEPRELIM 3.57   WBC 6.5   .0     GLU 90   BUN 34*   CREATSERUM 1.00   EGFRCR 55*   CA 9.5   ALB 3.4   *   K 4.2   CL 98   CO2  26.0   ALKPHO 84   AST 17   ALT 18   BILT 0.6   TP 6.8     TROPHS 19     PBNP 2,609*     Assessment & Plan:      #probable acute on chronic diastolic CHF exacerbation; iv lasix; cards to see  #suspected dementia with delirium and sundowning; psych consult given ongoing delirium and agitation despite increasing dosing of seroquel; also a family request for consult  #hyponatremia; 2/2 chf?  Contribution from ssri? Desmopressin as well. Monitor response to diuretics; cautiously resume desmopressin until renal can evaluate (son says she is on desmopressin for bladder urgency, not Na correction);  cautious resume of SSRI;  if Na not correcting with diuresis, will need to re-visit SSRI; defer desmopressin to renal; may need to also be stopped  #Hx breast cancer  #Hx stroke  #Hx PUD; s/p epi injection, cautery, endoclips 10/9/23; resume ppi;   #Hx hip fx s/p repair      PSYCH:  Reason for Consultation: delirium with history of major neurocognitive disorder     Impression: 88-year-old female presents with noted delirium with noted behavioral disturbances and a history of dementia.     Primary Psychiatric Diagnosis:  Acute encephalopathy  -Etiologies to include: underlying dementia,  electrolyte imbalance (Na, K, Mg, Phos), CHF exacerbation      Major neurocognitive disorder with behavioral disturbance     Depression, unspecified     Anxiety, unspecified     Personality Traits:  Deferred     Pertinent Medical Diagnoses:  Hyponatremia, CHF, and PUD     Recommendations:  Discontinue Seroquel.  Start Zyprexa 5 mg nightly with additional 2.5 mg PRN available if patient is not asleep after 45 minutes-1 hour.   Start on Zyprexa 2.5 mg PO/IV twice daily as needed for anxiety/agitation.   Continue Lexapro 20 mg for depression and anxiety  Delirium protocol:  Continue non-pharm delirium care- orientation, cognitive stimulation, family visits, lights on and blinds open in daytime and quiet and dark at night and PT when  appropriate.  Avoid benzos and opiates if possible since they can increase symptoms of delirium.  EKG completed on 1/9/2024.  ms.  UA ordered due to to patient's history of frequent urinary tract infections.  Please document how many hours patient sleeps and quality.      MARISSA Ann NP-C     History of Present Illness:  Patient presented to [facility] on 1/9/2024 after she was brought in by EMS from [facility] from home provide cardiomyopathy on the floor.  Staff witnessed the fall.  While in the ER patient was noted to have audible wheezing and an elevated proBNP.  She was also noted to be mildly hyponatremic at 131.  Chest x-ray noted that imaging was consistent with heart failure.  Patient was given IV Lasix and ER MD spoke to hospitalist and cardiology and patient was admitted for further treatment.      Patient was previously seen by our psychiatric services when she was hospitalized in October 2023 for similar presentation.  Patient has a reported history of dementia.  She has a history of developing delirium especially secondary to UTIs.  He reports that since she was discharged from the hospital in October 2023 that she has now become a resident of [facility].  Is reported that [facility] notes that patient is not sleeping at night and is impulsive.  She is hard to redirect.  Patient impulsive behavior causes her to be at risk for falls.  He notes that patient has a previous history of having trouble sleeping at night.  Reports that this was an issue when she was living at home as well but when she would receive trazodone that this was efficient and not.  Does note that she has a history of rumination.  Son does report that he feels like patient is depressed.  Son indicates that [facility] felt she might be hallucinating but was unsure on the details. Currently is prescribed Lexapro.  Reports recently prescribed Restoril, Xanax, and Seroquel with no noted effect. Son  does not report that patient was benefiting from those either. She is currently prescribed Lexapro.     Patient was noted to be sleeping when APRN first entered room. Patient had received PRN medications due to being restless and impulsive. Had just received PRN IM dose of Haldol 5 mg around 1:15 PM. She was holding her son's hand that was at bedside. Son stepped in the hallway to speak with APRN. Patient was sleeping still at first but then increased restlessness was noted when she realized that son was no longer present. APRN and son re-entered room. Patient denies feeling depressed or anxious. Denies any auditory or visual hallucinations. She endorses she is in the hospital due to a fall but reports year is 2023. She indicates that she wants her son to hold her hand so that she \"feels comforted\".      Discussion with son that when patient was hospitalized in October 2023 that she was started on Olanzapine (discharged with it being PRN) that seemed to aid in clearing up delirium and also aided in sleep. Discussed retrial of this medications to see if this is effective here as well. Discussed that medication could not result in results that we are attempting to achieve such as decreased agitation, restlessness, and sleep. It is possible that medication can also result in excessive sedation. Son is understanding and in agreement to trial of Zyprexa. PRN dosing will be available as well. Spoke with Dr. Scott, who is in agreement in ordering UA.       CARDS:    Impression:  Fall, initial encounter  Contusion of forehead, initial encounter  Acute diastolic congestive heart failure  Hyponatremia  Dementia with sundowning  Hx breast cancer  Hx stroke  Hx PUD; s/p epi injection, cautery, endoclips 10/9/23; resume ppi;   Hx hip fx s/p repair     Recommendations:  -tele monitoring  -agree with IV diuretics and will reassess daily based on clinical response, I/O, daily weights and lytes (renal consulted to help with DDAVP  dosing and patient hyponatremic)  -on low dose BB, ARB and statin    RENAL:    Assessment / Plan:     1) Hyponatremia- multifactorial; due to modest solute intake + desmopressin effect (x 1 yr for nocturia / frequency). Discussed with son- given pt's overall decline / non-ambulatory state + edema, will dc DDAVP for now.      2) HFpEF- echo noted; agree with IV loop diuretics     3) Recent UGIB s/p epi injection + cautery / endoclips on PPI     4) Recent hip fx s/p repair     5) Progressive dementia with severe sundowning      1/10:    CARDS:    Patient confused overnight, sundowning, trying to get out of bed, requiring sitter at the bedside.  Sitting up in a chair this morning, confused, denies any chest pain or SOB, reports mild forehead pain d/t fall.       Objective:  BP (!) 181/99 (BP Location: Left arm)   Pulse 77   Temp 98.2 °F (36.8 °C) (Axillary)   Resp 22   Wt 136 lb 11 oz (62 kg)   SpO2 96%   BMI 23.46 kg/m²      Telemetry: SR, HR 70s, PVCs            Last 3 Weights   01/09/24 0627 136 lb 11 oz (62 kg)   10/10/23 0000 136 lb 3.9 oz (61.8 kg)   10/05/23 2243 127 lb 8 oz (57.8 kg)   10/05/23 1622 130 lb (59 kg)   09/26/23 0221 126 lb (57.2 kg)      Lab 01/09/24  0657 01/10/24  0708   GLU 90 96   BUN 34* 19   CREATSERUM 1.00 0.77   EGFRCR 55* 75   CA 9.5 10.2*   * 135*   K 4.2 3.5   CL 98 100   CO2 26.0 27.0      RBC 3.97 4.57   HGB 11.9* 13.6   HCT 35.2 39.7   MCV 88.7 86.9   MCH 30.0 29.8   MCHC 33.8 34.3   RDW 14.1 13.9   NEPRELIM 3.57  --    WBC 6.5 9.6   .0 274.0       Physical Exam:    Gen: confused,oriented to name, NAD  Heent: pupils equal, reactive. Mucous membranes moist.   Neck: no jvd  Cardiac: regular rate and rhythm, normal S1,S2, soft systolic murmur, no gallop or rub   Lungs: CTA  Abd: soft, NT/ND +bs  Ext: trace of lower extremities edema  Skin: Warm, dry, bruising to right forehead   Neuro: No focal deficits, confused       Medications:      losartan  100 mg Oral Daily     metoprolol tartrate  12.5 mg Oral TID Beta Blocker/Cardiac    furosemide  40 mg Intravenous Daily    heparin  5,000 Units Subcutaneous 2 times per day    atorvastatin  10 mg Oral Nightly    escitalopram  20 mg Oral Nightly    folic acid  1 mg Oral Daily    ferrous sulfate  325 mg Oral Daily with breakfast    methenamine  1 g Oral BID    pantoprazole  20 mg Oral QAM AC    OLANZapine  5 mg Oral Nightly         Assessment:  Witnessed fall at the  nursing facility with head contusion   CT head showed no acute intracranial abnormalities, right frontal sclap hematoma   EKG showed SR with 1 st degree AV block, no acute ST changes   CXR diffuse interstitial pulmonary edema     Acute diastolic heart failure  proBNP 2,609   Echo 10/6/23 LVEF 60-65%, no rwma, mild-mod aortic regurgitation, mild MR, mod TR, PASP 50 mm Hg  Diuresis with IV Lasix, net fluid off 4.5L, kidney function stable, crt 0.77 today  Hyponatremia,  on admit  Was on DDAVP PTA d/t nocturia/frequency- now dc per nephrology    Dementia with sundowning   Hx of CVA  Hx of breast CA  Hx of PUD s/p epi injection, cautery, endoclips 10/9/23, on PPI  Recent hip fx s/p repair   HTN, blood pressure elevated 180/90, agitation probably contributing    On Losartan 100 mg, metoprolol tartrate 12.5 mg tid-will up titrate.        Plan:  Still volume positive- continue IV diuresis with lasix, kidney function stable today, will plan to transition to PO tomorrow.    Nephrology following plan to dc DDAVP permanently.   Continue metoprolol, losartan, statin.    Confusion with delirium and sundowning, high fall risk, sitter at the bedside-psych consulted.        MARISSA Fernandez     Patient seen and examined independently.  Note reviewed and labs reviewed. Agree with above assessment and plan.  88-year-old female who presented with a witnessed fall at her nursing facility and was noted to have acute diastolic heart failure.  She has been diuresed with IV Lasix and is  net -4.5 L.  Renal function remained stable.  DDAVP was discontinued given hyponatremia.  At this time, patient is clinically approaching euvolemia.  No further IV loop diuretic after today.  Recheck metabolic panel in morning.  Will continue to follow.        RENAL:     Assessment and Plan:     1) Hyponatremia- multifactorial; due to modest solute intake + desmopressin effect (x 1 yr for nocturia / frequency). Given pt's overall decline / non-ambulatory state + edema, will dc DDAVP permanently      2) HFpEF- echo noted; continue loop diuretics as per cards     3) Recent UGIB s/p epi injection + cautery / endoclips on PPI     4) Recent hip fx s/p repair     5) Progressive dementia with severe sundowning      PSYCH:      Interval History  1/10/2024 - Patient was reported to only get 1 hour of sleep last night. She only received the 5 mg scheduled dosing and does not appear she got the second 2.5 mg dose if she was not asleep in an hour. She was restless throughout the night. Sitter was at bedside due to impulsive behaviors and patient being a high fall risk. Patient has been noted to be restless and agitated throughout the day time which is a new pattern. She typically does sundown in the evening and [facility] has had issues with patient sleeping at night. Her impulsive, agitated, restless behaviors has resulted in patient not following redirection that then results in frequent falls. Discussed some medications previously tried at [facility]. Daughter indicated that patient has been on Trazodone, Restoril, Remeron, Xanax, and Seroquel. She is unsure if they have prescribed patient Risperidone but reports she did not write it down. Discussed that per chart review it appears that Zyprexa did previously work for patient. Daughter indicates she does not recall this. Discussed that it is possible that patient requires higher dosing. Daughter is in agreement to increase dosing of Zyprexa prior to trial of Risperidone. She  was educated on black box warning of Zyprexa and also other side effects. She is in agreement with adjustment. She also inquired about inpatient psychiatric hospitalizations. Patient would have to be medically cleared prior to inpatient psychiatric hospitalization but it is a possibility that since she is the patient's POA that if medication regimen is unable to be established while she is hospitalized at [facility] that it would be beneficial and appropriate for patient to go to inpatient psychiatric hospital for continued medication adjustments. Daughter verbalized understanding. Will also give PRN dosing of IM Zyprexa at this time as well since patient is presenting as anxious, agitated, restless, and impulsive at this time.       Recommendations:  Discontinue Seroquel.  Increase Zyprexa to 10 mg nightly with additional 5 mg PRN available if patient is not asleep after 45 minutes-1 hour.   Increase Zyprexa to 5 mg PO/IV twice daily as needed for anxiety/agitation.   Continue Lexapro 20 mg for depression and anxiety  Delirium protocol:  Continue non-pharm delirium care- orientation, cognitive stimulation, family visits, lights on and blinds open in daytime and quiet and dark at night and PT when appropriate.  Avoid benzos and opiates if possible since they can increase symptoms of delirium.  EKG completed on 1/9/2024.  ms.  UA ordered due to to patient's history of frequent urinary tract infections.  Please document how many hours patient sleeps and quality.   If continue to unable to get patient's anxiety, insomnia, restlessness, irritability, and impulsive behaviors under control then it is possible that patient will be in need of inpatient psychiatric treatment to establish appropriate medications regimen is achieved.     1/11:    RENAL:    Assessment and Plan:     1) Hyponatremia- multifactorial; due to modest solute intake + desmopressin effect (x 1 yr for nocturia / frequency). Given pt's overall decline  / non-ambulatory state + edema, will dc DDAVP permanently      2) HFpEF- echo noted; diuresed well- dc lasix given azotemia     3) Recent UGIB s/p epi injection + cautery / endoclips on PPI     4) Recent hip fx s/p repair     5) Progressive dementia with severe sundowning- zyprexa / lexapro per psych     6) HTN- no evidence of a secondary etiology; add amlodipine to usual losartan / metoprolol       PSYCH:    1/11/2024 - Received PRN dosing of IM Zyprexa yesterday afternoon and per RN, patient did calm a little but still presented as agitated and restless. She received scheduled dose of Zyprexa 10 mg at 20:54 and was noted to be dozing on and off but needed additional dose of Zyprexa 5 mg at 22:20. Per chart review, patient was noted to be sleeping around 2 AM till about 4 AM. Woke up again restless and agitated around 4 AM. Fell asleep briefly again around 6 AM. Patient has continued to be restless and impulsive this morning. RN gave PRN dose of PO Zyprexa at 9:25 AM. Spoke with patient's son. Discussed switching patient over to Risperidone from Olanzapine to see if this would allow patient to achieve sleep. Also discussed that patient might require dosing during the day as well. Continued discussion on possible need of inpatient psychiatric hospitalization for stabilization. Patient is unfortunately out of network at [facility]. Discussed that she might be in network with [facility]. Discussed his concern that [facility]'s will not let patient return. Will continue to try to adjusting dosing based on response.    Recommendations:  Discontinue Seroquel.  Discontinue scheduled Zyprexa. Does not seem to be efficient. (Received 15 mg last night and still was restless and barely slept).  Start on Risperidone 1 mg nightly with additional 0.5 mg available if patient is not asleep/drowsy within 1 hour. It is possible that patient might need some scheduled dosing during day as well.    Continue Zyprexa to 5 mg PO/IV twice  daily as needed for anxiety/agitation.   Continue Lexapro 20 mg for depression and anxiety  Delirium protocol:  Continue non-pharm delirium care- orientation, cognitive stimulation, family visits, lights on and blinds open in daytime and quiet and dark at night and PT when appropriate.  Avoid benzos and opiates if possible since they can increase symptoms of delirium.  EKG completed on 1/9/2024.  ms.  UA ordered due to to patient's history of frequent urinary tract infections.  Please document how many hours patient sleeps and quality.   If continue to unable to get patient's anxiety, insomnia, restlessness, irritability, and impulsive behaviors under control then it is possible that patient will be in need of inpatient psychiatric treatment to establish appropriate medications regimen is achieved.       CARDS:    Patient is sleeping     Objective:  BP (!) 161/85 (BP Location: Left arm)   Pulse 75   Temp 97.5 °F (36.4 °C) (Axillary)   Resp 26   Wt 136 lb 11 oz (62 kg)   SpO2 94%   BMI 23.46 kg/m²      Telemetry: SR     Lab 01/09/24  0657 01/10/24  0708 01/11/24  0710   GLU 90 96 95   BUN 34* 19 34*   CREATSERUM 1.00 0.77 1.12*   EGFRCR 55* 75 48*   CA 9.5 10.2* 10.8*   ALB 3.4  --   --    * 135* 134*   K 4.2 3.5 4.5   CL 98 100 103   CO2 26.0 27.0 25.0   ALKPHO 84  --   --    AST 17  --   --    ALT 18  --   --    BILT 0.6  --   --    TP 6.8  --   --       RBC 3.97 4.57 4.87   HGB 11.9* 13.6 14.4   HCT 35.2 39.7 42.9   MCV 88.7 86.9 88.1   MCH 30.0 29.8 29.6   MCHC 33.8 34.3 33.6   RDW 14.1 13.9 14.2   NEPRELIM 3.57  --   --    WBC 6.5 9.6 9.1   .0 274.0 273.0       Physical Exam:    Gen: sleeping  Heent:left orbital bruising  Cardiac: Regular rate and rhythm, normal S1,S2  Lungs: diminished  Abd: Soft, non tender, non distended  Ext: No edema  Skin: Warm, dry      Medications:      amLODIPine  5 mg Oral Daily    metoprolol tartrate  25 mg Oral TID Beta Blocker/Cardiac    OLANZapine  10 mg  Oral Nightly    losartan  100 mg Oral Daily    heparin  5,000 Units Subcutaneous 2 times per day    atorvastatin  10 mg Oral Nightly    escitalopram  20 mg Oral Nightly    folic acid  1 mg Oral Daily    ferrous sulfate  325 mg Oral Daily with breakfast    methenamine  1 g Oral BID    pantoprazole  20 mg Oral QAM AC       Assessment:  Witnessed fall at the  nursing facility with head contusion   CT head showed no acute intracranial abnormalities, right frontal sclap hematoma   EKG showed SR with 1 st degree AV block, no acute ST changes   CXR diffuse interstitial pulmonary edema     Troponin negative x 2  Acute diastolic heart failure  proBNP 2,609   Echo 10/6/23 LVEF 60-65%, no rwma, mild-mod aortic regurgitation, mild MR, mod TR, PASP 50 mm Hg  Diuresis with IV Lasix, net fluid off 4.6L, no weights. kidney function stable, crt 1.12 today  Hyponatremia,  on admit  Was on DDAVP PTA d/t nocturia/frequency- now dc'd permanently per nephrology    Dementia with sundowning   Hx of CVA  Hx of breast CA  Hx of PUD s/p epi injection, cautery, endoclips 10/9/23, on PPI  Recent hip fx s/p repair   HTN, 161/85  On Losartan 100 mg, metoprolol tartrate 12.5 mg tid-will up titrate.   Amolidpine added per renal today     Plan:  No lasix due to increased creatinine  Continue ARB, BB, staitn . Amlodipine added per renal toady     Plan of care discussed with patient, RN.     CHRISTY Campos       Patient seen and examined     Pt sleeping soundly; BP now normal. Suspect HTN issues related to agitation.     Lungs:clear, CV:RRR, Abd: soft, Ext: no edema     Continue titration of BP meds as need.  Volume managed by renal.     D/W pt's son at bedside     Bebeto Mehta MD [facility]      1/12:    PSYCH:    1/12/2024 - Patient sleepy and lethargic today.  Daughter at bedside provides collateral.  Patient slept better last night, only used the scheduled risperidone and did not need the additional 0.5mg.  She slept until 1:00AM, work  briefly, then returned to sleep until morning.  Per RN, she was less restless.  Daughter has concerns regarding multiple medication trials within the past month at the nursing home with little efficacy.  She has concerns about transferring patient to psych unit due to the lack of family contact and unfamiliarity of environment, and prefers patient to be managed here then discharged back to facility.     Recommendations:  Discontinue Seroquel.  Scheduled Zyprexa was discontinued. Did not seem to be effective: patient received 15 mg Weds night and still was restless and barely slept.  Continue risperidone 1 mg nightly with additional 0.5 mg available if patient is not asleep/drowsy within 1 hour. It is possible that patient might need some scheduled dosing during day as well.  Continue Zyprexa to 5 mg PO/IV twice daily as needed for anxiety/agitation.   Continue Lexapro 20 mg for depression and anxiety  Delirium protocol:  Continue non-pharm delirium care- orientation, cognitive stimulation, family visits, lights on and blinds open in daytime and quiet and dark at night and PT when appropriate.  Avoid benzos and opiates if possible since they can increase symptoms of delirium.  EKG completed on 1/9/2024.  ms.  UA ordered due to to patient's history of frequent urinary tract infections.  Please document how many hours patient sleeps and quality of the sleep.   If unable to get patient's anxiety, insomnia, restlessness, irritability, and impulsive behaviors under control then it is possible patient will require inpatient psychiatric treatment to establish appropriate medication regimen.       RENAL:    Assessment and Plan:     1) Hyponatremia- multifactorial; due to modest solute intake + desmopressin effect (x 1 yr for nocturia / frequency). Given pt's overall decline / non-ambulatory state + edema -> dc DDAVP permanently      2) VIJAY- due to prerenal azotemia (diuretics); minimal PVR / meds benign. Agree with  IVF / hold ARB     3) HFpEF- echo noted; diuresed well / euvolemic. Diuretics DC'ed     4) Recent UGIB s/p epi injection + cautery / endoclips on PPI     5) Recent hip fx s/p repair     6) Progressive dementia with severe sundowning- zyprexa / lexapro per psych       CARDS:    Patient opens eyes to speech but is drowsy this morning. Daughter at bedside states she had a better night with more sleep.      Objective:  BP 98/65 (BP Location: Left arm)   Pulse 88   Temp 97.7 °F (36.5 °C) (Oral)   Resp 18   Wt 129 lb (58.5 kg)   SpO2 98%   BMI 22.14 kg/m²      Telemetry: NSR w/1st AVB, rare PVC        Lab 01/09/24  0657 01/10/24  0708 01/11/24  0710   GLU 90 96 95   BUN 34* 19 34*   CREATSERUM 1.00 0.77 1.12*   EGFRCR 55* 75 48*   CA 9.5 10.2* 10.8*   * 135* 134*   K 4.2 3.5 4.5   CL 98 100 103   CO2 26.0 27.0 25.0      Lab 01/09/24  0657 01/10/24  0708 01/11/24  0710   RBC 3.97 4.57 4.87   HGB 11.9* 13.6 14.4   HCT 35.2 39.7 42.9   MCV 88.7 86.9 88.1   MCH 30.0 29.8 29.6   MCHC 33.8 34.3 33.6   RDW 14.1 13.9 14.2   NEPRELIM 3.57  --   --    WBC 6.5 9.6 9.1   .0 274.0 273.0        Physical Exam:  Gen: drowsy, confused, NAD  Heent: pupils equal, reactive. Mucous membranes dry; R scalp hematoma, bilateral orbital ecchymosis  Neck: no jvd  Cardiac: regular rate and rhythm, normal S1,S2, no murmur, clicks, rub or gallop  Lungs: Diminished effort  Abd: soft, NT/ND +bs  Ext: no edema  Skin: Warm, dry, ecchymotic, R scalp hematoma  Neuro: No focal deficits   Medications:      amLODIPine  5 mg Oral Daily    risperiDONE  1 mg Oral Nightly    metoprolol tartrate  25 mg Oral TID Beta Blocker/Cardiac    losartan  100 mg Oral Daily    heparin  5,000 Units Subcutaneous 2 times per day    atorvastatin  10 mg Oral Nightly    escitalopram  20 mg Oral Nightly    folic acid  1 mg Oral Daily    ferrous sulfate  325 mg Oral Daily with breakfast    methenamine  1 g Oral BID    pantoprazole  20 mg Oral QAM AC          Assessment:  Witnessed Fall at Nursing Home with R frontal scalp contusion  Does not appear to be syncopal event; pt had been in her chair at NH, and fell from chair. No loss of consciousness noted from NH staff  CT Brain: No acute intracranial abnormality; R scalp hematoma  ECG NSR with 1st AVB  Acute on Chronic HFpEF  CXR with mod pulm edema, no pleural effusion on admit  TTE 10/2023 LVEF 60-65%, mild-mod AR, mild MR, mod TR, PASP 50mmHg  proBNP 2609 on admit  Diuresed with IV Lasix: net approx -4.7L since admit; wt down 7 lbs since admit  Diuretics now discontinued with rise of Cr   Appears compensated on exam  Hypertension  Initially hypertensive on admission, now BP well controlled  On losartan, metoprolol, and amlodipine started yesterday  Hyponatremia  Na 131 on admit - improved, 135 today  Historically on desmopressin for bladder urgency - now discontinued d/t hyponatremia  Hx Dementia with sundowning, delirium  Psych on consult  Risperdone/lexapro  VIJAY  BUN/Cr increased today, 60/2.06  Diuretics discontinued on 1/10  Nephrology following  Hyperlipidemia - on statin  Hx CVA  Hx PUD s/p epi injection, cautery, endoclips 10/9/23  On PPI  Hx Hip Fx s/p repair     Plan:  Diuretics discontinued on 1/10. Further volume management per nephrology  Patient's daughter concerned about possible orthostasis with her BP lower than usual. Will check orthostatic BP today.   Cardiology will sign off at this time. Please call with any questions or concerns.      Plan of care discussed with patient, RN.     MARISSA Chin     Patient seen and examined independently.  Note reviewed and labs reviewed. Agree with above assessment and plan.  88-year-old female who presented with acute on chronic HFpEF as well as a witnessed fall at her facility.  Patient has been diuresed with IV Lasix, however today it appears as though she may have been over diuresed.  Discontinue diuretics for now.  Hold ARB.  Okay with gentle IV fluid  hydration.  Check orthostatic vitals.  Recheck BMP tomorrow.  Resumption of diuretics per nephrology.  Cardiology will follow peripherally.  Please do not hesitate contact if any questions or concerns arise.        1/13:     PSYCH:    1/13/2024 - Patient was able to sleep last night. She continues to present tired and lethargic this morning. Per RN and patient's daughter she is hard to arouse. She was taken for MRI of brain. Awaiting results. Discussed with patient's daughter about attempting to titrate down on Risperidone dose to see if this is the cause of excessive lethargy. She is in agreement. She does have concerns that the lethargy is multifactorial. Discussed that it is worth attempting to decrease Risperidone to see if this is the cause of excessive day time lethargy. Will continue to titrate medications as necessary.        Recommendations:  Discontinue Seroquel.  Scheduled Zyprexa was discontinued. Did not seem to be effective: patient received 15 mg Weds night and still was restless and barely slept.  Decrease Risperidone to 0.5 mg nightly with additional 0.25 mg available if patient is not asleep/drowsy within 1 hour. It is possible that patient might need some scheduled dosing during day as well.  Continue Zyprexa to 5 mg PO/IV twice daily as needed for anxiety/agitation.   Continue Lexapro 20 mg for depression and anxiety  Delirium protocol:  Continue non-pharm delirium care- orientation, cognitive stimulation, family visits, lights on and blinds open in daytime and quiet and dark at night and PT when appropriate.  Avoid benzos and opiates if possible since they can increase symptoms of delirium.  EKG completed on 1/9/2024.  ms.  UA ordered due to to patient's history of frequent urinary tract infections.  Please document how many hours patient sleeps and quality of the sleep.   If unable to get patient's anxiety, insomnia, restlessness, irritability, and impulsive behaviors under control then  it is possible patient will require inpatient psychiatric treatment to establish appropriate medication regimen.       RENAL:    Lab 01/09/24  0657 01/10/24  0708 01/11/24  0710 01/12/24  0739 01/13/24  0608   * 135* 134* 135* 135*   K 4.2 3.5 4.5 4.8 4.8   CL 98 100 103 105 108   CO2 26.0 27.0 25.0 25.0 24.0   BUN 34* 19 34* 60* 78*   CREATSERUM 1.00 0.77 1.12* 2.06* 1.81*   CA 9.5 10.2* 10.8* 10.0 9.6   MG  --  2.2  --   --   --    PHOS  --  2.6  --   --   --    GLU 90 96 95 90 101*        Impression/Plan:        1) Hyponatremia- multifactorial; due to modest solute intake + desmopressin effect (x 1 yr for nocturia / frequency). Given pt's overall decline / non-ambulatory state + edema -> dc DDAVP permanently.  Na has been stable     2) VIJAY- due to prerenal azotemia (diuretics); minimal PVR / meds benign. Agree with IVF ongoing 75/HR / hold ARB     3) HFpEF- echo noted; diuresed well / euvolemic. Diuretics DC'ed     4) Recent UGIB s/p epi injection + cautery / endoclips on PPI     5) Recent hip fx s/p repair     6) Progressive dementia with severe sundowning- zyprexa / lexapro per psych

## 2024-01-15 NOTE — PLAN OF CARE
Patient received with her eyes closed, but awoke when name was spoken. Alert and oriented x2, calm and cooperative. On RA with O2 sats > 90%. NSR with 1st deg AVB on tele. Incontinent of bowel and bladder. No complaints of pain, shortness of breath or chest pain/discomfort. POC : DC planning. Fall precautions in place. Call light within reach.    0430: Pt slept until midnight then woke up a couple of times every hour until 0400 looking for her son Casey. Pt will dose off and then goes back to sleep.    0630: Pt slept from [id number], woken up for morning meds then goes back to sleep.      Problem: Patient/Family Goals  Goal: Patient/Family Long Term Goal  Description: Patient's Long Term Goal: Stay out of the hospital    Interventions:  - Follow up with PCP  -Take medication as prescribed  -Healthy lifestyle  - See additional Care Plan goals for specific interventions  Outcome: Progressing     Problem: Patient/Family Goals  Goal: Patient/Family Short Term Goal  Description: Patient's Short Term Goal: Feel better    Interventions:   - Hourly rounding  -Pain assessment  -[place] patient  - See additional Care Plan goals for specific interventions  Outcome: Progressing     Problem: SAFETY ADULT - FALL  Goal: Free from fall injury  Description: INTERVENTIONS:  - Assess pt frequently for physical needs  - Identify cognitive and physical deficits and behaviors that affect risk of falls.  - [place] fall precautions as indicated by assessment.  - Educate pt/family on patient safety including physical limitations  - Instruct pt to call for assistance with activity based on assessment  - Modify environment to reduce risk of injury  - Provide assistive devices as appropriate  - Consider OT/PT consult to assist with strengthening/mobility  - Encourage toileting schedule  Outcome: Progressing     Problem: CARDIOVASCULAR - ADULT  Goal: Maintains optimal cardiac output and hemodynamic stability  Description: INTERVENTIONS:  -  Monitor vital signs, rhythm, and trends  - Monitor for bleeding, hypotension and signs of decreased cardiac output  - Evaluate effectiveness of vasoactive medications to optimize hemodynamic stability  - Monitor arterial and/or venous puncture sites for bleeding and/or hematoma  - Assess quality of pulses, skin color and temperature  - Assess for signs of decreased coronary artery perfusion - ex. Angina  - Evaluate fluid balance, assess for edema, trend weights  Outcome: Progressing     Problem: CARDIOVASCULAR - ADULT  Goal: Absence of cardiac arrhythmias or at baseline  Description: INTERVENTIONS:  - Continuous cardiac monitoring, monitor vital signs, obtain 12 lead EKG if indicated  - Evaluate effectiveness of antiarrhythmic and heart rate control medications as ordered  - Initiate emergency measures for life threatening arrhythmias  - Monitor electrolytes and administer replacement therapy as ordered  Outcome: Progressing     Problem: RESPIRATORY - ADULT  Goal: Achieves optimal ventilation and oxygenation  Description: INTERVENTIONS:  - Assess for changes in respiratory status  - Assess for changes in mentation and behavior  - Position to facilitate oxygenation and minimize respiratory effort  - Oxygen supplementation based on oxygen saturation or ABGs  - Provide Smoking Cessation handout, if applicable  - Encourage broncho-pulmonary hygiene including cough, deep breathe, Incentive Spirometry  - Assess the need for suctioning and perform as needed  - Assess and instruct to report SOB or any respiratory difficulty  - Respiratory Therapy support as indicated  - Manage/alleviate anxiety  - Monitor for signs/symptoms of CO2 retention  Outcome: Progressing

## 2024-01-15 NOTE — PROGRESS NOTES
[facility]  Report of Psychiatric Progress Note    Fe Sal Patient Status:  Observation    1935 MRN [id number]   Formerly [facility] 2NE-A Attending Tom Choudhury MD   Hosp Day # 3 PCP Ankit Singh MD     Date of Admission: 2024  Date of Service: 1/15/2024  Reason for Consultation: delirium with history of major neurocognitive disorder    Impression: 88-year-old female presents with delirium with behavioral disturbances and a history of dementia.    Primary Psychiatric Diagnosis:  Acute encephalopathy  -Etiologies to include: underlying dementia,  electrolyte imbalance (Na, K, Mg, Phos), CHF exacerbation      Major neurocognitive disorder with behavioral disturbance     Depression, unspecified     Anxiety, unspecified    Personality Traits:  Deferred     Pertinent Medical Diagnoses:  Hyponatremia, CHF, and PUD    Recommendations:  Discontinue Seroquel.  Scheduled Zyprexa was discontinued. Did not seem to be effective: patient received 15 mg Weds night and still was restless and barely slept.  Continue Risperidone 0.5 mg nightly with additional 0.25 mg available if patient is not asleep/drowsy within 1 hour. Script was provided  In the future, it is possible that patient might need some scheduled dosing during day as well.  Continue Zyprexa to 5 mg PO/IV twice daily as needed for anxiety/agitation while in the hosptial.   Continue Lexapro 20 mg for depression and anxiety  Delirium protocol:  Continue non-pharm delirium care- orientation, cognitive stimulation, family visits, lights on and blinds open in daytime and quiet and dark at night and PT when appropriate.  Avoid benzos and opiates if possible since they can increase symptoms of delirium.  EKG completed on 2024.  ms.  UA ordered due to to patient's history of frequent urinary tract infections.  Please document how many hours patient sleeps and quality of the sleep.   Cleared to discharge from psychiatric perspective.    Daughter provided contact information for PALLAVI Mendoza for [facility].     Hayley ANN NP-C    History of Present Illness:  Patient presented to [facility] on 1/9/2024 after she was brought in by EMS from [facility] from home provide cardiomyopathy on the floor.  Staff witnessed the fall.  While in the ER patient was noted to have audible wheezing and an elevated proBNP.  She was also noted to be mildly hyponatremic at 131.  Chest x-ray noted that imaging was consistent with heart failure.  Patient was given IV Lasix and ER MD spoke to hospitalist and cardiology and patient was admitted for further treatment.     Patient was previously seen by our psychiatric services when she was hospitalized in October 2023 for similar presentation.  Patient has a reported history of dementia.  She has a history of developing delirium especially secondary to UTIs.  He reports that since she was discharged from the hospital in October 2023 that she has now become a resident of [facility].  Is reported that [facility] notes that patient is not sleeping at night and is impulsive.  She is hard to redirect.  Patient impulsive behavior causes her to be at risk for falls.  He notes that patient has a previous history of having trouble sleeping at night.  Reports that this was an issue when she was living at home as well but when she would receive trazodone that this was efficient and not.  Does note that she has a history of rumination.  Son does report that he feels like patient is depressed.  Son indicates that [facility] felt she might be hallucinating but was unsure on the details. Currently is prescribed Lexapro.  Reports recently prescribed Restoril, Xanax, and Seroquel with no noted effect. Son does not report that patient was benefiting from those either. She is currently prescribed Lexapro.    Patient was noted to be sleeping when APRN first entered room. Patient had received PRN medications  due to being restless and impulsive. Had just received PRN IM dose of Haldol 5 mg around 1:15 PM. She was holding her son's hand that was at bedside. Son stepped in the hallway to speak with APRN. Patient was sleeping still at first but then increased restlessness was noted when she realized that son was no longer present. APRN and son re-entered room. Patient denies feeling depressed or anxious. Denies any auditory or visual hallucinations. She endorses she is in the hospital due to a fall but reports year is 2023. She indicates that she wants her son to hold her hand so that she \"feels comforted\".     Discussion with son that when patient was hospitalized in October 2023 that she was started on Olanzapine (discharged with it being PRN) that seemed to aid in clearing up delirium and also aided in sleep. Discussed retrial of this medications to see if this is effective here as well. Discussed that medication could not result in results that we are attempting to achieve such as decreased agitation, restlessness, and sleep. It is possible that medication can also result in excessive sedation. Son is understanding and in agreement to trial of Zyprexa. PRN dosing will be available as well. Spoke with Dr. Scott, who is in agreement in ordering UA.    Interval History  1/10/2024 - Patient was reported to only get 1 hour of sleep last night. She only received the 5 mg scheduled dosing and does not appear she got the second 2.5 mg dose if she was not asleep in an hour. She was restless throughout the night. Sitter was at bedside due to impulsive behaviors and patient being a high fall risk. Patient has been noted to be restless and agitated throughout the day time which is a new pattern. She typically does sundown in the evening and [facility] has had issues with patient sleeping at night. Her impulsive, agitated, restless behaviors has resulted in patient not following redirection that then results in frequent falls.  Discussed some medications previously tried at [facility]. Daughter indicated that patient has been on Trazodone, Restoril, Remeron, Xanax, and Seroquel. She is unsure if they have prescribed patient Risperidone but reports she did not write it down. Discussed that per chart review it appears that Zyprexa did previously work for patient. Daughter indicates she does not recall this. Discussed that it is possible that patient requires higher dosing. Daughter is in agreement to increase dosing of Zyprexa prior to trial of Risperidone. She was educated on black box warning of Zyprexa and also other side effects. She is in agreement with adjustment. She also inquired about inpatient psychiatric hospitalizations. Patient would have to be medically cleared prior to inpatient psychiatric hospitalization but it is a possibility that since she is the patient's POA that if medication regimen is unable to be established while she is hospitalized at [facility] that it would be beneficial and appropriate for patient to go to inpatient psychiatric hospital for continued medication adjustments. Daughter verbalized understanding. Will also give PRN dosing of IM Zyprexa at this time as well since patient is presenting as anxious, agitated, restless, and impulsive at this time.    1/11/2024 - Received PRN dosing of IM Zyprexa yesterday afternoon and per RN, patient did calm a little but still presented as agitated and restless. She received scheduled dose of Zyprexa 10 mg at 20:54 and was noted to be dozing on and off but needed additional dose of Zyprexa 5 mg at 22:20. Per chart review, patient was noted to be sleeping around 2 AM till about 4 AM. Woke up again restless and agitated around 4 AM. Fell asleep briefly again around 6 AM. Patient has continued to be restless and impulsive this morning. RN gave PRN dose of PO Zyprexa at 9:25 AM. Spoke with patient's son. Discussed switching patient over to Risperidone from Olanzapine to see if  this would allow patient to achieve sleep. Also discussed that patient might require dosing during the day as well. Continued discussion on possible need of inpatient psychiatric hospitalization for stabilization. Patient is unfortunately out of network at [facility]. Discussed that she might be in network with [facility]. Discussed his concern that [facility] will not let patient return. Will continue to try to adjusting dosing based on response.    1/12/2024 - Per Marina Anne PA-C: \"Patient sleepy and lethargic today.  Daughter at bedside provides collateral.  Patient slept better last night, only used the scheduled risperidone and did not need the additional 0.5mg.  She slept until 1:00AM, work briefly, then returned to sleep until morning.  Per RN, she was less restless.  Daughter has concerns regarding multiple medication trials within the past month at the nursing home with little efficacy.  She has concerns about transferring patient to psych unit due to the lack of family contact and unfamiliarity of environment, and prefers patient to be managed here then discharged back to facility.\"    1/13/2024 - Patient was able to sleep last night. She continues to present tired and lethargic this morning. Per RN and patient's daughter she is hard to arouse. She was taken for MRI of brain. Awaiting results. Discussed with patient's daughter about attempting to titrate down on Risperidone dose to see if this is the cause of excessive lethargy. She is in agreement. She does have concerns that the lethargy is multifactorial. Discussed that it is worth attempting to decrease Risperidone to see if this is the cause of excessive day time lethargy. Will continue to titrate medications as necessary.     1/14/24 - Slept about 7 hours last night with lowered dose of Risperidone. Patient was noted to have eyes closed this AM but quickly awoke when her name was spoken. She was noted to be calm and cooperative. Was noted to be  confused and believed that she was at the [place] and the year was 1997 but was accepting of reorientation. Spoke with daughter on the phone. Reports that patient called her this AM on the phone and seemed \"more herself\". Sitter at bedside indicated that patient ate majority of breakfast and has been appropriate. Will trial patient without sitter and continue current medication. Hopeful discharge back to [facility] tomorrow. Daughter provided with information for Howard ANN [facility].     1/15/24 - Patient noted to be lethargic this morning. At home caregiver, Sylwia, was at bedside. Reports per chart review was that patient slept from after receiving scheduled Risperidone (around 8:30 PM) till about midnight. From midnight till about 4 AM she would periodically wake up, call out for her son, and then fall back asleep. Slept again from 4-6:30 AM and woke up for morning medications and then went back to sleep. Discussed with patient's daughter. Spoke about patient could have received PRN dose of Risperidone as well and that would have likely helped with her sleeping throughout the night. Daughter is still comfortable with discharge back to [facility] today. Patient is cleared from a psychiatric perspective.     Past Psychiatric History:  History of anxiety and depression. No history of inpatient or outpatient psychiatric hospitalizations.      Substance Use History:  Denies     Psych Family History:  Denies     Social and Developmental History:   Patient had previously been living by herself in [place] until December 2022.  She was found by family members after she had been in a bathtub for over 24 hours.  Once hospitalized it was determined that she had a small stroke. Patient then moved in with her daughter. She is now residing at [facility]. She is . 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    Past Medical History:   Diagnosis Date    Anxiety     Arthritis     knees  and shoulders    Breast CA (HCC) 2001    right breast cancer with mastectomy.    Cancer (HCC) 2001    right breast ca    Cystitis, interstitial     history of----    Flatulence/gas pain/belching     Frequent UTI     GI bleed     H/O mammogram 2011    left breast    H/O mammogram 2015    benign    H/O pregnancy 1967,1969        HIGH BLOOD PRESSURE     HIGH CHOLESTEROL     History of endoscopy 2013    Dr. Mesa    History of stomach ulcers     Loss of appetite     Nausea     Osteoarthritis     Other and unspecified hyperlipidemia     Other and unspecified personal history of malignant neoplasm     dr. luann mccall, ill  breast cancer right    Pap smear for cervical cancer screening 2003    wnl    Renal cyst, right 07/15/2015    Refer to Dr. Shaffer/urology.    Rheumatic fever     Stroke (HCC)     Unspecified essential hypertension     Visual impairment      Past Surgical History:   Procedure Laterality Date    COLONOSCOPY   Dr. Perez, repeat in five years, family history of colon cancer, Diverticulosis    COLONOSCOPY  2013    Dr. Maciej Mesa    HYSTERECTOMY  1985    partial hystero.    MASTECTOMY RIGHT Right     right breast cancer with mastectomy.    NEEDLE BIOPSY RIGHT Right     DCIS    SPECIAL SERVICE OR REPORT  2001    mastectomy right    UNSPECIFIED DIAGNOSTIC PROCE  2005    cystoscopy     Family History   Problem Relation Age of Onset    Heart Disorder Father         chf    Cancer Mother         colon    Stroke Mother     Colon Cancer Mother     Breast Cancer Self 65    Hypertension Brother       reports that she has never smoked. She has never been exposed to tobacco smoke. She has never used smokeless tobacco. She reports that she does not drink alcohol and does not use drugs.    Allergies:  Allergies   Allergen Reactions    Bactrim [Sulfamethoxazole W/Trimethoprim] NAUSEA ONLY    Nitrofurantoin NAUSEA ONLY     Medications:  Current  Facility-Administered Medications:     risperiDONE (RisperDAL) tab 0.5 mg, 0.5 mg, Oral, Nightly **AND** risperiDONE (RisperDAL) tab 0.25 mg, 0.25 mg, Oral, Nightly PRN    labetalol (Trandate) 5 mg/mL injection 10 mg, 10 mg, Intravenous, Q4H PRN    metoprolol tartrate (Lopressor) tab 25 mg, 25 mg, Oral, TID Beta Blocker/Cardiac    OLANZapine (ZyPREXA) tab 5 mg, 5 mg, Oral, BID PRN **OR** OLANZapine (Zyprexa) 5 mg in sterile water for injection (PF) IM injection, 5 mg, Intramuscular, BID PRN    losartan (Cozaar) tab 100 mg, 100 mg, Oral, Daily    acetaminophen (Tylenol Extra Strength) tab 500 mg, 500 mg, Oral, Q4H PRN    polyethylene glycol (PEG 3350) (Miralax) 17 g oral packet 17 g, 17 g, Oral, Daily PRN    sennosides (Senokot) tab 17.2 mg, 17.2 mg, Oral, Nightly PRN    bisacodyl (Dulcolax) 10 MG rectal suppository 10 mg, 10 mg, Rectal, Daily PRN    ondansetron (Zofran) 4 MG/2ML injection 8 mg, 8 mg, Intravenous, Q6H PRN    benzonatate (Tessalon) cap 200 mg, 200 mg, Oral, TID PRN    glycerin-hypromellose- (Artifical Tears) 0.2-0.2-1 % ophthalmic solution 1 drop, 1 drop, Both Eyes, QID PRN    sodium chloride (Saline Mist) 0.65 % nasal solution 1 spray, 1 spray, Each Nare, Q3H PRN    heparin (Porcine) 5000 UNIT/ML injection 5,000 Units, 5,000 Units, Subcutaneous, 2 times per day    atorvastatin (Lipitor) tab 10 mg, 10 mg, Oral, Nightly    escitalopram (Lexapro) tab 20 mg, 20 mg, Oral, Nightly    folic acid (Folvite) tab 1 mg, 1 mg, Oral, Daily    ferrous sulfate DR tab 325 mg, 325 mg, Oral, Daily with breakfast    methenamine (Hiprex) tab 1 g, 1 g, Oral, BID    pantoprazole (Protonix) DR tab 20 mg, 20 mg, Oral, QAM AC    Review of Systems   Unable to perform ROS: Dementia     Psychiatry Review Systems: Possibly having hallucinations at [facility] per son. Not sleeping at night.     Mental Status Exam:   Risk Assessment  Suicidal ideation: no suicidal ideation  Homicidal ideation: None noted    Appearance:  disheveled, bruising about the face  Behavior: lethargic, sleepy  Attitude: cooperative, did whisper responses when spoken to  Gait: Not observed    Speech: soft and quiet    Mood:  sleepy  Affect: Congruent    Thought process: unable to assess  Thought content:  unable to assess  Perceptions: no hallucinations  Associations: Unable to assess    Orientation:  sleepy  Attention and Concentration:   poor, lethargic  Memory:  intact recent  Language: Unable to assess  Fund of Knowledge: Unable to assess    Insight: Limited   Judgment: Limited     Objective    Vitals:    01/15/24 0637   BP: 134/70   Pulse: 70   Resp:    Temp:      Laboratory Data:           STUDIES:

## 2024-01-15 NOTE — CM/SW NOTE
Per Rounds pt may be able to dc today.  MSW sent message to LACHELLE to start insurance auth.    11am  Updates sent to snf  MSW asked snf if pt can come back private pay until insurance responds-await reply from snf    11:15am   Dtr updated on Possible dc, agreeable to return to [facility] but wants to talk to Psych. MSW sent message to psych.    1:47pm  \[facility]\"" will take pt back while insurance is pending.    2:52pm  Expected Discharge Plan:    [place] [facility]  Call for report to: [phone]  Transportation provider-[facility]  due to dx Dementia  DC Time: 5:30 pm  Pt/Family aware of plan:  MSW left 2x messages for  Dtr Erika. 2;50pm and 3:20pm, 4:15pm  MSW spoke to pts son-agreeable to dc @4:17pm  [facility] aware of dc plan: Patient discussed in care rounds.  PCS form completed

## 2024-01-15 NOTE — PROGRESS NOTES
[facility]     Hospitalist Progress Note     Fe Sal Patient Status:  Inpatient    1935 MRN [id number]   [facility] 2NE-A Attending Bony, Amanuel Lau, *   Hosp Day # 3 PCP Ankit Singh MD     Chief Complaint: Delirium    Subjective:      - Pt with caregiver at bedside, endorsing left shoulder pain, similar to prior.    - Occasionally confused, Aox2, missed year   - Denies cp, sob, abd pain, n/v    Objective:    Review of Systems:   A comprehensive review of systems was completed; pertinent positive and negatives stated in subjective.    Vital signs:  Temp:  [97.4 °F (36.3 °C)-99.2 °F (37.3 °C)] 97.4 °F (36.3 °C)  Pulse:  [68-79] 70  Resp:  [17-18] 18  BP: (127-171)/(66-84) 134/70  SpO2:  [93 %-97 %] 93 %    Physical Exam:    General: No acute distress  Respiratory: no wheezes, no rhonchi  Cardiovascular: S1, S2, regular rate and rhythm  Abdomen: Soft, Non-tender, non-distended, positive bowel sounds  Neuro: No new focal deficits. CN II-XII intact, strength 5/5 in bilateral UE, 3+/5 in bilateral LE. Able to wiggle toes bilaterally.   Extremities: no edema    Diagnostic Data:    Labs:  Recent Labs   Lab 24  0657 01/10/24  0708 24  0710   WBC 6.5 9.6 9.1   HGB 11.9* 13.6 14.4   MCV 88.7 86.9 88.1   .0 274.0 273.0       Recent Labs   Lab 24  0657 01/10/24  0708 24  0739 24  0608 24  0656   GLU 90   < > 90 101* 96   BUN 34*   < > 60* 78* 58*   CREATSERUM 1.00   < > 2.06* 1.81* 1.09*   CA 9.5   < > 10.0 9.6 9.6   ALB 3.4  --   --   --   --    *   < > 135* 135* 139   K 4.2   < > 4.8 4.8 4.8   CL 98   < > 105 108 111   CO2 26.0   < > 25.0 24.0 25.0   ALKPHO 84  --   --   --   --    AST 17  --   --   --   --    ALT 18  --   --   --   --    BILT 0.6  --   --   --   --    TP 6.8  --   --   --   --     < > = values in this interval not displayed.       Estimated Creatinine Clearance: 28 mL/min (A) (based on SCr of 1.09 mg/dL (H)).    Recent Labs    Lab 01/09/24  0657   TROPHS 19       No results for input(s): \"PTP\", \"INR\" in the last 168 hours.               Microbiology    No results found for this visit on 01/09/24.      Imaging: Reviewed in Epic.    Medications:    risperiDONE  0.5 mg Oral Nightly    metoprolol tartrate  25 mg Oral TID Beta Blocker/Cardiac    losartan  100 mg Oral Daily    heparin  5,000 Units Subcutaneous 2 times per day    atorvastatin  10 mg Oral Nightly    escitalopram  20 mg Oral Nightly    folic acid  1 mg Oral Daily    ferrous sulfate  325 mg Oral Daily with breakfast    methenamine  1 g Oral BID    pantoprazole  20 mg Oral QAM AC       Assessment & Plan:      #Acute encephalopathy, resolved   -Likely medication induced   -Melatonin discontinued as patient became lethargic after combining melatonin with psych meds   -Psych meds per Psych   -MRI brain reviewed, no acute CVA or mass  - Infx w/u : CXR and UA without infection, no leukocytosis, COVID/flu negative     #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  -Zyprexa and Seroquel discontinued  -Risperidone dose decreased by Psych   -Psych following     #VIJAY, improving  -IVF discontinued per Nephro      #Hyponatremia, resolved    -DDAVP discontinued permanently by nephrology     #Acute on chronic HFpEF, resolved   -Echo reviewed  -IV Lasix discontinued per nephrology  -Consider low dose diuretic on discharge per nephro   -Cardiology following    #Hypertension  -Losartan on hold  -Metoprolol  -Amlodipine     #History of breast cancer     #History of stroke     #History of PUD  -S/p epi injection, cautery, endoclips on 10/9/2023  -PPI     #History of hip fracture s/p repair    Dc to [facility]     Amanuel Lovett MD    Supplementary Documentation:     Quality:  DVT Mechanical Prophylaxis:   SCDs,    DVT Pharmacologic Prophylaxis   Medication    heparin (Porcine) 5000 UNIT/ML injection 5,000 Units                Code Status: DNAR/Selective Treatment  Sal: External  urinary catheter in place  Sal Duration (in days):   Central line:    LB: 1/15/2024    The 21st Century Cures Act makes medical notes like these available to patients in the interest of transparency. Please be advised this is a medical document. Medical documents are intended to carry relevant information, facts as evident, and the clinical opinion of the practitioner. The medical note is intended as peer to peer communication and may appear blunt or direct. It is written in medical language and may contain abbreviations or verbiage that are unfamiliar.

## 2024-01-15 NOTE — PLAN OF CARE
Pt. Is alert to self. Drowsy this AM. Pt. Has poor appetite. Pt. Is 1st degree AV block on tele. On RA O2 in the high 90's. Pt. Denies pain.   Pt. Incontinent of bowel and bladder.   Pt. Turning side to side. Resting in bed this AM.   BP's elevated. PRN labetolol given.MD notified. Upon assessment at 0900 pt. Appeared to have slight left facial droop and decreased  strength left arm. MD notified. No new orders at this time. Upon later assesment at 1100  strength was equal bilaterally. Smile more symmetrical. Spoke with Pt. Caregiver and  pt. Has left shoulder pain/issues chronically.   BP responsive to PRN and daily Losartan.   Pt. Family updated on plan of care. Possible discharge. Today       Problem: Patient/Family Goals  Goal: Patient/Family Long Term Goal  Description: Patient's Long Term Goal: Stay out of the hospital    Interventions:  - Follow up with PCP  -Take medication as prescribed  -Healthy lifestyle  - See additional Care Plan goals for specific interventions  Outcome: Progressing  Goal: Patient/Family Short Term Goal  Description: Patient's Short Term Goal: Feel better    Interventions:   - Hourly rounding  -Pain assessment  -[place] patient  - See additional Care Plan goals for specific interventions  Outcome: Progressing     Problem: SAFETY ADULT - FALL  Goal: Free from fall injury  Description: INTERVENTIONS:  - Assess pt frequently for physical needs  - Identify cognitive and physical deficits and behaviors that affect risk of falls.  - [place] fall precautions as indicated by assessment.  - Educate pt/family on patient safety including physical limitations  - Instruct pt to call for assistance with activity based on assessment  - Modify environment to reduce risk of injury  - Provide assistive devices as appropriate  - Consider OT/PT consult to assist with strengthening/mobility  - Encourage toileting schedule  Outcome: Progressing     Problem: CARDIOVASCULAR - ADULT  Goal: Maintains  optimal cardiac output and hemodynamic stability  Description: INTERVENTIONS:  - Monitor vital signs, rhythm, and trends  - Monitor for bleeding, hypotension and signs of decreased cardiac output  - Evaluate effectiveness of vasoactive medications to optimize hemodynamic stability  - Monitor arterial and/or venous puncture sites for bleeding and/or hematoma  - Assess quality of pulses, skin color and temperature  - Assess for signs of decreased coronary artery perfusion - ex. Angina  - Evaluate fluid balance, assess for edema, trend weights  Outcome: Progressing  Goal: Absence of cardiac arrhythmias or at baseline  Description: INTERVENTIONS:  - Continuous cardiac monitoring, monitor vital signs, obtain 12 lead EKG if indicated  - Evaluate effectiveness of antiarrhythmic and heart rate control medications as ordered  - Initiate emergency measures for life threatening arrhythmias  - Monitor electrolytes and administer replacement therapy as ordered  Outcome: Progressing     Problem: RESPIRATORY - ADULT  Goal: Achieves optimal ventilation and oxygenation  Description: INTERVENTIONS:  - Assess for changes in respiratory status  - Assess for changes in mentation and behavior  - Position to facilitate oxygenation and minimize respiratory effort  - Oxygen supplementation based on oxygen saturation or ABGs  - Provide Smoking Cessation handout, if applicable  - Encourage broncho-pulmonary hygiene including cough, deep breathe, Incentive Spirometry  - Assess the need for suctioning and perform as needed  - Assess and instruct to report SOB or any respiratory difficulty  - Respiratory Therapy support as indicated  - Manage/alleviate anxiety  - Monitor for signs/symptoms of CO2 retention  Outcome: Progressing     Problem: Impaired Functional Mobility  Goal: Achieve highest/safest level of mobility/gait  Description: Interventions:  - Assess patient's functional ability and stability  - Promote increasing activity/tolerance for  mobility and gait  - Educate and engage patient/family in tolerated activity level and precautions    Outcome: Progressing     Problem: Impaired Cognition  Goal: Patient will exhibit improved attention, thought processing and/or memory  Description: Interventions:    Outcome: Progressing     Problem: Impaired Communication  Goal: Patient will achieve maximal communication potential  Description: Interventions:    Outcome: Progressing     Problem: Delirium  Goal: Minimize duration of delirium  Description: Interventions:  - Encourage use of hearing aids, eye glasses  - Promote highest level of mobility daily  - Provide frequent reorientation  - Promote wakefulness i.e. lights on, blinds open  - Promote sleep, encourage patient's normal rest cycle i.e. lights off, TV off, minimize noise and interruptions  - Encourage family to assist in orientation and promotion of home routines  Outcome: Progressing

## 2024-01-15 NOTE — PROGRESS NOTES
[facility]  Nephrology Progress Note    Fe Sal Attending:  Oseas Lovett DO       Assessment and Plan:    1) Hyponatremia- multifactorial; due to modest solute intake + desmopressin effect (x 1 yr for nocturia / frequency). Given pt's overall decline / non-ambulatory state + edema -> dc DDAVP permanently     2) VIJAY- due to prerenal azotemia (diuretics)- resolved     3) HFpEF- echo noted; compensated.      4) Recent UGIB s/p epi injection + cautery / endoclips on PPI     5) Recent hip fx s/p repair     6) Progressive dementia with severe sundowning- risperdal / lexapro per psych    Will follow peripherally- please call with questions- thanks. DC planning.       Subjective:  Able to answer questions more accurately, no c/o     Physical Exam:   /79 (BP Location: Left arm)   Pulse 63   Temp 98.1 °F (36.7 °C) (Axillary)   Resp 18   Wt 133 lb 9.6 oz (60.6 kg)   SpO2 97%   BMI 25.24 kg/m²   Temp (24hrs), Av.2 °F (36.8 °C), Min:97.4 °F (36.3 °C), Max:99.2 °F (37.3 °C)       Intake/Output Summary (Last 24 hours) at 1/15/2024 1204  Last data filed at 1/15/2024 1134  Gross per 24 hour   Intake 680 ml   Output 1125 ml   Net -445 ml     Wt Readings from Last 3 Encounters:   01/15/24 133 lb 9.6 oz (60.6 kg)   24 128 lb 8 oz (58.3 kg)   10/10/23 136 lb 3.9 oz (61.8 kg)     General: awake   HEENT: No scleral icterus, MMM  Neck: Supple, no JOHNATHAN or thyromegaly  Cardiac: Regular rate and rhythm, S1, S2 normal, no murmur or tub  Lungs: Decreased BS at bases bilaterally   Abdomen: Soft, non-tender. + bowel sounds, no palpable organomegaly  Extremities: Without clubbing, cyanosis; trace LE edema  Neurologic: Cranial nerves grossly intact, moving all extremities  Skin: Warm and dry, no rashes       Labs:          Imaging:  All imaging studies reviewed.    Meds:           Questions/concerns were discussed with patient and/or family by bedside.          Brigido King MD  2024  848 AM

## 2024-01-15 NOTE — DISCHARGE PLANNING
NURSING DISCHARGE NOTE    Discharged Nursing home via Ambulance.  Accompanied by Support staff  Belongings Taken by patient/family.    IV removed. Discharge education completed. All questions answered.  Report called to Facility. Lori MARQUEZ

## 2024-01-15 NOTE — PHYSICAL THERAPY NOTE
Chart reviewed, RN approved PT session. Attempted to see pt, however pt is being DC from hospital at 5 pm. ROCÍO

## 2024-01-16 ENCOUNTER — MOBILE ENCOUNTER (OUTPATIENT)
Dept: FAMILY MEDICINE CLINIC | Facility: CLINIC | Age: 89
End: 2024-01-16

## 2024-01-16 DIAGNOSIS — I50.32 CHRONIC DIASTOLIC CONGESTIVE HEART FAILURE (HCC): ICD-10-CM

## 2024-01-16 DIAGNOSIS — I10 ESSENTIAL HYPERTENSION: ICD-10-CM

## 2024-01-16 DIAGNOSIS — F01.54 VASCULAR DEMENTIA WITH ANXIETY, UNSPECIFIED DEMENTIA SEVERITY (HCC): ICD-10-CM

## 2024-01-16 DIAGNOSIS — R29.6 FREQUENT FALLS: Primary | ICD-10-CM

## 2024-01-16 PROCEDURE — 99306 1ST NF CARE HIGH MDM 50: CPT | Performed by: FAMILY MEDICINE

## 2024-01-16 PROCEDURE — 1111F DSCHRG MED/CURRENT MED MERGE: CPT | Performed by: FAMILY MEDICINE

## 2024-01-16 PROCEDURE — G0317 PROLNG NSG FAC E/M EA ADDL 15 MIN: HCPCS | Performed by: FAMILY MEDICINE

## 2024-01-16 NOTE — PROGRESS NOTES
Fe Sal Author: Ankit Singh MD     1935 MRN [id number]   Last Hospital  Admission 24      Last Hospital Discharge 1/15/24 PCP Ankit Singh MD   Hospital of Discharge  [facility]        CC --readmitted from [facility], frequent falls, delirium      H.P.I Fe Sal is a 88 year old female long-term resident of [facility] had been falling frequently, she was sent out to the emergency room after a fall, after the patient was sleeping in a chair and fell out of the chair, this was witnessed by others, patient did hit her head on the ground and developed a bruise over the left eye, patient was sent to the emergency room and was admitted for treatment of her heart failure as her BNP was elevated along with low sodium  Patient was diagnosed with acute encephalopathy likely medication induced, her melatonin was discontinued as it was interacting with her psych medications  Encephalopathy was also was presumed due to her worsening dementia with delirium and severe sundowning syndrome  Seroquel and Effexor were discontinued and her dose of risperidone was also decreased  Acute on chronic congestive heart failure resolved with IV Lasix transition to oral  Patient was stabilized and transferred back to [facility]   Discharge diagnosis  Frequent falls  Acute encephalopathy  Dementia with delirium acute kidney insufficiency  Hyponatremia  Acute on chronic congestive heart failure  Hypertension history of stroke history of liver ulcer disease      Patient seen in her room today, she is having lunch  Complains of left shoulder pain  She has been taking Tylenol that helped with the pain  Otherwise patient denies any complaints  Her appetite is good        Past Medical History:   Diagnosis Date    Anxiety     Arthritis     knees and shoulders    Breast CA (Formerly [facility])     right breast cancer with mastectomy.    Cancer (Formerly [facility]) 2001    right breast ca    Cystitis, interstitial     history of----     Flatulence/gas pain/belching     Frequent UTI     GI bleed     H/O mammogram 2011    left breast    H/O mammogram 2015    benign    H/O pregnancy 1967,1969        HIGH BLOOD PRESSURE     HIGH CHOLESTEROL     History of endoscopy 2013    Dr. Mesa    History of stomach ulcers     Loss of appetite     Nausea     Osteoarthritis     Other and unspecified hyperlipidemia     Other and unspecified personal history of malignant neoplasm     dr. luann mccall, ill  breast cancer right    Pap smear for cervical cancer screening 2003    wnl    Renal cyst, right 07/15/2015    Refer to Dr. Shaffer/urology.    Rheumatic fever     Stroke (HCC)     Unspecified essential hypertension     Visual impairment      Past Surgical History:   Procedure Laterality Date    COLONOSCOPY   Dr. Perez, repeat in five years, family history of colon cancer, Diverticulosis    COLONOSCOPY  2013    Dr. Maciej Mesa    HYSTERECTOMY  1985    partial hystero.    MASTECTOMY RIGHT Right     right breast cancer with mastectomy.    NEEDLE BIOPSY RIGHT Right     DCIS    SPECIAL SERVICE OR REPORT      mastectomy right    UNSPECIFIED DIAGNOSTIC PROCE  2005    cystoscopy     Family History   Problem Relation Age of Onset    Heart Disorder Father         chf    Cancer Mother         colon    Stroke Mother     Colon Cancer Mother     Breast Cancer Self 65    Hypertension Brother      Social History     Socioeconomic History    Marital status:    Tobacco Use    Smoking status: Never     Passive exposure: Never    Smokeless tobacco: Never   Vaping Use    Vaping Use: Never used   Substance and Sexual Activity    Alcohol use: No    Drug use: No    Sexual activity: Never   Other Topics Concern    Caffeine Concern Yes     Comment: tea     Exercise No   Social History Narrative    Lives with daughter.     Social Determinants of Health     Food Insecurity: No Food Insecurity (2024)     Food Insecurity     Food Insecurity: Never true   Transportation Needs: No Transportation Needs (1/9/2024)    Transportation Needs     Lack of Transportation: No   Housing Stability: Low Risk  (1/9/2024)    Housing Stability     Housing Instability: No       ALLERGIES:  Allergies   Allergen Reactions    Bactrim [Sulfamethoxazole W/Trimethoprim] NAUSEA ONLY    Nitrofurantoin NAUSEA ONLY       CODE STATUS:  DNR    CURRENT MEDICATIONS   Current Outpatient Medications   Medication Sig Dispense Refill    risperiDONE 0.25 MG Oral Tab Take 2 tablets (0.5 mg total) by mouth nightly. May also take 1 tablet (0.25 mg total) nightly as needed (sleep). 90 tablet 0    methenamine 1 g Oral Tab Take 1 tablet (1 g total) by mouth 2 (two) times daily.      Cholestyramine 4 g Oral Powd Pack Take 1 packet by mouth 2 (two) times daily.      saccharomyces boulardii 250 MG Oral Cap Take 1 capsule (250 mg total) by mouth daily.      lidocaine 5 % External Patch Place 1 patch onto the skin daily. 12h on and 12h off      omeprazole 20 MG Oral Capsule Delayed Release Take 1 capsule (20 mg total) by mouth every morning.      metoprolol tartrate 25 MG Oral Tab Take 0.5 tablets (12.5 mg total) by mouth TID Beta Blocker/Cardiac. 30 tablet 2    folic acid 1 MG Oral Tab Take 1 tablet (1 mg total) by mouth daily. 30 tablet 0    atorvastatin 10 MG Oral Tab Take 1 tablet (10 mg total) by mouth nightly. 30 tablet 3    aspirin 325 MG Oral Tab Take 1 tablet (325 mg total) by mouth daily.      ferrous sulfate 325 (65 FE) MG Oral Tab EC Take 1 tablet (325 mg total) by mouth daily with breakfast.      losartan 100 MG Oral Tab Take 1 tablet (100 mg total) by mouth daily.      Multiple Vitamin (MULTI-VITAMIN) Oral Tab Take 1 tablet by mouth daily.      ESCITALOPRAM 20 MG Oral Tab TAKE 1 TABLET(20 MG) BY MOUTH DAILY (Patient taking differently: Take 1 tablet (20 mg total) by mouth at bedtime.) 90 tablet 1    acetaminophen 325 MG Oral Tab Take 2 tablets (650 mg  total) by mouth every 6 (six) hours as needed.      CRANBERRY EXTRACT OR Take 4,200 mg by mouth 3 (three) times daily.      Omega-3 Fatty Acids (FISH OIL) 1200 MG Oral Cap Take 1,200 mg by mouth daily.         REVIEW OF SYSTEMS:  Review of Systems:   Constitutional: No fevers, chills, fatigue or night sweats.  ENT: No mouth pain, neck pain, running nose, headaches or swollen glands.  Skin: No rashes, pruritus or skin changes,  Respiratory: Denies cough, wheezing or shortness of breath.  CV: Denies chest pain, palpitations, orthopnea, PND or dizziness.  Musculoskeletal: No joint pain, stiffness or swelling.  GI: No nausea, vomiting or diarrhea. No blood in stools.  Neurologic: No seizures, tremors, weakness or numbness    VITALS: Pulse 72/min respiration 18/min temperature 98.1 blood pressure 120/80    PHYSICAL EXAM:  GENERAL: well developed, well nourished, in no apparent distress  SKIN: no rashes, no suspicious lesions  Wound; no open wound but there is bruise on the forehead and infraorbital area  HEENT: atraumatic, normocephalic,   EYES: PERRLA, EOMI, conjunctiva are clear  NECK: supple, no adenopathy, no bruits  CHEST: no chest tenderness  LUNGS: clear to auscultation  CARDIO: RRR without murmur  GI: good BS's, no masses, HSM or tenderness  : deferred  RECTAL: deferred  MUSCULOSKELETAL: back is not tender, FROM of the back  EXTREMITIES: no cyanosis, clubbing or edema  NEURO: Oriented times three, cranial nerves are intact, motor and sensory are grossly intact      DIAGNOSTICS REVIEWED AT THIS VISIT:  Lab Results   Component Value Date    GLU 96 01/14/2024    BUN 58 (H) 01/14/2024    BUNCREA 17.4 04/15/2021    CREATSERUM 1.09 (H) 01/14/2024    ANIONGAP 3 01/14/2024    GFR 62 12/01/2017    GFRNAA 51 (L) 04/21/2022    GFRAA 58 (L) 04/21/2022    CA 9.6 01/14/2024    OSMOCALC 304 (H) 01/14/2024    ALKPHO 84 01/09/2024    AST 17 01/09/2024    ALT 18 01/09/2024    BILT 0.6 01/09/2024    TP 6.8 01/09/2024    ALB 3.4  01/09/2024    GLOBULIN 3.4 01/09/2024    AGRATIO 1.5 04/21/2015     01/14/2024    K 4.8 01/14/2024     01/14/2024    CO2 25.0 01/14/2024       Lab Results   Component Value Date    WBC 9.1 01/11/2024    RBC 4.87 01/11/2024    HGB 14.4 01/11/2024    HCT 42.9 01/11/2024    .0 01/11/2024    MCV 88.1 01/11/2024    MCH 29.6 01/11/2024    MCHC 33.6 01/11/2024    RDW 14.2 01/11/2024    NEPRELIM 3.57 01/09/2024    NEPERCENT 54.7 01/09/2024    LYPERCENT 24.1 01/09/2024    MOPERCENT 16.9 01/09/2024    EOPERCENT 3.2 01/09/2024    BAPERCENT 0.8 01/09/2024    NE 3.57 01/09/2024    LYMABS 1.57 01/09/2024    MOABSO 1.10 (H) 01/09/2024    EOABSO 0.21 01/09/2024    BAABSO 0.05 01/09/2024     ASSESSMENT AND PLAN:      Diagnoses and all orders for this visit:    Frequent falls  - PT to work on ambulation, gait, balance, strength, endurance, transfers, safety  - OT to work on fine motor skills, ADLs, hygiene, toileting, transfers, safety  Vascular dementia with anxiety, unspecified dementia severity (HCC)  Medications adjusted  Currently on methadone 0.5 mg nightly as well as 0.25 mg twice daily as needed  Continue Lexapro 20 mg daily  Essential hypertension  Under control  Continue metoprolol, losartan  Chronic diastolic congestive heart failure (HCC)  Monitor I's and O's  - 24h nursing for medication administration, bowel/bladder care, pain/sleep assessment  - Physician supervision for multiple medical comorbidities, fall risk, DVT risk, infection risk, pain management  - Psych for adjustment to disability and cognitive deficits  - Social work/: for discharge planning needs and access to community resources as needed     -Hospital medications reviewed reconciled and restarted   Check the labs in the morning, CBC CMP TSH, vitamin D, UA    Time spent at appointment today is 95 minutes including preparing to see patient, reviewing test results, performing medically appropriate examination and evaluation  and coordinating care, counseling and educating patient/family, ordering medications and testing, and documenting clinical information in EMR.       Ankit Singh MD   [facility]  [address] [place]. [address] [place] [id number]    Electronically signed      This dictation was performed with a verbal recognition program (DRAGON) and it was checked for errors. It is possible that there are still dictated errors within this office note. If so, please bring any errors to my attention for an addendum. All efforts were made to ensure that this office note is accurate

## 2024-01-20 NOTE — DISCHARGE SUMMARY
[place]  DISCHARGE SUMMARY     Fe Sal Patient Status:  Inpatient    1935 MRN [id number]   Location [facility] 2NE-A Attending No att. providers found   Hosp Day # 3 PCP Ankit Singh MD     Date of Admission: 2024  Date of Discharge: 1/15/2024  Discharge Disposition: SNF Subacute Rehab    Admitting Diagnosis:   Fall, initial encounter [W19.XXXA]  Contusion of forehead, initial encounter [S00.83XA]  Acute on chronic congestive heart failure, unspecified heart failure type (HCC) [I50.9]    Hospital Discharge Diagnoses:   Acute encephalopathy, likely thought secondary to polypharmacy  Dementia with delirium and severe sundowning  VIJAY  Hyponatremia  Acute on chronic HFpEF  Hypertension  History of breast cancer  History of stroke  PUD    Lace+ Score: 83  59-90 High Risk  29-58 Medium Risk  0-28   Low Risk.    TCM Follow-Up Recommendation:  LACE > 58: High Risk of readmission after discharge from the hospital.        Discharge Diagnosis:   Acute encephalopathy secondary to polypharmacy    History of Present Illness: Fe Sal is a 88 year old female with delirium.  Fell out of chair.  Son says she has been more confused and sundowning lately.  Also with some increased weight gain 20lbs last month.  CXR in ER with pulm edema changes.  Patient cannot give hx at this time.  No recent fevers, n/v, diarrhea, cough per son.       Brief Synopsis: Patient with initial presentation for fall, suspected dementia with delirium and sundowning.  Psychiatry consulted.  Patient's antipsychotic regimen adjusted and transition to risperidone and home Lexapro continued.  Patient mental status improved after washout of medications.  Organic etiology worked up with MRI brain without acute CVA or mass, infectious workup with stat chest x-ray and UA without evidence of infection, no leukocytosis and COVID and flu negative.  Patient discharged to [facility]    Procedures during hospitalization:   None    Incidental or  significant findings and recommendations (brief descriptions):  Antipsychotic regimen adjusted per psychiatry given suspicion for dementia with delirium and severe sundowning    Lab/Test results pending at Discharge:   None    Consultants:  Psychiatry, nephrology, cardiology    Discharge Medication List:     Discharge Medications        START taking these medications        Instructions Prescription details   risperiDONE 0.25 MG Tabs  Commonly known as: RisperDAL      Take 2 tablets (0.5 mg total) by mouth nightly. May also take 1 tablet (0.25 mg total) nightly as needed (sleep).   Quantity: 90 tablet  Refills: 0            CHANGE how you take these medications        Instructions Prescription details   escitalopram 20 MG Tabs  Commonly known as: Lexapro  What changed: when to take this      TAKE 1 TABLET(20 MG) BY MOUTH DAILY   Quantity: 90 tablet  Refills: 1     saccharomyces boulardii 250 MG Caps  Commonly known as: Florastor  What changed: Another medication with the same name was removed. Continue taking this medication, and follow the directions you see here.      Take 1 capsule (250 mg total) by mouth daily.   Refills: 0            CONTINUE taking these medications        Instructions Prescription details   acetaminophen 325 MG Tabs  Commonly known as: Tylenol      Take 2 tablets (650 mg total) by mouth every 6 (six) hours as needed.   Refills: 0     aspirin 325 MG Tabs      Take 1 tablet (325 mg total) by mouth daily.   Refills: 0     atorvastatin 10 MG Tabs  Commonly known as: Lipitor      Take 1 tablet (10 mg total) by mouth nightly.   Quantity: 30 tablet  Refills: 3     Cholestyramine 4 g Pack  Commonly known as: QUESTRAN      Take 1 packet by mouth 2 (two) times daily.   Refills: 0     CRANBERRY EXTRACT OR      Take 4,200 mg by mouth 3 (three) times daily.   Refills: 0     ferrous sulfate 325 (65 FE) MG Tbec      Take 1 tablet (325 mg total) by mouth daily with breakfast.   Refills: 0     Fish Oil 1200  MG Caps      Take 1,200 mg by mouth daily.   Refills: 0     folic acid 1 MG Tabs  Commonly known as: Folvite      Take 1 tablet (1 mg total) by mouth daily.   Quantity: 30 tablet  Refills: 0     lidocaine 5 % Ptch  Commonly known as: Lidoderm      Place 1 patch onto the skin daily. 12h on and 12h off   Refills: 0     losartan 100 MG Tabs  Commonly known as: Cozaar      Take 1 tablet (100 mg total) by mouth daily.   Refills: 0     methenamine 1 g Tabs  Commonly known as: Hiprex      Take 1 tablet (1 g total) by mouth 2 (two) times daily.   Refills: 0     metoprolol tartrate 25 MG Tabs  Commonly known as: Lopressor      Take 0.5 tablets (12.5 mg total) by mouth TID Beta Blocker/Cardiac.   Quantity: 30 tablet  Refills: 2     Multi-Vitamin Tabs      Take 1 tablet by mouth daily.   Refills: 0     omeprazole 20 MG Cpdr  Commonly known as: PriLOSEC      Take 1 capsule (20 mg total) by mouth every morning.   Refills: 0            STOP taking these medications      ciprofloxacin 500 MG Tabs  Commonly known as: Cipro        desmopressin 0.1 MG Tabs  Commonly known as: Ddavp        OLANZapine 2.5 MG Tabs  Commonly known as: ZyPREXA        OLANZapine 5 MG Tabs  Commonly known as: ZyPREXA        QUEtiapine 50 MG Tabs  Commonly known as: SEROquel                  Where to Get Your Medications        Please  your prescriptions at the location directed by your doctor or nurse    Bring a paper prescription for each of these medications  risperiDONE 0.25 MG Tabs         [place] substances prescribed at discharge    Follow-up appointment:   Ankit Singh MD  [address] [place] [id number]  [phone]    Schedule an appointment as soon as possible for a visit in 1 week(s)  For routine follow-up after hospitilization      Vital signs:       Physical Exam: See exam from day of discharge  note  -----------------------------------------------------------------------------------------------  PATIENT DISCHARGE INSTRUCTIONS: See electronic chart    Amanuel Lovett MD 1/19/2024    Time spent:  > 30 minutes

## 2024-05-20 ENCOUNTER — MED REC SCAN ONLY (OUTPATIENT)
Dept: FAMILY MEDICINE CLINIC | Facility: CLINIC | Age: 89
End: 2024-05-20

## 2024-07-12 ENCOUNTER — EXTERNAL FACILITY (OUTPATIENT)
Dept: FAMILY MEDICINE CLINIC | Facility: CLINIC | Age: 89
End: 2024-07-12

## 2024-07-12 DIAGNOSIS — I10 ESSENTIAL HYPERTENSION: ICD-10-CM

## 2024-07-12 DIAGNOSIS — R29.6 FREQUENT FALLS: Primary | ICD-10-CM

## 2024-07-12 DIAGNOSIS — F01.54 VASCULAR DEMENTIA WITH ANXIETY, UNSPECIFIED DEMENTIA SEVERITY (HCC): ICD-10-CM

## 2024-07-12 DIAGNOSIS — I50.32 CHRONIC DIASTOLIC CONGESTIVE HEART FAILURE (HCC): ICD-10-CM

## 2024-07-12 PROCEDURE — 99309 SBSQ NF CARE MODERATE MDM 30: CPT | Performed by: FAMILY MEDICINE

## 2024-07-13 NOTE — PROGRESS NOTES
Fe Sal Author: Ankit Singh MD     1935 MRN [id number]   Last Hospital  Admission 24      Last Hospital Discharge 1/15/24 PCP Ankit Singh MD   Hospital of Discharge  [facility]        CC follow-up      H.P.I Fe Sal is a 88 year old female long-term resident of [facility] had been falling frequently, she was sent out to the emergency room after a fall, after the patient was sleeping in a chair and fell out of the chair, this was witnessed by others, patient did hit her head on the ground and developed a bruise over the left eye, patient was sent to the emergency room and was admitted for treatment of her heart failure as her BNP was elevated along with low sodium  Patient was diagnosed with acute encephalopathy likely medication induced, her melatonin was discontinued as it was interacting with her psych medications  Encephalopathy was also was presumed due to her worsening dementia with delirium and severe sundowning syndrome  Seroquel and Effexor were discontinued and her dose of risperidone was also decreased  Acute on chronic congestive heart failure resolved with IV Lasix transition to oral  Patient was stabilized and transferred back to [facility]   Discharge diagnosis  Frequent falls  Acute encephalopathy  Dementia with delirium acute kidney insufficiency  Hyponatremia  Acute on chronic congestive heart failure  Hypertension history of stroke history of liver ulcer disease    2024  Patient seen in the hallway watching TV  She is doing well denies any complaints has on and off shoulder pain  She has been taking Tylenol that helped with the pain  Otherwise patient denies any complaints  Her appetite is good  No recent falls    Past Medical History:    Anxiety    Arthritis    knees and shoulders    Breast CA (HCC)    right breast cancer with mastectomy.    Cancer (HCC)    right breast ca    Cystitis, interstitial    history of----    Flatulence/gas pain/belching     Frequent UTI    GI bleed    H/O mammogram    left breast    H/O mammogram    benign    H/O pregnancy        HIGH BLOOD PRESSURE    HIGH CHOLESTEROL    History of endoscopy    Dr. Mesa    History of stomach ulcers    Loss of appetite    Nausea    Osteoarthritis    Other and unspecified hyperlipidemia    Other and unspecified personal history of malignant neoplasm    dr. luann mccall, ill  breast cancer right    Pap smear for cervical cancer screening    wnl    Renal cyst, right    Refer to Dr. Shaffer/urology.    Rheumatic fever    Stroke (HCC)    Unspecified essential hypertension    Visual impairment     Past Surgical History:   Procedure Laterality Date    Colonoscopy   Dr. Perez, repeat in five years, family history of colon cancer, Diverticulosis    Colonoscopy  2013    Dr. Maciej Mesa    Hysterectomy  1985    partial hystero.    Mastectomy right Right     right breast cancer with mastectomy.    Needle biopsy right Right     DCIS    Special service or report      mastectomy right    Unspecified diagnostic proce  2005    cystoscopy     Family History   Problem Relation Age of Onset    Heart Disorder Father         chf    Cancer Mother         colon    Stroke Mother     Colon Cancer Mother     Breast Cancer Self 65    Hypertension Brother      Social History     Socioeconomic History    Marital status:    Tobacco Use    Smoking status: Never     Passive exposure: Never    Smokeless tobacco: Never   Vaping Use    Vaping status: Never Used   Substance and Sexual Activity    Alcohol use: No    Drug use: No    Sexual activity: Never   Other Topics Concern    Caffeine Concern Yes     Comment: tea     Exercise No   Social History Narrative    Lives with daughter.     Social Determinants of Health     Financial Resource Strain: Not At Risk (2022)    Received from [facility]    Financial Resource Strain     How hard is it for you to pay  for the very basics like food, housing, medical care, and heating?: Not hard at all   Food Insecurity: No Food Insecurity (1/9/2024)    Food Insecurity     Food Insecurity: Never true   Transportation Needs: No Transportation Needs (1/9/2024)    Transportation Needs     Lack of Transportation: No   Housing Stability: Low Risk  (1/9/2024)    Housing Stability     Housing Instability: No       ALLERGIES:  Allergies   Allergen Reactions    Bactrim [Sulfamethoxazole W/Trimethoprim] NAUSEA ONLY    Nitrofurantoin NAUSEA ONLY       CODE STATUS:  DNR    CURRENT MEDICATIONS   Current Outpatient Medications   Medication Sig Dispense Refill    risperiDONE 0.25 MG Oral Tab Take 2 tablets (0.5 mg total) by mouth nightly. May also take 1 tablet (0.25 mg total) nightly as needed (sleep). 90 tablet 0    methenamine 1 g Oral Tab Take 1 tablet (1 g total) by mouth 2 (two) times daily.      Cholestyramine 4 g Oral Powd Pack Take 1 packet by mouth 2 (two) times daily.      saccharomyces boulardii 250 MG Oral Cap Take 1 capsule (250 mg total) by mouth daily.      lidocaine 5 % External Patch Place 1 patch onto the skin daily. 12h on and 12h off      omeprazole 20 MG Oral Capsule Delayed Release Take 1 capsule (20 mg total) by mouth every morning.      metoprolol tartrate 25 MG Oral Tab Take 0.5 tablets (12.5 mg total) by mouth TID Beta Blocker/Cardiac. 30 tablet 2    folic acid 1 MG Oral Tab Take 1 tablet (1 mg total) by mouth daily. 30 tablet 0    atorvastatin 10 MG Oral Tab Take 1 tablet (10 mg total) by mouth nightly. 30 tablet 3    aspirin 325 MG Oral Tab Take 1 tablet (325 mg total) by mouth daily.      ferrous sulfate 325 (65 FE) MG Oral Tab EC Take 1 tablet (325 mg total) by mouth daily with breakfast.      losartan 100 MG Oral Tab Take 1 tablet (100 mg total) by mouth daily.      Multiple Vitamin (MULTI-VITAMIN) Oral Tab Take 1 tablet by mouth daily.      ESCITALOPRAM 20 MG Oral Tab TAKE 1 TABLET(20 MG) BY MOUTH DAILY (Patient  taking differently: Take 1 tablet (20 mg total) by mouth at bedtime.) 90 tablet 1    acetaminophen 325 MG Oral Tab Take 2 tablets (650 mg total) by mouth every 6 (six) hours as needed.      CRANBERRY EXTRACT OR Take 4,200 mg by mouth 3 (three) times daily.      Omega-3 Fatty Acids (FISH OIL) 1200 MG Oral Cap Take 1,200 mg by mouth daily.         REVIEW OF SYSTEMS:  Review of Systems:   Constitutional: No fevers, chills, fatigue or night sweats.  ENT: No mouth pain, neck pain, running nose, headaches or swollen glands.  Skin: No rashes, pruritus or skin changes,  Respiratory: Denies cough, wheezing or shortness of breath.  CV: Denies chest pain, palpitations, orthopnea, PND or dizziness.  Musculoskeletal: No joint pain, stiffness or swelling.  GI: No nausea, vomiting or diarrhea. No blood in stools.  Neurologic: No seizures, tremors, weakness or numbness    VITALS: Pulse 72/min respiration 18/min temperature 98.1 blood pressure 120/80    PHYSICAL EXAM:  GENERAL: well developed, well nourished, in no apparent distress  SKIN: no rashes, no suspicious lesions  Wound; no open wound but there is bruise on the forehead and infraorbital area  HEENT: atraumatic, normocephalic,   EYES: PERRLA, EOMI, conjunctiva are clear  NECK: supple, no adenopathy, no bruits  CHEST: no chest tenderness  LUNGS: clear to auscultation  CARDIO: RRR without murmur  GI: good BS's, no masses, HSM or tenderness  : deferred  RECTAL: deferred  MUSCULOSKELETAL: back is not tender, FROM of the back  EXTREMITIES: no cyanosis, clubbing or edema  NEURO: Oriented times three, cranial nerves are intact, motor and sensory are grossly intact      DIAGNOSTICS REVIEWED AT THIS VISIT  Recent labs reviewed from 7/5/2024, CBC  WBC of 6.27 hemoglobin 11.7 hematocrit 36.2 platelets at 221  Chemistry showed BUN slightly elevated at 37 creatinine 0.96 otherwise CMP was within normal limits        ASSESSMENT AND PLAN:  Admitted for long-term care at [facility]  Frequent falls  No recent falls  Continue restorative PT/OT  Vascular dementia with anxiety, unspecified dementia severity (HCC)  Medications adjusted  Currently on methadone 0.5 mg nightly as well as 0.25 mg twice daily as needed  Continue Lexapro 20 mg daily  Essential hypertension  Under control  Continue metoprolol, losartan  Chronic diastolic congestive heart failure (HCC)  Monitor I's and O's  Weekly weights  ContinueLasix, losartan  - 24h nursing for medication administration, bowel/bladder care, pain/sleep assessment  - Physician supervision for multiple medical comorbidities, fall risk, DVT risk, infection risk, pain management  - Psych for adjustment to disability and cognitive deficits          Ankit Singh MD   [facility]  [address]  [facility] [address]    Electronically signed      This dictation was performed with a verbal recognition program (DRAGON) and it was checked for errors. It is possible that there are still dictated errors within this office note. If so, please bring any errors to my attention for an addendum. All efforts were made to ensure that this office note is accurate

## 2024-12-09 ENCOUNTER — MED REC SCAN ONLY (OUTPATIENT)
Dept: FAMILY MEDICINE CLINIC | Facility: CLINIC | Age: 89
End: 2024-12-09

## 2024-12-22 ENCOUNTER — EXTERNAL FACILITY (OUTPATIENT)
Dept: FAMILY MEDICINE CLINIC | Facility: CLINIC | Age: 89
End: 2024-12-22

## 2024-12-22 DIAGNOSIS — I50.32 CHRONIC DIASTOLIC CONGESTIVE HEART FAILURE (HCC): ICD-10-CM

## 2024-12-22 DIAGNOSIS — F01.54 VASCULAR DEMENTIA WITH ANXIETY, UNSPECIFIED DEMENTIA SEVERITY (HCC): Primary | ICD-10-CM

## 2024-12-22 DIAGNOSIS — I10 ESSENTIAL HYPERTENSION: ICD-10-CM

## 2024-12-22 DIAGNOSIS — G30.9 SEVERE ALZHEIMER'S DEMENTIA WITH OTHER BEHAVIORAL DISTURBANCE, UNSPECIFIED TIMING OF DEMENTIA ONSET (HCC): ICD-10-CM

## 2024-12-22 DIAGNOSIS — F02.C18 SEVERE ALZHEIMER'S DEMENTIA WITH OTHER BEHAVIORAL DISTURBANCE, UNSPECIFIED TIMING OF DEMENTIA ONSET (HCC): ICD-10-CM

## 2024-12-22 PROCEDURE — 99309 SBSQ NF CARE MODERATE MDM 30: CPT | Performed by: FAMILY MEDICINE

## 2024-12-26 NOTE — PROGRESS NOTES
Fe Sal Author: Ankit Singh MD     1935 MRN [id number]   Last Hospital  Admission 24      Last Hospital Discharge 1/15/24 PCP Ankit Singh MD   Hospital of Discharge  [facility]        CC follow-up      H.P.I Fe Sal is a 89 year old female long-term resident of [facility] had been falling frequently, she was sent out to the emergency room after a fall, after the patient was sleeping in a chair and fell out of the chair, this was witnessed by others, patient did hit her head on the ground and developed a bruise over the left eye, patient was sent to the emergency room and was admitted for treatment of her heart failure as her BNP was elevated along with low sodium  Patient was diagnosed with acute encephalopathy likely medication induced, her melatonin was discontinued as it was interacting with her psych medications  Encephalopathy was also was presumed due to her worsening dementia with delirium and severe sundowning syndrome  Seroquel and Effexor were discontinued and her dose of risperidone was also decreased  Acute on chronic congestive heart failure resolved with IV Lasix transition to oral  Patient was stabilized and transferred back to [facility]   Discharge diagnosis  Frequent falls  Acute encephalopathy  Dementia with delirium acute kidney insufficiency  Hyponatremia  Acute on chronic congestive heart failure  Hypertension history of stroke history of liver ulcer disease    2024  Patient seen in the hallway watching TV  She is doing well denies any complaints has on and off shoulder pain  She has been taking Tylenol that helped with the pain  Otherwise patient denies any complaints  Her appetite is good  No recent falls    2024  Patient now on hospice, due to decline secondary to Alzheimer's  Patient seen in the room sitting comfortably on the wheelchair  Any chest pain shortness of breath  Complains of on and off shoulder pain    Past Medical History:     Anxiety    Arthritis    knees and shoulders    Breast CA (HCC)    right breast cancer with mastectomy.    Cancer (HCC)    right breast ca    Cystitis, interstitial    history of----    Flatulence/gas pain/belching    Frequent UTI    GI bleed    H/O mammogram    left breast    H/O mammogram    benign    H/O pregnancy        HIGH BLOOD PRESSURE    HIGH CHOLESTEROL    History of endoscopy    Dr. Mesa    History of stomach ulcers    Loss of appetite    Nausea    Osteoarthritis    Other and unspecified hyperlipidemia    Other and unspecified personal history of malignant neoplasm    dr. luann mccall, ill  breast cancer right    Pap smear for cervical cancer screening    wnl    Renal cyst, right    Refer to Dr. Shaffer/urology.    Rheumatic fever    Stroke (HCC)    Unspecified essential hypertension    Visual impairment     Past Surgical History:   Procedure Laterality Date    Colonoscopy   Dr. Perez, repeat in five years, family history of colon cancer, Diverticulosis    Colonoscopy  2013    Dr. Maciej Mesa    Hysterectomy  1985    partial hystero.    Mastectomy right Right     right breast cancer with mastectomy.    Needle biopsy right Right     DCIS    Special service or report      mastectomy right    Unspecified diagnostic proce  2005    cystoscopy     Family History   Problem Relation Age of Onset    Heart Disorder Father         chf    Cancer Mother         colon    Stroke Mother     Colon Cancer Mother     Breast Cancer Self 65    Hypertension Brother      Social History     Socioeconomic History    Marital status:    Tobacco Use    Smoking status: Never     Passive exposure: Never    Smokeless tobacco: Never   Vaping Use    Vaping status: Never Used   Substance and Sexual Activity    Alcohol use: No    Drug use: No    Sexual activity: Never   Other Topics Concern    Caffeine Concern Yes     Comment: tea     Exercise No   Social History Narrative    Lives  with daughter.     Social Drivers of Health     Financial Resource Strain: Not At Risk (12/13/2022)    Received [facility]    Financial Resource Strain     How hard is it for you to pay for the very basics like food, housing, medical care, and heating?: Not hard at all   Food Insecurity: No Food Insecurity (1/9/2024)    Food Insecurity     Food Insecurity: Never true   Transportation Needs: No Transportation Needs (1/9/2024)    Transportation Needs     Lack of Transportation: No   Housing Stability: Low Risk  (1/9/2024)    Housing Stability     Housing Instability: No       ALLERGIES:  Allergies   Allergen Reactions    Bactrim [Sulfamethoxazole W/Trimethoprim] NAUSEA ONLY    Nitrofurantoin NAUSEA ONLY       CODE STATUS:  DNR    CURRENT MEDICATIONS see PCC  REVIEW OF SYSTEMS:  Review of Systems:   Constitutional: No fevers, chills, fatigue or night sweats.  ENT: No mouth pain, neck pain, running nose, headaches or swollen glands.  Skin: No rashes, pruritus or skin changes,  Respiratory: Denies cough, wheezing or shortness of breath.  CV: Denies chest pain, palpitations, orthopnea, PND or dizziness.  Musculoskeletal: No joint pain, stiffness or swelling.  GI: No nausea, vomiting or diarrhea. No blood in stools.  Neurologic: No seizures, tremors, weakness or numbness    VITALS: Pulse 70/min respiration 18/min temperature 98.1 blood pressure 116/72    PHYSICAL EXAM:  GENERAL: well developed, well nourished, in no apparent distress  SKIN: no rashes, no suspicious lesions  HEENT: atraumatic, normocephalic,   EYES: PERRLA, EOMI, conjunctiva are clear  NECK: supple, no adenopathy, no bruits  CHEST: no chest tenderness  LUNGS: clear to auscultation  CARDIO: RRR without murmur  GI: good BS's, no masses, HSM or tenderness  : deferred  RECTAL: deferred  MUSCULOSKELETAL: back is not tender, FROM of the back  EXTREMITIES: no cyanosis, clubbing or edema  NEURO: Oriented times three, cranial nerves are  intact, motor and sensory are grossly intact    ASSESSMENT AND PLAN:  Alzheimer's dementia  Vascular dementia with anxiety, unspecified dementia severity (HCC)  Admitted to hospice  Hospice orders reviewed  Lidocaine patch to shoulders bilaterally, 12 hours on 12 hours off will continue Dilaudid 0.25 mL sublingually for pain every 2 hours as needed for pain as well as shortness of breath  Essential hypertension  Under control  Continue metoprolol, losartan  Chronic diastolic congestive heart failure (HCC)  Monitor I's and O's  ContinueLasix, losartan            Ankit Singh MD   [facility]  [address]  [facility] [address]    Electronically signed      This dictation was performed with a verbal recognition program (DRAGON) and it was checked for errors. It is possible that there are still dictated errors within this office note. If so, please bring any errors to my attention for an addendum. All efforts were made to ensure that this office note is accurate

## 2025-03-01 ENCOUNTER — MED REC SCAN ONLY (OUTPATIENT)
Dept: FAMILY MEDICINE CLINIC | Facility: CLINIC | Age: 89
End: 2025-03-01

## 2025-04-10 ENCOUNTER — MOBILE ENCOUNTER (OUTPATIENT)
Dept: FAMILY MEDICINE CLINIC | Facility: CLINIC | Age: 89
End: 2025-04-10

## 2025-04-10 DIAGNOSIS — I50.32 CHRONIC DIASTOLIC CONGESTIVE HEART FAILURE (HCC): ICD-10-CM

## 2025-04-10 DIAGNOSIS — I10 ESSENTIAL HYPERTENSION: ICD-10-CM

## 2025-04-10 DIAGNOSIS — F01.54 VASCULAR DEMENTIA WITH ANXIETY, UNSPECIFIED DEMENTIA SEVERITY (HCC): Primary | ICD-10-CM

## 2025-04-10 NOTE — PROGRESS NOTES
Fe Sal Author: Ankit Singh MD     1935 MRN [id number]   Last Hospital  Admission 24      Last Hospital Discharge 1/15/24 PCP Ankit Singh MD   Hospital of Discharge  [facility]        CC follow-up      H.P.I Fe Sal is a 89 year old female long-term resident of [facility] had been falling frequently, she was sent out to the emergency room after a fall, after the patient was sleeping in a chair and fell out of the chair, this was witnessed by others, patient did hit her head on the ground and developed a bruise over the left eye, patient was sent to the emergency room and was admitted for treatment of her heart failure as her BNP was elevated along with low sodium  Patient was diagnosed with acute encephalopathy likely medication induced, her melatonin was discontinued as it was interacting with her psych medications  Encephalopathy was also was presumed due to her worsening dementia with delirium and severe sundowning syndrome  Seroquel and Effexor were discontinued and her dose of risperidone was also decreased  Acute on chronic congestive heart failure resolved with IV Lasix transition to oral  Patient was stabilized and transferred back to [facility]   Discharge diagnosis  Frequent falls  Acute encephalopathy  Dementia with delirium acute kidney insufficiency  Hyponatremia  Acute on chronic congestive heart failure  Hypertension history of stroke history of liver ulcer disease    2024  Patient seen in the hallway watching TV  She is doing well denies any complaints has on and off shoulder pain  She has been taking Tylenol that helped with the pain  Otherwise patient denies any complaints  Her appetite is good  No recent falls    2024  Patient now on hospice, due to decline secondary to Alzheimer's  Patient seen in the room sitting comfortably on the wheelchair  Any chest pain shortness of breath  Complains of on and off shoulder pain    4/10/25  Patient seen and  examined in her room sitting comfortably on the recliner  Denies any complaints    Past Medical History:    Anxiety    Arthritis    knees and shoulders    Breast CA (HCC)    right breast cancer with mastectomy.    Cancer (HCC)    right breast ca    Cystitis, interstitial    history of----    Flatulence/gas pain/belching    Frequent UTI    GI bleed    H/O mammogram    left breast    H/O mammogram    benign    H/O pregnancy        HIGH BLOOD PRESSURE    HIGH CHOLESTEROL    History of endoscopy    Dr. Mesa    History of stomach ulcers    Loss of appetite    Nausea    Osteoarthritis    Other and unspecified hyperlipidemia    Other and unspecified personal history of malignant neoplasm    dr. luann mccall, ill  breast cancer right    Pap smear for cervical cancer screening    wnl    Renal cyst, right    Refer to Dr. Shaffer/urology.    Rheumatic fever    Stroke (HCC)    Unspecified essential hypertension    Visual impairment     Past Surgical History:   Procedure Laterality Date    Colonoscopy   Dr. Perez, repeat in five years, family history of colon cancer, Diverticulosis    Colonoscopy  2013    Dr. Maciej Mesa    Hysterectomy  1985    partial hystero.    Mastectomy right Right     right breast cancer with mastectomy.    Needle biopsy right Right     DCIS    Special service or report  2001    mastectomy right    Unspecified diagnostic proce  2005    cystoscopy     Family History   Problem Relation Age of Onset    Heart Disorder Father         chf    Cancer Mother         colon    Stroke Mother     Colon Cancer Mother     Breast Cancer Self 65    Hypertension Brother      Social History     Socioeconomic History    Marital status:    Tobacco Use    Smoking status: Never     Passive exposure: Never    Smokeless tobacco: Never   Vaping Use    Vaping status: Never Used   Substance and Sexual Activity    Alcohol use: No    Drug use: No    Sexual activity: Never   Other  Topics Concern    Caffeine Concern Yes     Comment: tea     Exercise No   Social History Narrative    Lives with daughter.     Social Drivers of Health     Food Insecurity: No Food Insecurity (1/9/2024)    Food Insecurity     Food Insecurity: Never true   Transportation Needs: No Transportation Needs (1/9/2024)    Transportation Needs     Lack of Transportation: No   Housing Stability: Low Risk  (1/9/2024)    Housing Stability     Housing Instability: No       ALLERGIES:  Allergies   Allergen Reactions    Bactrim [Sulfamethoxazole W/Trimethoprim] NAUSEA ONLY    Nitrofurantoin NAUSEA ONLY       CODE STATUS:  DNR    CURRENT MEDICATIONS see PCC  REVIEW OF SYSTEMS:  Review of Systems:   Constitutional: No fevers, chills, fatigue or night sweats.  ENT: No mouth pain, neck pain, running nose, headaches or swollen glands.  Skin: No rashes, pruritus or skin changes,  Respiratory: Denies cough, wheezing or shortness of breath.  CV: Denies chest pain, palpitations, orthopnea, PND or dizziness.  Musculoskeletal: No joint pain, stiffness or swelling.  GI: No nausea, vomiting or diarrhea. No blood in stools.  Neurologic: No seizures, tremors, weakness or numbness positive for dementia    VITALS: BP: 156/88 mmHg  4/10/2025 15:52  Temp:97.8 °F  4/10/2025 15:52  Pulse:67 bpm  4/10/2025 15:52  Weight:143.8 Lbs  4/1/2025 17:30  Resp:18 Breaths/min  4/10/2025 15:52    PHYSICAL EXAM:  GENERAL: well developed, well nourished, in no apparent distress  SKIN: no rashes, no suspicious lesions  HEENT: atraumatic, normocephalic,   EYES: PERRLA, EOMI, conjunctiva are clear  NECK: supple, no adenopathy, no bruits  CHEST: no chest tenderness  LUNGS: clear to auscultation  CARDIO: RRR without murmur  GI: good BS's, no masses, HSM or tenderness  : deferred  RECTAL: deferred  MUSCULOSKELETAL: Mild to moderate tenderness over the shoulders bilaterally  EXTREMITIES: Trace bilateral pedal edema  NEURO: Pleasantly confused, cranial nerves are intact,  motor and sensory are grossly intact    ASSESSMENT AND PLAN:  Alzheimer's dementia  Vascular dementia with anxiety, unspecified dementia severity (HCC)  Admitted to hospice  Hospice orders reviewed  Lidocaine patch to shoulders bilaterally, 12 hours on 12 hours off will continue Dilaudid 0.25 mL sublingually for pain every 2 hours as needed for pain as well as shortness of breath  Essential hypertension  Under control  Continue Coreg losartan  Chronic diastolic congestive heart failure (HCC)  Monitor I's and O's  ContinueLasix, losartan            Ankit Singh MD   [facility]  [address]  [facility] [address]    Electronically signed      This dictation was performed with a verbal recognition program (DRAGON) and it was checked for errors. It is possible that there are still dictated errors within this office note. If so, please bring any errors to my attention for an addendum. All efforts were made to ensure that this office note is accurate

## 2025-05-09 ENCOUNTER — TELEPHONE (OUTPATIENT)
Dept: FAMILY MEDICINE CLINIC | Facility: CLINIC | Age: 89
End: 2025-05-09

## 2025-05-09 NOTE — TELEPHONE ENCOUNTER
We received death certificate from [facility] and their fax number:  [phone].  The fax was placed on Dr. Singh's desk.

## (undated) DIAGNOSIS — Z12.31 ENCOUNTER FOR SCREENING MAMMOGRAM FOR MALIGNANT NEOPLASM OF BREAST: Primary | ICD-10-CM

## (undated) DIAGNOSIS — I10 HYPERTENSION, UNSPECIFIED TYPE: ICD-10-CM

## (undated) DIAGNOSIS — I10 ESSENTIAL (PRIMARY) HYPERTENSION: ICD-10-CM

## (undated) DIAGNOSIS — F33.1 MODERATE EPISODE OF RECURRENT MAJOR DEPRESSIVE DISORDER (HCC): ICD-10-CM

## (undated) DIAGNOSIS — Z00.00 ROUTINE GENERAL MEDICAL EXAMINATION AT A HEALTH CARE FACILITY: Primary | ICD-10-CM

## (undated) DEVICE — BIT DRL L330MM DIA4.2MM CALIB 100MM 3 FLUT QC

## (undated) DEVICE — SUTURE VCRL SZ 2-0 L27IN ABSRB UD L36MM CP-1

## (undated) DEVICE — STERIS KITS

## (undated) DEVICE — DRESS WOUND AQUACEL 3.5INX6INL

## (undated) DEVICE — STANDARD HYPODERMIC NEEDLE,POLYPROPYLENE HUB: Brand: MONOJECT

## (undated) DEVICE — STERILE POLYISOPRENE POWDER-FREE SURGICAL GLOVES: Brand: PROTEXIS

## (undated) DEVICE — BITEBLOCK ENDOSCP 60FR MAXI STRP

## (undated) DEVICE — 1200CC GUARDIAN II: Brand: GUARDIAN

## (undated) DEVICE — LIGHT HANDLE

## (undated) DEVICE — PROXIMATE SKIN STAPLERS (35 WIDE) CONTAINS 35 STAINLESS STEEL STAPLES (FIXED HEAD): Brand: PROXIMATE

## (undated) DEVICE — SYRINGE MED 10ML LL TIP W/O SFTY DISP

## (undated) DEVICE — ADAPTER AIR H2O CLN DISP FOR OLY GI E BIOGRD

## (undated) DEVICE — SLEEVE COMPR M KNEE LEN SGL USE KENDALL SCD

## (undated) DEVICE — 3M™ RED DOT™ MONITORING ELECTRODE WITH FOAM TAPE AND STICKY GEL, 50/BAG, 20/CASE, 72/PLT 2570: Brand: RED DOT™

## (undated) DEVICE — REPLAY HEMOSTASIS CLIP, 11MM SPAN: Brand: REPLAY

## (undated) DEVICE — C-ARM: Brand: UNBRANDED

## (undated) DEVICE — C-ARMOR C-ARM EQUIPMENT COVERS CLEAR STERILE UNIVERSAL FIT 12 PER CASE: Brand: C-ARMOR

## (undated) DEVICE — SUTURE VCRL SZ 0 L27IN ABSRB UD L36MM CP-1

## (undated) DEVICE — SOLUTION IRRIG 1000ML 0.9% NACL USP BTL

## (undated) DEVICE — STERILE POLYISOPRENE POWDER-FREE SURGICAL GLOVES WITH EMOLLIENT COATING: Brand: PROTEXIS

## (undated) DEVICE — KIT VLV 5 PC AIR H2O SUCT BX ENDOGATOR CONN

## (undated) DEVICE — ADHESIVE SKIN TOP FOR WND CLSR DERMBND ADV

## (undated) DEVICE — GUIDEWIRE ORTH L400MM DIA3.2MM FOR TFN

## (undated) DEVICE — HIP PINNING CDS: Brand: MEDLINE INDUSTRIES, INC.

## (undated) DEVICE — 3M™ STERI-STRIP™ REINFORCED ADHESIVE SKIN CLOSURES, R1547, 1/2 IN X 4 IN (12 MM X 100 MM), 6 STRIPS/ENVELOPE: Brand: 3M™ STERI-STRIP™

## (undated) DEVICE — ACUJECT FLX INJ NDL

## (undated) DEVICE — DRESS WOUND AQUACEL 3.5INX4INL

## (undated) DEVICE — 10FT COMBINED O2 DELIVERY/CO2 MONITORING. FILTER WITH MICROSTREAM TYPE LUER: Brand: DUAL ADULT NASAL CANNULA

## (undated) DEVICE — SYRINGE BULB 50/CS 48/PLT: Brand: MEDEGEN MEDICAL PRODUCTS, LLC

## (undated) DEVICE — REPLAY HEMOSTASIS CLIP, 16MM SPAN: Brand: REPLAY

## (undated) DEVICE — CATHETER ELECHEMSTAS 7FR L300CM CHN 2.8MM STD

## (undated) NOTE — LETTER
Veronika Fair [address] [facility] [address], [address] [place] [address]  Authorization for Surgical Operation and Procedure   Date:___________                                                                                                         Time:__________  I hereby Concepción Davis DO, my physician and his/her assistants (if applicable), which may include medical students, residents, and/or fellows, to perform the following surgical operation/ procedure and administer such anesthesia as may be determined necessary by my physician:  Operation/Procedure name (s) Procedure(s):  ESOPHAGOGASTRODUODENOSCOPY (EGD) on Fe Sal   2. I recognize that during the surgical operation/procedure, unforeseen conditions may necessitate additional or different procedures than those listed above. I, therefore, further authorize and request that the above-named surgeon, assistants, or designees perform such procedures as are, in their judgment, necessary and desirable. 3.   My surgeon/physician has discussed prior to my surgery the potential benefits, risks and side effects of this procedure; the likelihood of achieving goals; and potential problems that might occur during recuperation. They also discussed reasonable alternatives to the procedure, including risks, benefits, and side effects related to the alternatives and risks related to not receiving this procedure. I have had all my questions answered and I acknowledge that no guarantee has been made as to the result that may be obtained. 4.   Should the need arise during my operation or immediate post-operative period, I also consent to the administration of blood and/or blood products.   Further, I understand that despite careful testing and screening of blood or blood products by collecting agencies, I may still be subject to ill effects as a result of receiving a blood transfusion and/or blood products. The following are some, but not all, of the potential risks that can occur: fever and allergic reactions, hemolytic reactions, transmission of diseases such as Hepatitis, AIDS and Cytomegalovirus (CMV) and fluid overload. In the event that I wish to have an autologous transfusion of my own blood, or a directed donor transfusion. I will discuss this with my physician. 5.   I authorize the use of any specimen, organs, tissues, body parts or foreign objects that may be removed from my body during the operation/procedure for diagnosis, research or teaching purposes and their subsequent disposal by hospital authorities. I also authorize the release of specimen test results and/or written reports to my treating physician on the hospital medical staff or other referring or consulting physicians involved in my care, at the discretion of the Pathologist or my treating physician. 6.   I consent to the photographing or videotaping of the operations or procedures to be performed, including appropriate portions of my body for medical, scientific, or educational purposes, provided my identity is not revealed by the pictures or by descriptive texts accompanying them. If the procedure has been photographed/videotaped, the surgeon will obtain the original picture, image, videotape or CD. The hospital will not be responsible for storage, release or maintenance of the picture, image, tape or CD.    7.   I consent to the presence of a  or observers in the operating room as deemed necessary by my physician or their designees. 8.   I recognize that in the event my procedure results in extended X-Ray/fluoroscopy time, I may develop a skin reaction. 9.  If I have a Do Not Attempt Resuscitation (DNAR) order in place, that status will be suspended while in the operating room, procedural suite, and during the recovery period unless otherwise explicitly stated by me (or a person authorized to consent on my behalf). The surgeon or my attending physician will determine when the applicable recovery period ends for purposes of reinstating the DNAR order. 10. Patients having a sterilization procedure: I understand that if the procedure is successful the results will be permanent and it will therefore be impossible for me to inseminate, conceive, or bear children. I also understand that the procedure is intended to result in sterility, although the result has not been guaranteed. 11. I acknowledge that my physician has explained sedation/analgesia administration to me including the risk and benefits I consent to the administration of sedation/analgesia as may be necessary or desirable in the judgment of my physician.     I CERTIFY THAT I HAVE READ AND FULLY UNDERSTAND THE ABOVE CONSENT TO OPERATION and/or OTHER PROCEDURE.      _________________________________________  __________________________________  Signature of Patient     Signature of Responsible Person         ___________________________________         Printed Name of Responsible Person           _________________________________                 Relationship to Patient  _________________________________________  ______________________________  Signature of Witness          Date  Time    Patient Name: Neo Sal     : 1935                 Printed: 2023     Medical Record #: [id number]                     Page 1 of 1

## (undated) NOTE — MR AVS SNAPSHOT
[facility] [address] [address] [phone]               Thank you for choosing us for your health care visit with Marge Perales MD.  We are glad to serve you and happy to provide you with this summar ---------- Medicare covers annually or at 6-month intervals for prediabetic patients        Cardiovascular Disease Screening     Cholesterol, covered every 5 yrs including Total, LDL and Trigs LDL CHOLESTEROL (mg/dL)   Date Value   10/03/2016 91     LDL-CH Covered yearly for Long term Glucocorticoid medication (Steroids) Requires diagnosis related to osteoporosis or estrogen deficiency.    Biennial benefit unless patient has history of long-term glucocorticoid use for medical condition    Last Dexa Scan:   XR Persons who live in the same house as a HepB virus carrier   Homosexual men   Illicit injectable drug abusers     Tetanus Toxoid- Only covered with a cut with metal- TD and TDaP Not covered by Medicare Part B) No orders found for this or any previous visit Take 4,200 mg by mouth 3 (three) times daily. escitalopram 5 MG Tabs   Take 1 tablet (5 mg total) by mouth daily. Commonly known as:  LEXAPRO           Fish Oil 1200 MG Caps   Take 1,200 mg by mouth daily.            metoprolol Tartrate 25 MG Ta [address] [place] [address]   Phone:  [phone]   Fax:  [phone]    Diagnoses:  Bilateral renal cysts   Order:  Urology - Internal    Rogelio Sauceda MD   [facility] [address] [place] [address] [facility] [address]   Phone:  [phone]   Fax:  [phone]

## (undated) NOTE — MR AVS SNAPSHOT
[facility] [place] [id number] [phone]               Thank you for choosing us for your health care visit with Jose Faustin MD.  We are glad to serve you and happy to provide you with this summar metoprolol Tartrate 25 MG Tabs   TAKE 1 TABLET BY MOUTH TWICE DAILY   Commonly known as:  LOPRESSOR           * ICAPS AREDS 2 OR   Take 1 capsule by mouth 2 (two) times daily. * MULTIVITAMIN OR   Take  by mouth daily.            Pravastatin So Get your heart pumping – brisk walking, biking, swimming Even 10 minute increments are effective and add up over the week   2 ½ hours per week – spread out over time Use a sara to keep you motivated   Don’t forget strength training with weights and resist

## (undated) NOTE — ED AVS SNAPSHOT
Carri Grimaldo   MRN: [id number]    Department:  [facility] Emergency Department   Date of Visit:  2/19/2020           Disclosure     Insurance plans vary and the physician(s) referred by the ER may not be covered by your plan.  Please contact your in tell this physician (or your personal doctor if your instructions are to return to your personal doctor) about any new or lasting problems. The primary care or specialist physician will see patients referred from the [facility] Emergency Department.  Lauren García

## (undated) NOTE — MR AVS SNAPSHOT
[facility] [address] [phone]               Thank you for choosing us for your health care visit with Estephanie Elias MD.  We are glad to serve you and happy to provide you with this summar Pravastatin Sodium 10 MG Tabs   TAKE 1 TABLET BY MOUTH EVERY NIGHT AT BEDTIME   Commonly known as:  PRAVACHOL           * Notice: This list has 2 medication(s) that are the same as other medications prescribed for you.  Read the directions carefully, and [facility], [address] 30: [phone]    [facility] 30: [address] [facility] and   [address]     [address] [facility] [address] Nereyda Sweeney,

## (undated) NOTE — LETTER
07/15/19        [place]  [address] [address]      Dear Bertha Burrell,    Our records indicate that you have outstanding lab work and or testing that was ordered for you and has not yet been completed:  Orders Placed This Encounter

## (undated) NOTE — LETTER
January 30, 2020      [address] [facility] [address]      Dear Cortney Zapata:  It was a pleasure speaking with you over the phone recently.  To follow up, I wanted to send you my contact information to utilize when you have a question and

## (undated) NOTE — LETTER
05/31/19        [place]  [address]  [id number]      Dear Harsh Mood records indicate that you have outstanding lab work and or testing that was ordered for you and has not yet been completed:  Orders Placed This Encounter

## (undated) NOTE — IP AVS SNAPSHOT
Patient Demographics     Address  [address] [place] [id number] Phone  [phone] (Home) *Preferred*  [phone] (Mobile) E-mail Address  [email or web address]      Patient Contacts     Name Relation Home Work Mobile    Erika Cha Daughter [phone]      Casey Sal Son   [phone]      Allergies as of 1/15/2024  Review status set to Review Complete on 1/9/2024       Noted Reaction Type Reactions    DELETED: Ace Inhibitors 07/10/2012    Coughing    Bactrim [sulfamethoxazole W/trimethoprim] 12/27/2016    NAUSEA ONLY    Nitrofurantoin 07/10/2012    NAUSEA ONLY    DELETED: No Known Allergies 06/11/2007          Code Status Information     Code Status    DNAR/Selective Treatment      Patient Instructions    None      Follow-up Information     Ankit Singh MD. Schedule an appointment as soon as possible for a visit in 1 week(s).    Specialties: Family Medicine, IP Consult to Primary Care  Why: For routine follow-up after hospitilization  Contact information:  [address] [place] [id number] [phone]                        Your Home Meds List      TAKE these medications       Instructions Authorizing Provider Morning Afternoon Evening As Needed   acetaminophen 325 MG Tabs  Commonly known as: Tylenol      Take 2 tablets (650 mg total) by mouth every 6 (six) hours as needed.          aspirin 325 MG Tabs  Next dose due: Tuesday, 1/16/2024      Take 1 tablet (325 mg total) by mouth daily.          atorvastatin 10 MG Tabs  Commonly known as: Lipitor  Next dose due: Tonight, 1/15/2024      Take 1 tablet (10 mg total) by mouth nightly.   Durre Givens         Cholestyramine 4 g Pack  Commonly known as: QUESTRAN  Next dose due: Tonight, 1/15/2024      Take 1 packet by mouth 2 (two) times daily.          CRANBERRY EXTRACT OR  Next dose due: Tuesday, 1/16/2024      Take 4,200 mg by mouth 3 (three) times daily.          escitalopram 20 MG Tabs  Commonly known as: Lexapro  Next dose due:  Tonight, 1/15/2024      TAKE 1 TABLET(20 MG) BY MOUTH DAILY   Gena Opal         ferrous sulfate 325 (65 FE) MG Tbec  Next dose due: Tuesday, 1/16/2024      Take 1 tablet (325 mg total) by mouth daily with breakfast.          Fish Oil 1200 MG Caps  Next dose due: Tuesday, 1/16/2024      Take 1,200 mg by mouth daily.          folic acid 1 MG Tabs  Commonly known as: Folvite  Next dose due: Tuesday, 1/16/2024      Take 1 tablet (1 mg total) by mouth daily.   Durre Givens         lidocaine 5 % Ptch  Commonly known as: Lidoderm  Next dose due: Tuesday, 1/16/2024      Place 1 patch onto the skin daily. 12h on and 12h off          losartan 100 MG Tabs  Commonly known as: Cozaar  Next dose due: Tuesday, 1/16/2024      Take 1 tablet (100 mg total) by mouth daily.   Nevin Pitroda         methenamine 1 g Tabs  Commonly known as: Hiprex  Next dose due: Tonight, 1/15/2024      Take 1 tablet (1 g total) by mouth 2 (two) times daily.          metoprolol tartrate 25 MG Tabs  Commonly known as: Lopressor  Next dose due: Tonight, 1/15/2024      Take 0.5 tablets (12.5 mg total) by mouth TID Beta Blocker/Cardiac.   Durre Givens         Multi-Vitamin Tabs  Next dose due: Tuesday, 1/16/2024      Take 1 tablet by mouth daily.          omeprazole 20 MG Cpdr  Commonly known as: PriLOSEC  Next dose due: Tuesday, 1/16/2024      Take 1 capsule (20 mg total) by mouth every morning.          risperiDONE 0.25 MG Tabs  Commonly known as: RisperDAL  Next dose due: Tonight, 1/15/2024      Take 2 tablets (0.5 mg total) by mouth nightly. May also take 1 tablet (0.25 mg total) nightly as needed (sleep).   Hayley Mazariegos         saccharomyces boulardii 250 MG Caps  Commonly known as: Florastor  Next dose due: Tuesday, 1/16/2024      Take 1 capsule (250 mg total) by mouth daily.                Where to Get Your Medications      Please  your prescriptions at the location directed by your doctor or nurse    Bring a paper prescription for each of  these medications  risperiDONE 0.25 MG Tabs           [id number] - MAR ACTION REPORT  (last 48 hrs)    ** SITE UNKNOWN **     Order ID Medication Name Action Time Action Reason Comments    [id number] acetaminophen (Tylenol Extra Strength) tab 1,000 mg 01/15/24 1424 Given      [id number] atorvastatin (Lipitor) tab 10 mg 01/13/24 2106 Given      [id number] atorvastatin (Lipitor) tab 10 mg 01/14/24 2018 Given      [id number] escitalopram (Lexapro) tab 20 mg 01/13/24 2107 Given      [id number] escitalopram (Lexapro) tab 20 mg 01/14/24 2018 Given      [id number] ferrous sulfate DR tab 325 mg 01/14/24 0838 Given      [id number] ferrous sulfate DR tab 325 mg 01/15/24 0906 Given      [id number] folic acid (Folvite) tab 1 mg 01/14/24 0838 Given      [id number] folic acid (Folvite) tab 1 mg 01/15/24 0906 Given      [id number] labetalol (Trandate) 5 mg/mL injection 10 mg 01/15/24 0920 Given      [id number] losartan (Cozaar) tab 100 mg 01/15/24 0905 Given      [id number] methenamine (Hiprex) tab 1 g 01/13/24 2106 Given      [id number] methenamine (Hiprex) tab 1 g 01/14/24 0838 Given      [id number] methenamine (Hiprex) tab 1 g 01/14/24 2018 Given      [id number] methenamine (Hiprex) tab 1 g 01/15/24 0910 Given      [id number] metoprolol tartrate (Lopressor) tab 25 mg 01/13/24 2106 Given      [id number] metoprolol tartrate (Lopressor) tab 25 mg 01/14/24 0638 Given  /69    [id number] metoprolol tartrate (Lopressor) tab 25 mg 01/14/24 1450 Given      [id number] metoprolol tartrate (Lopressor) tab 25 mg 01/14/24 2018 Given      [id number] metoprolol tartrate (Lopressor) tab 25 mg 01/15/24 0636 Given      [id number] metoprolol tartrate (Lopressor) tab 25 mg 01/15/24 1423 Given      [id number] pantoprazole (Protonix) DR tab 20 mg 01/14/24 0638 Given      [id number] pantoprazole (Protonix) DR tab 20 mg 01/15/24 0634 Given      [id number] risperiDONE (RisperDAL) tab 0.5 mg (And Linked Group #1) 01/13/24 2109 Given      [id number] risperiDONE (RisperDAL)  tab 0.5 mg 01/14/24 2022 Given            LEFT LOWER ABDOMEN     Order ID Medication Name Action Time Action Reason Comments    [id number] heparin (Porcine) 5000 UNIT/ML injection 5,000 Units 01/13/24 2105 Given      [id number] heparin (Porcine) 5000 UNIT/ML injection 5,000 Units 01/14/24 0838 Given      [id number] heparin (Porcine) 5000 UNIT/ML injection 5,000 Units 01/15/24 0912 Given            RIGHT UPPER ABDOMEN     Order ID Medication Name Action Time Action Reason Comments    [id number] heparin (Porcine) 5000 UNIT/ML injection 5,000 Units 01/14/24 2019 Given              Recent Vital Signs    Flowsheet Row Most Recent Value   /79 Filed at 01/15/2024 1134   Pulse 73 Filed at 01/15/2024 1356   Resp 18 Filed at 01/15/2024 1134   Temp 98.1 °F (36.7 °C) Filed at 01/15/2024 1134   SpO2 97 % Filed at 01/15/2024 1134      Patient's Most Recent Weight    Flowsheet Row Most Recent Value   Patient Weight 60.6 kg (133 lb 9.6 oz)      Lab Results Last 24 Hours    No matching results found     Microbiology Results (All)     Procedure Component Value Units Date/Time    SARS-CoV-2/Flu A and B/RSV by PCR (GeneXpert) [[id number]]  (Normal) Collected: 01/09/24 0633    Order Status: Completed Lab Status: Final result Updated: 01/09/24 0723    Specimen: Other from Nares      SARS-CoV-2 (COVID-19) - (GeneXpert) Not Detected     Influenza A by PCR Negative     Influenza B by PCR Negative     RSV by PCR Negative    Narrative:      This test is intended for the qualitative detection and differentiation of SARS-CoV-2, influenza A, influenza B, and respiratory syncytial virus (RSV) viral RNA in nasopharyngeal or nares swabs from individuals suspected of respiratory viral infection consistent with COVID-19 by their healthcare provider. Signs and symptoms of respiratory viral infection due to SARS-CoV-2, influenza, and RSV can be similar.    Test performed using the [employer] Xpress SARS-CoV-2/FLU/RSV (real time RT-PCR)  assay on the  [employer] instrument, [place], [place] [address].   This test is being used under the Food and Drug Administration's Emergency Use Authorization.    The authorized Fact Sheet for Healthcare Providers for this assay is available upon request from the laboratory.         H&P - H&P Note      H&P signed by Casey Scott MD at 2024 12:56 PM  Version 1 of 1    Author: Casey Scott MD Service: Hospitalist Author Type: Physician    Filed: 2024 12:56 PM Date of Service: 2024 12:43 PM Status: Signed    : Casey Scott MD (Physician)         [facility]     Fe Sal Patient Status:  Observation    1935 MRN [id number]   Location [facility] 2NE-A Attending Tom Choudhury MD   Hosp Day # 0 PCP Ankit Singh MD     Chief Complaint: delirium    Subjective:    History of Present Illness:     Fe Sal is a 88 year old female with delirium.  Fell out of chair.  Son says she has been more confused and sundowning lately.  Also with some increased weight gain 20lbs last month.  CXR in ER with pulm edema changes.  Patient cannot give hx at this time.  No recent fevers, n/v, diarrhea, cough per son.    History/Other:    Past Medical History:  Past Medical History:   Diagnosis Date    Anxiety     Arthritis     knees and shoulders    Breast CA (HCC)     right breast cancer with mastectomy.    Cancer (HCC)     right breast ca    Cystitis, interstitial     history of----    Flatulence/gas pain/belching     Frequent UTI     GI bleed     H/O mammogram 2011    left breast    H/O mammogram 2015    benign    H/O pregnancy 1967,1969        HIGH BLOOD PRESSURE     HIGH CHOLESTEROL     History of endoscopy 2013    Dr. Mesa    History of stomach ulcers     Loss of appetite     Nausea     Osteoarthritis     Other and unspecified hyperlipidemia     Other and unspecified personal history of malignant neoplasm     dr. luann mccall, ill   breast cancer right    Pap smear for cervical cancer screening 09/18/2003    wnl    Renal cyst, right 07/15/2015    Refer to Dr. Shaffer/urology.    Rheumatic fever     Stroke (HCC)     Unspecified essential hypertension     Visual impairment      Past Surgical History:   Past Surgical History:   Procedure Laterality Date    COLONOSCOPY  2/02 4/08 2008 Dr. Perez, repeat in five years, family history of colon cancer, Diverticulosis    COLONOSCOPY  08/21/2013    Dr. Maciej Mesa    HYSTERECTOMY  1985    partial hystero.    MASTECTOMY RIGHT Right 2001    right breast cancer with mastectomy.    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    SPECIAL SERVICE OR REPORT  2001    mastectomy right    UNSPECIFIED DIAGNOSTIC PROCE  2005    cystoscopy      Family History:   Family History   Problem Relation Age of Onset    Heart Disorder Father         chf    Cancer Mother         colon    Stroke Mother     Colon Cancer Mother     Breast Cancer Self 65    Hypertension Brother        hypertension Social History:    reports that she has never smoked. She has never been exposed to tobacco smoke. She has never used smokeless tobacco. She reports that she does not drink alcohol and does not use drugs.     Allergies:   Allergies   Allergen Reactions    Bactrim [Sulfamethoxazole W/Trimethoprim] NAUSEA ONLY    Nitrofurantoin NAUSEA ONLY       Medications:    Current Facility-Administered Medications on File Prior to Encounter   Medication Dose Route Frequency Provider Last Rate Last Admin    [COMPLETED] potassium chloride (K-Dur) tab 20 mEq  20 mEq Oral Once Brigido King MD   20 mEq at 10/11/23 1403    [COMPLETED] influenza vaccine high dose quad (Fluzone QIV HD) 0.7 mL IM injection (ages >/= 65 years) 0.7 mL  0.7 mL Intramuscular Prior to discharge Tom Choudhury MD   0.7 mL at 10/15/23 1304     Current Outpatient Medications on File Prior to Encounter   Medication Sig Dispense Refill    QUEtiapine 50 MG Oral Tab Take 2 tablets (100  mg total) by mouth nightly.      methenamine 1 g Oral Tab Take 1 tablet (1 g total) by mouth 2 (two) times daily.      Cholestyramine 4 g Oral Powd Pack Take 1 packet by mouth 2 (two) times daily.      saccharomyces boulardii 250 MG Oral Cap Take 1 capsule (250 mg total) by mouth daily.      [] ciprofloxacin 500 MG Oral Tab Take 1 tablet (500 mg total) by mouth 2 (two) times daily.      alum-mag hydroxide-simethicone 200-200-20 MG/5ML Oral Suspension Take 15 mL by mouth daily as needed for Indigestion.      [] saccharomyces boulardii 250 MG Oral Cap Take 1 capsule (250 mg total) by mouth 2 (two) times daily.      lidocaine 5 % External Patch Place 1 patch onto the skin daily. 12h on and 12h off      omeprazole 20 MG Oral Capsule Delayed Release Take 1 capsule (20 mg total) by mouth every morning.      metoprolol tartrate 25 MG Oral Tab Take 0.5 tablets (12.5 mg total) by mouth TID Beta Blocker/Cardiac. 30 tablet 2    folic acid 1 MG Oral Tab Take 1 tablet (1 mg total) by mouth daily. 30 tablet 0    atorvastatin 10 MG Oral Tab Take 1 tablet (10 mg total) by mouth nightly. 30 tablet 3    OLANZapine 5 MG Oral Tab Take 1 tablet (5 mg total) by mouth nightly as needed (agitation, aggression, insomnia). 30 tablet 1    OLANZapine 2.5 MG Oral Tab Take 1 tablet (2.5 mg total) by mouth 2 (two) times daily as needed (agitation, psychosis). 30 tablet 1    aspirin 325 MG Oral Tab Take 1 tablet (325 mg total) by mouth daily.      ferrous sulfate 325 (65 FE) MG Oral Tab EC Take 1 tablet (325 mg total) by mouth daily with breakfast.      losartan 100 MG Oral Tab Take 1 tablet (100 mg total) by mouth daily.      desmopressin 0.1 MG Oral Tab TAKE 1 TABLET(0.1 MG) BY MOUTH EVERY NIGHT 90 tablet 1    Multiple Vitamin (MULTI-VITAMIN) Oral Tab Take 1 tablet by mouth daily.      ESCITALOPRAM 20 MG Oral Tab TAKE 1 TABLET(20 MG) BY MOUTH DAILY (Patient taking differently: Take 1 tablet (20 mg total) by mouth at bedtime.) 90  tablet 1    acetaminophen 325 MG Oral Tab Take 2 tablets (650 mg total) by mouth every 6 (six) hours as needed.      CRANBERRY EXTRACT OR Take 4,200 mg by mouth 3 (three) times daily.      Omega-3 Fatty Acids (FISH OIL) 1200 MG Oral Cap Take 1,200 mg by mouth daily.         Review of Systems: ROS per son at bedside.  A comprehensive 10 point review of systems was completed.    Pertinent positives and negatives noted in the HPI.    Objective:   Physical Exam:    /75 (BP Location: Right arm)   Pulse 84   Temp 97.1 °F (36.2 °C) (Oral)   Resp 18   Wt 136 lb 11 oz (62 kg)   SpO2 92%   BMI 23.46 kg/m²   General: confused, rousable but somnoelnt  Respiratory: No rhonchi, no wheezes  Cardiovascular: S1, S2. Regular rate and rhythm  Abdomen: Soft, NT/ND, +BS  Neuro: No new focal deficits  Extremities: trace LE  edema      Results:    Labs:      Labs Last 24 Hours:    Recent Labs   Lab 01/09/24  0657   RBC 3.97   HGB 11.9*   HCT 35.2   MCV 88.7   MCH 30.0   MCHC 33.8   RDW 14.1   NEPRELIM 3.57   WBC 6.5   .0       Recent Labs   Lab 01/09/24  0657   GLU 90   BUN 34*   CREATSERUM 1.00   EGFRCR 55*   CA 9.5   ALB 3.4   *   K 4.2   CL 98   CO2 26.0   ALKPHO 84   AST 17   ALT 18   BILT 0.6   TP 6.8       Lab Results   Component Value Date    INR 1.31 (H) 10/08/2023    INR 1.17 (H) 09/26/2023    INR 1.58 (H) 09/25/2023       Recent Labs   Lab 01/09/24  0657   TROPHS 19       Recent Labs   Lab 01/09/24  0657   PBNP 2,609*       No results for input(s): \"PCT\" in the last 168 hours.    Imaging: Imaging data reviewed in Epic.    Assessment & Plan:      #probable acute on chronic diastolic CHF exacerbation; iv lasix; cards to see  #suspected dementia with delirium and sundowning; psych consult given ongoing delirium and agitation despite increasing dosing of seroquel; also a family request for consult  #hyponatremia; 2/2 chf?  Contribution from ssri? Desmopressin as well. Monitor response to diuretics;  cautiously resume desmopressin until renal can evaluate (son says she is on desmopressin for bladder urgency, not Na correction);  cautious resume of SSRI;  if Na not correcting with diuresis, will need to re-visit SSRI; defer desmopressin to renal; may need to also be stopped  #Hx breast cancer  #Hx stroke  #Hx PUD; s/p epi injection, cautery, endoclips 10/9/23; resume ppi;   #Hx hip fx s/p repair    Will do med rec once review complete.    Quality:  DVT prophylaxis:  SCDs, heparin cautiously; if any melena, will need to be stopped  Code Status: full  Sal: NO  Sal Duration (in days): N/A  Central line: NO  Estimated discharge date: 2-3 days    Plan of care discussed with patient and ER MD Casey Scott MD    Supplementary Documentation:                                 Electronically signed by Casey Scott MD on 2024 12:56 PM              Consults - MD Consult Notes      Consults signed by Brigido King MD at 2024  5:02 PM     Author: Brigido King MD Service: Nephrology Author Type: Physician    Filed: 2024  5:02 PM Date of Service: 2024  4:47 PM Status: Signed    : Brigido King MD (Physician)       [facility]  Report of Consultation    Fe Sal Patient Status:  Observation    1935 MRN [id number]   Location [facility] 2NE-A Attending Tom Choudhury MD   Hosp Day # 0 PCP Ankit Singh MD       Assessment / Plan:    1) Hyponatremia- multifactorial; due to modest solute intake + desmopressin effect (x 1 yr for nocturia / frequency). Discussed with son- given pt's overall decline / non-ambulatory state + edema, will dc DDAVP for now.     2) HFpEF- echo noted; agree with IV loop diuretics    3) Recent UGIB s/p epi injection + cautery / endoclips on PPI    4) Recent hip fx s/p repair    5) Progressive dementia with severe sundowning      Reason for Consultation:  Hyponatremia    History of Present Illness:  Fe Sal is a a(n) 88 year old female. Dictation to  follow    History:  Past Medical History:   Diagnosis Date    Anxiety     Arthritis     knees and shoulders    Breast CA (HCC) 2001    right breast cancer with mastectomy.    Cancer (HCC) 2001    right breast ca    Cystitis, interstitial     history of----    Flatulence/gas pain/belching     Frequent UTI     GI bleed     H/O mammogram 2011    left breast    H/O mammogram 2015    benign    H/O pregnancy 1967,1969        HIGH BLOOD PRESSURE     HIGH CHOLESTEROL     History of endoscopy 2013    Dr. Mesa    History of stomach ulcers     Loss of appetite     Nausea     Osteoarthritis     Other and unspecified hyperlipidemia     Other and unspecified personal history of malignant neoplasm     dr. luann mccall, ill  breast cancer right    Pap smear for cervical cancer screening 2003    wnl    Renal cyst, right 07/15/2015    Refer to Dr. Shaffer/urology.    Rheumatic fever     Stroke (HCC)     Unspecified essential hypertension     Visual impairment      Past Surgical History:   Procedure Laterality Date    COLONOSCOPY   Dr. Perez, repeat in five years, family history of colon cancer, Diverticulosis    COLONOSCOPY  2013    Dr. Maciej Mesa    HYSTERECTOMY  1985    partial hystero.    MASTECTOMY RIGHT Right     right breast cancer with mastectomy.    NEEDLE BIOPSY RIGHT Right     DCIS    SPECIAL SERVICE OR REPORT  2001    mastectomy right    UNSPECIFIED DIAGNOSTIC PROCE  2005    cystoscopy     Family History   Problem Relation Age of Onset    Heart Disorder Father         chf    Cancer Mother         colon    Stroke Mother     Colon Cancer Mother     Breast Cancer Self 65    Hypertension Brother      Denies family history of kidney disease.    reports that she has never smoked. She has never been exposed to tobacco smoke. She has never used smokeless tobacco. She reports that she does not drink alcohol and does not use drugs.    Allergies:  Allergies    Allergen Reactions    Bactrim [Sulfamethoxazole W/Trimethoprim] NAUSEA ONLY    Nitrofurantoin NAUSEA ONLY       Medications:    Current Facility-Administered Medications:     losartan (Cozaar) tab 100 mg, 100 mg, Oral, Daily    metoprolol tartrate (Lopressor) partial tab 12.5 mg, 12.5 mg, Oral, TID Beta Blocker/Cardiac    labetalol (Trandate) 5 mg/mL injection 10 mg, 10 mg, Intravenous, Q6H PRN    acetaminophen (Tylenol Extra Strength) tab 500 mg, 500 mg, Oral, Q4H PRN    melatonin tab 3 mg, 3 mg, Oral, Nightly PRN    polyethylene glycol (PEG 3350) (Miralax) 17 g oral packet 17 g, 17 g, Oral, Daily PRN    sennosides (Senokot) tab 17.2 mg, 17.2 mg, Oral, Nightly PRN    bisacodyl (Dulcolax) 10 MG rectal suppository 10 mg, 10 mg, Rectal, Daily PRN    ondansetron (Zofran) 4 MG/2ML injection 8 mg, 8 mg, Intravenous, Q6H PRN    benzonatate (Tessalon) cap 200 mg, 200 mg, Oral, TID PRN    glycerin-hypromellose- (Artifical Tears) 0.2-0.2-1 % ophthalmic solution 1 drop, 1 drop, Both Eyes, QID PRN    sodium chloride (Saline Mist) 0.65 % nasal solution 1 spray, 1 spray, Each Nare, Q3H PRN    [START ON 1/10/2024] furosemide (Lasix) 10 mg/mL injection 40 mg, 40 mg, Intravenous, Daily    [START ON 1/10/2024] heparin (Porcine) 5000 UNIT/ML injection 5,000 Units, 5,000 Units, Subcutaneous, 2 times per day    atorvastatin (Lipitor) tab 10 mg, 10 mg, Oral, Nightly    desmopressin (Ddavp) tab 0.1 mg, 0.1 mg, Oral, Nightly    escitalopram (Lexapro) tab 20 mg, 20 mg, Oral, Nightly    folic acid (Folvite) tab 1 mg, 1 mg, Oral, Daily    [START ON 1/10/2024] ferrous sulfate DR tab 325 mg, 325 mg, Oral, Daily with breakfast    methenamine (Hiprex) tab 1 g, 1 g, Oral, BID    [START ON 1/10/2024] pantoprazole (Protonix) DR tab 20 mg, 20 mg, Oral, QAM AC    OLANZapine (ZyPREXA) tab 5 mg, 5 mg, Oral, Nightly **AND** OLANZapine (ZyPREXA) tab 2.5 mg, 2.5 mg, Oral, Nightly PRN    OLANZapine (ZyPREXA) tab 2.5 mg, 2.5 mg, Oral, BID PRN  **OR** OLANZapine (Zyprexa) 2.5238 mg in sterile water for injection (PF) IM injection, 2.5238 mg, Intramuscular, BID PRN  No current outpatient medications on file.       Review of Systems:  Please see HPI for pertinent positives. 10 point review of systems otherwise reviewed and negative.     Physical Exam:  BP (!) 171/128 (BP Location: Left arm)   Pulse 91   Temp 98.1 °F (36.7 °C) (Axillary)   Resp 16   Wt 136 lb 11 oz (62 kg)   SpO2 92%   BMI 23.46 kg/m²   Temp (24hrs), Av °F (36.7 °C), Min:97.1 °F (36.2 °C), Max:98.4 °F (36.9 °C)       Intake/Output Summary (Last 24 hours) at 2024 1647  Last data filed at 2024 1228  Gross per 24 hour   Intake --   Output 2450 ml   Net -2450 ml     Wt Readings from Last 3 Encounters:   24 136 lb 11 oz (62 kg)   10/10/23 136 lb 3.9 oz (61.8 kg)   23 126 lb (57.2 kg)     General: awake confused but able to answer simple questions  HEENT: No scleral icterus, MMM  Neck: Supple, no JOHNATHAN or thyromegaly  Cardiac: Regular rate and rhythm, S1, S2 normal, no murmur, rub, or gallop  Lungs: Decreased breath sounds at the bases bilaterally.   Abdomen: Soft, non-tender. + bowel sounds, no palpable organomegaly  Extremities: Without clubbing, cyanosis; 1+ LE edema  Neurologic: Cranial nerves grossly intact, moving all extremities  Skin: Warm and dry, no rashes      Laboratory Data:  Lab Results   Component Value Date    WBC 6.5 2024    HGB 11.9 2024    HCT 35.2 2024    .0 2024    CREATSERUM 1.00 2024    BUN 34 2024     2024    K 4.2 2024    CL 98 2024    CO2 26.0 2024    GLU 90 2024    CA 9.5 2024    ALB 3.4 2024    ALKPHO 84 2024    BILT 0.6 2024    TP 6.8 2024    AST 17 2024    ALT 18 2024       Imaging:  All imaging studies reviewed.      Thank you for allowing me to participate in the care of your patient.    Brigido King MD  2024  4:47  PM      Electronically signed by Brigido King MD on 1/9/2024  5:02 PM           D/C Summary    No notes of this type exist for this encounter.     Imaging Results (HF patients)    Chest X-Ray Results (HF patients only)    No exam resulted this encounter.      2D Echocardiogram Results (HF patients only)    No exam resulted this encounter.      Cath Angiogram Results (HF Patients only)    No exam resulted this encounter.          Physical Therapy Notes (last 72 hours)      Physical Therapy Note signed by Susi Murguia PT at 1/15/2024  5:05 PM  Version 1 of 1    Author: Susi Murguia PT Service: Rehab Author Type: Physical Therapist    Filed: 1/15/2024  5:05 PM Date of Service: 1/15/2024  5:04 PM Status: Signed    : Susi Murguia PT (Physical Therapist)       Chart reviewed, RN approved PT session. Attempted to see pt, however pt is being DC from hospital at 5 pm. CM             Occupational Therapy Notes (last 72 hours)  Notes from 1/12/2024  5:39 PM through 1/15/2024  5:39 PM   No notes of this type exist for this encounter.     Video Swallow Study Notes    No notes of this type exist for this encounter.     SLP Notes    No notes of this type exist for this encounter.     Immunizations     Name Date      Covid-19 Moderna 10/22/21     Covid-19 Moderna 03/16/21     Covid-19 Moderna 02/16/21     Covid-19 Pfizer Bivalent 09/13/22     Fluad 0.5ml 09/13/22     Fluad 0.5ml 10/04/18     Fluad 0.5ml 09/22/17     INFLUENZA 10/15/23     INFLUENZA 10/11/21     INFLUENZA 10/11/21     INFLUENZA 09/08/20     INFLUENZA 09/08/20     INFLUENZA 10/01/19     INFLUENZA 10/01/19     INFLUENZA 11/10/16     INFLUENZA 11/10/16     INFLUENZA 10/28/15     INFLUENZA 10/28/15     INFLUENZA 10/21/14     INFLUENZA 10/09/12     INFLUENZA 10/29/11     Pneumococcal (Prevnar 13) 10/28/15     Pneumovax 23 10/21/14     TDAP 07/26/14     Zoster Vaccine Recombinant Adjuvanted (Shingrix) 11/12/22     Zoster Vaccine Recombinant  Adjuvanted (Shingrix) 09/04/22       Multidisciplinary Problems     Active Goals     Not on file          Resolved Goals        Problem: Patient/Family Goals    Goal Priority Disciplines Outcome Interventions   Patient/Family Long Term Goal   (Resolved)     Interdisciplinary Completed    Description: Patient's Long Term Goal: Stay out of the hospital    Interventions:  - Follow up with PCP  -Take medication as prescribed  -Healthy lifestyle  - See additional Care Plan goals for specific interventions   Patient/Family Short Term Goal   (Resolved)     Interdisciplinary Completed    Description: Patient's Short Term Goal: Feel better    Interventions:   - Hourly rounding  -Pain assessment  -[place] patient  - See additional Care Plan goals for specific interventions

## (undated) NOTE — LETTER
Patient Name: Marquis Whitten  YOB: 1935          MRN :  [id number]  Date:  8/14/2023  Referring Physician:  Lane Mccallum    Discharge Summary  Pt has attended 9 visits in Physical Therapy. Diagnosis:   Physical deconditioning (R53.81)  Balance problem (R26.89)  H/O: stroke with residual effects (I69.30)    Referring Provider: Edmar Alejandra  Date of Evaluation:    6/12/2023    Precautions:  Fall Risk Next MD visit:   none scheduled   Insurance Primary/Secondary: Douglas Aguirre / N/A     # Auth Visits: 10 POC            Subjective: The patient reports that she is doing ok, she is tired today. She notes that her balance and walking are the same. Pain: pt here for balance      Objective:       6 minute walk test with RW: 298 ft, last test: 317 feet. Initial test: 290 ft. Normative value for community dwelling elderly: 80 M (1286 ft)    4-Stage Balance Test:   -feet apart: 30 sec   - Feet together: 8 sec (unable at eval)   - Modified Tandem:  5 (unable at eval)   - Tandem Stance: unable sec Fall Risk: yes   - SLS: R unable sec, L unble sec Fall Risk: yes  [Full tandem stance <10 sec indicates increased risk of falling]  Age appropriate norms for SLS: 61-75 y/o mean = 27.0 sec      70-80 y/o mean = 17.2 sec      80-98 y/o mean = 8.5 sec     TUG (AD, time): 36.34 (was 1:01)    Fall Risk: yes  [Performance exceeding the upper limit of confidence intervals are considered increased risk for falls; Dayan, 2006. .. 80-81 y/o mean 11.3s (10.0-12.7s)]    Functional Mobility:  5x sit<>stand: 31.96 (normative age value is 14.8 seconds)   Fall Risk: yes          Assessment: The patient improved in her TUG and 4 stage balance test, however 6MWT and 5x sit to stand were decreased from initial evaluation. This may be related to time of day with testing as these are more endurance type tests, and the patient had a later appointment and noted that she was fatigued today.  Overall she has improved in her ability to balance without upper extremity support, and she can walk a farther distance before requiring a seated rest break. She did not take a seated rest break until 4 minutes into the walk testing today. Bunny Morgan has met or partially met most of her PT goals and will be discharged from this episode of care for physical therapy. She will continue with her HEP with her caregiver. Goals:   (to be met in 10 visits)  Pt will demonstrate improved balance feet together to >20 seconds to improve static balance  PARTIALLY MET  Pt will perform TUG in <55 seconds with least restrictive AD, demonstrating improved gait speed for improved participation in ADL such as community ambulation   MET  Pt will demonstrate the ability to ambulate for 1 min without requiring a seated rest break  MET  Pt will be able to perform 4x STS in 20 seconds or less  NOT MET  Pt will improve functional hip strength to demonstrate ability to ascend/descend 1 flight of stairs reciprocally with use of handrail MET  Pt will be independent and compliant with comprehensive HEP to maintain progress achieved in PT  MET      Plan: The patient will be discharged from this episode of care for physical therapy at this time. Patient/Family/Caregiver was advised of these findings, precautions, and treatment options and has agreed to actively participate in planning and for this course of care. Thank you for your referral. If you have any questions, please contact me at Dept: [phone]. Sincerely,  Electronically signed by therapist: Kwan Mace, PT     Certification From: 7/60/4794  To:11/12/2023            21st Century Cures Act Notice to Patient: Medical documents like this are made available to patients in the interest of transparency. However, be advised this is a medical document and it is intended as [id number] communication between your medical providers.  This medical document may contain abbreviations, assessments, medical data, and results or other terms that are unfamiliar. Medical documents are intended to carry relevant information, facts as evident, and the clinical opinion of the practitioner. As such, this medical document may be written in language that appears blunt or direct. You are encouraged to contact your medical provider and/or [facility] Patient Experience if you have any questions about this medical document.

## (undated) NOTE — LETTER
ASTHMA ACTION PLAN for Fe Sal     : 1935     Date: 2019  Provider:  Estephanie Elias MD  Phone for doctor or clinic: [facility], [address] [place] [address] [phone]

## (undated) NOTE — LETTER
[address] [facility] FOR BLOOD OR BLOOD COMPONENTS      In the course of your treatment, it may become necessary to administer a transfusion of blood or blood components. This form provides basic information concerning this procedure and, if signed by you, authorizes its performance by qualified medical personnel. DESCRIPTION OF PROCEDURE:  Blood is introduced into one of your veins, commonly in the arm, using a sterilized disposable needle. The amount of blood transfused, and whether the transfusion will be of blood or blood components is a judgment the physician will make based on your particular needs. RISKS:  The transfusion is a common procedure of low risk. MINOR AND TEMPORARY REACTIONS ARE NOT UNCOMMON, including a slight bruise, swelling or local reaction in the area where the needle pierces your skin, or a non-serious reaction to the transfused material itself, including headache, fever or a mild skin reaction, such as rash. Serious reactions are possible, though very unlikely and include severe allergic reaction (shock)  and destruction (hemolysis) of transfused blood cells. Infectious diseases which are known to be transmitted by blood transfusion include CERTAIN TYPES OF VIRAL HEPATITIS, a viral infection of the liver, HUMAN IMMUNODEFICIENCY VIRUS (HIV-1,2) infection, a viral infection known to cause ACQUIRED IMMUNODEFICIENCY SYNDROME (AIDS) AS WELL AS CERTAIN OTHER BACTERIAL, VIRAL AND PARASITIC DISEASES. While a minimal risk of acquiring an infectious disease from transfused blood exists, in accordance with Federal and State law all due care has been taken in donor selection and testing to avoid transmission of disease. ALTERNATIVES:  If loss of blood poses serious threats in the course of your treatment, THERE IS NO EFFECTIVE ALTERNATIVE TO BLOOD TRANSFUSION.  However, if you have any further questions on this matter, your physician will fully explain the alternatives to you if it has not already been done. I,Fe Sal, have read/had read to me the above. I understand the matters bearing on the decision whether or not to authorize a transfusion of blood or blood components. I have no questions which have not been answered to my full satisfaction.  I hereby consent to such transfusion as  my physician may deem necessary or advisable in the course of my treatment.        _______________   __________________________________________________  Date     Signature of Patient, Parent or Legal Guardian      ([place])      __________________________________________  Witness to Signature (title or relationship to patient)    Patient Name: Bertin Pina     : 1935                 Printed: 2023     Medical Record #: [id number]                    Page 1 of 1

## (undated) NOTE — LETTER
[address] [facility] FOR BLOOD OR BLOOD COMPONENTS      In the course of your treatment, it may become necessary to administer a transfusion of blood or blood components. This form provides basic information concerning this procedure and, if signed by you, authorizes its performance by qualified medical personnel. DESCRIPTION OF PROCEDURE:  Blood is introduced into one of your veins, commonly in the arm, using a sterilized disposable needle. The amount of blood transfused, and whether the transfusion will be of blood or blood components is a judgment the physician will make based on your particular needs. RISKS:  The transfusion is a common procedure of low risk. MINOR AND TEMPORARY REACTIONS ARE NOT UNCOMMON, including a slight bruise, swelling or local reaction in the area where the needle pierces your skin, or a non-serious reaction to the transfused material itself, including headache, fever or a mild skin reaction, such as rash. Serious reactions are possible, though very unlikely and include severe allergic reaction (shock)  and destruction (hemolysis) of transfused blood cells. Infectious diseases which are known to be transmitted by blood transfusion include CERTAIN TYPES OF VIRAL HEPATITIS, a viral infection of the liver, HUMAN IMMUNODEFICIENCY VIRUS (HIV-1,2) infection, a viral infection known to cause ACQUIRED IMMUNODEFICIENCY SYNDROME (AIDS) AS WELL AS CERTAIN OTHER BACTERIAL, VIRAL AND PARASITIC DISEASES. While a minimal risk of acquiring an infectious disease from transfused blood exists, in accordance with Federal and State law all due care has been taken in donor selection and testing to avoid transmission of disease. ALTERNATIVES:  If loss of blood poses serious threats in the course of your treatment, THERE IS NO EFFECTIVE ALTERNATIVE TO BLOOD TRANSFUSION.  However, if you have any further questions on this matter, your physician will fully explain the alternatives to you if it has not already been done. I,Fe Sal, have read/had read to me the above. I understand the matters bearing on the decision whether or not to authorize a transfusion of blood or blood components. I have no questions which have not been answered to my full satisfaction.  I hereby consent to such transfusion as  my physician may deem necessary or advisable in the course of my treatment.        _______________   __________________________________________________  Date     Signature of Patient, Parent or Legal Guardian      ([place])      __________________________________________  Witness to Signature (title or relationship to patient)    Patient Name: Fannie Gibson     : 1935                 Printed: 2023     Medical Record #: [id number]                    Page 1 of 1

## (undated) NOTE — LETTER
October 31, 2019    [address] [facility] [id number]    Dear Misbah Vu:  It was a pleasure speaking with you over the phone recently.  To follow up, I wanted to send you some contact information to utilize when you have a question and or n